# Patient Record
Sex: FEMALE | Race: WHITE | NOT HISPANIC OR LATINO | Employment: OTHER | URBAN - METROPOLITAN AREA
[De-identification: names, ages, dates, MRNs, and addresses within clinical notes are randomized per-mention and may not be internally consistent; named-entity substitution may affect disease eponyms.]

---

## 2017-01-17 ENCOUNTER — TELEPHONE (OUTPATIENT)
Dept: ORTHOPEDICS | Facility: CLINIC | Age: 52
End: 2017-01-17

## 2017-01-17 NOTE — TELEPHONE ENCOUNTER
Pt has a appointment on 01/31/17, pt needs labs couple days before, left voicemail for pt,    Chani Singleton CMA (AAMA)

## 2017-02-17 DIAGNOSIS — C50.412 MALIGNANT NEOPLASM OF UPPER-OUTER QUADRANT OF LEFT FEMALE BREAST (H): ICD-10-CM

## 2017-02-17 LAB
ALBUMIN SERPL-MCNC: 4.2 G/DL (ref 3.4–5)
ALP SERPL-CCNC: 87 U/L (ref 40–150)
ALT SERPL W P-5'-P-CCNC: 33 U/L (ref 0–50)
ANION GAP SERPL CALCULATED.3IONS-SCNC: 6 MMOL/L (ref 3–14)
AST SERPL W P-5'-P-CCNC: 20 U/L (ref 0–45)
BASOPHILS # BLD AUTO: 0 10E9/L (ref 0–0.2)
BASOPHILS NFR BLD AUTO: 0.6 %
BILIRUB SERPL-MCNC: 0.6 MG/DL (ref 0.2–1.3)
BUN SERPL-MCNC: 16 MG/DL (ref 7–30)
CALCIUM SERPL-MCNC: 9.4 MG/DL (ref 8.5–10.1)
CANCER AG27-29 SERPL-ACNC: 24 U/ML (ref 0–39)
CHLORIDE SERPL-SCNC: 104 MMOL/L (ref 94–109)
CO2 SERPL-SCNC: 31 MMOL/L (ref 20–32)
CREAT SERPL-MCNC: 0.85 MG/DL (ref 0.52–1.04)
DIFFERENTIAL METHOD BLD: NORMAL
EOSINOPHIL # BLD AUTO: 0.1 10E9/L (ref 0–0.7)
EOSINOPHIL NFR BLD AUTO: 1.4 %
ERYTHROCYTE [DISTWIDTH] IN BLOOD BY AUTOMATED COUNT: 12.6 % (ref 10–15)
GFR SERPL CREATININE-BSD FRML MDRD: 71 ML/MIN/1.7M2
GLUCOSE SERPL-MCNC: 86 MG/DL (ref 70–99)
HCT VFR BLD AUTO: 43.2 % (ref 35–47)
HGB BLD-MCNC: 14.8 G/DL (ref 11.7–15.7)
IMM GRANULOCYTES # BLD: 0 10E9/L (ref 0–0.4)
IMM GRANULOCYTES NFR BLD: 0.1 %
LYMPHOCYTES # BLD AUTO: 2 10E9/L (ref 0.8–5.3)
LYMPHOCYTES NFR BLD AUTO: 28.3 %
MCH RBC QN AUTO: 32 PG (ref 26.5–33)
MCHC RBC AUTO-ENTMCNC: 34.3 G/DL (ref 31.5–36.5)
MCV RBC AUTO: 94 FL (ref 78–100)
MONOCYTES # BLD AUTO: 0.5 10E9/L (ref 0–1.3)
MONOCYTES NFR BLD AUTO: 6.6 %
NEUTROPHILS # BLD AUTO: 4.6 10E9/L (ref 1.6–8.3)
NEUTROPHILS NFR BLD AUTO: 63 %
PLATELET # BLD AUTO: 173 10E9/L (ref 150–450)
POTASSIUM SERPL-SCNC: 4 MMOL/L (ref 3.4–5.3)
PROT SERPL-MCNC: 7.7 G/DL (ref 6.8–8.8)
RBC # BLD AUTO: 4.62 10E12/L (ref 3.8–5.2)
SODIUM SERPL-SCNC: 141 MMOL/L (ref 133–144)
WBC # BLD AUTO: 7.2 10E9/L (ref 4–11)

## 2017-02-17 PROCEDURE — 36415 COLL VENOUS BLD VENIPUNCTURE: CPT | Performed by: PHYSICIAN ASSISTANT

## 2017-02-17 PROCEDURE — 80053 COMPREHEN METABOLIC PANEL: CPT | Performed by: PHYSICIAN ASSISTANT

## 2017-02-17 PROCEDURE — 86300 IMMUNOASSAY TUMOR CA 15-3: CPT | Performed by: PHYSICIAN ASSISTANT

## 2017-02-17 PROCEDURE — 85025 COMPLETE CBC W/AUTO DIFF WBC: CPT | Performed by: PHYSICIAN ASSISTANT

## 2017-02-21 ENCOUNTER — ONCOLOGY VISIT (OUTPATIENT)
Dept: ONCOLOGY | Facility: CLINIC | Age: 52
End: 2017-02-21
Payer: COMMERCIAL

## 2017-02-21 VITALS
DIASTOLIC BLOOD PRESSURE: 48 MMHG | RESPIRATION RATE: 18 BRPM | HEART RATE: 64 BPM | OXYGEN SATURATION: 100 % | WEIGHT: 131 LBS | BODY MASS INDEX: 19.85 KG/M2 | HEIGHT: 68 IN | SYSTOLIC BLOOD PRESSURE: 96 MMHG | TEMPERATURE: 96.5 F

## 2017-02-21 DIAGNOSIS — C50.412 MALIGNANT NEOPLASM OF UPPER-OUTER QUADRANT OF LEFT FEMALE BREAST (H): Primary | ICD-10-CM

## 2017-02-21 PROCEDURE — 99214 OFFICE O/P EST MOD 30 MIN: CPT | Performed by: INTERNAL MEDICINE

## 2017-02-21 RX ORDER — TAMOXIFEN CITRATE 20 MG/1
20 TABLET ORAL DAILY
Qty: 90 TABLET | Refills: 1 | Status: SHIPPED | OUTPATIENT
Start: 2017-02-21 | End: 2017-08-22

## 2017-02-21 NOTE — NURSING NOTE
"Emma Nunn is a 51 year old female who presents for:  Chief Complaint   Patient presents with     Oncology Clinic Visit     6 month follow up for breast cancer     Results     Labs done on 02/17/17        Initial Vitals:  BP 96/48 (BP Location: Right arm, Patient Position: Chair, Cuff Size: Adult Regular)  Pulse 64  Temp 96.5  F (35.8  C) (Temporal)  Resp 18  Ht 1.722 m (5' 7.79\")  Wt 59.4 kg (131 lb)  SpO2 100%  BMI 20.04 kg/m2 Estimated body mass index is 20.04 kg/(m^2) as calculated from the following:    Height as of this encounter: 1.722 m (5' 7.79\").    Weight as of this encounter: 59.4 kg (131 lb).. Body surface area is 1.69 meters squared. BP completed using cuff size: regular  Data Unavailable No LMP recorded. Patient is not currently having periods (Reason: Irregular Periods). Allergies and medications reviewed.     Medications: Medication refills not needed today.  Pharmacy name entered into EPIC:    [facility] - [place], [place] - [address] [place] [address] PHARMACY [place], [place] - [address] [place] [address]     Comments:     5 minutes for nursing intake (face to face time)   Victor Manuel Alejo MA        "

## 2017-02-21 NOTE — PROGRESS NOTES
Hematology/ Oncology Follow-up Visit:  Feb 21, 2017    Reason for Visit:   Chief Complaint   Patient presents with     Oncology Clinic Visit     6 month follow up for breast cancer     Results     Labs done on 02/17/17       Oncologic History:  Malignant neoplasm of female breast (ALYCE Nunn was diagnosed at age 47, premenopausally through self-breast check, felt a lump in her left outer upper quadrant. Diagnostic mammogram early 07/2013 identified asymmetrical density associated with palpable findings around 1-2 o'clock position, 8-10 cm from the nipple. Limited sonogram revealed hypoechoic mass with irregular borders, measured about 0.8 cm trocar position. Sonogram-guided biopsy indicating invasive ductal cancer, grade 2, with associated DCIS, ER/CT 90% positive, HER-2/sandy positive, FISH ratio greater than 11.7. she had lumpectomy 7/2013 - 1.3 cm LAURA, GII, + ALI, margin is close <=1 mm, +DCIS, 8LN negative.  She has [id number] stage I disease. TCH was recommended from 8//15/2013 to 12/2013 and herceptin after. . She finished RT 2/28/2014 and tamoxifen started in 3/2014.    Interval History:  The patient is here today for follow-up. She has been feeling well without any recent complaints of bony aches or pains. She denies any nausea vomiting or diarrhea. She denies any shortness of breath cough or wheezing. She is currently on tamoxifen. She has been tolerating tamoxifen without significant side effects.      Review Of Systems:  Constitutional: Negative for fever, chills, and night sweats.  Skin: negative.  Eyes: negative.  Ears/Nose/Throat: negative.  Respiratory: No shortness of breath, dyspnea on exertion, cough, or hemoptysis.  Cardiovascular: negative.  Gastrointestinal: negative.  Genitourinary: negative.  Musculoskeletal: negative.  Neurologic: negative.  Psychiatric: negative.  Hematologic/Lymphatic/Immunologic: negative.  Endocrine: negative.    All other ROS negative unless mentioned in interval  "history.    Past medical, social, surgical, and family histories reviewed.    Allergies:  Allergies as of 02/21/2017 - Ray as Reviewed 02/21/2017   Allergen Reaction Noted     No known drug allergies  04/02/2001       Current Medications:  Current Outpatient Prescriptions   Medication Sig Dispense Refill     tamoxifen (NOLVADEX) 20 MG tablet Take 1 tablet (20 mg) by mouth daily 90 tablet 1     levothyroxine (SYNTHROID, LEVOTHROID) 88 MCG tablet Take 1 tablet (88 mcg) by mouth daily 90 tablet 3        Physical Exam:  BP 96/48 (BP Location: Right arm, Patient Position: Chair, Cuff Size: Adult Regular)  Pulse 64  Temp 96.5  F (35.8  C) (Temporal)  Resp 18  Ht 1.722 m (5' 7.79\")  Wt 59.4 kg (131 lb)  SpO2 100%  BMI 20.04 kg/m2  Wt Readings from Last 12 Encounters:   02/21/17 59.4 kg (131 lb)   11/25/16 58.5 kg (129 lb)   07/20/16 63.4 kg (139 lb 11.2 oz)   01/19/16 71.9 kg (158 lb 8 oz)   07/16/15 67.1 kg (148 lb)   05/27/15 65.3 kg (144 lb)   04/24/15 63 kg (139 lb)   04/23/15 63 kg (139 lb)   01/15/15 65.8 kg (145 lb)   10/02/14 68 kg (150 lb)   09/15/14 67.9 kg (149 lb 12.8 oz)   07/23/14 65.7 kg (144 lb 14.4 oz)     ECOG performance status: 0  GENERAL APPEARANCE: Healthy, alert and in no acute distress.  HEENT: Sclerae anicteric. PERRLA. Oropharynx without ulcers, lesions, or thrush.  NECK: Supple. No asymmetry or masses.  LYMPHATICS: No palpable cervical, supraclavicular, axillary, or inguinal lymphadenopathy.  RESP: Lungs clear to auscultation bilaterally without rales, rhonchi or wheezes.  BREAST: There is no dominant masses or axillary adenopathy bilaterally. CARDIOVASCULAR: Regular rate and rhythm. Normal S1, S2; no S3 or S4. No murmur, gallop, or rub.  ABDOMEN: Soft, nontender. Bowel sounds normal. No palpable organomegaly or masses.  MUSCULOSKELETAL: Extremities without gross deformities noted. No edema of bilateral lower extremities.  SKIN: No suspicious lesions or rashes.  NEURO: Alert and oriented x 3. " Cranial nerves II-XII grossly intact.  PSYCHIATRIC: Mentation and affect appear normal.    Laboratory/Imaging Studies:  Orders Only on 02/17/2017   Component Date Value Ref Range Status     WBC 02/17/2017 7.2  4.0 - 11.0 10e9/L Final     RBC Count 02/17/2017 4.62  3.8 - 5.2 10e12/L Final     Hemoglobin 02/17/2017 14.8  11.7 - 15.7 g/dL Final     Hematocrit 02/17/2017 43.2  35.0 - 47.0 % Final     MCV 02/17/2017 94  78 - 100 fl Final     MCH 02/17/2017 32.0  26.5 - 33.0 pg Final     MCHC 02/17/2017 34.3  31.5 - 36.5 g/dL Final     RDW 02/17/2017 12.6  10.0 - 15.0 % Final     Platelet Count 02/17/2017 173  150 - 450 10e9/L Final     Diff Method 02/17/2017 Automated Method   Final     % Neutrophils 02/17/2017 63.0  % Final     % Lymphocytes 02/17/2017 28.3  % Final     % Monocytes 02/17/2017 6.6  % Final     % Eosinophils 02/17/2017 1.4  % Final     % Basophils 02/17/2017 0.6  % Final     % Immature Granulocytes 02/17/2017 0.1  % Final     Absolute Neutrophil 02/17/2017 4.6  1.6 - 8.3 10e9/L Final     Absolute Lymphocytes 02/17/2017 2.0  0.8 - 5.3 10e9/L Final     Absolute Monocytes 02/17/2017 0.5  0.0 - 1.3 10e9/L Final     Absolute Eosinophils 02/17/2017 0.1  0.0 - 0.7 10e9/L Final     Absolute Basophils 02/17/2017 0.0  0.0 - 0.2 10e9/L Final     Abs Immature Granulocytes 02/17/2017 0.0  0 - 0.4 10e9/L Final     Sodium 02/17/2017 141  133 - 144 mmol/L Final     Potassium 02/17/2017 4.0  3.4 - 5.3 mmol/L Final     Chloride 02/17/2017 104  94 - 109 mmol/L Final     Carbon Dioxide 02/17/2017 31  20 - 32 mmol/L Final     Anion Gap 02/17/2017 6  3 - 14 mmol/L Final     Glucose 02/17/2017 86  70 - 99 mg/dL Final     Urea Nitrogen 02/17/2017 16  7 - 30 mg/dL Final     Creatinine 02/17/2017 0.85  0.52 - 1.04 mg/dL Final     GFR Estimate 02/17/2017 71  >60 mL/min/1.7m2 Final    Non  GFR Calc     GFR Estimate If Black 02/17/2017 86  >60 mL/min/1.7m2 Final    African American GFR Calc     Calcium 02/17/2017 9.4   8.5 - 10.1 mg/dL Final     Bilirubin Total 02/17/2017 0.6  0.2 - 1.3 mg/dL Final     Albumin 02/17/2017 4.2  3.4 - 5.0 g/dL Final     Protein Total 02/17/2017 7.7  6.8 - 8.8 g/dL Final     Alkaline Phosphatase 02/17/2017 87  40 - 150 U/L Final     ALT 02/17/2017 33  0 - 50 U/L Final     AST 02/17/2017 20  0 - 45 U/L Final     CA 27-29 02/17/2017 24  0 - 39 U/mL Final    Assay Method:  Chemiluminescence using Siemens Centaur XP          Assessment and plan:    (C50.412) Malignant neoplasm of upper-outer quadrant of left female breast (H)  I reviewed with the patient today most recent laboratory results. We also reviewed mammographic images. There is no clinical evidence of disease recurrence. The patient will continue on adjuvant endocrine therapy with tamoxifen 20 mg orally daily. She will continue on calcium and vitamin D supplements. I will see her again in 6 months or sooner if there is development of concerns.  The patient is ready to learn, no apparent learning barriers were identified.  Diagnosis and treatment plans were explained to the patient. The patient expressed understanding of the content. The patient asked appropriate questions. The patient questions were answered to her satisfaction.    Chart documentation with Dragon Voice recognition Software. Although reviewed after completion, some words and grammatical errors may remain.

## 2017-02-21 NOTE — MR AVS SNAPSHOT
After Visit Summary   2/21/2017    Emma Nunn    MRN: [id number]           Patient Information     Date Of Birth          1965        Visit Information        Provider Department      2/21/2017 3:00 PM Richard Ocampo MD [facility]        Today's Diagnoses     Malignant neoplasm of upper-outer quadrant of left female breast (H)    -  1      Care Instructions      Please follow up with Dr. Poe or available Oncologist in 6 months.  Please schedule labs 2-5 days prior to follow up appointment.    Abiodunhanane missed you today.  Please call [phone] to schedule labs and follow up.    Lab Date/Time:    Follow Up Date/Time:     If you have any questions or concerns please feel free to call.    Fortino Garcia, RN, BSN   Oncology Care Coordinator RN  [facility]  [phone]            Follow-ups after your visit        Follow-up notes from your care team     Return in about 6 months (around 8/21/2017) for Blood work before next appointment.      Future tests that were ordered for you today     Open Future Orders        Priority Expected Expires Ordered    Ca27.29  breast tumor marker Routine 7/21/2017 2/21/2018 2/21/2017    CBC with platelets differential Routine 7/21/2017 2/21/2018 2/21/2017    Comprehensive metabolic panel Routine 7/21/2017 2/21/2018 2/21/2017            Who to contact     If you have questions or need follow up information about today's clinic visit or your schedule please contact [facility] directly at [phone].  Normal or non-critical lab and imaging results will be communicated to you by MyChart, letter or phone within 4 business days after the clinic has received the results. If you do not hear from us within 7 days, please contact the clinic through MyChart or phone. If you have a critical or abnormal lab result, we will notify you by phone as soon as possible.  Submit refill requests through [employer] or call your pharmacy and they  "will forward the refill request to us. Please allow 3 business days for your refill to be completed.          Additional Information About Your Visit        [email or web address] Information     [employer] lets you send messages to your doctor, view your test results, renew your prescriptions, schedule appointments and more. To sign up, go to [email or web address] . Click on \"Log in\" on the left side of the screen, which will take you to the Welcome page. Then click on \"Sign up Now\" on the right side of the page.     You will be asked to enter the access code listed below, as well as some personal information. Please follow the directions to create your username and password.     Your access code is: [email or web address]  Expires: 2017  3:19 PM     Your access code will  in 90 days. If you need help or a new code, please call your [facility] or [phone].        Care EveryWhere ID     This is your [facility] EveryWhere ID. This could be used by other organizations to access your [facility] medical records  [id number]        Your Vitals Were     Pulse Temperature Respirations Height Pulse Oximetry BMI (Body Mass Index)    64 96.5  F (35.8  C) (Temporal) 18 1.722 m (5' 7.79\") 100% 20.04 kg/m2       Blood Pressure from Last 3 Encounters:   17 96/48   16 110/68   16 106/60    Weight from Last 3 Encounters:   17 59.4 kg (131 lb)   16 58.5 kg (129 lb)   16 63.4 kg (139 lb 11.2 oz)                 Where to get your medicines      These medications were sent to [facility] [place] - [address] [place] [address]     Phone:  [phone]     tamoxifen 20 MG tablet          Primary Care Provider Office Phone # Fax #    Zoltan Tolentino [phone]       [facility] [address] [place] [address]        Thank you!     Thank you for choosing [facility]  for your care. Our goal is always to " provide you with excellent care. Hearing back from our patients is one way we can continue to improve our services. Please take a few minutes to complete the written survey that you may receive in the mail after your visit with us. Thank you!             Your Updated Medication List - Protect others around you: Learn how to safely use, store and throw away your medicines at www.disposemymeds.org.          This list is accurate as of: 2/21/17  3:43 PM.  Always use your most recent med list.                   Brand Name Dispense Instructions for use    levothyroxine 88 MCG tablet    SYNTHROID/LEVOTHROID    90 tablet    Take 1 tablet (88 mcg) by mouth daily       tamoxifen 20 MG tablet    NOLVADEX    90 tablet    Take 1 tablet (20 mg) by mouth daily

## 2017-02-21 NOTE — NURSING NOTE
DISCHARGE PLAN:  Next appointments: See patient instruction section  Departure Mode: Ambulatory  Accompanied by: unknown  0 minutes for nursing discharge (face to face time)     Emma Nunn is here today for 6 month Oncology follow up for breast cancer.  Patient was not seen by writing nurse at time of appointment. Patient to follow up in 6 months with labs prior.  AVS mailed to patient. Patient to schedule appointments. See patient instructions and Oncologist's Progress note for further details. Questions and concerns addressed to patient's satisfaction. Patient verbalized and demonstrated understanding of plan.  Contact information provided and patient is encouraged to call with any that arise in the interim of care.    Fortino Garcia, RN, BSN, OCN   Oncology Care Coordinator RN  [facility]  [phone]  2/21/2017, 3:50 PM

## 2017-02-21 NOTE — ASSESSMENT & PLAN NOTE
Emma Nunn was diagnosed at age 47, premenopausally through self-breast check, felt a lump in her left outer upper quadrant. Diagnostic mammogram early 07/2013 identified asymmetrical density associated with palpable findings around 1-2 o'clock position, 8-10 cm from the nipple. Limited sonogram revealed hypoechoic mass with irregular borders, measured about 0.8 cm trocar position. Sonogram-guided biopsy indicating invasive ductal cancer, grade 2, with associated DCIS, ER/GA 90% positive, HER-2/sandy positive, FISH ratio greater than 11.7. she had lumpectomy 7/2013 - 1.3 cm LAURA, GII, + ALI, margin is close <=1 mm, +DCIS, 8LN negative.  She has [id number] stage I disease. TCH was recommended from 8//15/2013 to 12/2013 and herceptin after. . She finished RT 2/28/2014 and tamoxifen started in 3/2014.

## 2017-02-21 NOTE — PATIENT INSTRUCTIONS
Please follow up with Dr. Poe or available Oncologist in 6 months.  Please schedule labs 2-5 days prior to follow up appointment.    Sorry missed you today.  Please call [phone] to schedule labs and follow up.    Lab Date/Time:    Follow Up Date/Time:     If you have any questions or concerns please feel free to call.    Fortino Garcia RN, BSN   Oncology Care Coordinator RN  [facility]  [phone]

## 2017-05-02 ENCOUNTER — TRANSFERRED RECORDS (OUTPATIENT)
Dept: HEALTH INFORMATION MANAGEMENT | Facility: CLINIC | Age: 52
End: 2017-05-02

## 2017-05-10 ENCOUNTER — OFFICE VISIT (OUTPATIENT)
Dept: FAMILY MEDICINE | Facility: CLINIC | Age: 52
End: 2017-05-10
Payer: COMMERCIAL

## 2017-05-10 VITALS
HEART RATE: 56 BPM | DIASTOLIC BLOOD PRESSURE: 62 MMHG | RESPIRATION RATE: 16 BRPM | SYSTOLIC BLOOD PRESSURE: 102 MMHG | TEMPERATURE: 97.4 F | WEIGHT: 130 LBS | BODY MASS INDEX: 19.89 KG/M2
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
## 2017-05-10 DIAGNOSIS — K82.8 ENLARGED GALLBLADDER: ICD-10-CM

## 2017-05-10 DIAGNOSIS — S06.0X9D CONCUSSION WITH LOSS OF CONSCIOUSNESS, UNSPECIFIED DURATION, SUBSEQUENT ENCOUNTER: ICD-10-CM

## 2017-05-10 DIAGNOSIS — S01.81XD LACERATION OF FOREHEAD, SUBSEQUENT ENCOUNTER: ICD-10-CM

## 2017-05-10 PROCEDURE — 99214 OFFICE O/P EST MOD 30 MIN: CPT | Performed by: FAMILY MEDICINE

## 2017-05-10 RX ORDER — HYDROCODONE BITARTRATE AND ACETAMINOPHEN 5; 325 MG/1; MG/1
1 TABLET ORAL PRN
COMMUNITY
Start: 2017-05-02 | End: 2017-05-12

## 2017-05-10 RX ORDER — IBUPROFEN 600 MG/1
1 TABLET, FILM COATED ORAL EVERY 6 HOURS
COMMUNITY
Start: 2017-05-02 | End: 2017-05-12

## 2017-05-10 RX ORDER — ONDANSETRON 4 MG/1
1 TABLET, ORALLY DISINTEGRATING ORAL PRN
COMMUNITY
Start: 2017-05-02 | End: 2017-05-12

## 2017-05-10 RX ORDER — METHOCARBAMOL 750 MG/1
2 TABLET, FILM COATED ORAL 3 TIMES DAILY
COMMUNITY
Start: 2017-05-02 | End: 2017-05-12

## 2017-05-10 ASSESSMENT — PAIN SCALES - GENERAL: PAINLEVEL: MILD PAIN (2)
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
## 2017-05-10 NOTE — MR AVS SNAPSHOT
After Visit Summary   5/10/2017    Emma Nunn    MRN: [id number]           Patient Information     Date Of Birth          1965        Visit Information        Provider Department      5/10/2017 2:40 PM Zoltan Tolentino MD [facility]        Today's Diagnoses     Enlarged gallbladder    -  1       Follow-ups after your visit        Your next 10 appointments already scheduled     May 15, 2017  8:45 AM CDT   US ABDOMEN COMPLETE with PHUS1   [facility] ([facility])    [address] [place] [address] [phone]           Please bring a list of your medicines (including vitamins, minerals and over-the-counter drugs). Also, tell your doctor about any allergies you may have. Wear comfortable clothes and leave your valuables at home.  Adults: No eating or drinking for 8 hours before the exam. You may take medicine with a small sip of water.  Children: - Children 6+ years: No food or drink for 6 hours before exam. - Children 1-5 years: No food or drink for 4 hours before exam. - Infants, breast-fed: may have breast milk up to 2 hours before exam. - Infants, formula: may have bottle until 4 hours before exam.  Please call the Imaging Department at your exam site with any questions.              Future tests that were ordered for you today     Open Future Orders        Priority Expected Expires Ordered    US Abdomen Complete Routine 8/8/2017 5/10/2018 5/10/2017            Who to contact     If you have questions or need follow up information about today's clinic visit or your schedule please contact [facility] directly at [phone].  Normal or non-critical lab and imaging results will be communicated to you by MyChart, letter or phone within 4 business days after the clinic has received the results. If you do not hear from us within 7 days, please contact the clinic through MyChart or phone. If you have a critical or  "abnormal lab result, we will notify you by phone as soon as possible.  Submit refill requests through [employer] or call your pharmacy and they will forward the refill request to us. Please allow 3 business days for your refill to be completed.          Additional Information About Your Visit        [employer] Information     Bontera lets you send messages to your doctor, view your test results, renew your prescriptions, schedule appointments and more. To sign up, go to [email or web address] . Click on \"Log in\" on the left side of the screen, which will take you to the Welcome page. Then click on \"Sign up Now\" on the right side of the page.     You will be asked to enter the access code listed below, as well as some personal information. Please follow the directions to create your username and password.     Your access code is: [email or web address]  Expires: 2017  3:24 PM     Your access code will  in 90 days. If you need help or a new code, please call your [facility] or [phone].        Care EveryWhere ID     This is your Care EveryWhere ID. This could be used by other organizations to access your [facility] medical records  [id number]        Your Vitals Were     Pulse Temperature Respirations BMI (Body Mass Index)          56 97.4  F (36.3  C) (Temporal) 16 19.89 kg/m2         Blood Pressure from Last 3 Encounters:   05/10/17 102/62   17 96/48   16 110/68    Weight from Last 3 Encounters:   05/10/17 130 lb (59 kg)   17 131 lb (59.4 kg)   16 129 lb (58.5 kg)               Primary Care Provider Office Phone # Fax #    Zoltan Tolentino [phone]       [facility] [address] [place] [address]        Thank you!     Thank you for choosing [facility]  for your care. Our goal is always to provide you with excellent care. Hearing back from our patients is one way we can continue to improve our services. Please take a few minutes " to complete the written survey that you may receive in the mail after your visit with us. Thank you!             Your Updated Medication List - Protect others around you: Learn how to safely use, store and throw away your medicines at www.disposemymeds.org.          This list is accurate as of: 5/10/17  3:24 PM.  Always use your most recent med list.                   Brand Name Dispense Instructions for use    HYDROcodone-acetaminophen 5-325 MG per tablet    NORCO     Take 1 tablet by mouth as needed       ibuprofen 600 MG tablet    ADVIL/MOTRIN     Take 1 tablet by mouth every 6 hours       levothyroxine 88 MCG tablet    SYNTHROID/LEVOTHROID    90 tablet    Take 1 tablet (88 mcg) by mouth daily       methocarbamol 750 MG tablet    ROBAXIN     Take 2 tablets by mouth 3 times daily       ondansetron 4 MG ODT tab    ZOFRAN-ODT     Take 1 tablet by mouth as needed       tamoxifen 20 MG tablet    NOLVADEX    90 tablet    Take 1 tablet (20 mg) by mouth daily

## 2017-05-15 ENCOUNTER — HOSPITAL ENCOUNTER (OUTPATIENT)
Dept: ULTRASOUND IMAGING | Facility: CLINIC | Age: 52
Discharge: HOME | End: 2017-05-15
Attending: FAMILY MEDICINE | Admitting: FAMILY MEDICINE
Payer: COMMERCIAL

## 2017-05-15 DIAGNOSIS — K82.8 ENLARGED GALLBLADDER: ICD-10-CM

## 2017-05-15 PROCEDURE — 76705 ECHO EXAM OF ABDOMEN: CPT

## 2017-05-17 ENCOUNTER — TELEPHONE (OUTPATIENT)
Dept: FAMILY MEDICINE | Facility: CLINIC | Age: 52
End: 2017-05-17

## 2017-05-17 DIAGNOSIS — R93.5 ABNORMAL ABDOMINAL ULTRASOUND: Primary | ICD-10-CM

## 2017-05-17 NOTE — TELEPHONE ENCOUNTER
Reason for Call:  Request for results:    Name of test or procedure: ultrasound    Date of test of procedure: 5/15/17    Location of the test or procedure: [facility]    OK to leave the result message on voice mail or with a family member? YES    Phone number Patient can be reached at:  Home number on file [phone] (home)    Additional comments: Emma would appreciate a call asap to discuss her results.     Call taken on 5/17/2017 at 2:42 PM by Neel Trivedi

## 2017-05-17 NOTE — TELEPHONE ENCOUNTER
Zoltan Tolentino MD Blomgren, Kristi, MA                   Gallbladder ultrasound showed some gallstones and sludge in there. May need surgical consult we can discuss this next week.

## 2017-05-17 NOTE — TELEPHONE ENCOUNTER
Patient notified and she would like to discuss this with surgery. Anna Tolentino can you please get her set up with general surgery to discuss gall stones. KB/CMA

## 2017-05-17 NOTE — PROGRESS NOTES
Gallbladder ultrasound showed some gallstones and sludge in there. May need surgical consult we can discuss this next week.

## 2017-05-19 ENCOUNTER — OFFICE VISIT (OUTPATIENT)
Dept: SURGERY | Facility: OTHER | Age: 52
End: 2017-05-19
Payer: COMMERCIAL

## 2017-05-19 ENCOUNTER — TELEPHONE (OUTPATIENT)
Dept: SURGERY | Facility: OTHER | Age: 52
End: 2017-05-19

## 2017-05-19 VITALS — WEIGHT: 129 LBS | BODY MASS INDEX: 19.74 KG/M2 | TEMPERATURE: 97.9 F | HEART RATE: 76 BPM

## 2017-05-19 DIAGNOSIS — R11.0 NAUSEA: ICD-10-CM

## 2017-05-19 DIAGNOSIS — F07.81 POST CONCUSSIVE SYNDROME: ICD-10-CM

## 2017-05-19 DIAGNOSIS — K80.50 BILIARY COLIC: Primary | ICD-10-CM

## 2017-05-19 LAB
ALBUMIN SERPL-MCNC: 3.9 G/DL (ref 3.4–5)
ALP SERPL-CCNC: 79 U/L (ref 40–150)
ALT SERPL W P-5'-P-CCNC: 33 U/L (ref 0–50)
ANION GAP SERPL CALCULATED.3IONS-SCNC: 5 MMOL/L (ref 3–14)
AST SERPL W P-5'-P-CCNC: 20 U/L (ref 0–45)
BILIRUB SERPL-MCNC: 0.3 MG/DL (ref 0.2–1.3)
BUN SERPL-MCNC: 12 MG/DL (ref 7–30)
CALCIUM SERPL-MCNC: 8.8 MG/DL (ref 8.5–10.1)
CHLORIDE SERPL-SCNC: 108 MMOL/L (ref 94–109)
CO2 SERPL-SCNC: 30 MMOL/L (ref 20–32)
CREAT SERPL-MCNC: 0.79 MG/DL (ref 0.52–1.04)
ERYTHROCYTE [DISTWIDTH] IN BLOOD BY AUTOMATED COUNT: 12.3 % (ref 10–15)
GFR SERPL CREATININE-BSD FRML MDRD: 77 ML/MIN/1.7M2
GLUCOSE SERPL-MCNC: 88 MG/DL (ref 70–99)
HCT VFR BLD AUTO: 41.1 % (ref 35–47)
HGB BLD-MCNC: 13.9 G/DL (ref 11.7–15.7)
MCH RBC QN AUTO: 32.7 PG (ref 26.5–33)
MCHC RBC AUTO-ENTMCNC: 33.8 G/DL (ref 31.5–36.5)
MCV RBC AUTO: 97 FL (ref 78–100)
PLATELET # BLD AUTO: 142 10E9/L (ref 150–450)
POTASSIUM SERPL-SCNC: 4.6 MMOL/L (ref 3.4–5.3)
PROT SERPL-MCNC: 7 G/DL (ref 6.8–8.8)
RBC # BLD AUTO: 4.25 10E12/L (ref 3.8–5.2)
SODIUM SERPL-SCNC: 143 MMOL/L (ref 133–144)
WBC # BLD AUTO: 5.8 10E9/L (ref 4–11)

## 2017-05-19 PROCEDURE — 99241 ZZC OFFICE CONSULTATION,LEVEL I: CPT | Performed by: SPECIALIST

## 2017-05-19 RX ORDER — ONDANSETRON 4 MG/1
4 TABLET, FILM COATED ORAL EVERY 8 HOURS PRN
Qty: 30 TABLET | Refills: 3 | Status: SHIPPED | OUTPATIENT
Start: 2017-05-19 | End: 2017-10-24

## 2017-05-19 NOTE — MR AVS SNAPSHOT
After Visit Summary   5/19/2017    Emma Nunn    MRN: [id number]           Patient Information     Date Of Birth          1965        Visit Information        Provider Department      5/19/2017 8:00 AM Jason Munoz MD [facility]        Today's Diagnoses     Biliary colic    -  1    Nausea        Post concussive syndrome           Follow-ups after your visit        Additional Services     CONCUSSION  REFERRAL       [facility] is referring you to the Concussion  service at [facility].      The  Representative will assist you in the coordination of your concussion care as prescribed by your physician.    The  Representative will contact you within one business day, or you may contact the  Representative at [phone].    Referral Options:  Non-Sports related concussion management - [facility] 5-5-2017    Coverage of these services are subject to the terms and limitations of your health insurance plan.  Please call member services at your health plan with any benefit or coverage questions.     If X-rays, CT or MRI's have been performed, please contact the facility where they were done, to arrange for  prior to your scheduled appointment.  Please bring this referral request to your appointment and present it to your specialist.                  Your next 10 appointments already scheduled     May 22, 2017  7:00 AM CDT   Pre-Op physical with BUZZ Raines CNP   [facility]    [address] [facility] [address] [phone]            May 24, 2017  8:40 AM CDT   Office Visit with Zoltan Tolentino MD   [facility]    [address] [place] [address] [phone]           Bring a current list of meds and any records pertaining to this visit.  For Physicals, please bring immunization  "records and any forms needing to be filled out.  Please arrive 10 minutes early to complete paperwork.            Jun 01, 2017  7:30 AM CDT   Return Visit with Jason Munoz MD   [facility] ([facility]    [address] [facility]  [place] [address] [phone]              Future tests that were ordered for you today     Open Future Orders        Priority Expected Expires Ordered    **CBC with platelets FUTURE 1yr Routine 4/19/2018 5/19/2018 5/19/2017    **Comprehensive metabolic panel FUTURE 1yr Routine 4/19/2018 5/19/2018 5/19/2017            Who to contact     If you have questions or need follow up information about today's clinic visit or your schedule please contact [facility] directly at [phone].  Normal or non-critical lab and imaging results will be communicated to you by [employer], letter or phone within 4 business days after the clinic has received the results. If you do not hear from us within 7 days, please contact the clinic through [employer] or phone. If you have a critical or abnormal lab result, we will notify you by phone as soon as possible.  Submit refill requests through [employer] or call your pharmacy and they will forward the refill request to us. Please allow 3 business days for your refill to be completed.          Additional Information About Your Visit        [employer] Information     [employer] lets you send messages to your doctor, view your test results, renew your prescriptions, schedule appointments and more. To sign up, go to [email or web address] . Click on \"Log in\" on the left side of the screen, which will take you to the Welcome page. Then click on \"Sign up Now\" on the right side of the page.     You will be asked to enter the access code listed below, as well as some personal information. Please follow the directions to create your username and password.     Your access code is: [email or web address]  Expires: 8/8/2017  3:24 PM     Your " access code will  in 90 days. If you need help or a new code, please call your [facility] clinic or [phone].        Care EveryWhere ID     This is your Care EveryWhere ID. This could be used by other organizations to access your [facility] medical records  [id number]        Your Vitals Were     Pulse Temperature BMI (Body Mass Index)             76 97.9  F (36.6  C) (Temporal) 19.74 kg/m2          Blood Pressure from Last 3 Encounters:   05/10/17 102/62   17 96/48   16 110/68    Weight from Last 3 Encounters:   17 58.5 kg (129 lb)   05/10/17 59 kg (130 lb)   17 59.4 kg (131 lb)              We Performed the Following     CONCUSSION  REFERRAL          Today's Medication Changes          These changes are accurate as of: 17  8:57 AM.  If you have any questions, ask your nurse or doctor.               Start taking these medicines.        Dose/Directions    ondansetron 4 MG tablet   Commonly known as:  ZOFRAN   Used for:  Nausea   Started by:  Jason Munoz MD        Dose:  4 mg   Take 1 tablet (4 mg) by mouth every 8 hours as needed for nausea   Quantity:  30 tablet   Refills:  3            Where to get your medicines      These medications [facility] sent to [facility] [place] [address] [facility]   [address] [place] [address] [facility] [address]     Phone:  [phone]     ondansetron 4 MG tablet                Primary Care Provider Office Phone # Fax #    Zoltan Tolentino [phone]       [address]   [facility] [address]        Thank you!     Thank you for choosing [facility]  for your care. Our goal is always to provide you with excellent care. Hearing back from our patients is one way we can continue to improve our services. Please take a few minutes to complete the written survey that you may receive in the mail after your visit with us. Thank you!             Your Updated Medication List  - Protect others around you: Learn how to safely use, store and throw away your medicines at www.disposemymeds.org.          This list is accurate as of: 5/19/17  8:57 AM.  Always use your most recent med list.                   Brand Name Dispense Instructions for use    levothyroxine 88 MCG tablet    SYNTHROID/LEVOTHROID    90 tablet    Take 1 tablet (88 mcg) by mouth daily       ondansetron 4 MG tablet    ZOFRAN    30 tablet    Take 1 tablet (4 mg) by mouth every 8 hours as needed for nausea       tamoxifen 20 MG tablet    NOLVADEX    90 tablet    Take 1 tablet (20 mg) by mouth daily

## 2017-05-19 NOTE — NURSING NOTE
"Chief Complaint   Patient presents with     Abdominal Pain     possible gallbladder     Consult     referring Rajan       Initial Pulse 76  Temp 97.9  F (36.6  C) (Temporal)  Wt 58.5 kg (129 lb)  BMI 19.74 kg/m2 Estimated body mass index is 19.74 kg/(m^2) as calculated from the following:    Height as of 2/21/17: 1.722 m (5' 7.79\").    Weight as of this encounter: 58.5 kg (129 lb).  Medication Reconciliation: complete    "

## 2017-05-19 NOTE — PATIENT INSTRUCTIONS
Don't take anymore Ibuprofen, Aspirin, Naproxen or Aleve prior to surgery.   Tylenol is okay to use if needed.   Labs will be done today.  We will let you know only if there is something that would prevent your surgery.  If your labs are normal, you will get a letter within the next 1-2 weeks.     Before Your Surgery      Call your surgeon if there is any change in your health. This includes signs of a cold or flu (such as a sore throat, runny nose, cough, rash or fever).    Do not smoke, drink alcohol or take over the counter medicine (unless your surgeon or primary care doctor tells you to) for the 24 hours before and after surgery.    If you take prescribed drugs: Follow your doctor s orders about which medicines to take and which to stop until after surgery.    Eating and drinking prior to surgery: follow the instructions from your surgeon    Take a shower or bath the night before surgery. Use the soap your surgeon gave you to gently clean your skin. If you do not have soap from your surgeon, use your regular soap. Do not shave or scrub the surgery site.  Wear clean pajamas and have clean sheets on your bed.   Before Your Surgery    Call your surgeon if there is any change in your health. This includes signs of a cold or flu (such as a sore throat, runny nose, cough, rash or fever).  Do not smoke, drink alcohol or take over the counter medicine (unless your surgeon or primary care doctor tells you to) for the 24 hours before and after surgery.  If you take prescribed drugs: Follow your doctor s orders about which medicines to take and which to stop until after surgery.  Eating and drinking prior to surgery: follow the instructions from your surgeon  Take a shower or bath the night before surgery. Use the soap your surgeon gave you to gently clean your skin. If you do not have soap from your surgeon, use your regular soap. Do not shave or scrub the surgery site.  Wear clean pajamas and have clean sheets on your  bed.
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
## 2017-05-19 NOTE — TELEPHONE ENCOUNTER
Surgery Scheduled    Date of Surgery 05/24/17 Time of Surgery 10:00am  Procedure: Laparoscopic Cholecystectomy  Hospital/Surgical Facility: [facility]  Surgeon: Dr Munoz  Type of Anesthesia Anticipated: General  Pre-Op: 05/22/17 with Shelli Brito   Post-Op: 06/01/17 with Dr Munoz  Pre-Certification -to be completed  Consent Signed -to be completed  Hospital Stay -same day procedure    Surgery Packet (and/or) Colonscopy Prep (was given/or mailed) to patient. Patient was also instructed to arrive 1 hour(s) prior to surgery.  Patient understood and agrees to the plan.      Emma Jim  Specialty

## 2017-05-19 NOTE — NURSING NOTE
[facility] Surgical Services    Emma Nunn has been given the following teaching information:  Before Your Surgery booklet  [place]: Understanding Laproscopic Gallbladder Surgery  Instructions for Showering or Bathing before Surgery  Request for surgery sheet given to Emma at [facility]

## 2017-05-22 ENCOUNTER — OFFICE VISIT (OUTPATIENT)
Dept: ORTHOPEDICS | Facility: CLINIC | Age: 52
End: 2017-05-22
Payer: COMMERCIAL

## 2017-05-22 ENCOUNTER — OFFICE VISIT (OUTPATIENT)
Dept: FAMILY MEDICINE | Facility: OTHER | Age: 52
End: 2017-05-22
Payer: COMMERCIAL

## 2017-05-22 VITALS
HEART RATE: 83 BPM | BODY MASS INDEX: 19.23 KG/M2 | SYSTOLIC BLOOD PRESSURE: 84 MMHG | OXYGEN SATURATION: 100 % | RESPIRATION RATE: 20 BRPM | HEIGHT: 68 IN | DIASTOLIC BLOOD PRESSURE: 56 MMHG | TEMPERATURE: 97.1 F | WEIGHT: 126.9 LBS

## 2017-05-22 VITALS — HEIGHT: 68 IN | BODY MASS INDEX: 19.23 KG/M2 | WEIGHT: 126.9 LBS | HEART RATE: 78 BPM

## 2017-05-22 DIAGNOSIS — S06.0X1A CONCUSSION WITH LOSS OF CONSCIOUSNESS <= 30 MIN, INITIAL ENCOUNTER: Primary | ICD-10-CM

## 2017-05-22 DIAGNOSIS — E03.9 HYPOTHYROIDISM, UNSPECIFIED TYPE: ICD-10-CM

## 2017-05-22 DIAGNOSIS — Z01.818 PREOP GENERAL PHYSICAL EXAM: Primary | ICD-10-CM
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
## 2017-05-22 DIAGNOSIS — K80.50 BILIARY COLIC: ICD-10-CM

## 2017-05-22 LAB
T4 FREE SERPL-MCNC: 1.3 NG/DL (ref 0.76–1.46)
TSH SERPL DL<=0.005 MIU/L-ACNC: 6.81 MU/L (ref 0.4–4)

## 2017-05-22 PROCEDURE — 99244 OFF/OP CNSLTJ NEW/EST MOD 40: CPT | Performed by: PHYSICAL MEDICINE & REHABILITATION

## 2017-05-22 PROCEDURE — 84439 ASSAY OF FREE THYROXINE: CPT | Performed by: NURSE PRACTITIONER

## 2017-05-22 PROCEDURE — 93000 ELECTROCARDIOGRAM COMPLETE: CPT | Performed by: NURSE PRACTITIONER

## 2017-05-22 PROCEDURE — 36415 COLL VENOUS BLD VENIPUNCTURE: CPT | Performed by: NURSE PRACTITIONER

## 2017-05-22 PROCEDURE — 99214 OFFICE O/P EST MOD 30 MIN: CPT | Performed by: NURSE PRACTITIONER

## 2017-05-22 PROCEDURE — 84443 ASSAY THYROID STIM HORMONE: CPT | Performed by: NURSE PRACTITIONER

## 2017-05-22 ASSESSMENT — PAIN SCALES - GENERAL: PAINLEVEL: EXTREME PAIN (8)
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
## 2017-05-22 NOTE — LETTER
[facility]  [address] [facility] [address]  Phone: [phone]          May 22, 2017    Emma Nunn  [address] [facility] [address]          Dear Emma,      LAB RESULTS:     The results of your recent labs were stable, okay for surgery.  If you have any further questions or problems, please contact our office.          Sincerely,      Shelli Brito, CNP      Component      Latest Ref Rng & Units 5/22/2017   TSH      0.40 - 4.00 mU/L 6.81 (H)   T4 Free      0.76 - 1.46 ng/dL 1.30

## 2017-05-22 NOTE — PATIENT INSTRUCTIONS
Today's Plan of Care:  -Rehab: Physical Therapy: [facility] - [phone] and Occupational Therapy. Please do 5-6 days of exercises per week between formal sessions and the home exercises they provide.  -Continue Zofran as directed  -Recommend melatonin for sleep. Take 30 minutes before attempting to go to sleep  -Rest physically and cognitively as much as possible. Avoid things that worsen your symptoms.  -Limit screen time: computers, iPads, cell phones, video games, TV    Follow Up: 6/5 or sooner if symptoms fail to improve or worsen. Call with any questions or concerns.       Healing After a Concussion     Rest  Rest is the best treatment for a concussion. You should avoid activities that cause your symptoms to get worse or make you feel tired. This would include physical activities as well as watching TV, texting or playing video games.    You may sleep or nap during the day as long as it does not prevent you from sleeping at night. If you find it is hard to fall asleep, talk to your doctor. You may need medicine to help you sleep.    If symptoms have not worsened, you do not need to be wakened and checked on during the night.      Work  You may need to change your work routine as you recover. A doctor can help you create a plan for the conditions at your job.      Treat pain    Take Tylenol (acetaminophen) for headaches and pain every 4 to 6 hours, as needed.    Do not take over-the-counter medicines such as ibuprofen, Advil, Motrin, Benadryl, Aleve, sleep aides or Tylenol PM. These drugs may cause new problems.    If you cannot manage your pain with Tylenol, call your doctor or go to the emergency department.      Watch symptoms closely  Each day keep track of your symptoms. This will help your doctor see how well you are healing. Write down the symptom, how often it occurs, how long it lasts, and what makes it better or worse.    Possible symptoms: headache, stomach upset, feeling confused or  "dizzy, motion sickness, and personality changes.      Returning to activity  Take your time returning to activity. A doctor can help determine what levels of activity are best for you. If you re returning to a sport, you should see a healthcare provider before doing so.      If you have questions, call  Concussion hotline: [phone] or Athletic medicine hotline: 732.473.3805.          For informational purposes only.  Not to replace the advice of your health care provider.  Copyright   2014 [facility].  All rights reserved.            Sleep Hygiene     What is it?    \"Sleep hygiene\" means having good sleep habits. Follow the tips below to sleep better at night.      Get on a schedule. Go to bed and get up at about the same time every day.    Listen to your body. Only try to sleep when you actually feel tired or sleepy.    Be patient. If you haven't been able to get to sleep after about 20 minutes or more, get up and do something calming or boring until you feel sleepy. Then, return to bed and try again.      Avoid caffeine (coffee, tea, cola drinks, chocolate and some medicines) for at least 4 to 6 hours before going to bed. We also suggest you don't use alcohol or nicotine (cigarettes) during this time. Both can make it harder for you to fall asleep and stay asleep.    Use your bed for sleeping only. That means no TV, computer or homework in bed!    Don't nap during the day. If you do nap, make sure it is for less than an hour and before 3 p.m.    Create sleep rituals that remind your body that it is time to sleep. Examples include breathing exercises, stretching, or reading a book.     Try a bath or shower before bed. Having a hot bath 1 to 2 hours before bedtime can help you feel sleepy.    Don't watch the clock. Checking the clock during the night can wake you up. It can also lead to negative thoughts such as \"I will never fall asleep.\"    Use a sleep diary. Track your sleep schedule to know " your sleep patterns and to see where you can improve.    Get regular exercise. But try not to do heavy exercise in the 4 hours before bedtime.      Eat a healthy, balanced diet. Try eating a light, healthy snack before bed, but avoid eating a heavy meal.    Create the right sleeping area. A cool, dark, quiet room is best. If needed, try earplugs, fans and blackout curtains.      Keep your daytime routine the same even if you have a bad night sleep. Avoiding activities the next day can make it harder to sleep.          For informational purposes only. Not to replace the advice of your health care provider. Copyright   2013 [employer]. All rights reserved.

## 2017-05-22 NOTE — NURSING NOTE
"Chief Complaint   Patient presents with     Pre-Op Exam     lap stefan       Initial BP (!) 84/56  Pulse 83  Temp 97.1  F (36.2  C) (Tympanic)  Resp 20  Ht 5' 7.5\" (1.715 m)  Wt 126 lb 14.4 oz (57.6 kg)  SpO2 100%  Breastfeeding? No  BMI 19.58 kg/m2 Estimated body mass index is 19.58 kg/(m^2) as calculated from the following:    Height as of this encounter: 5' 7.5\" (1.715 m).    Weight as of this encounter: 126 lb 14.4 oz (57.6 kg).  Medication Reconciliation: complete   ................Paul Batista LPN,   May 22, 2017,      7:08 AM,   [facility]    "
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
## 2017-05-24 ENCOUNTER — ANESTHESIA EVENT (OUTPATIENT)
Dept: SURGERY | Facility: CLINIC | Age: 52
End: 2017-05-24
Payer: COMMERCIAL

## 2017-05-24 ENCOUNTER — HOSPITAL ENCOUNTER (OUTPATIENT)
Facility: CLINIC | Age: 52
Discharge: HOME | End: 2017-05-24
Attending: SPECIALIST | Admitting: SPECIALIST
Payer: COMMERCIAL

## 2017-05-24 ENCOUNTER — OFFICE VISIT (OUTPATIENT)
Dept: FAMILY MEDICINE | Facility: CLINIC | Age: 52
End: 2017-05-24
Payer: COMMERCIAL

## 2017-05-24 ENCOUNTER — ANESTHESIA (OUTPATIENT)
Dept: SURGERY | Facility: CLINIC | Age: 52
End: 2017-05-24
Payer: COMMERCIAL

## 2017-05-24 VITALS
OXYGEN SATURATION: 99 % | TEMPERATURE: 95.3 F | RESPIRATION RATE: 16 BRPM | SYSTOLIC BLOOD PRESSURE: 100 MMHG | HEART RATE: 84 BPM | DIASTOLIC BLOOD PRESSURE: 64 MMHG

## 2017-05-24 VITALS
TEMPERATURE: 97.6 F | SYSTOLIC BLOOD PRESSURE: 99 MMHG | OXYGEN SATURATION: 99 % | DIASTOLIC BLOOD PRESSURE: 63 MMHG | RESPIRATION RATE: 14 BRPM

## 2017-05-24 DIAGNOSIS — S06.0X9D CONCUSSION, WITH LOSS OF CONSCIOUSNESS OF UNSPECIFIED DURATION, SUBSEQUENT ENCOUNTER: Primary | ICD-10-CM

## 2017-05-24 DIAGNOSIS — G89.18 POST-OP PAIN: Primary | ICD-10-CM

## 2017-05-24 PROCEDURE — 36000056 ZZH SURGERY LEVEL 3 1ST 30 MIN: Performed by: SPECIALIST

## 2017-05-24 PROCEDURE — 25000125 ZZHC RX 250: Performed by: SPECIALIST

## 2017-05-24 PROCEDURE — 27210794 ZZH OR GENERAL SUPPLY STERILE: Performed by: SPECIALIST

## 2017-05-24 PROCEDURE — 88304 TISSUE EXAM BY PATHOLOGIST: CPT | Mod: 26 | Performed by: SPECIALIST

## 2017-05-24 PROCEDURE — 47562 LAPAROSCOPIC CHOLECYSTECTOMY: CPT | Performed by: SPECIALIST

## 2017-05-24 PROCEDURE — 25000125 ZZHC RX 250: Performed by: NURSE ANESTHETIST, CERTIFIED REGISTERED

## 2017-05-24 PROCEDURE — 71000015 ZZH RECOVERY PHASE 1 LEVEL 2 EA ADDTL HR: Performed by: SPECIALIST

## 2017-05-24 PROCEDURE — 25000128 H RX IP 250 OP 636: Performed by: SPECIALIST

## 2017-05-24 PROCEDURE — 40000306 ZZH STATISTIC PRE PROC ASSESS II: Performed by: SPECIALIST

## 2017-05-24 PROCEDURE — 25000128 H RX IP 250 OP 636: Performed by: NURSE ANESTHETIST, CERTIFIED REGISTERED

## 2017-05-24 PROCEDURE — 37000008 ZZH ANESTHESIA TECHNICAL FEE, 1ST 30 MIN: Performed by: SPECIALIST

## 2017-05-24 PROCEDURE — 71000014 ZZH RECOVERY PHASE 1 LEVEL 2 FIRST HR: Performed by: SPECIALIST

## 2017-05-24 PROCEDURE — 25000132 ZZH RX MED GY IP 250 OP 250 PS 637: Performed by: SPECIALIST

## 2017-05-24 PROCEDURE — 37000009 ZZH ANESTHESIA TECHNICAL FEE, EACH ADDTL 15 MIN: Performed by: SPECIALIST

## 2017-05-24 PROCEDURE — 27110028 ZZH OR GENERAL SUPPLY NON-STERILE: Performed by: SPECIALIST

## 2017-05-24 PROCEDURE — 25000566 ZZH SEVOFLURANE, EA 15 MIN: Performed by: SPECIALIST

## 2017-05-24 PROCEDURE — 88304 TISSUE EXAM BY PATHOLOGIST: CPT | Performed by: SPECIALIST

## 2017-05-24 PROCEDURE — 71000027 ZZH RECOVERY PHASE 2 EACH 15 MINS: Performed by: SPECIALIST

## 2017-05-24 PROCEDURE — 99213 OFFICE O/P EST LOW 20 MIN: CPT | Performed by: FAMILY MEDICINE

## 2017-05-24 PROCEDURE — 36000058 ZZH SURGERY LEVEL 3 EA 15 ADDTL MIN: Performed by: SPECIALIST

## 2017-05-24 RX ORDER — DIMENHYDRINATE 50 MG/ML
12.5 INJECTION, SOLUTION INTRAMUSCULAR; INTRAVENOUS EVERY 10 MIN PRN
Status: DISCONTINUED | OUTPATIENT
Start: 2017-05-24 | End: 2017-05-24 | Stop reason: HOSPADM

## 2017-05-24 RX ORDER — SODIUM CHLORIDE, SODIUM LACTATE, POTASSIUM CHLORIDE, CALCIUM CHLORIDE 600; 310; 30; 20 MG/100ML; MG/100ML; MG/100ML; MG/100ML
INJECTION, SOLUTION INTRAVENOUS CONTINUOUS
Status: DISCONTINUED | OUTPATIENT
Start: 2017-05-24 | End: 2017-05-24 | Stop reason: HOSPADM

## 2017-05-24 RX ORDER — BUPIVACAINE HYDROCHLORIDE AND EPINEPHRINE 2.5; 5 MG/ML; UG/ML
INJECTION, SOLUTION INFILTRATION; PERINEURAL PRN
Status: DISCONTINUED | OUTPATIENT
Start: 2017-05-24 | End: 2017-05-24 | Stop reason: HOSPADM

## 2017-05-24 RX ORDER — PROPOFOL 10 MG/ML
INJECTION, EMULSION INTRAVENOUS PRN
Status: DISCONTINUED | OUTPATIENT
Start: 2017-05-24 | End: 2017-05-24

## 2017-05-24 RX ORDER — OXYCODONE HYDROCHLORIDE 5 MG/1
10 TABLET ORAL EVERY 4 HOURS PRN
Status: DISCONTINUED | OUTPATIENT
Start: 2017-05-24 | End: 2017-05-24 | Stop reason: HOSPADM

## 2017-05-24 RX ORDER — HYDROCODONE BITARTRATE AND ACETAMINOPHEN 5; 325 MG/1; MG/1
1-2 TABLET ORAL
Status: COMPLETED | OUTPATIENT
Start: 2017-05-24 | End: 2017-05-24

## 2017-05-24 RX ORDER — CEFAZOLIN SODIUM 1 G/3ML
1 INJECTION, POWDER, FOR SOLUTION INTRAMUSCULAR; INTRAVENOUS SEE ADMIN INSTRUCTIONS
Status: DISCONTINUED | OUTPATIENT
Start: 2017-05-24 | End: 2017-05-24 | Stop reason: HOSPADM

## 2017-05-24 RX ORDER — LIDOCAINE HYDROCHLORIDE 20 MG/ML
INJECTION, SOLUTION INFILTRATION; PERINEURAL PRN
Status: DISCONTINUED | OUTPATIENT
Start: 2017-05-24 | End: 2017-05-24

## 2017-05-24 RX ORDER — LIDOCAINE 40 MG/G
CREAM TOPICAL
Status: DISCONTINUED | OUTPATIENT
Start: 2017-05-24 | End: 2017-05-24 | Stop reason: HOSPADM

## 2017-05-24 RX ORDER — GLYCOPYRROLATE 0.2 MG/ML
INJECTION, SOLUTION INTRAMUSCULAR; INTRAVENOUS PRN
Status: DISCONTINUED | OUTPATIENT
Start: 2017-05-24 | End: 2017-05-24

## 2017-05-24 RX ORDER — ACETAMINOPHEN 10 MG/ML
INJECTION, SOLUTION INTRAVENOUS PRN
Status: DISCONTINUED | OUTPATIENT
Start: 2017-05-24 | End: 2017-05-24

## 2017-05-24 RX ORDER — FENTANYL CITRATE 50 UG/ML
25-50 INJECTION, SOLUTION INTRAMUSCULAR; INTRAVENOUS
Status: DISCONTINUED | OUTPATIENT
Start: 2017-05-24 | End: 2017-05-24 | Stop reason: HOSPADM

## 2017-05-24 RX ORDER — CEFAZOLIN SODIUM 2 G/100ML
2 INJECTION, SOLUTION INTRAVENOUS
Status: COMPLETED | OUTPATIENT
Start: 2017-05-24 | End: 2017-05-24

## 2017-05-24 RX ORDER — NEOSTIGMINE METHYLSULFATE 1 MG/ML
VIAL (ML) INJECTION PRN
Status: DISCONTINUED | OUTPATIENT
Start: 2017-05-24 | End: 2017-05-24

## 2017-05-24 RX ORDER — ONDANSETRON 2 MG/ML
INJECTION INTRAMUSCULAR; INTRAVENOUS PRN
Status: DISCONTINUED | OUTPATIENT
Start: 2017-05-24 | End: 2017-05-24

## 2017-05-24 RX ORDER — NALOXONE HYDROCHLORIDE 0.4 MG/ML
.1-.4 INJECTION, SOLUTION INTRAMUSCULAR; INTRAVENOUS; SUBCUTANEOUS
Status: DISCONTINUED | OUTPATIENT
Start: 2017-05-24 | End: 2017-05-24 | Stop reason: HOSPADM

## 2017-05-24 RX ORDER — FENTANYL CITRATE 50 UG/ML
INJECTION, SOLUTION INTRAMUSCULAR; INTRAVENOUS PRN
Status: DISCONTINUED | OUTPATIENT
Start: 2017-05-24 | End: 2017-05-24

## 2017-05-24 RX ORDER — SCOLOPAMINE TRANSDERMAL SYSTEM 1 MG/1
PATCH, EXTENDED RELEASE TRANSDERMAL PRN
Status: DISCONTINUED | OUTPATIENT
Start: 2017-05-24 | End: 2017-05-24

## 2017-05-24 RX ORDER — ONDANSETRON 2 MG/ML
4 INJECTION INTRAMUSCULAR; INTRAVENOUS EVERY 30 MIN PRN
Status: DISCONTINUED | OUTPATIENT
Start: 2017-05-24 | End: 2017-05-24 | Stop reason: HOSPADM

## 2017-05-24 RX ORDER — ONDANSETRON 4 MG/1
4 TABLET, ORALLY DISINTEGRATING ORAL EVERY 30 MIN PRN
Status: DISCONTINUED | OUTPATIENT
Start: 2017-05-24 | End: 2017-05-24 | Stop reason: HOSPADM

## 2017-05-24 RX ORDER — HYDROCODONE BITARTRATE AND ACETAMINOPHEN 5; 325 MG/1; MG/1
1-2 TABLET ORAL EVERY 4 HOURS PRN
Qty: 30 TABLET | Refills: 0 | Status: SHIPPED | OUTPATIENT
Start: 2017-05-24 | End: 2017-06-01

## 2017-05-24 RX ORDER — HYDRALAZINE HYDROCHLORIDE 20 MG/ML
2.5-5 INJECTION INTRAMUSCULAR; INTRAVENOUS EVERY 10 MIN PRN
Status: DISCONTINUED | OUTPATIENT
Start: 2017-05-24 | End: 2017-05-24 | Stop reason: HOSPADM

## 2017-05-24 RX ORDER — ALBUTEROL SULFATE 0.83 MG/ML
2.5 SOLUTION RESPIRATORY (INHALATION) EVERY 4 HOURS PRN
Status: DISCONTINUED | OUTPATIENT
Start: 2017-05-24 | End: 2017-05-24 | Stop reason: HOSPADM

## 2017-05-24 RX ORDER — MEPERIDINE HYDROCHLORIDE 50 MG/ML
12.5 INJECTION INTRAMUSCULAR; INTRAVENOUS; SUBCUTANEOUS
Status: DISCONTINUED | OUTPATIENT
Start: 2017-05-24 | End: 2017-05-24 | Stop reason: HOSPADM

## 2017-05-24 RX ADMIN — MIDAZOLAM HYDROCHLORIDE 1 MG: 1 INJECTION, SOLUTION INTRAMUSCULAR; INTRAVENOUS at 10:08

## 2017-05-24 RX ADMIN — HYDROMORPHONE HYDROCHLORIDE 0.5 MG: 1 INJECTION, SOLUTION INTRAMUSCULAR; INTRAVENOUS; SUBCUTANEOUS at 12:29

## 2017-05-24 RX ADMIN — PROPOFOL 100 MG: 10 INJECTION, EMULSION INTRAVENOUS at 10:19

## 2017-05-24 RX ADMIN — FENTANYL CITRATE 50 MCG: 50 INJECTION, SOLUTION INTRAMUSCULAR; INTRAVENOUS at 10:18

## 2017-05-24 RX ADMIN — SODIUM CHLORIDE, POTASSIUM CHLORIDE, SODIUM LACTATE AND CALCIUM CHLORIDE: 600; 310; 30; 20 INJECTION, SOLUTION INTRAVENOUS at 11:56

## 2017-05-24 RX ADMIN — ONDANSETRON HYDROCHLORIDE 4 MG: 2 INJECTION, SOLUTION INTRAMUSCULAR; INTRAVENOUS at 12:29

## 2017-05-24 RX ADMIN — SODIUM CHLORIDE, POTASSIUM CHLORIDE, SODIUM LACTATE AND CALCIUM CHLORIDE: 600; 310; 30; 20 INJECTION, SOLUTION INTRAVENOUS at 10:08

## 2017-05-24 RX ADMIN — HYDROMORPHONE HYDROCHLORIDE 0.5 MG: 1 INJECTION, SOLUTION INTRAMUSCULAR; INTRAVENOUS; SUBCUTANEOUS at 12:12

## 2017-05-24 RX ADMIN — FENTANYL CITRATE 50 MCG: 50 INJECTION, SOLUTION INTRAMUSCULAR; INTRAVENOUS at 11:40

## 2017-05-24 RX ADMIN — MIDAZOLAM HYDROCHLORIDE 1 MG: 1 INJECTION, SOLUTION INTRAMUSCULAR; INTRAVENOUS at 10:11

## 2017-05-24 RX ADMIN — FENTANYL CITRATE 50 MCG: 50 INJECTION INTRAMUSCULAR; INTRAVENOUS at 12:19

## 2017-05-24 RX ADMIN — FENTANYL CITRATE 50 MCG: 50 INJECTION INTRAMUSCULAR; INTRAVENOUS at 12:38

## 2017-05-24 RX ADMIN — SCOPOLAMINE 1 PATCH: 1 PATCH, EXTENDED RELEASE TRANSDERMAL at 10:24

## 2017-05-24 RX ADMIN — NEOSTIGMINE METHYLSULFATE 2 MG: 1 INJECTION INTRAMUSCULAR; INTRAVENOUS; SUBCUTANEOUS at 11:18

## 2017-05-24 RX ADMIN — ACETAMINOPHEN 1000 MG: 10 INJECTION, SOLUTION INTRAVENOUS at 10:36

## 2017-05-24 RX ADMIN — LIDOCAINE HYDROCHLORIDE 20 MG: 20 INJECTION, SOLUTION INFILTRATION; PERINEURAL at 10:19

## 2017-05-24 RX ADMIN — HYDROMORPHONE HYDROCHLORIDE 0.5 MG: 1 INJECTION, SOLUTION INTRAMUSCULAR; INTRAVENOUS; SUBCUTANEOUS at 11:48

## 2017-05-24 RX ADMIN — CEFAZOLIN SODIUM 2 G: 2 INJECTION, SOLUTION INTRAVENOUS at 10:28

## 2017-05-24 RX ADMIN — FENTANYL CITRATE 50 MCG: 50 INJECTION INTRAMUSCULAR; INTRAVENOUS at 12:03

## 2017-05-24 RX ADMIN — HYDROMORPHONE HYDROCHLORIDE 0.5 MG: 1 INJECTION, SOLUTION INTRAMUSCULAR; INTRAVENOUS; SUBCUTANEOUS at 11:42

## 2017-05-24 RX ADMIN — GLYCOPYRROLATE 0.4 MG: 0.2 INJECTION, SOLUTION INTRAMUSCULAR; INTRAVENOUS at 11:18

## 2017-05-24 RX ADMIN — ONDANSETRON 4 MG: 2 INJECTION INTRAMUSCULAR; INTRAVENOUS at 11:18

## 2017-05-24 RX ADMIN — ROCURONIUM BROMIDE 30 MG: 10 INJECTION INTRAVENOUS at 10:19

## 2017-05-24 RX ADMIN — HYDROCODONE BITARTRATE AND ACETAMINOPHEN 2 TABLET: 5; 325 TABLET ORAL at 14:04
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
## 2017-05-24 NOTE — IP AVS SNAPSHOT
[facility]    [address] [place] [address]    Phone:  [phone]                                       After Visit Summary   5/24/2017    Emma Nunn    MRN: [id number]           After Visit Summary Signature Page     I have received my discharge instructions, and my questions have been answered. I have discussed any challenges I see with this plan with the nurse or doctor.    ..........................................................................................................................................  Patient/Patient Representative Signature      ..........................................................................................................................................  Patient Representative Print Name and Relationship to Patient    ..................................................               ................................................  Date                                            Time    ..........................................................................................................................................  Reviewed by Signature/Title    ...................................................              ..............................................  Date                                                            Time
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
## 2017-05-24 NOTE — DISCHARGE INSTRUCTIONS
DISCHARGE INSTRUCTIONS FOR PATIENT   SCOPOLAMINE TRANSDERMAL PATCH  You may leave the patch on behind your ear for three days, but NO LONGER. You may have withdrawal symptoms (nausea, vomiting, headache, dizziness) if used longer.  When you remove the patch, you may wash and dry your hands thoroughly and before touching your eyes, as pupil may dilate.  Discard patch (away from children and pets).  You may develop some urinary hesitancy or urine retention.  [facility]   Adult Discharge Orders & Instructions     For 24 hours after surgery    1. Get plenty of rest.  A responsible adult must stay with you for at least 24 hours after you leave the hospital.   2. Do not drive or use heavy equipment.  If you have weakness or tingling, don't drive or use heavy equipment until this feeling goes away.  3. Do not drink alcohol.  4. Avoid strenuous or risky activities.  Ask for help when climbing stairs.   5. You may feel lightheaded.  IF so, sit for a few minutes before standing.  Have someone help you get up.   6. If you have nausea (feel sick to your stomach): Drink only clear liquids such as apple juice, ginger ale, broth or 7-Up.  Rest may also help.  Be sure to drink enough fluids.  Move to a regular diet as you feel able.  7. You may have a slight fever. Call the doctor if your fever is over 100 F (37.7 C) (taken under the tongue) or lasts longer than 24 hours.  8. You may have a dry mouth, a sore throat, muscle aches or trouble sleeping.  These should go away after 24 hours.  9. Do not make important or legal decisions.   Call your doctor for any of the followin.  Signs of infection (fever, growing tenderness at the surgery site, a large amount of drainage or bleeding, severe pain, foul-smelling drainage, redness, swelling).    2. It has been over 8 to 10 hours since surgery and you are still not able to urinate (pass water).    To contact a doctor, call [phone] or nurse advice line after  hours [phone]
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
## 2017-05-24 NOTE — BRIEF OP NOTE
[facility] Brief Operative Note    Pre-operative diagnosis: biliary colic   Post-operative diagnosis Acute and chronic cholecystitis with hydrops   Procedure: Procedure(s):  Laparoscopic Cholecystectomy - Wound Class: IV-Dirty or Infected   Surgeon: Jason Munoz MD, FACS   Assistants(s): Kirk Quinones surg tech   Estimated blood loss: Less than 10 ml    Specimens: Gallbladder and stones   Findings: Distended, thickened gallbladder with hydrops and stones     Jason Munoz MD, FACS    #[id number]
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
## 2017-05-24 NOTE — ANESTHESIA CARE TRANSFER NOTE
Patient: Emma Nunn    Procedure(s):  Laparoscopic Cholecystectomy - Wound Class: IV-Dirty or Infected    Diagnosis: biliary colic  Diagnosis Additional Information: No value filed.    Anesthesia Type:   General, ETT     Note:  Airway :Nasal Cannula  Patient transferred to:PACU        Vitals: (Last set prior to Anesthesia Care Transfer)    CRNA VITALS  5/24/2017 1112 - 5/24/2017 1149      5/24/2017             Pulse: 75    SpO2: 100 %                Electronically Signed By: BUZZ Zepeda CRNA  May 24, 2017  11:49 AM

## 2017-05-24 NOTE — ANESTHESIA POSTPROCEDURE EVALUATION
Patient: Emma Nunn    Procedure(s):  Laparoscopic Cholecystectomy - Wound Class: IV-Dirty or Infected    Diagnosis:biliary colic  Diagnosis Additional Information: No value filed.    Anesthesia Type:  General, ETT    Note:  Anesthesia Post Evaluation    Patient location during evaluation: Phase 2 and Bedside  Patient participation: Able to fully participate in evaluation  Level of consciousness: awake and alert  Pain management: satisfactory to patient  Airway patency: patent  Cardiovascular status: stable  Respiratory status: nasal cannula  Hydration status: stable  PONV: none     Anesthetic complications: None    Comments: Appear to tolerate Gen well without anesthesia related problems / complications noted.  Pain level adequate per patient. No N  /  V noted.  No complaints per patient.  Will follow as needed.        Last vitals:  Vitals:    05/24/17 1235 05/24/17 1245 05/24/17 1300   BP:  101/57 (!) 84/52   Resp: 8 11 10   Temp:      SpO2: 99% 97% 97%         Electronically Signed By: BUZZ Zepeda CRNA  May 24, 2017  1:56 PM

## 2017-05-28 LAB — COPATH REPORT: NORMAL

## 2017-06-01 ENCOUNTER — OFFICE VISIT (OUTPATIENT)
Dept: SURGERY | Facility: OTHER | Age: 52
End: 2017-06-01
Payer: COMMERCIAL

## 2017-06-01 VITALS — WEIGHT: 123.6 LBS | BODY MASS INDEX: 19.07 KG/M2 | TEMPERATURE: 97.2 F | HEART RATE: 72 BPM

## 2017-06-01 DIAGNOSIS — Z98.890 POST-OPERATIVE STATE: Primary | ICD-10-CM

## 2017-06-01 PROCEDURE — 99024 POSTOP FOLLOW-UP VISIT: CPT | Performed by: SPECIALIST

## 2017-06-01 NOTE — PROGRESS NOTES
F/U for lap stefan.    Subjective:  Pt feels good.  Pre-op sx have resolved.    Objective:    Abd: Soft, non tender, non distended, staples out    Path -   SPECIMEN(S):   Gallbladder and contents     FINAL DIAGNOSIS:   Gallbladder and contents:   - Cholelithiasis.   - Chronic cholecystitis.   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     Electronically signed out by:     ADELFO Phillips M.D.       Assessment/Plan:  Pt s/p lap stefan. Doing well.  Pre-op sx have resolved.  She will increase her activity as fatty food intake as tolerated.  F/U with me PRN.    Jason Munoz MD

## 2017-06-01 NOTE — MR AVS SNAPSHOT
After Visit Summary   6/1/2017    Emma Nunn    MRN: [id number]           Patient Information     Date Of Birth          1965        Visit Information        Provider Department      6/1/2017 7:30 AM Jason Munoz MD [facility]        Today's Diagnoses     Post-operative state    -  1       Follow-ups after your visit        Your next 10 appointments already scheduled     Jun 05, 2017  8:20 AM CDT   Return Concussion with Mendy Macario MD   [facility]    [address] [place] [address]   [phone]            Jun 07, 2017  3:15 PM CDT   Concussion Eval with Cristina Jain, PT   [facility] ([facility])    [address] [place] [address] [address] [place] [address]   [phone]            Jun 08, 2017 10:15 AM CDT   Concussion Eval with Sue Cabral OT   [facility] ([facility])    [address] [place] [address] [address] [place] [address]   [phone]            Aug 21, 2017  3:30 PM CDT   LAB with [facility]   [facility]    [address] [place] [address]   [phone]           Patient must bring picture ID.  Patient should be prepared to give a urine specimen  Please do not eat 10-12 hours before your appointment if you are coming in fasting for labs on lipids, cholesterol, or glucose (sugar).  Pregnant women should follow their Care Team instructions. Water with medications is okay. Do not drink coffee or other fluids.   If you have concerns about taking  your medications, please ask at office or if scheduling via [employer], send a message by clicking on Secure Messaging, Message Your Care Team.            Aug 22, 2017  3:30 PM CDT   Return Visit with Richard Ocampo MD   [facility]    [address] [place] [address]  "  [phone]              Who to contact     If you have questions or need follow up information about today's clinic visit or your schedule please contact [facility] directly at [phone].  Normal or non-critical lab and imaging results will be communicated to you by MyChart, letter or phone within 4 business days after the clinic has received the results. If you do not hear from us within 7 days, please contact the clinic through MyChart or phone. If you have a critical or abnormal lab result, we will notify you by phone as soon as possible.  Submit refill requests through [employer] or call your pharmacy and they will forward the refill request to us. Please allow 3 business days for your refill to be completed.          Additional Information About Your Visit        [employer] Information     [employer] lets you send messages to your doctor, view your test results, renew your prescriptions, schedule appointments and more. To sign up, go to [email or web address] . Click on \"Log in\" on the left side of the screen, which will take you to the Welcome page. Then click on \"Sign up Now\" on the right side of the page.     You will be asked to enter the access code listed below, as well as some personal information. Please follow the directions to create your username and password.     Your access code is: [email or web address]  Expires: 2017  3:24 PM     Your access code will  in 90 days. If you need help or a new code, please call your [place] clinic or [phone].        Care EveryWhere ID     This is your Care EveryWhere ID. This could be used by other organizations to access your [place] medical records  [id number]        Your Vitals Were     Pulse Temperature BMI (Body Mass Index)             72 97.2  F (36.2  C) (Temporal) 19.07 kg/m2          Blood Pressure from Last 3 Encounters:   17 99/63   17 100/64   17 (!) 84/56    Weight from Last 3 Encounters:   17 123 lb 9.6 " oz (56.1 kg)   05/22/17 126 lb 14.4 oz (57.6 kg)   05/22/17 126 lb 14.4 oz (57.6 kg)              Today, you had the following     No orders found for display       Primary Care Provider Office Phone # Fax #    Zoltan Tolentino [phone]       [place] [address] [place] [address]        Thank you!     Thank you for choosing [facility]  for your care. Our goal is always to provide you with excellent care. Hearing back from our patients is one way we can continue to improve our services. Please take a few minutes to complete the written survey that you may receive in the mail after your visit with us. Thank you!             Your Updated Medication List - Protect others around you: Learn how to safely use, store and throw away your medicines at www.disposemymeds.org.          This list is accurate as of: 6/1/17  7:34 AM.  Always use your most recent med list.                   Brand Name Dispense Instructions for use    IBUPROFEN PO      Take 600 mg by mouth every 6 hours as needed for moderate pain       levothyroxine 88 MCG tablet    SYNTHROID/LEVOTHROID    90 tablet    Take 1 tablet (88 mcg) by mouth daily       ondansetron 4 MG tablet    ZOFRAN    30 tablet    Take 1 tablet (4 mg) by mouth every 8 hours as needed for nausea       tamoxifen 20 MG tablet    NOLVADEX    90 tablet    Take 1 tablet (20 mg) by mouth daily

## 2017-06-01 NOTE — NURSING NOTE
"Chief Complaint   Patient presents with     Surgical Followup     Laparoscopic Cholecystectomy       Initial Pulse 72  Temp 97.2  F (36.2  C) (Temporal)  Wt 123 lb 9.6 oz (56.1 kg)  BMI 19.07 kg/m2 Estimated body mass index is 19.07 kg/(m^2) as calculated from the following:    Height as of 5/22/17: 5' 7.5\" (1.715 m).    Weight as of this encounter: 123 lb 9.6 oz (56.1 kg).  Medication Reconciliation: complete    "

## 2017-06-05 ENCOUNTER — OFFICE VISIT (OUTPATIENT)
Dept: ORTHOPEDICS | Facility: CLINIC | Age: 52
End: 2017-06-05
Payer: COMMERCIAL

## 2017-06-05 VITALS — WEIGHT: 123 LBS | HEIGHT: 68 IN | BODY MASS INDEX: 18.64 KG/M2 | HEART RATE: 72 BPM

## 2017-06-05 DIAGNOSIS — S06.0X1D CONCUSSION WITH LOSS OF CONSCIOUSNESS <= 30 MIN, SUBSEQUENT ENCOUNTER: ICD-10-CM
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
## 2017-06-05 PROCEDURE — 99213 OFFICE O/P EST LOW 20 MIN: CPT | Performed by: PHYSICAL MEDICINE & REHABILITATION

## 2017-06-05 NOTE — PATIENT INSTRUCTIONS
Today's Plan of Care:  -Recommend no work at this time.  Letter provided.    -No multimedia activity (i.e.texting, video games, computer work, and TV)  -No strenuous physical activity.  -Continue Zofran as needed for nausea.  -Avoid things that aggravate the symptoms.  -No driving yet.  -Recommend physical and occupational therapy for the concussion.  Please do 5-6 days of exercises per week between formal sessions and the home exercises they provide.  -Recommend melatonin for sleep. Take 30 minutes before attempting to go to sleep.  -Follow up in 2 weeks or sooner if symptoms fail to improve or worsen.  Call with any questions or concerns.       Healing After a Concussion     Rest  Rest is the best treatment for a concussion. You should avoid activities that cause your symptoms to get worse or make you feel tired. This would include physical activities as well as watching TV, texting or playing video games.    You may sleep or nap during the day as long as it does not prevent you from sleeping at night. If you find it is hard to fall asleep, talk to your doctor. You may need medicine to help you sleep.    If symptoms have not worsened, you do not need to be wakened and checked on during the night.        Work  You may need to change your work routine as you recover. A doctor can help you create a plan for the conditions at your job.      Treat pain    Take Tylenol (acetaminophen) for headaches and pain every 4 to 6 hours, as needed.    Do not take over-the-counter medicines such as ibuprofen, Advil, Motrin, Benadryl, Aleve, sleep aides or Tylenol PM. These drugs may cause new problems.    If you cannot manage your pain with Tylenol, call your doctor or go to the emergency department.      Watch symptoms closely  Each day keep track of your symptoms. This will help your doctor see how well you are healing. Write down the symptom, how often it occurs, how long it lasts, and what makes it better or worse.    Possible  "symptoms: headache, stomach upset, feeling confused or dizzy, motion sickness, and personality changes.      Returning to activity  Take your time returning to activity. A doctor can help determine what levels of activity are best for you. If you re returning to a sport, you should see a healthcare provider before doing so.      If you have questions, call  Concussion hotline: [phone] or Athletic medicine hotline: 339.323.4412.          For informational purposes only.  Not to replace the advice of your health care provider.  Copyright   2014 [facility].  All rights reserved.            Sleep Hygiene     What is it?    \"Sleep hygiene\" means having good sleep habits. Follow the tips below to sleep better at night.      Get on a schedule. Go to bed and get up at about the same time every day.    Listen to your body. Only try to sleep when you actually feel tired or sleepy.    Be patient. If you haven't been able to get to sleep after about 20 minutes or more, get up and do something calming or boring until you feel sleepy. Then, return to bed and try again.      Avoid caffeine (coffee, tea, cola drinks, chocolate and some medicines) for at least 4 to 6 hours before going to bed. We also suggest you don't use alcohol or nicotine (cigarettes) during this time. Both can make it harder for you to fall asleep and stay asleep.    Use your bed for sleeping only. That means no TV, computer or homework in bed!    Don't nap during the day. If you do nap, make sure it is for less than an hour and before 3 p.m.    Create sleep rituals that remind your body that it is time to sleep. Examples include breathing exercises, stretching, or reading a book.     Try a bath or shower before bed. Having a hot bath 1 to 2 hours before bedtime can help you feel sleepy.    Don't watch the clock. Checking the clock during the night can wake you up. It can also lead to negative thoughts such as \"I will never fall " "asleep.\"    Use a sleep diary. Track your sleep schedule to know your sleep patterns and to see where you can improve.    Get regular exercise. But try not to do heavy exercise in the 4 hours before bedtime.      Eat a healthy, balanced diet. Try eating a light, healthy snack before bed, but avoid eating a heavy meal.    Create the right sleeping area. A cool, dark, quiet room is best. If needed, try earplugs, fans and blackout curtains.      Keep your daytime routine the same even if you have a bad night sleep. Avoiding activities the next day can make it harder to sleep.          For informational purposes only. Not to replace the advice of your health care provider. Copyright   2013 [facility]. All rights reserved.    "

## 2017-06-05 NOTE — LETTER
[address] [address] [phone]      June 5, 2017    Emma Nunn  [address] [facility] [address]              To whom it may concern,  Emma was seen in clinic today in follow up for a concussion. She is unable to work for 2 weeks. She will be seen in follow up in 2 weeks for reevaluation. Thank you for your help in advance.              Sincerely,      Mendy Macario MD
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
## 2017-06-07 ENCOUNTER — HOSPITAL ENCOUNTER (OUTPATIENT)
Dept: PHYSICAL THERAPY | Facility: CLINIC | Age: 52
Setting detail: THERAPIES SERIES
End: 2017-06-07
Attending: PHYSICAL MEDICINE & REHABILITATION
Payer: COMMERCIAL

## 2017-06-07 PROCEDURE — 97162 PT EVAL MOD COMPLEX 30 MIN: CPT | Mod: GP | Performed by: PHYSICAL THERAPIST

## 2017-06-07 PROCEDURE — 97112 NEUROMUSCULAR REEDUCATION: CPT | Mod: GP | Performed by: PHYSICAL THERAPIST

## 2017-06-07 PROCEDURE — 40000767 ZZHC STATISTIC PT CONCUSSION VISIT: Performed by: PHYSICAL THERAPIST

## 2017-06-08 ENCOUNTER — HOSPITAL ENCOUNTER (OUTPATIENT)
Dept: OCCUPATIONAL THERAPY | Facility: CLINIC | Age: 52
Setting detail: THERAPIES SERIES
End: 2017-06-08
Attending: PHYSICAL MEDICINE & REHABILITATION
Payer: COMMERCIAL

## 2017-06-08 PROCEDURE — 97167 OT EVAL HIGH COMPLEX 60 MIN: CPT | Mod: GO | Performed by: OCCUPATIONAL THERAPIST

## 2017-06-08 PROCEDURE — 40000768 ZZHC STATISTIC OT CONCUSSION VISIT: Performed by: OCCUPATIONAL THERAPIST

## 2017-06-08 PROCEDURE — 97535 SELF CARE MNGMENT TRAINING: CPT | Mod: GO | Performed by: OCCUPATIONAL THERAPIST
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
## 2017-06-13 ENCOUNTER — HOSPITAL ENCOUNTER (OUTPATIENT)
Dept: PHYSICAL THERAPY | Facility: CLINIC | Age: 52
Setting detail: THERAPIES SERIES
End: 2017-06-13
Attending: PHYSICAL MEDICINE & REHABILITATION
Payer: COMMERCIAL

## 2017-06-13 PROCEDURE — 97112 NEUROMUSCULAR REEDUCATION: CPT | Mod: GP | Performed by: PHYSICAL THERAPIST

## 2017-06-13 PROCEDURE — 40000719 ZZHC STATISTIC PT DEPARTMENT NEURO VISIT: Performed by: PHYSICAL THERAPIST

## 2017-06-13 PROCEDURE — 97140 MANUAL THERAPY 1/> REGIONS: CPT | Mod: GP | Performed by: PHYSICAL THERAPIST

## 2017-06-14 ENCOUNTER — HOSPITAL ENCOUNTER (OUTPATIENT)
Dept: OCCUPATIONAL THERAPY | Facility: CLINIC | Age: 52
Setting detail: THERAPIES SERIES
End: 2017-06-14
Attending: PHYSICAL MEDICINE & REHABILITATION
Payer: COMMERCIAL

## 2017-06-14 PROCEDURE — 40000768 ZZHC STATISTIC OT CONCUSSION VISIT: Performed by: OCCUPATIONAL THERAPIST

## 2017-06-14 PROCEDURE — 97112 NEUROMUSCULAR REEDUCATION: CPT | Mod: GO | Performed by: OCCUPATIONAL THERAPIST

## 2017-06-14 PROCEDURE — 97535 SELF CARE MNGMENT TRAINING: CPT | Mod: GO | Performed by: OCCUPATIONAL THERAPIST

## 2017-06-15 ENCOUNTER — HOSPITAL ENCOUNTER (OUTPATIENT)
Dept: PHYSICAL THERAPY | Facility: CLINIC | Age: 52
Setting detail: THERAPIES SERIES
End: 2017-06-15
Attending: PHYSICAL MEDICINE & REHABILITATION
Payer: COMMERCIAL

## 2017-06-15 PROCEDURE — 95992 CANALITH REPOSITIONING PROC: CPT | Mod: GP | Performed by: PHYSICAL THERAPIST

## 2017-06-15 PROCEDURE — 40000767 ZZHC STATISTIC PT CONCUSSION VISIT: Performed by: PHYSICAL THERAPIST

## 2017-06-15 PROCEDURE — 97112 NEUROMUSCULAR REEDUCATION: CPT | Mod: GP | Performed by: PHYSICAL THERAPIST

## 2017-06-19 ENCOUNTER — OFFICE VISIT (OUTPATIENT)
Dept: ORTHOPEDICS | Facility: CLINIC | Age: 52
End: 2017-06-19
Payer: COMMERCIAL

## 2017-06-19 ENCOUNTER — HOSPITAL ENCOUNTER (OUTPATIENT)
Dept: PHYSICAL THERAPY | Facility: CLINIC | Age: 52
Setting detail: THERAPIES SERIES
End: 2017-06-19
Attending: PHYSICAL MEDICINE & REHABILITATION
Payer: COMMERCIAL

## 2017-06-19 VITALS
BODY MASS INDEX: 19.25 KG/M2 | WEIGHT: 127 LBS | SYSTOLIC BLOOD PRESSURE: 101 MMHG | HEIGHT: 68 IN | DIASTOLIC BLOOD PRESSURE: 72 MMHG

## 2017-06-19 DIAGNOSIS — S06.0X1D CONCUSSION WITH LOSS OF CONSCIOUSNESS <= 30 MIN, SUBSEQUENT ENCOUNTER: Primary | ICD-10-CM

## 2017-06-19 DIAGNOSIS — F07.81 POST-CONCUSSION VERTIGO: ICD-10-CM

## 2017-06-19 DIAGNOSIS — R42 POST-CONCUSSION VERTIGO: ICD-10-CM

## 2017-06-19 PROCEDURE — 97112 NEUROMUSCULAR REEDUCATION: CPT | Mod: GP | Performed by: PHYSICAL THERAPIST

## 2017-06-19 PROCEDURE — 99214 OFFICE O/P EST MOD 30 MIN: CPT | Performed by: PHYSICAL MEDICINE & REHABILITATION

## 2017-06-19 PROCEDURE — 97140 MANUAL THERAPY 1/> REGIONS: CPT | Mod: GP | Performed by: PHYSICAL THERAPIST

## 2017-06-19 PROCEDURE — 40000767 ZZHC STATISTIC PT CONCUSSION VISIT: Performed by: PHYSICAL THERAPIST

## 2017-06-19 NOTE — PROGRESS NOTES
Sports Medicine Clinic Report:    CC: Head Injury    Emma Nunn is a 51 year old female who presents in follow up for a    Concussion with loss of consciousness <= 30 min, subsequent encounter  Post-concussion vertigo that occurred on 5/2/2017 or 7 weeks ago.  Since last visit on 6/5/2017 patient notes that she feels about the same. She notes that the nausea seems to be better, it is not constant. She notes an increase in nausea with PT exercises, thinking.    Since your last visit, level of activity is:  No activity initiated. Continues to rest a lot    Since your last visit, have you continued with your normal cognitive activity (text, computer, school):  None      Current Symptoms:  CONCUSSION SYMPTOMS ASSESSMENT 6/14/2017 6/15/2017 6/19/2017   Headache or Pressure In Head 4 - moderate to severe 3 - moderate 4 - moderate to severe   Upset Stomach or Throwing Up 0 - none 0 - none 0 - none   Problems with Balance 0 - none 0 - none 0 - none   Feeling Dizzy 0 - none 2 - mild to moderate 2 - mild to moderate   Sensitivity to Light 3 - moderate 3 - moderate 3 - moderate   Sensitivity to Noise 3 - moderate 3 - moderate 2 - mild to moderate   Mood Changes 2 - mild to moderate 2 - mild to moderate 3 - moderate   Feeling sluggish, hazy, or foggy 3 - moderate 2 - mild to moderate 3 - moderate   Trouble Concentrating, Lack of Focus 3 - moderate 3 - moderate 3 - moderate   Motion Sickness 3 - moderate 2 - mild to moderate 3 - moderate   Vision Changes 0 - none 0 - none 0 - none   Memory Problems 2 - mild to moderate 3 - moderate 2 - mild to moderate   Feeling Confused 2 - mild to moderate 2 - mild to moderate 2 - mild to moderate   Neck Pain 3 - moderate 2 - mild to moderate 2 - mild to moderate   Trouble Sleeping 2 - mild to moderate 3 - moderate 1 - mild   Total Number of Symptoms 11 12 12   Symptom Severity Score 30 30 30       Sleep: Sleeping more than usual. Sleep is better, not having as much trouble falling asleep.  "Using melatonin    Patient's past medical, surgical, social and family histories are reviewed today.    Past Medical History:   Diagnosis Date     Breast cancer (H)      Diffuse cystic mastopathy     Fibrocystic breast disease     External hemorrhoids without mention of complication      Unspecified hypothyroidism      Past Surgical History:   Procedure Laterality Date     BREAST BIOPSY, CORE RT/LT  7/8/2013     C NONSPECIFIC PROCEDURE  1993    Laparoscopy     C NONSPECIFIC PROCEDURE  1993    Outpt surgery for anal fissure     COLONOSCOPY N/A 12/1/2014    Procedure: COLONOSCOPY;  Surgeon: Gabriele Golden MD;  Location: [facility]     LAPAROSCOPIC CHOLECYSTECTOMY N/A 5/24/2017    Procedure: LAPAROSCOPIC CHOLECYSTECTOMY;  Laparoscopic Cholecystectomy;  Surgeon: Jason Munoz MD;  Location: [facility] OR     LUMPECTOMY BREAST WITH SENTINEL NODE, COMBINED  7/31/2013    Procedure: COMBINED LUMPECTOMY BREAST WITH SENTINEL NODE;  Left Breast Lumpectomy with Hamler Node Biopsy;  Surgeon: Jason Munoz MD;  Location: [facility] OR       OBJECTIVE:  /72  Ht 5' 7.5\" (1.715 m)  Wt 127 lb (57.6 kg)  BMI 19.6 kg/m2    General: Alert, wearing sunglasses, in no acute distress.  Skin: no suspicious lesions or rashes  Psych: Frustrated  HEENT: Neck is supple - some tenderness over the right suboccipital region.  Decreased neck flexion.   Neck movements cause nausea.  Neuromuscular/Strength: No motor weakness in C4-T1 distribution.    Neurologic/Visual:  Word-finding difficulties  Cranial Nerves:  II-XII intact grossly  PAULA: yes  EOMI: Has dizziness with tracking  Convergence testing: Difficulty with convergence    Coordination:       - Finger to Nose: Slow, but no dysmetria       - Heel to Shin: normal       - Rapid Alternating Movements: Slow    Balance Testing:       - Romberg: not tested       - Backward Tandem: not tested       - Single-leg stance: not tested      Vestibular/Ocular Motor Test:     Not Tested Headache Dizziness " Nausea Fogginess Comments   Baseline  7 0 6 5    Smooth Pursuits  7 0 8 6    Saccades-Horizontal  7 0 8 6 Nausea worse with eye movement and then subsides   Saccades-Vertical  7 0 8 6 Nausea worse with eye movement and then subsides - better than horizontal   Convergence ([place])  7 0 9 6 ([place] in CM)  Measure 1: 5  Measure 2: 8  Measure 3 8   VOR Vertical  8 2 9 6 2 passes and stopped test   VOR Horizontal  9 3 9 6 9 pass - shoulders began to turn instead of head   Visual Motion Sensitivity Test N/A - - - -            Impact Testing Scores: ImPACT Testing not performed    ASSESSMENT:     Concussion with loss of consciousness <= 30 min, subsequent encounter  Post-concussion vertigo    PLAN:  -Recommend no work at this time.  Letter provided.    -Advised against any multimedia activity (i.e.texting, video games, computer work, and TV)  -Advised against any strenuous physical activity.  However, encourage light walks outside as tolerated.    -Continue Zofran as needed for nausea.  She feels like the Zofran may be causing headaches.  Encouraged her to write down her symptoms, in particular related to taking the Zofran.  If it does cause side effects, we will try an alternate anti-emetic.    -Avoid things that aggravate the symptoms.  -She should not drive yet.  -Continue physical and occupational therapy for the concussion.  Please do 5-6 days of exercises per week between formal sessions and the home exercises they provide.  -Continue melatonin for sleep. Take 30 minutes to 1 hour before attempting to go to sleep  -Follow up in 3 weeks or sooner if symptoms fail to improve or worsen.  Call with any questions or concerns.     Time spent in one-on-one evaluation and discussion with patient regarding nature of problem, course, prior treatments, and therapeutic options, at least 50% of which was spent in counseling and coordination of care:  25 minutes.    Radha Macario MD, [facility]

## 2017-06-19 NOTE — MR AVS SNAPSHOT
After Visit Summary   6/19/2017    Emma Nunn    MRN: [id number]           Patient Information     Date Of Birth          1965        Visit Information        Provider Department      6/19/2017 8:20 AM Mendy Macario MD [facility]        Today's Diagnoses     Concussion with loss of consciousness <= 30 min, subsequent encounter    -  1      Care Instructions    Today's Plan of Care:  -Recommend no work at this time.  Letter provided.    -No multimedia activity (i.e.texting, video games, computer work, and TV)  -No strenuous physical activity. Light activity may be initiated  -Attempt Zofran as needed for nausea. Record symptoms  -Avoid things that aggravate the symptoms. Increased symptoms indicate too much stress on the brain  -No driving yet.  -Continue physical and occupational therapy for the concussion.    -Continue melatonin for sleep. Take 30 minutes before attempting to go to sleep.  -Follow up in 3 weeks or sooner if symptoms fail to improve or worsen.  Call with any questions or concerns.         Healing After a Concussion     Rest  Rest is the best treatment for a concussion. You should avoid activities that cause your symptoms to get worse or make you feel tired. This would include physical activities as well as watching TV, texting or playing video games.    You may sleep or nap during the day as long as it does not prevent you from sleeping at night. If you find it is hard to fall asleep, talk to your doctor. You may need medicine to help you sleep.    If symptoms have not worsened, you do not need to be wakened and checked on during the night.      Work  You may need to change your work routine as you recover. A doctor can help you create a plan for the conditions at your job.      Treat pain    Take Tylenol (acetaminophen) for headaches and pain every 4 to 6 hours, as needed.    Do not take over-the-counter medicines such as ibuprofen, Advil, Motrin,  "Benadryl, Aleve, sleep aides or Tylenol PM. These drugs may cause new problems.    If you cannot manage your pain with Tylenol, call your doctor or go to the emergency department.      Watch symptoms closely  Each day keep track of your symptoms. This will help your doctor see how well you are healing. Write down the symptom, how often it occurs, how long it lasts, and what makes it better or worse.    Possible symptoms: headache, stomach upset, feeling confused or dizzy, motion sickness, and personality changes.      Returning to activity  Take your time returning to activity. A doctor can help determine what levels of activity are best for you. If you re returning to a sport, you should see a healthcare provider before doing so.      If you have questions, call  Concussion hotline: [phone] or Athletic medicine hotline: 795.954.3223.          For informational purposes only.  Not to replace the advice of your health care provider.  Copyright   2014 [facility].  All rights reserved.            Sleep Hygiene     What is it?    \"Sleep hygiene\" means having good sleep habits. Follow the tips below to sleep better at night.      Get on a schedule. Go to bed and get up at about the same time every day.    Listen to your body. Only try to sleep when you actually feel tired or sleepy.    Be patient. If you haven't been able to get to sleep after about 20 minutes or more, get up and do something calming or boring until you feel sleepy. Then, return to bed and try again.      Avoid caffeine (coffee, tea, cola drinks, chocolate and some medicines) for at least 4 to 6 hours before going to bed. We also suggest you don't use alcohol or nicotine (cigarettes) during this time. Both can make it harder for you to fall asleep and stay asleep.    Use your bed for sleeping only. That means no TV, computer or homework in bed!    Don't nap during the day. If you do nap, make sure it is for less than an hour and before " "3 p.m.    Create sleep rituals that remind your body that it is time to sleep. Examples include breathing exercises, stretching, or reading a book.     Try a bath or shower before bed. Having a hot bath 1 to 2 hours before bedtime can help you feel sleepy.    Don't watch the clock. Checking the clock during the night can wake you up. It can also lead to negative thoughts such as \"I will never fall asleep.\"    Use a sleep diary. Track your sleep schedule to know your sleep patterns and to see where you can improve.    Get regular exercise. But try not to do heavy exercise in the 4 hours before bedtime.      Eat a healthy, balanced diet. Try eating a light, healthy snack before bed, but avoid eating a heavy meal.    Create the right sleeping area. A cool, dark, quiet room is best. If needed, try earplugs, fans and blackout curtains.      Keep your daytime routine the same even if you have a bad night sleep. Avoiding activities the next day can make it harder to sleep.          For informational purposes only. Not to replace the advice of your health care provider. Copyright   2013 [facility]. All rights reserved.            Follow-ups after your visit        Your next 10 appointments already scheduled     Jun 19, 2017 10:00 AM CDT   Concussion Treatment with Cristina Jain, PT   [facility] ([facility])    [address] [place] [address]   [phone]            Jun 21, 2017 11:30 AM CDT   Concussion Treatment with Sue Cabral OT   [facility] ([facility])    [address] [place] [address]   [phone]            Jun 22, 2017 10:45 AM CDT   Concussion Treatment with Cristina Jain, PT   [facility] ([facility])    [address] [place] [address]   [phone]            Jun 29, 2017  4:00 PM CDT   Concussion Treatment with Sue ENGEL" [place], ELOY   [facility] ([facility])    [address] [place] [address]   [phone]            Jul 06, 2017 10:45 AM CDT   Concussion Treatment with Sue Cabral OT   [facility] ([facility])    [address] [place] [address]   [phone]            Aug 21, 2017  3:30 PM CDT   LAB with [facility]   [facility] ([facility])    [address] [place] [address] [phone]           Patient must bring picture ID.  Patient should be prepared to give a urine specimen  Please do not eat 10-12 hours before your appointment if you are coming in fasting for labs on lipids, cholesterol, or glucose (sugar).  Pregnant women should follow their Care Team instructions. Water with medications is okay. Do not drink coffee or other fluids.   If you have concerns about taking  your medications, please ask at office or if scheduling via [employer], send a message by clicking on Secure Messaging, Message Your Care Team.            Aug 22, 2017  3:30 PM CDT   Return Visit with Richard Ocampo MD   [facility] ([address] [id number] [phone]              Who to contact     If you have questions or need follow up information about today's clinic visit or your schedule please contact [facility] directly at [phone].  Normal or non-critical lab and imaging results will be communicated to you by [employer], letter or phone within 4 business days after the clinic has received the results. If you do not hear from us within 7 days, please contact the clinic through [employer] or phone. If you have a critical or abnormal lab result, we will notify you by phone as soon as possible.  Submit refill requests through [employer] or call your pharmacy and they will forward the refill request to  "us. Please allow 3 business days for your refill to be completed.          Additional Information About Your Visit        MyChart Information     [email or web address] lets you send messages to your doctor, view your test results, renew your prescriptions, schedule appointments and more. To sign up, go to [email or web address] . Click on \"Log in\" on the left side of the screen, which will take you to the Welcome page. Then click on \"Sign up Now\" on the right side of the page.     You will be asked to enter the access code listed below, as well as some personal information. Please follow the directions to create your username and password.     Your access code is: [email or web address]  Expires: 2017  3:24 PM     Your access code will  in 90 days. If you need help or a new code, please call your [facility] clinic or [phone].        Care EveryWhere ID     This is your Care EveryWhere ID. This could be used by other organizations to access your [facility] medical records  [id number]        Your Vitals Were     Height BMI (Body Mass Index)                5' 7.5\" (1.715 m) 19.6 kg/m2           Blood Pressure from Last 3 Encounters:   17 101/72   17 99/63   17 100/64    Weight from Last 3 Encounters:   17 127 lb (57.6 kg)   17 123 lb (55.8 kg)   17 123 lb 9.6 oz (56.1 kg)              Today, you had the following     No orders found for display       Primary Care Provider Office Phone # Fax #    Zoltan Tolentino [phone]       [facility] [address] [place] [address]        Thank you!     Thank you for choosing [facility]  for your care. Our goal is always to provide you with excellent care. Hearing back from our patients is one way we can continue to improve our services. Please take a few minutes to complete the written survey that you may receive in the mail after your visit with us. Thank you!             Your Updated " Medication List - Protect others around you: Learn how to safely use, store and throw away your medicines at www.disposemymeds.org.          This list is accurate as of: 6/19/17  9:15 AM.  Always use your most recent med list.                   Brand Name Dispense Instructions for use    IBUPROFEN PO      Take 600 mg by mouth every 6 hours as needed for moderate pain       levothyroxine 88 MCG tablet    SYNTHROID/LEVOTHROID    90 tablet    Take 1 tablet (88 mcg) by mouth daily       ondansetron 4 MG tablet    ZOFRAN    30 tablet    Take 1 tablet (4 mg) by mouth every 8 hours as needed for nausea       tamoxifen 20 MG tablet    NOLVADEX    90 tablet    Take 1 tablet (20 mg) by mouth daily

## 2017-06-19 NOTE — PROGRESS NOTES
"Outpatient Physical Therapy Progress Note     Patient: Emma Nunn  : 1965    Beginning/End Dates of Reporting Period:  4 visits between 17 and 17    Referring Provider: Dr. Mendy Macario    Therapy Diagnosis: Concussion with vertigo, post traumatic headache, oculomotor issues      Client Self Report: She went to see Dr. Renaldo mondragon. She reports double vision with testing. States she is sleeping more, but only for an hour at a time. She is vague about improvements in nausea, but feels it is not always there but always there - always underlying.  She is not driving at this time. She is noticing a derease in concerns with approach. She is reporting 1 fall and running into walls several times over the last week or so. She is not sure she has a home program from OT or what they are working on. Her understanding of the PT exercises are looking at walls and chin tucks. She feels the exercise (looking at the walls/canalith repositioning is helpful).     Objective Measurements:  Objective Measure: DHI  Details:  - had difficulty interpreting questions  Objective Measure: Concussion Symptom Asessment  Details:   Objective Measure: Approach concerns  Details: Decreasing even from behind  Objective Measure: Seated Cervical AROM  Details: Flexion: 9 degrees with nausea; extension: 47 degrees with nausea, Rotation: 30% bilaterally with nausea     Goals:  Goal Identifier Vertigo   Goal Description Emma will be able to report 50% decrease of vertigo on DHI so she can walk and progress toward driving her car safely, avoid falls (Not driving yet, fell x 1 wk ago - lost balance, )   Target Date 17   Date Met      Progress:     Goal Identifier Sleep   Goal Description Emma will be able to use sleep hygiene principles to allow her to sleep x 4 hours at one time->7-8 hours per night (improving. intermittent sleep but \"lots\")   Target Date 17   Date Met      Progress:     Goal Identifier " Headache   Goal Description Emma will be able to report a 50% reduction in headaches so she can concentrate better and focus to prepare for return to driving and work (Increasing HA, now:5/6, range:3-5/6)   Target Date 07/14/17   Date Met      Progress:     Goal Identifier Approach   Goal Description Emma will be able to tolerate approaches within 2 feet of her from the back and R side so she can hold conversations, walk through crowds in the community without increased anxiety (improving)   Target Date 06/22/17   Date Met      Progress:   Progress Toward Goals:   Progress this reporting period: Plan of care discussed. reporting less anxiety with approaches. She is still jumping to sounds in the hallway during the session. She has an increase in her concussion symptoms which could be secondary to the earlier assessment checks by Dr. Macario. She is noting a decrease in her dizziness with the home program and we discussed the progression for this next session. Her cervical AROM is tight and limited due to nausea vs report of pain. We will continue to work on the vertigo and work with OT on the vision aspect. She was encouraged to continue with the cervical AROM exercises as she is able. there is significant tightness of the SCMs bilaterally that do relax 70% by palpation with the manual releases.     Plan:  Continue therapy per current plan of care. 1-2 times per week and focusing initially on vertigo and oculomotor, manual therapy and progress activity as she tolerates    Discharge:  No    Thank you for referring Emma  to [facility] - John Jain, PT  [phone]

## 2017-06-19 NOTE — LETTER
[address] [address]  [phone]      June 19, 2017    Emma Nunn  [address] [place] [facility] [address]              To whom it may concern,  Emma was seen in clinic today in follow up for a concussion. She is still unable to return to work. She will be seen in follow up in 3 weeks for reevaluation. Thank you for your help in advance.              Sincerely,      Mendy Macario MD

## 2017-06-19 NOTE — PATIENT INSTRUCTIONS
Today's Plan of Care:  -Recommend no work at this time.  Letter provided.    -No multimedia activity (i.e.texting, video games, computer work, and TV)  -No strenuous physical activity. Light activity may be initiated  -Attempt Zofran as needed for nausea. Record symptoms  -Avoid things that aggravate the symptoms. Increased symptoms indicate too much stress on the brain  -No driving yet.  -Continue physical and occupational therapy for the concussion.    -Continue melatonin for sleep. Take 30 minutes before attempting to go to sleep.  -Follow up in 3 weeks or sooner if symptoms fail to improve or worsen.  Call with any questions or concerns.         Healing After a Concussion     Rest  Rest is the best treatment for a concussion. You should avoid activities that cause your symptoms to get worse or make you feel tired. This would include physical activities as well as watching TV, texting or playing video games.    You may sleep or nap during the day as long as it does not prevent you from sleeping at night. If you find it is hard to fall asleep, talk to your doctor. You may need medicine to help you sleep.    If symptoms have not worsened, you do not need to be wakened and checked on during the night.      Work  You may need to change your work routine as you recover. A doctor can help you create a plan for the conditions at your job.      Treat pain    Take Tylenol (acetaminophen) for headaches and pain every 4 to 6 hours, as needed.    Do not take over-the-counter medicines such as ibuprofen, Advil, Motrin, Benadryl, Aleve, sleep aides or Tylenol PM. These drugs may cause new problems.    If you cannot manage your pain with Tylenol, call your doctor or go to the emergency department.      Watch symptoms closely  Each day keep track of your symptoms. This will help your doctor see how well you are healing. Write down the symptom, how often it occurs, how long it lasts, and what makes it better or worse.    Possible  "symptoms: headache, stomach upset, feeling confused or dizzy, motion sickness, and personality changes.      Returning to activity  Take your time returning to activity. A doctor can help determine what levels of activity are best for you. If you re returning to a sport, you should see a healthcare provider before doing so.      If you have questions, call  Concussion hotline: 200.242.4349 or Athletic medicine hotline: 299.720.4078.          For informational purposes only.  Not to replace the advice of your health care provider.  Copyright   2014 [facility].  All rights reserved.            Sleep Hygiene     What is it?    \"Sleep hygiene\" means having good sleep habits. Follow the tips below to sleep better at night.      Get on a schedule. Go to bed and get up at about the same time every day.    Listen to your body. Only try to sleep when you actually feel tired or sleepy.    Be patient. If you haven't been able to get to sleep after about 20 minutes or more, get up and do something calming or boring until you feel sleepy. Then, return to bed and try again.      Avoid caffeine (coffee, tea, cola drinks, chocolate and some medicines) for at least 4 to 6 hours before going to bed. We also suggest you don't use alcohol or nicotine (cigarettes) during this time. Both can make it harder for you to fall asleep and stay asleep.    Use your bed for sleeping only. That means no TV, computer or homework in bed!    Don't nap during the day. If you do nap, make sure it is for less than an hour and before 3 p.m.    Create sleep rituals that remind your body that it is time to sleep. Examples include breathing exercises, stretching, or reading a book.     Try a bath or shower before bed. Having a hot bath 1 to 2 hours before bedtime can help you feel sleepy.    Don't watch the clock. Checking the clock during the night can wake you up. It can also lead to negative thoughts such as \"I will never fall " "asleep.\"    Use a sleep diary. Track your sleep schedule to know your sleep patterns and to see where you can improve.    Get regular exercise. But try not to do heavy exercise in the 4 hours before bedtime.      Eat a healthy, balanced diet. Try eating a light, healthy snack before bed, but avoid eating a heavy meal.    Create the right sleeping area. A cool, dark, quiet room is best. If needed, try earplugs, fans and blackout curtains.      Keep your daytime routine the same even if you have a bad night sleep. Avoiding activities the next day can make it harder to sleep.          For informational purposes only. Not to replace the advice of your health care provider. Copyright   2013 [facility]. All rights reserved.    "

## 2017-06-21 ENCOUNTER — HOSPITAL ENCOUNTER (OUTPATIENT)
Dept: OCCUPATIONAL THERAPY | Facility: CLINIC | Age: 52
Setting detail: THERAPIES SERIES
End: 2017-06-21
Attending: PHYSICAL MEDICINE & REHABILITATION
Payer: COMMERCIAL

## 2017-06-21 PROCEDURE — 40000768 ZZHC STATISTIC OT CONCUSSION VISIT: Performed by: OCCUPATIONAL THERAPIST

## 2017-06-21 PROCEDURE — 97535 SELF CARE MNGMENT TRAINING: CPT | Mod: GO | Performed by: OCCUPATIONAL THERAPIST

## 2017-06-21 PROCEDURE — 97112 NEUROMUSCULAR REEDUCATION: CPT | Mod: GO | Performed by: OCCUPATIONAL THERAPIST

## 2017-06-22 ENCOUNTER — HOSPITAL ENCOUNTER (OUTPATIENT)
Dept: PHYSICAL THERAPY | Facility: CLINIC | Age: 52
Setting detail: THERAPIES SERIES
End: 2017-06-22
Attending: PHYSICAL MEDICINE & REHABILITATION
Payer: COMMERCIAL

## 2017-06-22 PROCEDURE — 40000767 ZZHC STATISTIC PT CONCUSSION VISIT: Performed by: PHYSICAL THERAPIST

## 2017-06-22 PROCEDURE — 97140 MANUAL THERAPY 1/> REGIONS: CPT | Mod: GP | Performed by: PHYSICAL THERAPIST

## 2017-06-26 ENCOUNTER — HOSPITAL ENCOUNTER (OUTPATIENT)
Dept: OCCUPATIONAL THERAPY | Facility: CLINIC | Age: 52
Setting detail: THERAPIES SERIES
End: 2017-06-26
Attending: PHYSICAL MEDICINE & REHABILITATION
Payer: COMMERCIAL

## 2017-06-26 PROCEDURE — 40000768 ZZHC STATISTIC OT CONCUSSION VISIT: Performed by: OCCUPATIONAL THERAPIST

## 2017-06-26 PROCEDURE — 97535 SELF CARE MNGMENT TRAINING: CPT | Mod: GO | Performed by: OCCUPATIONAL THERAPIST

## 2017-06-28 ENCOUNTER — HOSPITAL ENCOUNTER (OUTPATIENT)
Dept: PHYSICAL THERAPY | Facility: CLINIC | Age: 52
Setting detail: THERAPIES SERIES
End: 2017-06-28
Attending: PHYSICAL MEDICINE & REHABILITATION
Payer: COMMERCIAL

## 2017-06-28 PROCEDURE — 40000767 ZZHC STATISTIC PT CONCUSSION VISIT: Performed by: PHYSICAL THERAPIST

## 2017-06-28 PROCEDURE — 97530 THERAPEUTIC ACTIVITIES: CPT | Mod: GP | Performed by: PHYSICAL THERAPIST

## 2017-06-28 PROCEDURE — 97140 MANUAL THERAPY 1/> REGIONS: CPT | Mod: GP | Performed by: PHYSICAL THERAPIST

## 2017-07-05 ENCOUNTER — HOSPITAL ENCOUNTER (OUTPATIENT)
Dept: PHYSICAL THERAPY | Facility: CLINIC | Age: 52
Setting detail: THERAPIES SERIES
End: 2017-07-05
Attending: PHYSICAL MEDICINE & REHABILITATION
Payer: COMMERCIAL

## 2017-07-05 PROCEDURE — 97140 MANUAL THERAPY 1/> REGIONS: CPT | Mod: GP | Performed by: PHYSICAL THERAPIST

## 2017-07-05 PROCEDURE — 40000767 ZZHC STATISTIC PT CONCUSSION VISIT: Performed by: PHYSICAL THERAPIST

## 2017-07-06 ENCOUNTER — HOSPITAL ENCOUNTER (OUTPATIENT)
Dept: PHYSICAL THERAPY | Facility: CLINIC | Age: 52
Setting detail: THERAPIES SERIES
End: 2017-07-06
Attending: PHYSICAL MEDICINE & REHABILITATION
Payer: COMMERCIAL

## 2017-07-06 ENCOUNTER — HOSPITAL ENCOUNTER (OUTPATIENT)
Dept: OCCUPATIONAL THERAPY | Facility: CLINIC | Age: 52
Setting detail: THERAPIES SERIES
End: 2017-07-06
Attending: PHYSICAL MEDICINE & REHABILITATION
Payer: COMMERCIAL

## 2017-07-06 PROCEDURE — 40000767 ZZHC STATISTIC PT CONCUSSION VISIT: Performed by: PHYSICAL THERAPIST

## 2017-07-06 PROCEDURE — 97140 MANUAL THERAPY 1/> REGIONS: CPT | Mod: GP | Performed by: PHYSICAL THERAPIST

## 2017-07-06 PROCEDURE — 97535 SELF CARE MNGMENT TRAINING: CPT | Mod: GO | Performed by: OCCUPATIONAL THERAPIST

## 2017-07-06 PROCEDURE — 40000768 ZZHC STATISTIC OT CONCUSSION VISIT: Performed by: OCCUPATIONAL THERAPIST

## 2017-07-07 DIAGNOSIS — F06.4 ANXIETY DISORDER DUE TO BRAIN INJURY: ICD-10-CM

## 2017-07-07 DIAGNOSIS — S06.0X1A: Primary | ICD-10-CM

## 2017-07-10 ENCOUNTER — OFFICE VISIT (OUTPATIENT)
Dept: ORTHOPEDICS | Facility: CLINIC | Age: 52
End: 2017-07-10
Payer: COMMERCIAL

## 2017-07-10 VITALS — HEIGHT: 68 IN | WEIGHT: 125 LBS | BODY MASS INDEX: 18.94 KG/M2 | HEART RATE: 66 BPM

## 2017-07-10 DIAGNOSIS — S06.0X1D CONCUSSION WITH LOSS OF CONSCIOUSNESS <= 30 MIN, SUBSEQUENT ENCOUNTER: Primary | ICD-10-CM

## 2017-07-10 PROCEDURE — 99214 OFFICE O/P EST MOD 30 MIN: CPT | Performed by: PHYSICAL MEDICINE & REHABILITATION

## 2017-07-10 NOTE — PROGRESS NOTES
Sports Medicine Clinic Report:    CC: Head Injury    Emma Nunn is a 51 year old female who presents in follow up for a concussion that occurred on 5/2/2017 or 2 months ago.  Since last visit on 6/19/2017 patient notes that she has had improvement She believes the PT and rest has helped significantly. Her nausea and dizziness has been significantly reduced, her headaches are less frequent with less intensity. She feels that she is able to engage more and feels less anxious.     Since your last visit, level of activity is:  Stage 2 - light to moderate. Walking - she feels that she has more energy    Since your last visit, have you continued with your normal cognitive activity (text, computer, school):  She reports that her fogginess has decreased. She does continue to have trouble with language processing      Current Symptoms:  CONCUSSION SYMPTOMS ASSESSMENT 7/5/2017 7/6/2017 7/10/2017   Headache or Pressure In Head 3 - moderate 3 - moderate 2 - mild to moderate   Upset Stomach or Throwing Up 0 - none 0 - none 0 - none   Problems with Balance 2 - mild to moderate 2 - mild to moderate 0 - none   Feeling Dizzy 1 - mild 2 - mild to moderate 1 - mild   Sensitivity to Light 2 - mild to moderate 2 - mild to moderate 1 - mild   Sensitivity to Noise 1 - mild 1 - mild 2 - mild to moderate   Mood Changes 1 - mild 2 - mild to moderate 2 - mild to moderate   Feeling sluggish, hazy, or foggy 2 - mild to moderate 1 - mild 2 - mild to moderate   Trouble Concentrating, Lack of Focus 1 - mild 2 - mild to moderate 2 - mild to moderate   Motion Sickness 1 - mild 2 - mild to moderate 1 - mild   Vision Changes 2 - mild to moderate 0 - none 0 - none   Memory Problems 2 - mild to moderate 3 - moderate 2 - mild to moderate   Feeling Confused 1 - mild 3 - moderate 2 - mild to moderate   Neck Pain 1 - mild 2 - mild to moderate 2 - mild to moderate   Trouble Sleeping 2 - mild to moderate 2 - mild to moderate 1 - mild   Total Number of  "Symptoms 14 13 12   Symptom Severity Score 22 27 20       Sleep: Sleeping more than usual however sleeping less than she was    Patient's past medical, surgical, social and family histories are reviewed today.    Past Medical History:   Diagnosis Date     Breast cancer (H)      Diffuse cystic mastopathy     Fibrocystic breast disease     External hemorrhoids without mention of complication      Unspecified hypothyroidism      Past Surgical History:   Procedure Laterality Date     BREAST BIOPSY, CORE RT/LT  7/8/2013     C NONSPECIFIC PROCEDURE  1993    Laparoscopy     C NONSPECIFIC PROCEDURE  1993    Outpt surgery for anal fissure     COLONOSCOPY N/A 12/1/2014    Procedure: COLONOSCOPY;  Surgeon: Gabriele Golden MD;  Location: [facility]     LAPAROSCOPIC CHOLECYSTECTOMY N/A 5/24/2017    Procedure: LAPAROSCOPIC CHOLECYSTECTOMY;  Laparoscopic Cholecystectomy;  Surgeon: Jason Munoz MD;  Location: [facility]     LUMPECTOMY BREAST WITH SENTINEL NODE, COMBINED  7/31/2013    Procedure: COMBINED LUMPECTOMY BREAST WITH SENTINEL NODE;  Left Breast Lumpectomy with Partridge Node Biopsy;  Surgeon: Jason Munoz MD;  Location: [facility]       OBJECTIVE:  Pulse 66  Ht 5' 7.5\" (1.715 m)  Wt 125 lb (56.7 kg)  BMI 19.29 kg/m2    General: Healthy, well-appearing, and in no acute distress.  Skin: no suspicious lesions or rashes  Psych: mentation appears normal, and affect is appropriate/bright  HEENT: Neck is supple, flexion and left lateral rotation is decreased.  Left lateral rotation is painful.    Neuromuscular/Strength: No motor weakness in C5-T1 distribution.    Neurologic/Visual:  Cranial Nerves:  II-XII intact grossly  PAULA: yes  EOMI: yes  Nystagmus: no    Coordination:       - Finger to Nose: normal       - Heel to Shin: normal       - Rapid Alternating Movements: normal    Cognitive:  Previous cognitive assessment was normal and without deficit; repeat cognitive testing not performed today.      ASSESSMENT:  Concussion with " loss of consciousness <= 30 min, subsequent encounter    PLAN:  -She has been improving gradually but continues to have some symptoms.    -Recommend continuing PT and OT.  PT/OT thought she would benefit from SLP and a mental health provider.  We have ordered speech/language therapy.  She is going to call her insurance and determine where she would like to go for mental health.  We will place the referral accordingly.  -Continue to remain off of work.  Letter provided to be off for 2 more weeks.    -Work restrictions: off for 2 weeks. Letter provided.  -Refrain from multimedia activity (i.e.texting, video games, computer work, and TV)  -Refrain from any strenuous physical activity.  However, encourage light walks outside as tolerated.    -Avoid things that aggravate the symptoms.  -She should not return to driving yet.  -Continue melatonin for sleep. Take 30 minutes to 1 hour before attempting to go to sleep  -Follow Up: 2 weeks or sooner if symptoms fail to improve or worsen. Call with any questions or concerns.     Time spent in one-on-one evaluation and discussion with patient regarding nature of problem, course, prior treatments, and therapeutic options, at least 50% of which was spent in counseling and coordination of care:  25 minutes.    Radha Macario MD, CAQ  [facility]

## 2017-07-10 NOTE — MR AVS SNAPSHOT
After Visit Summary   7/10/2017    Emma Nunn    MRN: [id number]           Patient Information     Date Of Birth          1965        Visit Information        Provider Department      7/10/2017 10:40 AM Mendy Macario MD [facility]        Today's Diagnoses     Concussion with loss of consciousness <= 30 min, subsequent encounter    -  1      Care Instructions    Today's Plan of Care:  -Work restrictions: off for 2 weeks. Letter provided  -Continue PT and OT, beginning speech/language therapy  -Call with desired mental health provider  -Avoid things that aggravate the symptoms.  -She should not return to driving yet.  -Continue melatonin for sleep. Take 30 minutes to 1 hour before attempting to go to sleep    Follow Up: 2 weeks or sooner if symptoms fail to improve or worsen. Call with any questions or concerns.               Follow-ups after your visit        Your next 10 appointments already scheduled     Jul 11, 2017  3:15 PM CDT   Concussion Treatment with Cristina Jain, PT   [facility] ([facility])    [address] [place] [address] Dr John LALA [address]   [phone]            Jul 12, 2017 10:30 AM CDT   Concussion Treatment with Sue Cabral, OT   [facility] ([facility])    [address] [place] [address] Dr John LALA [address]   [phone]            Jul 13, 2017 10:45 AM CDT   Concussion Treatment with Cristina Jain, PT   [facility] ([facility])    [address] [place] [address] Dr John LALA [address]   [phone]            Jul 20, 2017  3:15 PM CDT   Concussion Treatment with Sue Cabral, ELOY   [facility] ([facility])    [address] [place] [address] Dr John LALA [address]   [phone]            Jul 25, 2017  3:15 PM CDT   Concussion Treatment with Cristina Jain, PT   [facility]  ([facility])    [address] John LALA [address]   [phone]            Jul 27, 2017 10:15 AM CDT   Concussion Treatment with Cristina Jain, PT   [facility] ([facility])    [address] [place] [address]   [phone]            Aug 01, 2017  3:15 PM CDT   Concussion Treatment with Cristina Jain, PT   [facility] ([facility])    [address] John [place] [address]   [phone]            Aug 02, 2017  8:15 AM CDT   Concussion Treatment with Sue Cabral, OT   [facility] ([facility])    [address] John [place] [address]   [phone]            Aug 03, 2017  8:00 AM CDT   Concussion Treatment with Cristina Jain, PT   [facility] ([facility])    [address] John [place] [address]   [phone]            Aug 08, 2017  3:15 PM CDT   Concussion Treatment with Cristina Jain, PT   [facility] ([facility])    [address] John [place] [address]   [phone]              Who to contact     If you have questions or need follow up information about today's clinic visit or your schedule please contact [facility] directly at [phone].  Normal or non-critical lab and imaging results will be communicated to you by MyChart, letter or phone within 4 business days after the clinic has received the results. If you do not hear from us within 7 days, please contact the clinic through [employer] or phone. If you have a critical or abnormal lab result, we will notify you by phone as soon as possible.  Submit refill requests through [employer] or call your pharmacy and they will forward the refill request to us. Please allow 3 business days for your refill to be completed.          Additional Information About Your Visit       "  MyChart Information     [employer] lets you send messages to your doctor, view your test results, renew your prescriptions, schedule appointments and more. To sign up, go to [email or web address] . Click on \"Log in\" on the left side of the screen, which will take you to the Welcome page. Then click on \"Sign up Now\" on the right side of the page.     You will be asked to enter the access code listed below, as well as some personal information. Please follow the directions to create your username and password.     Your access code is: [email or web address]  Expires: 2017  3:24 PM     Your access code will  in 90 days. If you need help or a new code, please call your [place] clinic or [phone].        Care EveryWhere ID     This is your Care EveryWhere ID. This could be used by other organizations to access your [place] medical records  [id number]        Your Vitals Were     Pulse Height BMI (Body Mass Index)             66 5' 7.5\" (1.715 m) 19.29 kg/m2          Blood Pressure from Last 3 Encounters:   17 101/72   17 99/63   17 100/64    Weight from Last 3 Encounters:   07/10/17 125 lb (56.7 kg)   17 127 lb (57.6 kg)   17 123 lb (55.8 kg)              Today, you had the following     No orders found for display       Primary Care Provider Office Phone # Fax #    Zoltan Tolentino [phone]       [facility] [address] [place] [address]        Equal Access to Services     [facility] Hospital[facility] AH: Hadii isatu Betancourt, waaxda luqadaha, qaybta kaaldanay anders. So [facility] [phone].    ATENCIÓN: Si habla español, tiene a jang disposición servicios gratuitos de asistencia lingüística. Weston candelario [phone].    We comply with applicable federal civil rights laws and [place] laws. We do not discriminate on the basis of race, color, national origin, age, disability sex, sexual orientation or gender " identity.            Thank you!     Thank you for choosing [facility]  for your care. Our goal is always to provide you with excellent care. Hearing back from our patients is one way we can continue to improve our services. Please take a few minutes to complete the written survey that you may receive in the mail after your visit with us. Thank you!             Your Updated Medication List - Protect others around you: Learn how to safely use, store and throw away your medicines at www.disposemymeds.org.          This list is accurate as of: 7/10/17 11:26 AM.  Always use your most recent med list.                   Brand Name Dispense Instructions for use Diagnosis    IBUPROFEN PO      Take 600 mg by mouth every 6 hours as needed for moderate pain        levothyroxine 88 MCG tablet    SYNTHROID/LEVOTHROID    90 tablet    Take 1 tablet (88 mcg) by mouth daily    Malignant neoplasm of upper-outer quadrant of left female breast (H)       ondansetron 4 MG tablet    ZOFRAN    30 tablet    Take 1 tablet (4 mg) by mouth every 8 hours as needed for nausea    Nausea       tamoxifen 20 MG tablet    NOLVADEX    90 tablet    Take 1 tablet (20 mg) by mouth daily    Malignant neoplasm of upper-outer quadrant of left female breast (H)

## 2017-07-10 NOTE — PATIENT INSTRUCTIONS
Today's Plan of Care:  -Work restrictions: off for 2 weeks. Letter provided  -Continue PT and OT, beginning speech/language therapy  -Call with desired mental health provider  -Avoid things that aggravate the symptoms.  -She should not return to driving yet.  -Continue melatonin for sleep. Take 30 minutes to 1 hour before attempting to go to sleep    Follow Up: 2 weeks or sooner if symptoms fail to improve or worsen. Call with any questions or concerns.

## 2017-07-10 NOTE — LETTER
[address] [address]  [phone]      July 10, 2017    Emma Nunn  [address] [facility] [address]              To whom it may concern,  Emma was seen in clinic today in follow up for a concussion. She is still unable to return to work. She will be seen in follow up in 2 weeks for reevaluation. Thank you for your help in advance.              Sincerely,      Mendy Macario MD

## 2017-07-11 ENCOUNTER — HOSPITAL ENCOUNTER (OUTPATIENT)
Dept: PHYSICAL THERAPY | Facility: CLINIC | Age: 52
Setting detail: THERAPIES SERIES
End: 2017-07-11
Attending: PHYSICAL MEDICINE & REHABILITATION
Payer: COMMERCIAL

## 2017-07-11 PROCEDURE — 97110 THERAPEUTIC EXERCISES: CPT | Mod: GP | Performed by: PHYSICAL THERAPIST

## 2017-07-11 PROCEDURE — 40000767 ZZHC STATISTIC PT CONCUSSION VISIT: Performed by: PHYSICAL THERAPIST

## 2017-07-11 PROCEDURE — 97140 MANUAL THERAPY 1/> REGIONS: CPT | Mod: GP | Performed by: PHYSICAL THERAPIST

## 2017-07-12 ENCOUNTER — HOSPITAL ENCOUNTER (OUTPATIENT)
Dept: OCCUPATIONAL THERAPY | Facility: CLINIC | Age: 52
Setting detail: THERAPIES SERIES
End: 2017-07-12
Attending: PHYSICAL MEDICINE & REHABILITATION
Payer: COMMERCIAL

## 2017-07-12 PROCEDURE — 97112 NEUROMUSCULAR REEDUCATION: CPT | Mod: GO | Performed by: OCCUPATIONAL THERAPIST

## 2017-07-12 PROCEDURE — 96125 COGNITIVE TEST BY HC PRO: CPT | Mod: GO | Performed by: OCCUPATIONAL THERAPIST

## 2017-07-12 PROCEDURE — 40000768 ZZHC STATISTIC OT CONCUSSION VISIT: Performed by: OCCUPATIONAL THERAPIST

## 2017-07-12 NOTE — PROGRESS NOTES
Cognistat    SUMMARY OF TEST:    The Cognistat is designed to assess functioning in five major areas:  Language, Constructions, Memory, Calculations and Reasoning.  Attention, Level of Consciousness and Orientation are assessed independently.  Scores are plotted on a profile which illustrates the overall pattern of abilities and disabilities.  Scores can be compared to norms and/or representative profiles for various populations.    DATE OF TESTIN17    RESULTS OF TESTING:    This patient scores in the average range for Level of Consciousness, Orientation, Language, Calculations and Reasoning    This patient scores in the mildly impaired for Attention, Constructions and Memory    This patient scores in the moderately impaired for na    This patient scores in the severely impaired for na    Test results comments:  Patient tolerated testing >75% , without increased symptoms    INTERPRETATION OF TEST RESULTS:    The patient is able to tolerate testing this date.  OT able to complete full COGNISTAT testing without patient requiring brain rest or symptom control with testing.  Patient unable to state date, but overall oriented.  Deficit areas of concern are attention/sequencing, memory/recall and visual constructs.   She has delayed processing speed to answer most questions presented, however improved from a week ago.  Overall mild cognitive impairment at this time.    Factors affecting performance:  Impaired vision    Recommendations: Continue OT services for visual and cognitive processing skill building.    TIME ADMINISTERING TEST: 35    TIME FOR INTERPRETATION AND PREPARATION OF REPORT: 10    TOTAL TIME: 45      Thank you for referring Emma  To OT services to assist with concussion rehab.    If you have any questions or concerns, please contact me at [phone].    Sue Cabral MA, OTR/L  [facility]

## 2017-07-13 ENCOUNTER — HOSPITAL ENCOUNTER (OUTPATIENT)
Dept: PHYSICAL THERAPY | Facility: CLINIC | Age: 52
Setting detail: THERAPIES SERIES
End: 2017-07-13
Attending: PHYSICAL MEDICINE & REHABILITATION
Payer: COMMERCIAL

## 2017-07-13 PROCEDURE — 97110 THERAPEUTIC EXERCISES: CPT | Mod: GP | Performed by: PHYSICAL THERAPIST

## 2017-07-13 PROCEDURE — 97140 MANUAL THERAPY 1/> REGIONS: CPT | Mod: GP | Performed by: PHYSICAL THERAPIST

## 2017-07-13 PROCEDURE — 40000767 ZZHC STATISTIC PT CONCUSSION VISIT: Performed by: PHYSICAL THERAPIST

## 2017-07-18 ENCOUNTER — HOSPITAL ENCOUNTER (OUTPATIENT)
Dept: PHYSICAL THERAPY | Facility: CLINIC | Age: 52
Setting detail: THERAPIES SERIES
End: 2017-07-18
Attending: PHYSICAL MEDICINE & REHABILITATION
Payer: COMMERCIAL

## 2017-07-18 PROCEDURE — 97110 THERAPEUTIC EXERCISES: CPT | Mod: GP | Performed by: PHYSICAL THERAPIST

## 2017-07-18 PROCEDURE — 40000767 ZZHC STATISTIC PT CONCUSSION VISIT: Performed by: PHYSICAL THERAPIST

## 2017-07-18 PROCEDURE — 97112 NEUROMUSCULAR REEDUCATION: CPT | Mod: GP | Performed by: PHYSICAL THERAPIST

## 2017-07-18 PROCEDURE — 97140 MANUAL THERAPY 1/> REGIONS: CPT | Mod: GP | Performed by: PHYSICAL THERAPIST

## 2017-07-20 ENCOUNTER — HOSPITAL ENCOUNTER (OUTPATIENT)
Dept: OCCUPATIONAL THERAPY | Facility: CLINIC | Age: 52
Setting detail: THERAPIES SERIES
End: 2017-07-20
Attending: PHYSICAL MEDICINE & REHABILITATION
Payer: COMMERCIAL

## 2017-07-20 PROCEDURE — 40000768 ZZHC STATISTIC OT CONCUSSION VISIT: Performed by: OCCUPATIONAL THERAPIST

## 2017-07-20 PROCEDURE — 97112 NEUROMUSCULAR REEDUCATION: CPT | Mod: GO | Performed by: OCCUPATIONAL THERAPIST

## 2017-07-25 ENCOUNTER — HOSPITAL ENCOUNTER (OUTPATIENT)
Dept: PHYSICAL THERAPY | Facility: CLINIC | Age: 52
Setting detail: THERAPIES SERIES
End: 2017-07-25
Attending: PHYSICAL MEDICINE & REHABILITATION
Payer: COMMERCIAL

## 2017-07-25 ENCOUNTER — OFFICE VISIT (OUTPATIENT)
Dept: ORTHOPEDICS | Facility: CLINIC | Age: 52
End: 2017-07-25
Payer: COMMERCIAL

## 2017-07-25 VITALS — HEART RATE: 72 BPM | WEIGHT: 125 LBS | HEIGHT: 68 IN | BODY MASS INDEX: 18.94 KG/M2

## 2017-07-25 DIAGNOSIS — S06.0X1D CONCUSSION WITH LOSS OF CONSCIOUSNESS <= 30 MIN, SUBSEQUENT ENCOUNTER: Primary | ICD-10-CM

## 2017-07-25 PROCEDURE — 40000767 ZZHC STATISTIC PT CONCUSSION VISIT: Performed by: PHYSICAL THERAPIST

## 2017-07-25 PROCEDURE — 97140 MANUAL THERAPY 1/> REGIONS: CPT | Mod: GP | Performed by: PHYSICAL THERAPIST

## 2017-07-25 PROCEDURE — 99214 OFFICE O/P EST MOD 30 MIN: CPT | Performed by: PHYSICAL MEDICINE & REHABILITATION

## 2017-07-25 NOTE — MR AVS SNAPSHOT
After Visit Summary   7/25/2017    Emma Nunn    MRN: [id number]           Patient Information     Date Of Birth          1965        Visit Information        Provider Department      7/25/2017 9:00 AM Mendy Macario MD [facility] Instructions    Today's Plan of Care:   -Recommend continuing PT and OT.  Begin speech/language therapy as scheduled.  Call with mental health provider  -Continue to remain off of work.  Letter provided to be off for 3 more weeks.    -Work restrictions: off for 3 weeks. Letter provided.  -Refrain from multimedia activity (i.e.texting, video games, computer work, and TV)  -Refrain from any strenuous physical activity.  However, encourage light walks outside as tolerated.    -Avoid things that aggravate the symptoms.  -She should not return to driving yet.  -Continue melatonin for sleep. Take 30 minutes to 1 hour before attempting to go to sleep  -Follow Up: 3 weeks or sooner if symptoms fail to improve or worsen. Call with any questions or concerns.             Follow-ups after your visit        Your next 10 appointments already scheduled     Jul 25, 2017  3:15 PM CDT   Concussion Treatment with Cristina Jain, PT   [facility] ([facility])    [address] [place] [address] Dr John LALA [address]   [phone]            Jul 27, 2017 10:15 AM CDT   Concussion Treatment with Cristina Jain, PT   [facility] ([facility])    [address] [place] [address] Dr John LALA [address]   [phone]            Aug 01, 2017  3:15 PM CDT   Concussion Treatment with Cristina Jain PT   [facility] ([facility])    [address] [place] [address] Dr John LALA [address]   [phone]            Aug 02, 2017  8:15 AM CDT   Concussion Treatment with Sue Cabral OT   [facility] ([facility])    [address] [place] [address]  Dr John LALA [address]   [phone]            Aug 03, 2017  8:00 AM CDT   Concussion Treatment with Cristina Jain, PT   [facility] ([facility])    [address] [place] [address]   [phone]            Aug 08, 2017  3:15 PM CDT   Concussion Treatment with Cristina Jain, PT   [facility] ([facility])    [address] [place] [address]   [phone]            Aug 09, 2017  3:15 PM CDT   Concussion Treatment with Cristina Jain, PT   [facility] ([facility])    [address] [place] [address]   [phone]            Aug 10, 2017  8:00 AM CDT   Evaluation with ANGELITO Buitrago   [facility] ([facility])    [address] [place] [address] Dr John LALA [address]   [phone]            Aug 10, 2017  3:15 PM CDT   Concussion Treatment with Sue Cabral OT   [facility] ([facility])    [address] [place] [address]   [phone]            Aug 15, 2017  8:00 AM CDT   Treatment 45 with ANGELITO Buitrago   [facility] ([facility])    [address] [place] [address] Dr John LALA [address]   [phone]              Who to contact     If you have questions or need follow up information about today's clinic visit or your schedule please contact [facility] directly at [phone].  Normal or non-critical lab and imaging results will be communicated to you by MyChart, letter or phone within 4 business days after the clinic has received the results. If you do not hear from us within 7 days, please contact the clinic through MyChart or phone. If you have a critical or abnormal lab result, we will notify you by phone as soon as possible.  Submit refill requests through [employer] or call your pharmacy and they will forward the refill  "request to us. Please allow 3 business days for your refill to be completed.          Additional Information About Your Visit        MyChart Information     [employer] lets you send messages to your doctor, view your test results, renew your prescriptions, schedule appointments and more. To sign up, go to [email or web address] . Click on \"Log in\" on the left side of the screen, which will take you to the Welcome page. Then click on \"Sign up Now\" on the right side of the page.     You will be asked to enter the access code listed below, as well as some personal information. Please follow the directions to create your username and password.     Your access code is: [email or web address]  Expires: 2017  3:24 PM     Your access code will  in 90 days. If you need help or a new code, please call your [place] clinic or [phone].        Care EveryWhere ID     This is your [facility] EveryWhere ID. This could be used by other organizations to access your [place] medical records  [id number]        Your Vitals Were     Pulse Height                72 5' 7.5\" (1.715 m)           Blood Pressure from Last 3 Encounters:   17 101/72   17 99/63   17 100/64    Weight from Last 3 Encounters:   07/10/17 125 lb (56.7 kg)   17 127 lb (57.6 kg)   17 123 lb (55.8 kg)              Today, you had the following     No orders found for display       Primary Care Provider Office Phone # Fax #    Zoltan Tolentino [phone]       [facility] [address] [place] [address]        Equal Access to Services     [facility] : Hadii isatu garrett hadconsuelo Soteo, waaxda luqadaha, qaybta kaalmada afsaneh, danay irwin. So [facility] [phone].    ATENCIÓN: Si habla español, tiene a jang disposición servicios gratuitos de asistencia lingüística. Llame al [phone].    We comply with applicable federal civil rights laws and [place] laws. We do not " discriminate on the basis of race, color, national origin, age, disability sex, sexual orientation or gender identity.            Thank you!     Thank you for choosing [facility]  for your care. Our goal is always to provide you with excellent care. Hearing back from our patients is one way we can continue to improve our services. Please take a few minutes to complete the written survey that you may receive in the mail after your visit with us. Thank you!             Your Updated Medication List - Protect others around you: Learn how to safely use, store and throw away your medicines at www.disposemymeds.org.          This list is accurate as of: 7/25/17  9:41 AM.  Always use your most recent med list.                   Brand Name Dispense Instructions for use Diagnosis    IBUPROFEN PO      Take 600 mg by mouth every 6 hours as needed for moderate pain        levothyroxine 88 MCG tablet    SYNTHROID/LEVOTHROID    90 tablet    Take 1 tablet (88 mcg) by mouth daily    Malignant neoplasm of upper-outer quadrant of left female breast (H)       ondansetron 4 MG tablet    ZOFRAN    30 tablet    Take 1 tablet (4 mg) by mouth every 8 hours as needed for nausea    Nausea       tamoxifen 20 MG tablet    NOLVADEX    90 tablet    Take 1 tablet (20 mg) by mouth daily    Malignant neoplasm of upper-outer quadrant of left female breast (H)

## 2017-07-25 NOTE — PATIENT INSTRUCTIONS
Today's Plan of Care:   -Recommend continuing PT and OT.  Begin speech/language therapy as scheduled.  Call with mental health provider  -Continue to remain off of work.  Letter provided to be off for 3 more weeks.    -Work restrictions: off for 3 weeks. Letter provided.  -Refrain from multimedia activity (i.e.texting, video games, computer work, and TV)  -Refrain from any strenuous physical activity.  However, encourage light walks outside as tolerated.    -Avoid things that aggravate the symptoms.  -She should not return to driving yet.  -Continue melatonin for sleep. Take 30 minutes to 1 hour before attempting to go to sleep  -Follow Up: 3 weeks or sooner if symptoms fail to improve or worsen. Call with any questions or concerns.

## 2017-07-25 NOTE — Clinical Note
Sawyer Rogers, Just wanted to let you know that Emma requested more visits per week with you since she feels it has been really helpful (thank you!) so she may ask you about that. Thanks! Radha

## 2017-07-25 NOTE — PROGRESS NOTES
"Sports Medicine Clinic Report:    CC: Head Injury    Emma Nunn is a 51 year old female who presents in follow up for a concussion that occurred on 5/2/2017 or 12 weeks ago.  Since last visit on 7/10/2017 patient notes some slight improvement in the noise and light sensitivity. She feels that the cognitive piece is getting better however she still has a lag in speech.     She still is struggling with \"sensory overload\". She gets irritated and challenged with multiple stimuli around her.    Since your last visit, level of activity is:  Able to do most daily living tasks. She has also been walking.      Since your last visit, have you continued with your normal cognitive activity (text, computer, school):  She has progressed through some physical therapy tasks with more success. She feels that she has been able to process more.      Current Symptoms:  CONCUSSION SYMPTOMS ASSESSMENT 7/13/2017 7/18/2017 7/25/2017   Headache or Pressure In Head 3 - moderate 3 - moderate 2 - mild to moderate   Upset Stomach or Throwing Up 0 - none 0 - none 0 - none   Problems with Balance 1 - mild 2 - mild to moderate 1 - mild   Feeling Dizzy 1 - mild 1 - mild 1 - mild   Sensitivity to Light 2 - mild to moderate 2 - mild to moderate 1 - mild   Sensitivity to Noise 2 - mild to moderate 2 - mild to moderate 1 - mild   Mood Changes 2 - mild to moderate 2 - mild to moderate 2 - mild to moderate   Feeling sluggish, hazy, or foggy 2 - mild to moderate 2 - mild to moderate 1 - mild   Trouble Concentrating, Lack of Focus 2 - mild to moderate 2 - mild to moderate 2 - mild to moderate   Motion Sickness 1 - mild 0 - none 0 - none   Vision Changes 2 - mild to moderate 2 - mild to moderate 2 - mild to moderate   Memory Problems 3 - moderate 3 - moderate 2 - mild to moderate   Feeling Confused 2 - mild to moderate 2 - mild to moderate 2 - mild to moderate   Neck Pain 1 - mild 2 - mild to moderate 2 - mild to moderate   Trouble Sleeping 2 - mild to " "moderate 2 - mild to moderate 1 - mild   Total Number of Symptoms 14 13 13   Symptom Severity Score 26 27 20       Sleep: Continue melatonin for sleep although trouble staying asleep    Patient's past medical, surgical, social and family histories are reviewed today.    Past Medical History:   Diagnosis Date     Breast cancer (H)      Diffuse cystic mastopathy     Fibrocystic breast disease     External hemorrhoids without mention of complication      Unspecified hypothyroidism      Past Surgical History:   Procedure Laterality Date     BREAST BIOPSY, CORE RT/LT  7/8/2013     C NONSPECIFIC PROCEDURE  1993    Laparoscopy     C NONSPECIFIC PROCEDURE  1993    Outpt surgery for anal fissure     COLONOSCOPY N/A 12/1/2014    Procedure: COLONOSCOPY;  Surgeon: Gabriele Golden MD;  Location: [facility]     LAPAROSCOPIC CHOLECYSTECTOMY N/A 5/24/2017    Procedure: LAPAROSCOPIC CHOLECYSTECTOMY;  Laparoscopic Cholecystectomy;  Surgeon: Jason Munoz MD;  Location: [facility]     LUMPECTOMY BREAST WITH SENTINEL NODE, COMBINED  7/31/2013    Procedure: COMBINED LUMPECTOMY BREAST WITH SENTINEL NODE;  Left Breast Lumpectomy with Crowley Node Biopsy;  Surgeon: Jason Munoz MD;  Location: [facility]       OBJECTIVE:  Pulse 72  Ht 5' 7.5\" (1.715 m)  Wt 125 lb (56.7 kg)  BMI 19.29 kg/m2    General: Healthy, well-appearing, and in no acute distress.  Skin: no suspicious lesions or rashes  Psych: mentation appears normal, and affect is appropriate/bright  HEENT: Neck is supple with full ROM; initial exam benign.  Neuromuscular/Strength: No motor weakness in C5-T1 distribution.    Neurologic/Visual:  Cranial Nerves:  CN II-XII intact grossly  PAULA: yes  EOMI: yes  Nystagmus: no  Painful eye movements: no, but causes some double vision    Coordination:       - Finger to Nose: normal       - Heel to Shin: normal       - Rapid Alternating Movements: normal    Cognitive:  Previous cognitive assessment was normal and without deficit; repeat " cognitive testing not performed today.      Impact Testing Scores: ImPACT Testing not performed    ASSESSMENT:  Concussion with loss of consciousness <= 30 min, subsequent encounter    PLAN:  -Recommend continuing PT and OT.  Begin speech/language therapy as scheduled.  She may also be getting prism glasses per OT.  -Call with preferred mental health provider and we will place a referral.  -Continue to remain off of work.  Letter provided to be off for 3 more weeks.    -Refrain from multimedia activity (i.e.texting, video games, computer work, and TV)  -Refrain from any strenuous physical activity.  However, encourage light walks outside as tolerated.    -Avoid things that aggravate the symptoms.  -She should not return to driving yet.  -Continue melatonin for sleep. Take 30 minutes to 1 hour before attempting to go to sleep  -Follow Up: 3 weeks or sooner if symptoms fail to improve or worsen. Call with any questions or concerns.     Time spent in one-on-one evaluation and discussion with patient regarding nature of problem, course, prior treatments, and therapeutic options, at least 50% of which was spent in counseling and coordination of care:  25 minutes.    Radha Macario MD, CAQ  [facility]

## 2017-07-26 ENCOUNTER — TELEPHONE (OUTPATIENT)
Dept: ORTHOPEDICS | Facility: OTHER | Age: 52
End: 2017-07-26

## 2017-07-26 NOTE — TELEPHONE ENCOUNTER
Letter completed, faxed to Lisa Lopez at [phone]  Claim # [id number]    Copy mailed to pt

## 2017-07-26 NOTE — TELEPHONE ENCOUNTER
Reason for Call:  Other appointment    Detailed comments: pt seen in clinic yesterday and  states pt didn't receive paperwork for being absent for work for 3 more weeks. Please advise and contact pt in regards.    Phone Number Patient can be reached at: Home number on file [phone] (home)    Best Time: ANY    Can we leave a detailed message on this number? YES    Call taken on 7/26/2017 at 9:46 AM by Sofía Quigley

## 2017-07-26 NOTE — LETTER
[address] [id number] [phone]      July 25, 2017    Emma Nunn  [address] [facility] [address]              To whom it may concern,  Emma was seen in clinic today in follow up for a concussion. She is to continue no work for 3 weeks. She will be seen in follow up in 3 weeks for reevaluation. Thank you for your help in advance.              Sincerely,      Mendy Macario MD

## 2017-07-27 ENCOUNTER — HOSPITAL ENCOUNTER (OUTPATIENT)
Dept: PHYSICAL THERAPY | Facility: CLINIC | Age: 52
Setting detail: THERAPIES SERIES
End: 2017-07-27
Attending: PHYSICAL MEDICINE & REHABILITATION
Payer: COMMERCIAL

## 2017-07-27 PROCEDURE — 40000767 ZZHC STATISTIC PT CONCUSSION VISIT: Performed by: PHYSICAL THERAPIST

## 2017-07-27 PROCEDURE — 97112 NEUROMUSCULAR REEDUCATION: CPT | Mod: GP | Performed by: PHYSICAL THERAPIST

## 2017-07-27 PROCEDURE — 97140 MANUAL THERAPY 1/> REGIONS: CPT | Mod: GP | Performed by: PHYSICAL THERAPIST

## 2017-07-31 NOTE — ADDENDUM NOTE
Encounter addended by: Cristina Jain PT on: 7/31/2017  9:13 AM<BR>     Actions taken: Flowsheet accepted

## 2017-08-01 ENCOUNTER — HOSPITAL ENCOUNTER (OUTPATIENT)
Dept: PHYSICAL THERAPY | Facility: CLINIC | Age: 52
Setting detail: THERAPIES SERIES
End: 2017-08-01
Attending: PHYSICAL MEDICINE & REHABILITATION
Payer: COMMERCIAL

## 2017-08-01 PROCEDURE — 97140 MANUAL THERAPY 1/> REGIONS: CPT | Mod: GP | Performed by: PHYSICAL THERAPIST

## 2017-08-01 PROCEDURE — 40000767 ZZHC STATISTIC PT CONCUSSION VISIT: Performed by: PHYSICAL THERAPIST

## 2017-08-02 ENCOUNTER — HOSPITAL ENCOUNTER (OUTPATIENT)
Dept: OCCUPATIONAL THERAPY | Facility: CLINIC | Age: 52
Setting detail: THERAPIES SERIES
End: 2017-08-02
Attending: PHYSICAL MEDICINE & REHABILITATION
Payer: COMMERCIAL

## 2017-08-02 PROCEDURE — 97112 NEUROMUSCULAR REEDUCATION: CPT | Mod: GO | Performed by: OCCUPATIONAL THERAPIST

## 2017-08-02 PROCEDURE — 40000125 ZZHC STATISTIC OT OUTPT VISIT: Performed by: OCCUPATIONAL THERAPIST

## 2017-08-03 ENCOUNTER — HOSPITAL ENCOUNTER (OUTPATIENT)
Dept: OCCUPATIONAL THERAPY | Facility: CLINIC | Age: 52
Setting detail: THERAPIES SERIES
End: 2017-08-03
Attending: PHYSICAL MEDICINE & REHABILITATION
Payer: COMMERCIAL

## 2017-08-03 ENCOUNTER — HOSPITAL ENCOUNTER (OUTPATIENT)
Dept: PHYSICAL THERAPY | Facility: CLINIC | Age: 52
Setting detail: THERAPIES SERIES
End: 2017-08-03
Attending: PHYSICAL MEDICINE & REHABILITATION
Payer: COMMERCIAL

## 2017-08-03 PROCEDURE — 40000768 ZZHC STATISTIC OT CONCUSSION VISIT: Performed by: OCCUPATIONAL THERAPIST

## 2017-08-03 PROCEDURE — 40000767 ZZHC STATISTIC PT CONCUSSION VISIT: Performed by: PHYSICAL THERAPIST

## 2017-08-03 PROCEDURE — 97530 THERAPEUTIC ACTIVITIES: CPT | Mod: GP | Performed by: PHYSICAL THERAPIST

## 2017-08-03 PROCEDURE — 97112 NEUROMUSCULAR REEDUCATION: CPT | Mod: GO | Performed by: OCCUPATIONAL THERAPIST

## 2017-08-03 PROCEDURE — 97140 MANUAL THERAPY 1/> REGIONS: CPT | Mod: GP | Performed by: PHYSICAL THERAPIST

## 2017-08-08 ENCOUNTER — HOSPITAL ENCOUNTER (OUTPATIENT)
Dept: PHYSICAL THERAPY | Facility: CLINIC | Age: 52
Setting detail: THERAPIES SERIES
End: 2017-08-08
Attending: PHYSICAL MEDICINE & REHABILITATION
Payer: COMMERCIAL

## 2017-08-08 ENCOUNTER — HOSPITAL ENCOUNTER (OUTPATIENT)
Dept: OCCUPATIONAL THERAPY | Facility: CLINIC | Age: 52
Setting detail: THERAPIES SERIES
End: 2017-08-08
Attending: PHYSICAL MEDICINE & REHABILITATION
Payer: COMMERCIAL

## 2017-08-08 PROCEDURE — 40000768 ZZHC STATISTIC OT CONCUSSION VISIT: Performed by: OCCUPATIONAL THERAPIST

## 2017-08-08 PROCEDURE — 40000767 ZZHC STATISTIC PT CONCUSSION VISIT: Performed by: PHYSICAL THERAPIST

## 2017-08-08 PROCEDURE — 97112 NEUROMUSCULAR REEDUCATION: CPT | Mod: GP | Performed by: PHYSICAL THERAPIST

## 2017-08-08 PROCEDURE — 97140 MANUAL THERAPY 1/> REGIONS: CPT | Mod: GP | Performed by: PHYSICAL THERAPIST

## 2017-08-08 PROCEDURE — 97112 NEUROMUSCULAR REEDUCATION: CPT | Mod: GO | Performed by: OCCUPATIONAL THERAPIST

## 2017-08-09 ENCOUNTER — HOSPITAL ENCOUNTER (OUTPATIENT)
Dept: PHYSICAL THERAPY | Facility: CLINIC | Age: 52
Setting detail: THERAPIES SERIES
End: 2017-08-09
Attending: PHYSICAL MEDICINE & REHABILITATION
Payer: COMMERCIAL

## 2017-08-09 PROCEDURE — 40000767 ZZHC STATISTIC PT CONCUSSION VISIT: Performed by: PHYSICAL THERAPIST

## 2017-08-09 PROCEDURE — 97140 MANUAL THERAPY 1/> REGIONS: CPT | Mod: GP | Performed by: PHYSICAL THERAPIST
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
## 2017-08-10 ENCOUNTER — HOSPITAL ENCOUNTER (OUTPATIENT)
Dept: OCCUPATIONAL THERAPY | Facility: CLINIC | Age: 52
Setting detail: THERAPIES SERIES
End: 2017-08-10
Attending: PHYSICAL MEDICINE & REHABILITATION
Payer: COMMERCIAL

## 2017-08-10 ENCOUNTER — HOSPITAL ENCOUNTER (OUTPATIENT)
Dept: SPEECH THERAPY | Facility: CLINIC | Age: 52
Setting detail: THERAPIES SERIES
End: 2017-08-10
Attending: PHYSICAL MEDICINE & REHABILITATION
Payer: COMMERCIAL

## 2017-08-10 PROCEDURE — 96125 COGNITIVE TEST BY HC PRO: CPT | Mod: GN | Performed by: SPEECH-LANGUAGE PATHOLOGIST

## 2017-08-10 PROCEDURE — 40000223 ZZHC STATISTIC SLP VISIT NEURO CLINIC: Performed by: SPEECH-LANGUAGE PATHOLOGIST

## 2017-08-10 PROCEDURE — 40000768 ZZHC STATISTIC OT CONCUSSION VISIT: Performed by: OCCUPATIONAL THERAPIST

## 2017-08-10 PROCEDURE — 97535 SELF CARE MNGMENT TRAINING: CPT | Mod: GO | Performed by: OCCUPATIONAL THERAPIST
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
## 2017-08-14 ENCOUNTER — HOSPITAL ENCOUNTER (OUTPATIENT)
Dept: PHYSICAL THERAPY | Facility: CLINIC | Age: 52
Setting detail: THERAPIES SERIES
End: 2017-08-14
Attending: PHYSICAL MEDICINE & REHABILITATION
Payer: COMMERCIAL

## 2017-08-14 ENCOUNTER — TELEPHONE (OUTPATIENT)
Dept: ONCOLOGY | Facility: CLINIC | Age: 52
End: 2017-08-14

## 2017-08-14 ENCOUNTER — HOSPITAL ENCOUNTER (OUTPATIENT)
Dept: OCCUPATIONAL THERAPY | Facility: CLINIC | Age: 52
Setting detail: THERAPIES SERIES
End: 2017-08-14
Attending: PHYSICAL MEDICINE & REHABILITATION
Payer: COMMERCIAL

## 2017-08-14 PROCEDURE — 40000768 ZZHC STATISTIC OT CONCUSSION VISIT: Performed by: OCCUPATIONAL THERAPIST

## 2017-08-14 PROCEDURE — 97535 SELF CARE MNGMENT TRAINING: CPT | Mod: GO | Performed by: OCCUPATIONAL THERAPIST

## 2017-08-14 PROCEDURE — 97112 NEUROMUSCULAR REEDUCATION: CPT | Mod: GP | Performed by: PHYSICAL THERAPIST

## 2017-08-14 PROCEDURE — 97140 MANUAL THERAPY 1/> REGIONS: CPT | Mod: GP | Performed by: PHYSICAL THERAPIST

## 2017-08-14 PROCEDURE — 40000767 ZZHC STATISTIC PT CONCUSSION VISIT: Performed by: PHYSICAL THERAPIST

## 2017-08-14 NOTE — TELEPHONE ENCOUNTER
Reason for Call:  Other call back    Detailed comments: Dr. Ocampo patient, requesting a thyroid lab test also, she is having labs on Monday. Please call patients  Gabriele when lab order is placed.     Phone Number Patient can be reached at: Other phone number:  [phone] (Y)    Best Time: ASAP today -     Can we leave a detailed message on this number? YES    Call taken on 8/14/2017 at 8:40 AM by Perlita Loera

## 2017-08-14 NOTE — TELEPHONE ENCOUNTER
Patient does have DX of hypothyroidism. That is being managed by Zoltan Tolentino.  Order for thyroid labs should come from his office as this is not related to her oncology dx. Lab can still draw the correct tube needed and contact Zoltan Tolentino for orders.    Spoke with patient's  Gabriele and updated with this information. He would like our office to contact Zoltan Tolentino for the orders.  Did let Gabriele know that typically this would be left to the patient to obtain, but I would send a message to Zoltan Tolentino.  I did advise Gabriele/patient verify with Zoltan Tolentino office or the lab prior to Monday, that orders were placed.  Gabriele is in agreement with this plan.  Advised to call back with further questions or concerns. Direct line provided.    Elli Tolentino RN, BSN, OCN  Oncology Hematology   Breast Cancer Navigator  [facility]  [email or web address]  [phone]  Phone   [phone]  Fax

## 2017-08-14 NOTE — TELEPHONE ENCOUNTER
Spoke directly with Dr. Tolentino's nurse.  Relayed the patient's request.  She will speak with Dr. Tolentino and call and update patient and spouse in regards to orders being placed or not (last thyroid level was 05/2017).

## 2017-08-15 ENCOUNTER — HOSPITAL ENCOUNTER (OUTPATIENT)
Dept: SPEECH THERAPY | Facility: CLINIC | Age: 52
Setting detail: THERAPIES SERIES
End: 2017-08-15
Attending: PHYSICAL MEDICINE & REHABILITATION
Payer: COMMERCIAL

## 2017-08-15 ENCOUNTER — OFFICE VISIT (OUTPATIENT)
Dept: ORTHOPEDICS | Facility: CLINIC | Age: 52
End: 2017-08-15
Payer: COMMERCIAL

## 2017-08-15 VITALS — HEIGHT: 68 IN | HEART RATE: 75 BPM | BODY MASS INDEX: 18.94 KG/M2 | WEIGHT: 125 LBS

## 2017-08-15 DIAGNOSIS — S06.0X1D CONCUSSION WITH LOSS OF CONSCIOUSNESS <= 30 MIN, SUBSEQUENT ENCOUNTER: Primary | ICD-10-CM

## 2017-08-15 PROCEDURE — 99213 OFFICE O/P EST LOW 20 MIN: CPT | Performed by: PHYSICAL MEDICINE & REHABILITATION

## 2017-08-15 PROCEDURE — 97532 ZZHC SP COGNITIVE SKILLS EA 15 MIN: CPT | Mod: GN | Performed by: SPEECH-LANGUAGE PATHOLOGIST

## 2017-08-15 PROCEDURE — 40000223 ZZHC STATISTIC SLP VISIT NEURO CLINIC: Performed by: SPEECH-LANGUAGE PATHOLOGIST

## 2017-08-15 NOTE — LETTER
[address] [address]  Phone: [phone]    August 15, 2017        To Whom It May Concern:    Emma was seen in clinic today in follow up for a concussion. She is to continue no work for 4 weeks. She will be seen in follow up in 4 weeks for reevaluation. Thank you for your help in advance.    Please feel free to contact me at the number above with any questions or concerns.    Sincerely,         Mendy Macario MD        [place] state law requires qualified medical clearance for return to  participation following concussion.

## 2017-08-15 NOTE — LETTER
[address] [address]  Phone: [phone]    August 15, 2017        To Whom It May Concern:    Emma was seen in clinic today in follow up for a concussion. She is to continue no work for 4 weeks. She will be seen in follow up in 3 weeks for reevaluation. Thank you for your help in advance.    Please feel free to contact me at the number above with any questions or concerns.    Sincerely,         Mendy Macario MD        [place] state law requires qualified medical clearance for return to  participation following concussion.

## 2017-08-15 NOTE — PROGRESS NOTES
Emma Nunn is a 51 year old female who presents in follow up for a concussion that occurred on 5/2/2017 or 15 weeks ago.  Since last visit on 7/25/2017 patient had been slowly improving.  Four days ago had speech therapy testing and OT session, felt increasing symptoms after that.  Has been getting manual work in PT, has been helping.  Was struggling with attention tasks in speech.  Has been doing visual field work and cognitive/memory tasks in OT.    Since your last visit, level of activity is:  Stage 3 - moderately aggressive, 5 miles walking everyday.    Since your last visit, have you continued with your normal cognitive activity (text, computer, school):  Yes, has been improving, wants to discuss returning to work until Friday's setback      Current Symptoms:  CONCUSSION SYMPTOMS ASSESSMENT 8/8/2017 8/14/2017 8/15/2017   Headache or Pressure In Head 3 - moderate 5 - severe 5 - severe   Upset Stomach or Throwing Up 0 - none 0 - none 2 - mild to moderate   Problems with Balance 0 - none 0 - none 0 - none   Feeling Dizzy 0 - none 0 - none 0 - none   Sensitivity to Light 2 - mild to moderate 3 - moderate 3 - moderate   Sensitivity to Noise 2 - mild to moderate 3 - moderate 3 - moderate   Mood Changes 2 - mild to moderate 3 - moderate 3 - moderate   Feeling sluggish, hazy, or foggy 1 - mild 2 - mild to moderate 2 - mild to moderate   Trouble Concentrating, Lack of Focus 2 - mild to moderate 2 - mild to moderate 2 - mild to moderate   Motion Sickness 0 - none 2 - mild to moderate 0 - none   Vision Changes 2 - mild to moderate 2 - mild to moderate 3 - moderate   Memory Problems 2 - mild to moderate 2 - mild to moderate 2 - mild to moderate   Feeling Confused 1 - mild 3 - moderate 2 - mild to moderate   Neck Pain 3 - moderate 3 - moderate 3 - moderate   Trouble Sleeping 2 - mild to moderate 3 - moderate 3 - moderate   Total Number of Symptoms 11 12 12   Symptom Severity Score 22 33 33       Sleep: Difficulty staying  "asleep, typically 1-2 hours at a time    Patient's past medical, surgical, social and family histories are reviewed today.    Past Medical History:   Diagnosis Date     Breast cancer (H)      Diffuse cystic mastopathy     Fibrocystic breast disease     External hemorrhoids without mention of complication      Unspecified hypothyroidism      Past Surgical History:   Procedure Laterality Date     BREAST BIOPSY, CORE RT/LT  7/8/2013     C NONSPECIFIC PROCEDURE  1993    Laparoscopy     C NONSPECIFIC PROCEDURE  1993    Outpt surgery for anal fissure     COLONOSCOPY N/A 12/1/2014    Procedure: COLONOSCOPY;  Surgeon: Gabriele Golden MD;  Location: [facility]     LAPAROSCOPIC CHOLECYSTECTOMY N/A 5/24/2017    Procedure: LAPAROSCOPIC CHOLECYSTECTOMY;  Laparoscopic Cholecystectomy;  Surgeon: Jason Munoz MD;  Location: [facility] OR     LUMPECTOMY BREAST WITH SENTINEL NODE, COMBINED  7/31/2013    Procedure: COMBINED LUMPECTOMY BREAST WITH SENTINEL NODE;  Left Breast Lumpectomy with [place] Node Biopsy;  Surgeon: Jason Munoz MD;  Location: [facility] OR       OBJECTIVE:  Pulse 75  Ht 5' 7.5\" (1.715 m)  Wt 125 lb (56.7 kg)  BMI 19.29 kg/m2    General: Healthy, well-appearing, and in no acute distress.  Skin: no suspicious lesions or rashes  Psych:  Appears more calm today and less anxious  HEENT: Head normocephalic, atraumatic  Neuromuscular/Strength: No motor weakness in C5-T1 distribution.    Neurologic/Visual:  Cranial Nerves:  II-XII intact grossly  PAULA: yes  EOMI: yes  Nystagmus: no  Cognitive:  Previous cognitive assessment was normal and without deficit; repeat cognitive testing not performed today.    ASSESSMENT:  Concussion with loss of consciousness <= 30 min, subsequent encounter    PLAN:  -Recommend continuing PT, OT and speech.    -Continue to remain off of work.  Letter provided to be off for 4 more weeks.    -Refrain from multimedia activity (i.e.texting, video games, computer work, and TV)  -Refrain from any " strenuous physical activity.  However, encourage light walks outside as tolerated.    -Avoid things that aggravate the symptoms.  -She should not return to driving yet.  -Continue melatonin for sleep. Take 30 minutes to 1 hour before attempting to go to sleep  -Follow Up: 4 weeks or sooner if symptoms fail to improve or worsen. Call with any questions or concerns.    Radha Macario MD, CAQ  [facility]

## 2017-08-15 NOTE — MR AVS SNAPSHOT
After Visit Summary   8/15/2017    Emma Nunn    MRN: [id number]           Patient Information     Date Of Birth          1965        Visit Information        Provider Department      8/15/2017 9:00 AM Mendy Macario MD [facility] Instructions    Today's Plan of Care:   -Recommend continuing PT, OT and speech.    -Continue to remain off of work.  Letter provided to be off for 4 more weeks.    -Work restrictions: off for 4 weeks. Letter provided.  -Refrain from multimedia activity (i.e.texting, video games, computer work, and TV)  -Refrain from any strenuous physical activity.  However, encourage light walks outside as tolerated.    -Avoid things that aggravate the symptoms.  -She should not return to driving yet.  -Continue melatonin for sleep. Take 30 minutes to 1 hour before attempting to go to sleep  -Follow Up: 4 weeks or sooner if symptoms fail to improve or worsen. Call with any questions or concerns.    Healing After a Concussion     Rest  Rest is the best treatment for a concussion. You should avoid activities that cause your symptoms to get worse or make you feel tired. This would include physical activities as well as watching TV, texting or playing video games.    You may sleep or nap during the day as long as it does not prevent you from sleeping at night. If you find it is hard to fall asleep, talk to your doctor. You may need medicine to help you sleep.    If symptoms have not worsened, you do not need to be wakened and checked on during the night.      School  You can rest your brain by staying at home for a time. The amount of time away from school will depend on the injury and the symptoms.    At school, you may have trouble taking tests or working on a computer. Symptoms may get worse in band, choir, busy classes or a noisy lunchroom. A doctor can work with the school if you need a plan to help you succeed.    Work  You may need to change  "your work routine as you recover. A doctor can help you create a plan for the conditions at your job.      Treat pain    Take Tylenol (acetaminophen) for headaches and pain every 4 to 6 hours, as needed.    Do not take over-the-counter medicines such as ibuprofen, Advil, Motrin, Benadryl, Aleve, sleep aides or Tylenol PM. These drugs may cause new problems.    If you cannot manage your pain with Tylenol, call your doctor or go to the emergency department.      Watch symptoms closely  Each day keep track of your symptoms. This will help your doctor see how well you are healing. Write down the symptom, how often it occurs, how long it lasts, and what makes it better or worse.    Possible symptoms: headache, stomach upset, feeling confused or dizzy, motion sickness, and personality changes.      Returning to activity  Take your time returning to activity. A doctor can help determine what levels of activity are best for you. If you re returning to a sport, you should see a healthcare provider before doing so.      If you have questions, call  Concussion hotline: [phone] or Athletic medicine hotline: [phone].          For informational purposes only.  Not to replace the advice of your health care provider.  Copyright   2014 [facility].  All rights reserved.            Sleep Hygiene     What is it?    \"Sleep hygiene\" means having good sleep habits. Follow the tips below to sleep better at night.      Get on a schedule. Go to bed and get up at about the same time every day.    Listen to your body. Only try to sleep when you actually feel tired or sleepy.    Be patient. If you haven't been able to get to sleep after about 20 minutes or more, get up and do something calming or boring until you feel sleepy. Then, return to bed and try again.      Avoid caffeine (coffee, tea, cola drinks, chocolate and some medicines) for at least 4 to 6 hours before going to bed. We also suggest you don't use alcohol " "or nicotine (cigarettes) during this time. Both can make it harder for you to fall asleep and stay asleep.    Use your bed for sleeping only. That means no TV, computer or homework in bed!    Don't nap during the day. If you do nap, make sure it is for less than an hour and before 3 p.m.    Create sleep rituals that remind your body that it is time to sleep. Examples include breathing exercises, stretching, or reading a book.     Try a bath or shower before bed. Having a hot bath 1 to 2 hours before bedtime can help you feel sleepy.    Don't watch the clock. Checking the clock during the night can wake you up. It can also lead to negative thoughts such as \"I will never fall asleep.\"    Use a sleep diary. Track your sleep schedule to know your sleep patterns and to see where you can improve.    Get regular exercise. But try not to do heavy exercise in the 4 hours before bedtime.      Eat a healthy, balanced diet. Try eating a light, healthy snack before bed, but avoid eating a heavy meal.    Create the right sleeping area. A cool, dark, quiet room is best. If needed, try earplugs, fans and blackout curtains.      Keep your daytime routine the same even if you have a bad night sleep. Avoiding activities the next day can make it harder to sleep.          For informational purposes only. Not to replace the advice of your health care provider. Copyright   2013 [facility]. All rights reserved.            Follow-ups after your visit        Your next 10 appointments already scheduled     Aug 18, 2017  9:00 AM CDT   Concussion Treatment with Sue Cabral OT   [facility] ([facility])    [address] [place] [id number] [phone]            Aug 18, 2017  9:30 AM CDT   Concussion Treatment with Cristina Jain PT   [facility] ([facility])    [address] [place] [id number] [phone]         "    Aug 21, 2017  8:15 AM CDT   Concussion Treatment with Sue Cabral, OT   [facility] ([facility])    [address] [place] [address] Dr [place] [id number]   [phone]            Aug 21, 2017  9:00 AM CDT   Concussion Treatment with Cristina Jain, PT   [facility] ([facility])    [address] [place] [address] Dr [place] [address]   [phone]            Aug 21, 2017  3:30 PM CDT   [facility]    [address] [place] [id number]   [phone]           Patient must bring picture ID. Patient should be prepared to give a urine specimen  Please do not eat 10-12 hours before your appointment if you are coming in fasting for labs on lipids, cholesterol, or glucose (sugar). Pregnant women should follow their Care Team instructions. Water with medications is okay. Do not drink coffee or other fluids. If you have concerns about taking  your medications, please ask at office or if scheduling via OpenWhere, send a message by clicking on Secure Messaging, Message Your Care Team.            Aug 22, 2017  7:30 AM CDT   Treatment 45 with ANGELITO Buitrago   [facility] ([facility])    [address] [place] [address] Dr [place] [phone]   [phone]            Aug 22, 2017  3:30 PM CDT   [facility]    [address] [place] [address]   [phone]            Aug 25, 2017  9:00 AM CDT   Concussion Treatment with Sue Cabral, OT   [facility] ([facility])    [address] [place] [address] Dr [place] [address]   [phone]            Aug 25, 2017  9:45 AM CDT   Concussion Treatment with Cristina Jain, PT   [facility] ([facility])    [address] [place] [address] Dr [place]  "[phone]            Aug 29, 2017  9:00 AM CDT   Concussion Treatment with Cristina Jain PT   [facility] ([facility])    [address] [place] [id number] [phone]              Who to contact     If you have questions or need follow up information about today's clinic visit or your schedule please [facility] at [phone].  Normal or non-critical lab and imaging results will be communicated to you by MyChart, letter or phone within 4 business days after the clinic has received the results. If you do not hear from us within 7 days, please contact the clinic through MyChart or phone. If you have a critical or abnormal lab result, we will notify you by phone as soon as possible.  Submit refill requests through Kiko or call your pharmacy and they will forward the refill request to us. Please allow 3 business days for your refill to be completed.          Additional Information About Your Visit        [facility] Direct Information     Kiko lets you send messages to your doctor, view your test results, renew your prescriptions, schedule appointments and more. To sign up, go to [email or web address] . Click on \"Log in\" on the left side of the screen, which will take you to the Welcome page. Then click on \"Sign up Now\" on the right side of the page.     You will be asked to enter the access code listed below, as well as some personal information. Please follow the directions to create your username and password.     Your access code is: [email or web address]  Expires: 2017  9:30 AM     Your access code will  in 90 days. If you need help or a new code, please call your [facility] clinic or [phone].        Care EveryWhere ID     This is your Care EveryWhere ID. This could be used by other organizations to access your [facility] medical records  [id number]        Your Vitals Were     Pulse Height BMI (Body Mass " "Index)             75 5' 7.5\" (1.715 m) 19.29 kg/m2          Blood Pressure from Last 3 Encounters:   06/19/17 101/72   05/24/17 99/63   05/24/17 100/64    Weight from Last 3 Encounters:   08/15/17 125 lb (56.7 kg)   07/25/17 125 lb (56.7 kg)   07/10/17 125 lb (56.7 kg)              Today, you had the following     No orders found for display       Primary Care Provider Office Phone # Fax #    Zoltan Tolentino [phone]       [facility] [address] [place] [address]        Equal Access to Services     REYNA SINGH : Hadii isatu samaniegoo Soteo, waaxda luqadaha, qaybta kaalmada adeegyada, danay irwin. So [facility] [phone].    ATENCIÓN: Si habla español, tiene a jang disposición servicios gratuitos de asistencia lingüística. Llame al [phone].    We comply with applicable federal civil rights laws and [place] laws. We do not discriminate on the basis of race, color, national origin, age, disability sex, sexual orientation or gender identity.            Thank you!     Thank you for choosing [facility]  for your care. Our goal is always to provide you with excellent care. Hearing back from our patients is one way we can continue to improve our services. Please take a few minutes to complete the written survey that you may receive in the mail after your visit with us. Thank you!             Your Updated Medication List - Protect others around you: Learn how to safely use, store and throw away your medicines at www.disposemymeds.org.          This list is accurate as of: 8/15/17  9:30 AM.  Always use your most recent med list.                   Brand Name Dispense Instructions for use Diagnosis    IBUPROFEN PO      Take 600 mg by mouth every 6 hours as needed for moderate pain        levothyroxine 88 MCG tablet    SYNTHROID/LEVOTHROID    90 tablet    Take 1 tablet (88 mcg) by mouth daily    Malignant neoplasm of upper-outer " quadrant of left female breast (H)       ondansetron 4 MG tablet    ZOFRAN    30 tablet    Take 1 tablet (4 mg) by mouth every 8 hours as needed for nausea    Nausea       tamoxifen 20 MG tablet    NOLVADEX    90 tablet    Take 1 tablet (20 mg) by mouth daily    Malignant neoplasm of upper-outer quadrant of left female breast (H)

## 2017-08-15 NOTE — NURSING NOTE
"Chief Complaint   Patient presents with     Musculoskeletal Problem     concussion f/u       Initial Pulse 75  Ht 5' 7.5\" (1.715 m)  Wt 125 lb (56.7 kg)  BMI 19.29 kg/m2 Estimated body mass index is 19.29 kg/(m^2) as calculated from the following:    Height as of this encounter: 5' 7.5\" (1.715 m).    Weight as of this encounter: 125 lb (56.7 kg).  Medication Reconciliation: complete     Andreas Brady MS, ATC      "

## 2017-08-15 NOTE — PATIENT INSTRUCTIONS
Today's Plan of Care:   -Recommend continuing PT, OT and speech.    -Continue to remain off of work.  Letter provided to be off for 4 more weeks.    -Work restrictions: off for 4 weeks. Letter provided.  -Refrain from multimedia activity (i.e.texting, video games, computer work, and TV)  -Refrain from any strenuous physical activity.  However, encourage light walks outside as tolerated.    -Avoid things that aggravate the symptoms.  -She should not return to driving yet.  -Continue melatonin for sleep. Take 30 minutes to 1 hour before attempting to go to sleep  -Follow Up: 4 weeks or sooner if symptoms fail to improve or worsen. Call with any questions or concerns.    Healing After a Concussion     Rest  Rest is the best treatment for a concussion. You should avoid activities that cause your symptoms to get worse or make you feel tired. This would include physical activities as well as watching TV, texting or playing video games.    You may sleep or nap during the day as long as it does not prevent you from sleeping at night. If you find it is hard to fall asleep, talk to your doctor. You may need medicine to help you sleep.    If symptoms have not worsened, you do not need to be wakened and checked on during the night.      School  You can rest your brain by staying at home for a time. The amount of time away from school will depend on the injury and the symptoms.    At school, you may have trouble taking tests or working on a computer. Symptoms may get worse in band, choir, busy classes or a noisy lunchroom. A doctor can work with the school if you need a plan to help you succeed.    Work  You may need to change your work routine as you recover. A doctor can help you create a plan for the conditions at your job.      Treat pain    Take Tylenol (acetaminophen) for headaches and pain every 4 to 6 hours, as needed.    Do not take over-the-counter medicines such as ibuprofen, Advil, Motrin, Benadryl, Aleve, sleep  "aides or Tylenol PM. These drugs may cause new problems.    If you cannot manage your pain with Tylenol, call your doctor or go to the emergency department.      Watch symptoms closely  Each day keep track of your symptoms. This will help your doctor see how well you are healing. Write down the symptom, how often it occurs, how long it lasts, and what makes it better or worse.    Possible symptoms: headache, stomach upset, feeling confused or dizzy, motion sickness, and personality changes.      Returning to activity  Take your time returning to activity. A doctor can help determine what levels of activity are best for you. If you re returning to a sport, you should see a healthcare provider before doing so.      If you have questions, call  Concussion hotline: [phone] or Athletic medicine hotline: 766.829.4920.          For informational purposes only.  Not to replace the advice of your health care provider.  Copyright   2014 [facility].  All rights reserved.            Sleep Hygiene     What is it?    \"Sleep hygiene\" means having good sleep habits. Follow the tips below to sleep better at night.      Get on a schedule. Go to bed and get up at about the same time every day.    Listen to your body. Only try to sleep when you actually feel tired or sleepy.    Be patient. If you haven't been able to get to sleep after about 20 minutes or more, get up and do something calming or boring until you feel sleepy. Then, return to bed and try again.      Avoid caffeine (coffee, tea, cola drinks, chocolate and some medicines) for at least 4 to 6 hours before going to bed. We also suggest you don't use alcohol or nicotine (cigarettes) during this time. Both can make it harder for you to fall asleep and stay asleep.    Use your bed for sleeping only. That means no TV, computer or homework in bed!    Don't nap during the day. If you do nap, make sure it is for less than an hour and before 3 p.m.    Create sleep " "rituals that remind your body that it is time to sleep. Examples include breathing exercises, stretching, or reading a book.     Try a bath or shower before bed. Having a hot bath 1 to 2 hours before bedtime can help you feel sleepy.    Don't watch the clock. Checking the clock during the night can wake you up. It can also lead to negative thoughts such as \"I will never fall asleep.\"    Use a sleep diary. Track your sleep schedule to know your sleep patterns and to see where you can improve.    Get regular exercise. But try not to do heavy exercise in the 4 hours before bedtime.      Eat a healthy, balanced diet. Try eating a light, healthy snack before bed, but avoid eating a heavy meal.    Create the right sleeping area. A cool, dark, quiet room is best. If needed, try earplugs, fans and blackout curtains.      Keep your daytime routine the same even if you have a bad night sleep. Avoiding activities the next day can make it harder to sleep.          For informational purposes only. Not to replace the advice of your health care provider. Copyright   2013 [facility]. All rights reserved.    "

## 2017-08-18 ENCOUNTER — HOSPITAL ENCOUNTER (OUTPATIENT)
Dept: PHYSICAL THERAPY | Facility: CLINIC | Age: 52
Setting detail: THERAPIES SERIES
End: 2017-08-18
Attending: PHYSICAL MEDICINE & REHABILITATION
Payer: COMMERCIAL

## 2017-08-18 ENCOUNTER — HOSPITAL ENCOUNTER (OUTPATIENT)
Dept: OCCUPATIONAL THERAPY | Facility: CLINIC | Age: 52
Setting detail: THERAPIES SERIES
End: 2017-08-18
Attending: PHYSICAL MEDICINE & REHABILITATION
Payer: COMMERCIAL

## 2017-08-18 PROCEDURE — 97112 NEUROMUSCULAR REEDUCATION: CPT | Mod: GO | Performed by: OCCUPATIONAL THERAPIST

## 2017-08-18 PROCEDURE — 40000768 ZZHC STATISTIC OT CONCUSSION VISIT: Performed by: OCCUPATIONAL THERAPIST

## 2017-08-18 PROCEDURE — 40000767 ZZHC STATISTIC PT CONCUSSION VISIT: Performed by: PHYSICAL THERAPIST

## 2017-08-18 PROCEDURE — 97530 THERAPEUTIC ACTIVITIES: CPT | Mod: GP | Performed by: PHYSICAL THERAPIST

## 2017-08-18 NOTE — PROGRESS NOTES
"Outpatient Physical Therapy Progress Note     Patient: Emma Nunn  : 1965    Beginning/End Dates of Reporting Period:  9 visits between 17 and 17 for a total of 22 visits    Referring Provider: Dr. Mendy Macario    Therapy Diagnosis: Concussion with vertigo, post traumatic headache, oculomotor issues , neck pain and decreased AROM     Client Self Report: She reports her sleeping is not changed. She sleeps x 3 hours (falls asleep without dificulty) and then up x 1-1/2 hours and then sleeps in 1 hour  blocks. She wakes to brain chatter and \"body pain: neck, shoulders and C6-7 dermatome bilateral fingers. She is noting an increase in her head pressure when she has to concentrate and focus. She contiunes to push through symptoms which increases her head pressure. She has been completing the neck exercises but continues to have difficulty with cervical flexion, She feels her rotation and SB for her neck are improved.     Objective Measurements:  Objective Measure: Concussion assessment  Details:   Objective Measure: NDI  Details: 68%  Objective Measure: Cervical AROM in sitting  Details: Flexion: 25% with pain, extension 80%, rotation R: 90%, L: 85%       Outcome Measures (most recent score):  Dizziness Handicap Inventory (score out of 100) A decrease in score by 17.18 or greater indicates a clinically significant change in symptoms.: 12 (8physical and 4 functional)           Goals:  Goal Identifier Vertigo   Goal Description Emma will be able to report 50% decrease of vertigo on DHI so she can walk and progress toward driving her car safely, avoid falls   Target Date 17   Date Met  17 (17: Vertigo very rare now; only 1x/day now; 90% decreas)   Progress:     Goal Identifier Sleep   Goal Description Emma will be able to use sleep hygiene principles to allow her to sleep x 4 hours at one time->7-8 hours per night (6-7 hours of total sleep, but interrupted)   Target Date 17 " "  Date Met      Progress:     Goal Identifier Headache   Goal Description Emma will be able to report a 50% reduction in headaches so she can concentrate better and focus to prepare for return to driving and work (3/10, pressure. decreased from last session)   Target Date 08/30/17   Date Met   (7/18/17: 80% reduction in HAs since start but not driving)   Progress:     Goal Identifier Approach   Goal Description Emma will be able to tolerate approaches within 2 feet of her from the back and R side so she can hold conversations, walk through crowds in the community without increased anxiety (met - occasional hesitancy normal for her)   Target Date 08/01/17   Date Met      Progress:     Goal Identifier Vertigo   Goal Description Emma will complete the cnanlith repositioning exercises at home so she can report 100% improvement in dizziness symptoms/room spinning and stay safe with walking   Target Date 10/18/17   Date Met      Progress:         Progress Toward Goals:   Progress this reporting period: Emma is reporting improvement in her dizziness and her DHI is indicating this. She reports she has some dizziness at times but she continues to complete the exercises 3 times per day and feels she does not need to work on this in clinic. She has an increase in head pressure wtih focusing. She is noting improvement with the manual therapy. We revisited the importance of \"nudging\" the concusson symptoms without pushing through and the importance of communication. She has a tendency not to let the therapist know when symptoms increase. She would benefit from continued PT working on cervical range of motion and activities with monitoring. OT is working on oculomotor activities.         Plan:  Continue therapy per current plan of care focusing more on cervical AROM, home education and exercise    Discharge:  No    Thank you for referring Emma  to [facility] John Jain, " PT  [phone]

## 2017-08-21 ENCOUNTER — HOSPITAL ENCOUNTER (OUTPATIENT)
Dept: PHYSICAL THERAPY | Facility: CLINIC | Age: 52
Setting detail: THERAPIES SERIES
End: 2017-08-21
Attending: PHYSICAL MEDICINE & REHABILITATION
Payer: COMMERCIAL

## 2017-08-21 ENCOUNTER — HOSPITAL ENCOUNTER (OUTPATIENT)
Dept: OCCUPATIONAL THERAPY | Facility: CLINIC | Age: 52
Setting detail: THERAPIES SERIES
End: 2017-08-21
Attending: PHYSICAL MEDICINE & REHABILITATION
Payer: COMMERCIAL

## 2017-08-21 DIAGNOSIS — C50.412 MALIGNANT NEOPLASM OF UPPER-OUTER QUADRANT OF LEFT FEMALE BREAST (H): ICD-10-CM

## 2017-08-21 DIAGNOSIS — Z13.6 CARDIOVASCULAR SCREENING; LDL GOAL LESS THAN 160: Primary | ICD-10-CM

## 2017-08-21 LAB
ALBUMIN SERPL-MCNC: 3.5 G/DL (ref 3.4–5)
ALP SERPL-CCNC: 75 U/L (ref 40–150)
ALT SERPL W P-5'-P-CCNC: 35 U/L (ref 0–50)
ANION GAP SERPL CALCULATED.3IONS-SCNC: 6 MMOL/L (ref 3–14)
AST SERPL W P-5'-P-CCNC: 23 U/L (ref 0–45)
BASOPHILS # BLD AUTO: 0.1 10E9/L (ref 0–0.2)
BASOPHILS NFR BLD AUTO: 0.9 %
BILIRUB SERPL-MCNC: 0.6 MG/DL (ref 0.2–1.3)
BUN SERPL-MCNC: 13 MG/DL (ref 7–30)
CALCIUM SERPL-MCNC: 9.2 MG/DL (ref 8.5–10.1)
CANCER AG27-29 SERPL-ACNC: 18 U/ML (ref 0–39)
CHLORIDE SERPL-SCNC: 107 MMOL/L (ref 94–109)
CHOLEST SERPL-MCNC: 149 MG/DL
CO2 SERPL-SCNC: 32 MMOL/L (ref 20–32)
CREAT SERPL-MCNC: 0.82 MG/DL (ref 0.52–1.04)
DIFFERENTIAL METHOD BLD: ABNORMAL
EOSINOPHIL # BLD AUTO: 0.1 10E9/L (ref 0–0.7)
EOSINOPHIL NFR BLD AUTO: 1.8 %
ERYTHROCYTE [DISTWIDTH] IN BLOOD BY AUTOMATED COUNT: 12.3 % (ref 10–15)
GFR SERPL CREATININE-BSD FRML MDRD: 74 ML/MIN/1.7M2
GLUCOSE SERPL-MCNC: 86 MG/DL (ref 70–99)
HCT VFR BLD AUTO: 41.6 % (ref 35–47)
HDLC SERPL-MCNC: 81 MG/DL
HGB BLD-MCNC: 13.6 G/DL (ref 11.7–15.7)
IMM GRANULOCYTES # BLD: 0 10E9/L (ref 0–0.4)
IMM GRANULOCYTES NFR BLD: 0.2 %
LDLC SERPL CALC-MCNC: 57 MG/DL
LYMPHOCYTES # BLD AUTO: 1.6 10E9/L (ref 0.8–5.3)
LYMPHOCYTES NFR BLD AUTO: 29.7 %
MCH RBC QN AUTO: 32 PG (ref 26.5–33)
MCHC RBC AUTO-ENTMCNC: 32.7 G/DL (ref 31.5–36.5)
MCV RBC AUTO: 98 FL (ref 78–100)
MONOCYTES # BLD AUTO: 0.4 10E9/L (ref 0–1.3)
MONOCYTES NFR BLD AUTO: 7.7 %
NEUTROPHILS # BLD AUTO: 3.3 10E9/L (ref 1.6–8.3)
NEUTROPHILS NFR BLD AUTO: 59.7 %
NONHDLC SERPL-MCNC: 68 MG/DL
PLATELET # BLD AUTO: 135 10E9/L (ref 150–450)
POTASSIUM SERPL-SCNC: 4.4 MMOL/L (ref 3.4–5.3)
PROT SERPL-MCNC: 6.5 G/DL (ref 6.8–8.8)
RBC # BLD AUTO: 4.25 10E12/L (ref 3.8–5.2)
SODIUM SERPL-SCNC: 145 MMOL/L (ref 133–144)
TRIGL SERPL-MCNC: 57 MG/DL
WBC # BLD AUTO: 5.5 10E9/L (ref 4–11)

## 2017-08-21 PROCEDURE — 97112 NEUROMUSCULAR REEDUCATION: CPT | Mod: GO | Performed by: OCCUPATIONAL THERAPIST

## 2017-08-21 PROCEDURE — 80061 LIPID PANEL: CPT | Performed by: FAMILY MEDICINE

## 2017-08-21 PROCEDURE — 85025 COMPLETE CBC W/AUTO DIFF WBC: CPT | Performed by: INTERNAL MEDICINE

## 2017-08-21 PROCEDURE — 97140 MANUAL THERAPY 1/> REGIONS: CPT | Mod: GP | Performed by: PHYSICAL THERAPIST

## 2017-08-21 PROCEDURE — 80053 COMPREHEN METABOLIC PANEL: CPT | Performed by: INTERNAL MEDICINE

## 2017-08-21 PROCEDURE — 97110 THERAPEUTIC EXERCISES: CPT | Mod: GP | Performed by: PHYSICAL THERAPIST

## 2017-08-21 PROCEDURE — 40000767 ZZHC STATISTIC PT CONCUSSION VISIT: Performed by: PHYSICAL THERAPIST

## 2017-08-21 PROCEDURE — 40000768 ZZHC STATISTIC OT CONCUSSION VISIT: Performed by: OCCUPATIONAL THERAPIST

## 2017-08-21 PROCEDURE — 36415 COLL VENOUS BLD VENIPUNCTURE: CPT | Performed by: INTERNAL MEDICINE

## 2017-08-21 PROCEDURE — 86300 IMMUNOASSAY TUMOR CA 15-3: CPT | Performed by: INTERNAL MEDICINE

## 2017-08-21 NOTE — LETTER
[address] [id number] [phone]         August 31, 2017    Emma Nunn  [address] [facility] [address]        Dear Emma,    The results of your recent lab tests were normal.     Results for orders placed or performed in visit on 08/21/17   Ca27.29  breast tumor marker   Result Value Ref Range    CA 27-29 18 0 - 39 U/mL   CBC with platelets differential   Result Value Ref Range    WBC 5.5 4.0 - 11.0 10e9/L    RBC Count 4.25 3.8 - 5.2 10e12/L    Hemoglobin 13.6 11.7 - 15.7 g/dL    Hematocrit 41.6 35.0 - 47.0 %    MCV 98 78 - 100 fl    MCH 32.0 26.5 - 33.0 pg    MCHC 32.7 31.5 - 36.5 g/dL    RDW 12.3 10.0 - 15.0 %    Platelet Count 135 (L) 150 - 450 10e9/L    Diff Method Automated Method     % Neutrophils 59.7 %    % Lymphocytes 29.7 %    % Monocytes 7.7 %    % Eosinophils 1.8 %    % Basophils 0.9 %    % Immature Granulocytes 0.2 %    Absolute Neutrophil 3.3 1.6 - 8.3 10e9/L    Absolute Lymphocytes 1.6 0.8 - 5.3 10e9/L    Absolute Monocytes 0.4 0.0 - 1.3 10e9/L    Absolute Eosinophils 0.1 0.0 - 0.7 10e9/L    Absolute Basophils 0.1 0.0 - 0.2 10e9/L    Abs Immature Granulocytes 0.0 0 - 0.4 10e9/L   Comprehensive metabolic panel   Result Value Ref Range    Sodium 145 (H) 133 - 144 mmol/L    Potassium 4.4 3.4 - 5.3 mmol/L    Chloride 107 94 - 109 mmol/L    Carbon Dioxide 32 20 - 32 mmol/L    Anion Gap 6 3 - 14 mmol/L    Glucose 86 70 - 99 mg/dL    Urea Nitrogen 13 7 - 30 mg/dL    Creatinine 0.82 0.52 - 1.04 mg/dL    GFR Estimate 74 >60 mL/min/1.7m2    GFR Estimate If Black 89 >60 mL/min/1.7m2    Calcium 9.2 8.5 - 10.1 mg/dL    Bilirubin Total 0.6 0.2 - 1.3 mg/dL    Albumin 3.5 3.4 - 5.0 g/dL    Protein Total 6.5 (L) 6.8 - 8.8 g/dL    Alkaline Phosphatase 75 40 - 150 U/L    ALT 35 0 - 50 U/L    AST 23 0 - 45 U/L   Lipid panel reflex to direct LDL   Result Value Ref Range    Cholesterol 149 <200 mg/dL    Triglycerides 57 <150 mg/dL    HDL Cholesterol 81 >49  mg/dL    LDL Cholesterol Calculated 57 <100 mg/dL    Non HDL Cholesterol 68 <130 mg/dL           If you have any questions or concerns, please call [phone].    Sincerely,      Zoltan Tolentino MD/tf

## 2017-08-22 ENCOUNTER — HOSPITAL ENCOUNTER (OUTPATIENT)
Dept: SPEECH THERAPY | Facility: CLINIC | Age: 52
Setting detail: THERAPIES SERIES
End: 2017-08-22
Attending: PHYSICAL MEDICINE & REHABILITATION
Payer: COMMERCIAL

## 2017-08-22 ENCOUNTER — ONCOLOGY VISIT (OUTPATIENT)
Dept: ONCOLOGY | Facility: CLINIC | Age: 52
End: 2017-08-22
Payer: COMMERCIAL

## 2017-08-22 VITALS
RESPIRATION RATE: 18 BRPM | SYSTOLIC BLOOD PRESSURE: 110 MMHG | TEMPERATURE: 97.5 F | OXYGEN SATURATION: 100 % | DIASTOLIC BLOOD PRESSURE: 67 MMHG | WEIGHT: 131.6 LBS | BODY MASS INDEX: 19.94 KG/M2 | HEIGHT: 68 IN | HEART RATE: 67 BPM

## 2017-08-22 DIAGNOSIS — E03.9 HYPOTHYROIDISM, UNSPECIFIED TYPE: Primary | ICD-10-CM

## 2017-08-22 DIAGNOSIS — C50.412 MALIGNANT NEOPLASM OF UPPER-OUTER QUADRANT OF LEFT BREAST IN FEMALE, ESTROGEN RECEPTOR POSITIVE (H): ICD-10-CM

## 2017-08-22 DIAGNOSIS — Z17.0 MALIGNANT NEOPLASM OF UPPER-OUTER QUADRANT OF LEFT BREAST IN FEMALE, ESTROGEN RECEPTOR POSITIVE (H): ICD-10-CM

## 2017-08-22 PROCEDURE — 99214 OFFICE O/P EST MOD 30 MIN: CPT | Performed by: INTERNAL MEDICINE

## 2017-08-22 PROCEDURE — 40000223 ZZHC STATISTIC SLP VISIT NEURO CLINIC: Performed by: SPEECH-LANGUAGE PATHOLOGIST

## 2017-08-22 PROCEDURE — 97532 ZZHC SP COGNITIVE SKILLS EA 15 MIN: CPT | Mod: GN | Performed by: SPEECH-LANGUAGE PATHOLOGIST

## 2017-08-22 RX ORDER — TAMOXIFEN CITRATE 20 MG/1
20 TABLET ORAL DAILY
Qty: 90 TABLET | Refills: 1 | Status: SHIPPED | OUTPATIENT
Start: 2017-08-22 | End: 2018-03-05

## 2017-08-22 RX ORDER — LEVOTHYROXINE SODIUM 88 UG/1
88 TABLET ORAL DAILY
Qty: 90 TABLET | Refills: 3 | Status: SHIPPED | OUTPATIENT
Start: 2017-08-22 | End: 2018-07-02

## 2017-08-22 ASSESSMENT — PAIN SCALES - GENERAL: PAINLEVEL: MILD PAIN (2)

## 2017-08-22 NOTE — MR AVS SNAPSHOT
After Visit Summary   8/22/2017    Emma Nunn    MRN: [id number]           Patient Information     Date Of Birth          1965        Visit Information        Provider Department      8/22/2017 3:30 PM Richard Ocampo MD [facility]        Today's Diagnoses     Hypothyroidism, unspecified type    -  1    Malignant neoplasm of upper-outer quadrant of left breast in female, estrogen receptor positive (H)           Follow-ups after your visit        Follow-up notes from your care team     Return in about 6 months (around 2/22/2018).      Your next 10 appointments already scheduled     Aug 25, 2017  9:00 AM CDT   Concussion Treatment with Sue Cabral, OT   [facility] ([facility])    [address] [place] [address] Dr John LALA [address]   [phone]            Aug 25, 2017  9:45 AM CDT   Concussion Treatment with Cristina Jain, PT   [facility] ([facility])    [address] [place] [address] Dr John LALA [address]   [phone]            Aug 29, 2017  8:15 AM CDT   Treatment 45 with Pacheco Rodas, SLP   [facility] ([facility])    [address] [place] [address] Dr John LALA [address]   [phone]            Aug 29, 2017  9:00 AM CDT   Concussion Treatment with Cristina Jain, PT   [facility] ([facility])    [address] [place] [address] Dr John LALA [address]   [phone]            Aug 29, 2017  9:45 AM CDT   Concussion Treatment with Sue Cabral, OT   [facility] ([facility])    [address] [place] [address] Dr John LALA [address]   [phone]            Aug 31, 2017  8:45 AM CDT   Concussion Treatment with Sue Cabral, OT   [facility] ([facility])    [facility] [address] John LALA [address]   [phone]            Aug 31, 2017  9:30 AM CDT    Concussion Treatment with Cristina Jain, PT   [facility] ([facility])    [address] [facility] [address] John LALA [address]   [phone]            Sep 05, 2017  9:00 AM CDT   Treatment 45 with Pacheco Rodas, SLP   [facility] ([facility])    [address] John LALA [address]   [phone]            Sep 05, 2017  9:30 AM CDT   Concussion Treatment with Cristina Jain, PT   [facility] ([facility])    [address] John LALA [address]   [phone]            Sep 06, 2017  9:45 AM CDT   Concussion Treatment with Cristina Jain, PT   [facility] ([facility])    [address] John LALA [address]   [phone]              Future tests that were ordered for you today     Open Future Orders        Priority Expected Expires Ordered    CBC with platelets differential Routine 8/22/2017 8/22/2018 8/22/2017    MA Screening Digital Bilateral Routine 10/23/2017 8/22/2018 8/22/2017            Who to contact     If you have questions or need follow up information about today's clinic visit or your schedule please contact [facility] directly at [phone].  Normal or non-critical lab and imaging results will be communicated to you by [employer], letter or phone within 4 business days after the clinic has received the results. If you do not hear from us within 7 days, please contact the clinic through [employer] or phone. If you have a critical or abnormal lab result, we will notify you by phone as soon as possible.  Submit refill requests through [employer] or call your pharmacy and they will forward the refill request to us. Please allow 3 business days for your refill to be completed.          Additional Information About Your Visit        [employer] Information     [employer] lets you send messages to your doctor, view your test results, renew  "your prescriptions, schedule appointments and more. To sign up, go to [email or web address] . Click on \"Log in\" on the left side of the screen, which will take you to the Welcome page. Then click on \"Sign up Now\" on the right side of the page.     You will be asked to enter the access code listed below, as well as some personal information. Please follow the directions to create your username and password.     Your access code is: [email or web address]  Expires: 2017  9:30 AM     Your access code will  in 90 days. If you need help or a new code, please call your [facility] clinic or [phone].        Care EveryWhere ID     This is your Care EveryWhere ID. This could be used by other organizations to access your [facility] medical records  [id number]        Your Vitals Were     Pulse Temperature Respirations Height Pulse Oximetry BMI (Body Mass Index)    67 97.5  F (36.4  C) (Temporal) 18 1.715 m (5' 7.5\") 100% 20.31 kg/m2       Blood Pressure from Last 3 Encounters:   17 110/67   17 101/72   17 99/63    Weight from Last 3 Encounters:   17 59.7 kg (131 lb 9.6 oz)   08/15/17 56.7 kg (125 lb)   17 56.7 kg (125 lb)              We Performed the Following     Ca27.29  breast tumor marker     Comprehensive metabolic panel          Where to get your medicines      These medications were sent to [facility]  [address] [facility] [address]     Phone:  [phone]     levothyroxine 88 MCG tablet    tamoxifen 20 MG tablet          Primary Care Provider Office Phone # Fax #    Zoltan Tolentino [phone]       [place] [address] [address] [place] [address] [address]        Equal Access to Services     REYNA SINGH : Mohsen Betancourt, jeana ward, danay pablo. [facility] [phone].    ATENCIÓN: Si habla esprenee, tiene a jang disposición " servicios gratuitos de asistencia lingüística. Weston candelario [phone].    We comply with applicable federal civil rights laws and [place] laws. We do not discriminate on the basis of race, color, national origin, age, disability sex, sexual orientation or gender identity.            Thank you!     Thank you for choosing [facility]  for your care. Our goal is always to provide you with excellent care. Hearing back from our patients is one way we can continue to improve our services. Please take a few minutes to complete the written survey that you may receive in the mail after your visit with us. Thank you!             Your Updated Medication List - Protect others around you: Learn how to safely use, store and throw away your medicines at www.disposemymeds.org.          This list is accurate as of: 8/22/17  4:06 PM.  Always use your most recent med list.                   Brand Name Dispense Instructions for use Diagnosis    IBUPROFEN PO      Take 600 mg by mouth every 6 hours as needed for moderate pain        levothyroxine 88 MCG tablet    SYNTHROID/LEVOTHROID    90 tablet    Take 1 tablet (88 mcg) by mouth daily    Malignant neoplasm of upper-outer quadrant of left breast in female, estrogen receptor positive (H)       ondansetron 4 MG tablet    ZOFRAN    30 tablet    Take 1 tablet (4 mg) by mouth every 8 hours as needed for nausea    Nausea       tamoxifen 20 MG tablet    NOLVADEX    90 tablet    Take 1 tablet (20 mg) by mouth daily    Malignant neoplasm of upper-outer quadrant of left breast in female, estrogen receptor positive (H)

## 2017-08-22 NOTE — ASSESSMENT & PLAN NOTE
Emma Nunn was diagnosed at age 47, premenopausally through self-breast check, felt a lump in her left outer upper quadrant. Diagnostic mammogram early 07/2013 identified asymmetrical density associated with palpable findings around 1-2 o'clock position, 8-10 cm from the nipple. Limited sonogram revealed hypoechoic mass with irregular borders, measured about 0.8 cm trocar position. Sonogram-guided biopsy indicating invasive ductal cancer, grade 2, with associated DCIS, ER/IL 90% positive, HER-2/sandy positive, FISH ratio greater than 11.7. she had lumpectomy 7/2013 - 1.3 cm LAURA, GII, + ALI, margin is close <=1 mm, +DCIS, 8LN negative.  She has [id number] stage I disease. TCH was recommended from 8//15/2013 to 12/2013 and herceptin after. . She finished RT 2/28/2014 and tamoxifen started in 3/2014.

## 2017-08-22 NOTE — NURSING NOTE
"Oncology Rooming Note    August 22, 2017 3:43 PM   Emma Nunn is a 51 year old female who presents for:    Chief Complaint   Patient presents with     Oncology Clinic Visit     6 month follow up for Malignant neoplasm of upper-outer quadrant of left female breast     Results     Labs today     Initial Vitals: /67 (BP Location: Right arm, Patient Position: Chair, Cuff Size: Adult Regular)  Pulse 67  Temp 97.5  F (36.4  C) (Temporal)  Resp 18  Ht 1.715 m (5' 7.5\")  Wt 59.7 kg (131 lb 9.6 oz)  SpO2 100%  BMI 20.31 kg/m2 Estimated body mass index is 20.31 kg/(m^2) as calculated from the following:    Height as of this encounter: 1.715 m (5' 7.5\").    Weight as of this encounter: 59.7 kg (131 lb 9.6 oz). Body surface area is 1.69 meters squared.  Mild Pain (2) Comment: left axillary - most times, more tender to touch   No LMP recorded. Patient is not currently having periods (Reason: Chemotherapy).  Allergies reviewed: Yes  Medications reviewed: Yes    Medications: Medication refills not needed today.  Pharmacy name entered into EPIC:    [facility] - [facility], [place] - [address] [place] [address] PHARM[address]     Clinical concerns: None Dr. Ocampo was notified.    8 minutes for nursing intake (face to face time)     Fortino Garcia RN              "

## 2017-08-22 NOTE — PATIENT INSTRUCTIONS
Sorry Nursing missed you at the end of your appointment to assist with scheduling.  Please call Oncology at [phone] or [phone] to assist you with scheduling.    Please follow up with Dr. Ocampo in 6 months.  Please schedule labs 2-5 days prior to follow up appointment.  Please schedule Mammogram at the end of October.      Mammogram Date/Time:    Lab Date/Time:    Follow Up Date/Time:     If you have any questions or concerns please feel free to call.    Fortino Garcia, RN, BSN   Oncology Care Coordinator RN  [facility]  [phone]
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
## 2017-08-23 NOTE — NURSING NOTE
DISCHARGE PLAN:  Next appointments: See patient instruction section  Departure Mode: Ambulatory  Accompanied by:   0 minutes for nursing discharge (face to face time)     Emma Nunn is here today for Oncology follow up.  Patient was not seen by writing nurse at time of appointment. Patient to continue with follow up in 6 months with labs.  AVS mailed to patient.  See patient instructions and Oncologist's Progress note for further details. Questions and concerns addressed to patient's satisfaction. Patient verbalized and demonstrated understanding of plan.  Contact information provided and patient is encouraged to call with any that arise in the interim of care.    Fortino Garcia, RN, BSN, OCN   Oncology Care Coordinator RN  [facility]  [phone]  8/23/2017, 1:52 PM

## 2017-08-25 ENCOUNTER — HOSPITAL ENCOUNTER (OUTPATIENT)
Dept: PHYSICAL THERAPY | Facility: CLINIC | Age: 52
Setting detail: THERAPIES SERIES
End: 2017-08-25
Attending: PHYSICAL MEDICINE & REHABILITATION
Payer: COMMERCIAL

## 2017-08-25 ENCOUNTER — HOSPITAL ENCOUNTER (OUTPATIENT)
Dept: OCCUPATIONAL THERAPY | Facility: CLINIC | Age: 52
Setting detail: THERAPIES SERIES
End: 2017-08-25
Attending: PHYSICAL MEDICINE & REHABILITATION
Payer: COMMERCIAL

## 2017-08-25 PROCEDURE — 97110 THERAPEUTIC EXERCISES: CPT | Mod: GP | Performed by: PHYSICAL THERAPIST

## 2017-08-25 PROCEDURE — 40000718 ZZHC STATISTIC PT DEPARTMENT ORTHO VISIT: Performed by: PHYSICAL THERAPIST

## 2017-08-25 PROCEDURE — 97112 NEUROMUSCULAR REEDUCATION: CPT | Mod: GO | Performed by: OCCUPATIONAL THERAPIST

## 2017-08-25 PROCEDURE — 97140 MANUAL THERAPY 1/> REGIONS: CPT | Mod: GP | Performed by: PHYSICAL THERAPIST

## 2017-08-25 PROCEDURE — 97535 SELF CARE MNGMENT TRAINING: CPT | Mod: GO | Performed by: OCCUPATIONAL THERAPIST

## 2017-08-25 PROCEDURE — 40000768 ZZHC STATISTIC OT CONCUSSION VISIT: Performed by: OCCUPATIONAL THERAPIST

## 2017-08-29 ENCOUNTER — HOSPITAL ENCOUNTER (OUTPATIENT)
Dept: OCCUPATIONAL THERAPY | Facility: CLINIC | Age: 52
Setting detail: THERAPIES SERIES
End: 2017-08-29
Attending: PHYSICAL MEDICINE & REHABILITATION
Payer: COMMERCIAL

## 2017-08-29 ENCOUNTER — HOSPITAL ENCOUNTER (OUTPATIENT)
Dept: PHYSICAL THERAPY | Facility: CLINIC | Age: 52
Setting detail: THERAPIES SERIES
End: 2017-08-29
Attending: PHYSICAL MEDICINE & REHABILITATION
Payer: COMMERCIAL

## 2017-08-29 ENCOUNTER — HOSPITAL ENCOUNTER (OUTPATIENT)
Dept: SPEECH THERAPY | Facility: CLINIC | Age: 52
Setting detail: THERAPIES SERIES
End: 2017-08-29
Attending: PHYSICAL MEDICINE & REHABILITATION
Payer: COMMERCIAL

## 2017-08-29 PROCEDURE — 97110 THERAPEUTIC EXERCISES: CPT | Mod: GP | Performed by: PHYSICAL THERAPIST

## 2017-08-29 PROCEDURE — 97535 SELF CARE MNGMENT TRAINING: CPT | Mod: GO

## 2017-08-29 PROCEDURE — 97532 ZZHC SP COGNITIVE SKILLS EA 15 MIN: CPT | Mod: GN | Performed by: SPEECH-LANGUAGE PATHOLOGIST

## 2017-08-29 PROCEDURE — 40000223 ZZHC STATISTIC SLP VISIT NEURO CLINIC: Performed by: SPEECH-LANGUAGE PATHOLOGIST

## 2017-08-29 PROCEDURE — 40000768 ZZHC STATISTIC OT CONCUSSION VISIT

## 2017-08-29 PROCEDURE — 97140 MANUAL THERAPY 1/> REGIONS: CPT | Mod: GP | Performed by: PHYSICAL THERAPIST

## 2017-08-29 PROCEDURE — 97530 THERAPEUTIC ACTIVITIES: CPT | Mod: GO

## 2017-08-29 PROCEDURE — 97112 NEUROMUSCULAR REEDUCATION: CPT | Mod: GO

## 2017-08-29 PROCEDURE — 40000767 ZZHC STATISTIC PT CONCUSSION VISIT: Performed by: PHYSICAL THERAPIST

## 2017-08-31 ENCOUNTER — HOSPITAL ENCOUNTER (OUTPATIENT)
Dept: PHYSICAL THERAPY | Facility: CLINIC | Age: 52
Setting detail: THERAPIES SERIES
End: 2017-08-31
Attending: PHYSICAL MEDICINE & REHABILITATION
Payer: COMMERCIAL

## 2017-08-31 ENCOUNTER — HOSPITAL ENCOUNTER (OUTPATIENT)
Dept: OCCUPATIONAL THERAPY | Facility: CLINIC | Age: 52
Setting detail: THERAPIES SERIES
End: 2017-08-31
Attending: PHYSICAL MEDICINE & REHABILITATION
Payer: COMMERCIAL

## 2017-08-31 PROCEDURE — 97110 THERAPEUTIC EXERCISES: CPT | Mod: GP | Performed by: PHYSICAL THERAPIST

## 2017-08-31 PROCEDURE — 97140 MANUAL THERAPY 1/> REGIONS: CPT | Mod: GP | Performed by: PHYSICAL THERAPIST

## 2017-08-31 PROCEDURE — 97112 NEUROMUSCULAR REEDUCATION: CPT | Mod: GO | Performed by: OCCUPATIONAL THERAPIST

## 2017-08-31 PROCEDURE — 40000768 ZZHC STATISTIC OT CONCUSSION VISIT: Performed by: OCCUPATIONAL THERAPIST

## 2017-08-31 PROCEDURE — 40000767 ZZHC STATISTIC PT CONCUSSION VISIT: Performed by: PHYSICAL THERAPIST

## 2017-09-05 ENCOUNTER — HOSPITAL ENCOUNTER (OUTPATIENT)
Dept: PHYSICAL THERAPY | Facility: CLINIC | Age: 52
Setting detail: THERAPIES SERIES
End: 2017-09-05
Attending: PHYSICAL MEDICINE & REHABILITATION
Payer: COMMERCIAL

## 2017-09-05 ENCOUNTER — HOSPITAL ENCOUNTER (OUTPATIENT)
Dept: SPEECH THERAPY | Facility: CLINIC | Age: 52
Setting detail: THERAPIES SERIES
End: 2017-09-05
Attending: PHYSICAL MEDICINE & REHABILITATION
Payer: COMMERCIAL

## 2017-09-05 PROCEDURE — 40000211 ZZHC STATISTIC SLP  DEPARTMENT VISIT: Performed by: SPEECH-LANGUAGE PATHOLOGIST

## 2017-09-05 PROCEDURE — 40000767 ZZHC STATISTIC PT CONCUSSION VISIT: Performed by: PHYSICAL THERAPIST

## 2017-09-05 PROCEDURE — 97532 ZZHC SP COGNITIVE SKILLS EA 15 MIN: CPT | Mod: GN | Performed by: SPEECH-LANGUAGE PATHOLOGIST

## 2017-09-05 PROCEDURE — 97140 MANUAL THERAPY 1/> REGIONS: CPT | Mod: GP | Performed by: PHYSICAL THERAPIST

## 2017-09-06 ENCOUNTER — HOSPITAL ENCOUNTER (OUTPATIENT)
Dept: OCCUPATIONAL THERAPY | Facility: CLINIC | Age: 52
Setting detail: THERAPIES SERIES
End: 2017-09-06
Attending: PHYSICAL MEDICINE & REHABILITATION
Payer: COMMERCIAL

## 2017-09-06 ENCOUNTER — HOSPITAL ENCOUNTER (OUTPATIENT)
Dept: PHYSICAL THERAPY | Facility: CLINIC | Age: 52
Setting detail: THERAPIES SERIES
End: 2017-09-06
Attending: PHYSICAL MEDICINE & REHABILITATION
Payer: COMMERCIAL

## 2017-09-06 PROCEDURE — 97140 MANUAL THERAPY 1/> REGIONS: CPT | Mod: GP | Performed by: PHYSICAL THERAPIST

## 2017-09-06 PROCEDURE — 40000768 ZZHC STATISTIC OT CONCUSSION VISIT: Performed by: OCCUPATIONAL THERAPIST

## 2017-09-06 PROCEDURE — 40000767 ZZHC STATISTIC PT CONCUSSION VISIT: Performed by: PHYSICAL THERAPIST

## 2017-09-06 PROCEDURE — 97112 NEUROMUSCULAR REEDUCATION: CPT | Mod: GO | Performed by: OCCUPATIONAL THERAPIST

## 2017-09-12 ENCOUNTER — OFFICE VISIT (OUTPATIENT)
Dept: ORTHOPEDICS | Facility: CLINIC | Age: 52
End: 2017-09-12
Payer: COMMERCIAL

## 2017-09-12 ENCOUNTER — HOSPITAL ENCOUNTER (OUTPATIENT)
Dept: OCCUPATIONAL THERAPY | Facility: CLINIC | Age: 52
Setting detail: THERAPIES SERIES
End: 2017-09-12
Attending: PHYSICAL MEDICINE & REHABILITATION
Payer: COMMERCIAL

## 2017-09-12 ENCOUNTER — HOSPITAL ENCOUNTER (OUTPATIENT)
Dept: PHYSICAL THERAPY | Facility: CLINIC | Age: 52
Setting detail: THERAPIES SERIES
End: 2017-09-12
Attending: PHYSICAL MEDICINE & REHABILITATION
Payer: COMMERCIAL

## 2017-09-12 ENCOUNTER — HOSPITAL ENCOUNTER (OUTPATIENT)
Dept: SPEECH THERAPY | Facility: CLINIC | Age: 52
Setting detail: THERAPIES SERIES
End: 2017-09-12
Attending: PHYSICAL MEDICINE & REHABILITATION
Payer: COMMERCIAL

## 2017-09-12 VITALS — WEIGHT: 125 LBS | BODY MASS INDEX: 18.94 KG/M2 | HEART RATE: 72 BPM | HEIGHT: 68 IN

## 2017-09-12 DIAGNOSIS — S06.0X1D CONCUSSION WITH LOSS OF CONSCIOUSNESS <= 30 MIN, SUBSEQUENT ENCOUNTER: Primary | ICD-10-CM

## 2017-09-12 PROCEDURE — 97535 SELF CARE MNGMENT TRAINING: CPT | Mod: GO | Performed by: OCCUPATIONAL THERAPIST

## 2017-09-12 PROCEDURE — 40000768 ZZHC STATISTIC OT CONCUSSION VISIT: Performed by: OCCUPATIONAL THERAPIST

## 2017-09-12 PROCEDURE — 97532 ZZHC SP COGNITIVE SKILLS EA 15 MIN: CPT | Mod: GN | Performed by: SPEECH-LANGUAGE PATHOLOGIST

## 2017-09-12 PROCEDURE — 99214 OFFICE O/P EST MOD 30 MIN: CPT | Performed by: PHYSICAL MEDICINE & REHABILITATION

## 2017-09-12 PROCEDURE — 97112 NEUROMUSCULAR REEDUCATION: CPT | Mod: GO | Performed by: OCCUPATIONAL THERAPIST

## 2017-09-12 PROCEDURE — 97110 THERAPEUTIC EXERCISES: CPT | Mod: GP | Performed by: PHYSICAL THERAPIST

## 2017-09-12 PROCEDURE — 40000769 ZZHC STATISTIC SLP CONCUSSION VISIT: Performed by: SPEECH-LANGUAGE PATHOLOGIST

## 2017-09-12 PROCEDURE — 97140 MANUAL THERAPY 1/> REGIONS: CPT | Mod: GP | Performed by: PHYSICAL THERAPIST

## 2017-09-12 PROCEDURE — 40000767 ZZHC STATISTIC PT CONCUSSION VISIT: Performed by: PHYSICAL THERAPIST

## 2017-09-12 NOTE — LETTER
September 12, 2017      Emma Nunn  [address] [facility] [address]        To Whom It May Concern:      Emma Nunn  was seen on 9/12/2017 in follow up for her concussion.  She is able to return to work beginning 9/18/2017 with the following restrictions:  -9/18-9/22: Monday, Wednesday, Friday 3 hours per day, work from home  -9/25-9/29: Monday, Wednesday, Friday 5 hours per day, work from home  -10/2-10/6: Monday, Wednesday, Friday 6 hours per day, work from home      She will be seen in follow up in 3 weeks for reevaluation. Thank you for your help in advance.         Sincerely,        Mendy Macario MD, CAQ

## 2017-09-12 NOTE — PATIENT INSTRUCTIONS
Today's Plan of Care:  -Work restrictions, letter provided  -Continue therapies as indicated by therapists    Follow Up: 3 weeks or sooner if symptoms fail to improve or worsen. Call with any questions or concerns.

## 2017-09-12 NOTE — MR AVS SNAPSHOT
After Visit Summary   9/12/2017    Emma Nunn    MRN: [id number]           Patient Information     Date Of Birth          1965        Visit Information        Provider Department      9/12/2017 8:00 AM Mendy Macario MD [facility] Instructions    Today's Plan of Care:  -Work restrictions, letter provided  -Continue therapies as indicated by therapists    Follow Up: 3 weeks or sooner if symptoms fail to improve or worsen. Call with any questions or concerns.               Follow-ups after your visit        Your next 10 appointments already scheduled     Sep 12, 2017  9:45 AM CDT   Concussion Treatment with Cristina Jain, PT   [facility] ([facility])    [address] [place] [address] [address] [place] [address]   [phone]            Sep 12, 2017 10:30 AM CDT   Concussion Treatment with Sue Cabral, OT   [facility] ([facility])    [address] [place] [address] [place] [address]   [phone]            Sep 14, 2017  9:00 AM CDT   Concussion Treatment with Cristina Jain, PT   [facility] ([facility])    [address] [place] [address] [place] [address]   [phone]            Sep 14, 2017  9:45 AM CDT   Concussion Treatment with Sue Cabral, OT   [facility] ([facility])    [address] [place] [address] [place] [address]   [phone]            Sep 19, 2017  8:30 AM CDT   Treatment 45 with Destiny Tolentino, SLP   [facility] ([facility])    [address] [place] [address] [address] [place] [address]   [phone]            Sep 19, 2017  9:15 AM CDT   Concussion Treatment with Cristina Jain, PT   [facility] ([facility])    [facility] [address] [place] [address]   [phone]            Sep 19, 2017 10:00 AM CDT   Concussion  "Treatment with Sue Cabral, OT   [facility] ([facility])    [address]   [place] [address]   [phone]            Sep 21, 2017  9:00 AM CDT   Concussion Treatment with Cristina Jain PT   [facility] ([facility])    [address] [place] [address]   [phone]            Sep 21, 2017  9:45 AM CDT   Concussion Treatment with Sue Cabral, OT   [facility]ational [facility] ([facility])    [address] [place] [address]   [phone]              Who to contact     If you have questions or need follow up information about today's clinic visit or your schedule please contact [facility] directly at [phone].  Normal or non-critical lab and imaging results will be communicated to you by MyChart, letter or phone within 4 business days after the clinic has received the results. If you do not hear from us within 7 days, please contact the clinic through [employer] or phone. If you have a critical or abnormal lab result, we will notify you by phone as soon as possible.  Submit refill requests through [facility] or call your pharmacy and they will forward the refill request to us. Please allow 3 business days for your refill to be completed.          Additional Information About Your Visit        MyChart Information     [facility] lets you send messages to your doctor, view your test results, renew your prescriptions, schedule appointments and more. To sign up, go to [email or web address] . Click on \"Log in\" on the left side of the screen, which will take you to the Welcome page. Then click on \"Sign up Now\" on the right side of the page.     You will be asked to enter the access code listed below, as well as some personal information. Please follow the directions to create your username and password.     Your access code is: " [email or web address]  Expires: 2017  9:30 AM     Your access code will  in 90 days. If you need help or a new code, please call your [facility] clinic or [phone].        Care EveryWhere ID     This is your Care EveryWhere ID. This could be used by other organizations to access your [facility] medical records  [id number]        Your Vitals Were     Pulse Height BMI (Body Mass Index)             72 5' 7.5\" (1.715 m) 19.29 kg/m2          Blood Pressure from Last 3 Encounters:   17 110/67   17 101/72   17 99/63    Weight from Last 3 Encounters:   17 125 lb (56.7 kg)   17 131 lb 9.6 oz (59.7 kg)   08/15/17 125 lb (56.7 kg)              Today, you had the following     No orders found for display       Primary Care Provider Office Phone # Fax #    Zoltan Tolentino [phone]       [facility] [address] [place] [address]        Equal Access to Services     [facility] Center[facility]EL[facility] AH: Hadii aad ku hadasho Soomaali, waaxda luqadaha, qaybta kaalmada adeegyada, danay kauffman . So [facility] [phone].    ATENCIÓN: Si habla español, tiene a jang disposición servicios gratuitos de asistencia lingüística. Sivaame al [phone].    We comply with applicable federal civil rights laws and [place] laws. We do not discriminate on the basis of race, color, national origin, age, disability sex, sexual orientation or gender identity.            Thank you!     Thank you for choosing [facility]  for your care. Our goal is always to provide you with excellent care. Hearing back from our patients is one way we can continue to improve our services. Please take a few minutes to complete the written survey that you may receive in the mail after your visit with us. Thank you!             Your Updated Medication List - Protect others around you: Learn how to safely use, store and throw away your medicines at www.disposemymeds.org.        "   This list is accurate as of: 9/12/17  9:03 AM.  Always use your most recent med list.                   Brand Name Dispense Instructions for use Diagnosis    IBUPROFEN PO      Take 600 mg by mouth every 6 hours as needed for moderate pain        levothyroxine 88 MCG tablet    SYNTHROID/LEVOTHROID    90 tablet    Take 1 tablet (88 mcg) by mouth daily    Malignant neoplasm of upper-outer quadrant of left breast in female, estrogen receptor positive (H)       ondansetron 4 MG tablet    ZOFRAN    30 tablet    Take 1 tablet (4 mg) by mouth every 8 hours as needed for nausea    Nausea       tamoxifen 20 MG tablet    NOLVADEX    90 tablet    Take 1 tablet (20 mg) by mouth daily    Malignant neoplasm of upper-outer quadrant of left breast in female, estrogen receptor positive (H)

## 2017-09-12 NOTE — PROGRESS NOTES
Sports Medicine Clinic Report:    CC: Head Injury    Emma Nunn is a 51 year old female who presents in follow up for a concussion that occurred on 5/2/2017 or 4 months ago.  Since last visit on 8/15/2017 patient notes she continues to have some dull headaches however she has not had any nausea or dizziness. She feels that she is back to 85-90%. She continues to have some neck pain. She is continuing to work on processing information and the attention piece with reading with speech therapy. She continues to have issues with overstimulation and gets irritated very easily in these environment.     Since your last visit, level of activity is:  Continues to do some walking, she has began to do light weight and biking with PT - has not done any running.     Since your last visit, have you continued with your normal cognitive activity (text, computer, school):  Less than normal. Working on [employer] and challenges.       Current Symptoms:  CONCUSSION SYMPTOMS ASSESSMENT 9/5/2017 9/6/2017 9/12/2017   Headache or Pressure In Head 2 - mild to moderate 2 - mild to moderate 2 - mild to moderate   Upset Stomach or Throwing Up 0 - none 0 - none 0 - none   Problems with Balance 0 - none 0 - none 0 - none   Feeling Dizzy 0 - none 0 - none 0 - none   Sensitivity to Light 2 - mild to moderate 1 - mild 1 - mild   Sensitivity to Noise 3 - moderate 2 - mild to moderate 2 - mild to moderate   Mood Changes 1 - mild 1 - mild 1 - mild   Feeling sluggish, hazy, or foggy 0 - none 0 - none 0 - none   Trouble Concentrating, Lack of Focus 2 - mild to moderate 2 - mild to moderate 3 - moderate   Motion Sickness 0 - none 0 - none 0 - none   Vision Changes 2 - mild to moderate 2 - mild to moderate 1 - mild   Memory Problems 2 - mild to moderate 2 - mild to moderate 2 - mild to moderate   Feeling Confused 2 - mild to moderate 2 - mild to moderate 2 - mild to moderate   Neck Pain 2 - mild to moderate 2 - mild to moderate 2 - mild to moderate   Trouble  "Sleeping 2 - mild to moderate 1 - mild 2 - mild to moderate   Total Number of Symptoms 10 10 10   Symptom Severity Score 20 17 18       Sleep: Still having some trouble sleeping. She is continuing to sleep more (9-10 sleep). Continues to have trouble falling asleep     Patient's past medical, surgical, social and family histories are reviewed today.    Past Medical History:   Diagnosis Date     Breast cancer (H)      Diffuse cystic mastopathy     Fibrocystic breast disease     External hemorrhoids without mention of complication      Unspecified hypothyroidism      Past Surgical History:   Procedure Laterality Date     BREAST BIOPSY, CORE RT/LT  7/8/2013     C NONSPECIFIC PROCEDURE  1993    Laparoscopy     C NONSPECIFIC PROCEDURE  1993    Outpt surgery for anal fissure     COLONOSCOPY N/A 12/1/2014    Procedure: COLONOSCOPY;  Surgeon: Gabriele Golden MD;  Location: [facility]     LAPAROSCOPIC CHOLECYSTECTOMY N/A 5/24/2017    Procedure: LAPAROSCOPIC CHOLECYSTECTOMY;  Laparoscopic Cholecystectomy;  Surgeon: Jason Munoz MD;  Location: [facility]     LUMPECTOMY BREAST WITH SENTINEL NODE, COMBINED  7/31/2013    Procedure: COMBINED LUMPECTOMY BREAST WITH SENTINEL NODE;  Left Breast Lumpectomy with [place] Node Biopsy;  Surgeon: Jason Munoz MD;  Location: [facility] OR       OBJECTIVE:  Pulse 72  Ht 5' 7.5\" (1.715 m)  Wt 125 lb (56.7 kg)  BMI 19.29 kg/m2    General: Healthy, well-appearing, and in no acute distress.  Skin: no suspicious lesions or rashes  Psych: mentation appears normal, and affect is appropriate/bright  HEENT: Neck is supple with full ROM  Neuromuscular/Strength: No motor weakness in C5-T1 distribution.    Neurologic/Visual:  PAULA: yes  EOMI: yes  Nystagmus: no  Painful eye movements: no    Coordination:       - Finger to Nose: normal       - Heel to Shin: normal       - Rapid Alternating Movements: normal    Cognitive:  Previous cognitive assessment was normal and without deficit; repeat cognitive " testing not performed today.      Impact Testing Scores: ImPACT Testing not performed    ASSESSMENT:  Concussion with loss of consciousness <= 30 min, subsequent encounter    PLAN:  -Emma is doing much better.  We will clear her to begin a gradual return to work.  On 9/18/17, we will clear her for 3 hours of work on Monday, Wednesday, and Friday.  On 9/25/17, she can do 5 hours of work Monday, Wednesday, and Friday.  On 10/2/17, she can do 6 hours of work.  This should all be done from home.  -On Tuesdays and Thursdays, she will continue to have occupational and speech language pathology.  She has almost been discharged from physical therapy.  Recommend she continue the home exercises.    -Follow Up: 3 weeks or sooner if symptoms fail to improve or worsen. Call with any questions or concerns.     Time spent in one-on-one evaluation and discussion with patient regarding nature of problem, course, prior treatments, and therapeutic options, at least 50% of which was spent in counseling and coordination of care:  25 minutes.    Radha Macario MD, CAQ  [facility]

## 2017-09-14 ENCOUNTER — HOSPITAL ENCOUNTER (OUTPATIENT)
Dept: OCCUPATIONAL THERAPY | Facility: CLINIC | Age: 52
Setting detail: THERAPIES SERIES
End: 2017-09-14
Attending: PHYSICAL MEDICINE & REHABILITATION
Payer: COMMERCIAL

## 2017-09-14 ENCOUNTER — HOSPITAL ENCOUNTER (OUTPATIENT)
Dept: PHYSICAL THERAPY | Facility: CLINIC | Age: 52
Setting detail: THERAPIES SERIES
End: 2017-09-14
Attending: PHYSICAL MEDICINE & REHABILITATION
Payer: COMMERCIAL

## 2017-09-14 PROCEDURE — 40000768 ZZHC STATISTIC OT CONCUSSION VISIT: Performed by: OCCUPATIONAL THERAPIST

## 2017-09-14 PROCEDURE — 40000767 ZZHC STATISTIC PT CONCUSSION VISIT: Performed by: PHYSICAL THERAPIST

## 2017-09-14 PROCEDURE — 97112 NEUROMUSCULAR REEDUCATION: CPT | Mod: GO | Performed by: OCCUPATIONAL THERAPIST

## 2017-09-14 PROCEDURE — 97140 MANUAL THERAPY 1/> REGIONS: CPT | Mod: GP | Performed by: PHYSICAL THERAPIST

## 2017-09-14 PROCEDURE — 97535 SELF CARE MNGMENT TRAINING: CPT | Mod: GO | Performed by: OCCUPATIONAL THERAPIST

## 2017-09-14 NOTE — PROGRESS NOTES
Outpatient Occupational Therapy Progress Note     Patient: Barbara Nunn  : 1965  Insurance:   Payor/Plan Subscriber Name Rel Member # Group #   [employer] BARBARA NUNN  [id number]       [address], [address]       Beginning/End Dates of Reporting Period:  17 to 2017  Patient seen for 10 OT appointments since reporting period and total of 20 OT sessions since SOC.    Referring Provider: Dr Mendy Macario    Therapy Diagnosis: s/p concussion effecting her ability to preform functional tasks/actvites at work, home and leisure.  Decreased ability to drive independently due to symptoms and anxiety.    Client Self Report: Per pt., she may return to work on Monday, physician order : 3 hours  M/W/F, 2nd week: M/W/F 5 hours and 3rd week: 6 hours M/W/F.       Objective Measurements:     Objective Measure: memory   Details: LTM recall:  1st set 10/10, 2nd set 10/10 and 3rd 10/10 (improved)     Objective Measure: MVPT   Details: raw score 44 standard score 128,, percentile rank: 97 nd >18.1 y/o equivalent (norm)     Objective Measure: Comprehensive Trail Making Test (CTMT)   Details: Trail 1 ; raw score 1:02, T score 23, %ile <1.  Trail 2: raw score 1:00, 23 T score, <1.  Trail 3: raw score 1:11, 24 T-score and <1.  Trail 4: 44, T score 42 and 21 (below average)       Objective Measure: 9 hole peg test   Details: right 20 seconds and left 21 seconds (improved, norm)     Objective Measure: auditory sequence and memory recall/attention   Details: subtest foreward; 6 letter sequence recall x 2 trials and reverse 4 letters , 2 trials   Objective Measure: functional billing and schedule worksheets    Dynavision reactions speed; delayed and decreased divided attention skills (as prepare to return to [facility])         Goals:     Goal Identifier visual and sound accomadations   Goal Description The patient will state and demonstrate up to 5 accomadations implemented at home and work (when  returns), in order to manage s/p concussion symptoms and imrpvoe overall function.   Target Date 09/08/17   Date Met  09/06/17   Progress:     Goal Identifier attention   Goal Description The patient will be able to attend to a therapeutic activity for 15 to 30 mintues; without distress or increased symtpoms to improve ability for return to work and daily tasks.   Target Date 10/06/17 (advancing toward goals, continue goal)   Date Met      Progress:     Goal Identifier memory   Goal Description The patient will recall 2 , 10 word list in order to improve memory skills needed for work and home.   Target Date 09/08/17   Date Met  08/08/17   Progress:     Goal Identifier reaction speed and divided attention   Goal Description The patient will average 60> lights and state up to 3# seq without increased symptoms, in order to improve skiills needed for driving independently and work tasks.   Target Date 09/14/17   Date Met      Progress:     Goal Identifier auditory sequencing and attention, recall   Goal Description  The patient will maintain attention to oral instructions and commands and tested by ability to recall 3; 5-8 word sentences provided by therapist without cues; in order to improve her skills needed for conversation with others and for work tasks.   Target Date  11/14/17   Date Met      Progress:     Goal Identifier multi step actiivity   Goal Description  The patient will be able to complete a 5 - 8 step visual direction tasks, without cues, in order to improve problem solving and processing skills needed for home and work.   Target Date  11/14/17   Date Met      Progress:     Goal Identifier     Goal Description     Target Date     Date Met      Progress:     Goal Identifier     Goal Description     Target Date     Date Met      Progress:     Progress Toward Goals:   Progress this reporting period: refer to objectives and goals      Plan:  Changes to therapy plan of care: extended POC 2x week x 8  weeks  Changes to goals: 2 new goals    Discharge:  No

## 2017-09-19 ENCOUNTER — HOSPITAL ENCOUNTER (OUTPATIENT)
Dept: OCCUPATIONAL THERAPY | Facility: CLINIC | Age: 52
Setting detail: THERAPIES SERIES
End: 2017-09-19
Attending: PHYSICAL MEDICINE & REHABILITATION
Payer: COMMERCIAL

## 2017-09-19 ENCOUNTER — HOSPITAL ENCOUNTER (OUTPATIENT)
Dept: PHYSICAL THERAPY | Facility: CLINIC | Age: 52
Setting detail: THERAPIES SERIES
End: 2017-09-19
Attending: PHYSICAL MEDICINE & REHABILITATION
Payer: COMMERCIAL

## 2017-09-19 ENCOUNTER — HOSPITAL ENCOUNTER (OUTPATIENT)
Dept: SPEECH THERAPY | Facility: CLINIC | Age: 52
Setting detail: THERAPIES SERIES
End: 2017-09-19
Attending: PHYSICAL MEDICINE & REHABILITATION
Payer: COMMERCIAL

## 2017-09-19 PROCEDURE — 97535 SELF CARE MNGMENT TRAINING: CPT | Mod: GO | Performed by: OCCUPATIONAL THERAPIST

## 2017-09-19 PROCEDURE — 40000767 ZZHC STATISTIC PT CONCUSSION VISIT: Performed by: PHYSICAL THERAPIST

## 2017-09-19 PROCEDURE — 97140 MANUAL THERAPY 1/> REGIONS: CPT | Mod: GP | Performed by: PHYSICAL THERAPIST

## 2017-09-19 PROCEDURE — 40000211 ZZHC STATISTIC SLP  DEPARTMENT VISIT: Performed by: SPEECH-LANGUAGE PATHOLOGIST

## 2017-09-19 PROCEDURE — 97532 ZZHC SP COGNITIVE SKILLS EA 15 MIN: CPT | Mod: GN | Performed by: SPEECH-LANGUAGE PATHOLOGIST

## 2017-09-19 PROCEDURE — 40000768 ZZHC STATISTIC OT CONCUSSION VISIT: Performed by: OCCUPATIONAL THERAPIST

## 2017-09-19 NOTE — PROGRESS NOTES
Outpatient Physical Therapy Progress Note     Patient: Emma Nunn  : 1965    Beginning/End Dates of Reporting Period:  9 visits between 17 and 17 for a total of 31 visits    Referring Provider: Dr. Mendy Macario    Therapy Diagnosis: Post traumatic headache, neck pain with decreased cervical AROM and issues with light and noise sensitivity     Client Self Report: She reports she is getting her exercises in daily. She has seen SLP and worked on the computer. She is expecting to return soon. She had a 10 minute teleconference with luna and this went well.     Objective Measurements:  Objective Measure: DHI  Details: No dizziness  Objective Measure: NDI  Details: not completed today  Objective Measure: Concussion assessment scale  Details: 10/17  Objective Measure: Seated cervical AROM  Details: Flexion: 41 degrees; extension: full at 60 degrees; Sidebending bilaterally 30 degrees; rotation: R: 75 degrees, L: 73 degrees  Objective Measure: Symptoms  Details: Cervical and Headache       GOALS:    Goal Identifier Sleep   Goal Description Emma will be able to use sleep hygiene principles to allow her to sleep x 4 hours at one time->7-8 hours per night (on and off, she is now working to get on her work schedule)   Target Date 17   Date Met      Progress:     Goal Identifier Headache   Goal Description Emma will be able to report a 50% reduction in headaches so she can concentrate better and focus to prepare for return to driving and work (3/10, pressure. decreased from last session)   Target Date 17 (pressure is decreased - 25%)   Date Met   (17: 80% reduction in HAs since start but not driving)   Progress:     Goal Identifier Approach   Goal Description Emma will be able to tolerate approaches within 2 feet of her from the back and R side so she can hold conversations, walk through crowds in the community without increased anxiety (met - occasional hesitancy normal for her)    Target Date 08/01/17   Date Met      Progress:     Goal Identifier Vertigo   Goal Description Emma will complete the cnanlith repositioning exercises at home so she can report 100% improvement in dizziness symptoms/room spinning and stay safe with walking (met - no dizziness x 1 week)   Target Date 10/18/17   Date Met      Progress:         Progress Toward Goals:   Progress this reporting period: Overall, Emma has been noting significant improvement. She is continuing to have issues with noise and light sensitivity. She no longer has nausea or dizziness. She continues to feel stretching with the manual therapy along the neck and upper back. She has improved her AROM and her headaches and neck symptoms are averaging 2/6. She is working hard on her home program including endurance and cervical AROM. She will benefit from continued PT to assist with the deeper releases for her neck and head to restore her neck AROM.     Plan:  Changes to therapy plan of care: Continue 2->1 time per week with intention of holding PT after 9-30-17    Discharge:  No    Thank you for referring Emma  to [facility] [place] Jain, PT  [phone]

## 2017-09-21 ENCOUNTER — HOSPITAL ENCOUNTER (OUTPATIENT)
Dept: PHYSICAL THERAPY | Facility: CLINIC | Age: 52
Setting detail: THERAPIES SERIES
End: 2017-09-21
Attending: PHYSICAL MEDICINE & REHABILITATION
Payer: COMMERCIAL

## 2017-09-21 ENCOUNTER — HOSPITAL ENCOUNTER (OUTPATIENT)
Dept: OCCUPATIONAL THERAPY | Facility: CLINIC | Age: 52
Setting detail: THERAPIES SERIES
End: 2017-09-21
Attending: PHYSICAL MEDICINE & REHABILITATION
Payer: COMMERCIAL

## 2017-09-21 PROCEDURE — 97140 MANUAL THERAPY 1/> REGIONS: CPT | Mod: GP | Performed by: PHYSICAL THERAPIST

## 2017-09-21 PROCEDURE — 40000767 ZZHC STATISTIC PT CONCUSSION VISIT: Performed by: PHYSICAL THERAPIST

## 2017-09-21 PROCEDURE — 97535 SELF CARE MNGMENT TRAINING: CPT | Mod: GO | Performed by: OCCUPATIONAL THERAPIST

## 2017-09-21 PROCEDURE — 40000768 ZZHC STATISTIC OT CONCUSSION VISIT: Performed by: OCCUPATIONAL THERAPIST

## 2017-09-26 ENCOUNTER — HOSPITAL ENCOUNTER (OUTPATIENT)
Dept: PHYSICAL THERAPY | Facility: CLINIC | Age: 52
Setting detail: THERAPIES SERIES
End: 2017-09-26
Attending: PHYSICAL MEDICINE & REHABILITATION
Payer: COMMERCIAL

## 2017-09-26 ENCOUNTER — HOSPITAL ENCOUNTER (OUTPATIENT)
Dept: OCCUPATIONAL THERAPY | Facility: CLINIC | Age: 52
Setting detail: THERAPIES SERIES
End: 2017-09-26
Attending: PHYSICAL MEDICINE & REHABILITATION
Payer: COMMERCIAL

## 2017-09-26 PROCEDURE — 40000718 ZZHC STATISTIC PT DEPARTMENT ORTHO VISIT: Performed by: PHYSICAL THERAPIST

## 2017-09-26 PROCEDURE — 97140 MANUAL THERAPY 1/> REGIONS: CPT | Mod: GP | Performed by: PHYSICAL THERAPIST

## 2017-09-26 PROCEDURE — 97535 SELF CARE MNGMENT TRAINING: CPT | Mod: GO | Performed by: OCCUPATIONAL THERAPIST

## 2017-09-26 PROCEDURE — 40000768 ZZHC STATISTIC OT CONCUSSION VISIT: Performed by: OCCUPATIONAL THERAPIST

## 2017-09-26 PROCEDURE — 97530 THERAPEUTIC ACTIVITIES: CPT | Mod: GP | Performed by: PHYSICAL THERAPIST

## 2017-09-27 ENCOUNTER — HOSPITAL ENCOUNTER (OUTPATIENT)
Dept: SPEECH THERAPY | Facility: CLINIC | Age: 52
Setting detail: THERAPIES SERIES
End: 2017-09-27
Attending: PHYSICAL MEDICINE & REHABILITATION
Payer: COMMERCIAL

## 2017-09-27 PROCEDURE — 40000211 ZZHC STATISTIC SLP  DEPARTMENT VISIT: Performed by: SPEECH-LANGUAGE PATHOLOGIST

## 2017-09-27 PROCEDURE — 92507 TX SP LANG VOICE COMM INDIV: CPT | Mod: GN | Performed by: SPEECH-LANGUAGE PATHOLOGIST

## 2017-10-03 ENCOUNTER — OFFICE VISIT (OUTPATIENT)
Dept: ORTHOPEDICS | Facility: CLINIC | Age: 52
End: 2017-10-03
Payer: COMMERCIAL

## 2017-10-03 VITALS — BODY MASS INDEX: 18.94 KG/M2 | WEIGHT: 125 LBS | HEIGHT: 68 IN

## 2017-10-03 DIAGNOSIS — S06.0X1S CONCUSSION WITH LOSS OF CONSCIOUSNESS OF 30 MINUTES OR LESS, SEQUELA (H): Primary | ICD-10-CM

## 2017-10-03 PROCEDURE — 99213 OFFICE O/P EST LOW 20 MIN: CPT | Performed by: PHYSICAL MEDICINE & REHABILITATION

## 2017-10-03 NOTE — PATIENT INSTRUCTIONS
Today's Plan of Care:  -Work restrictions provided in letter.   -Begin gradual return to driving as discussed by OT  -Email disability info to [email or web address]      Follow Up:  4 weeks or sooner if symptoms fail to improve or worsen. Call with any questions or concerns.

## 2017-10-03 NOTE — PROGRESS NOTES
"Sports Medicine Clinic Report:    CC: Head Injury    Emma Nunn is a 51 year old female who presents in follow up for a concussion that occurred on 5/2/17 or 5 months ago.  Since last visit on 9/12/2017 patient notes she is continuing to improve. She feels that she is at ~85-90% of her baseline. She continues to have pressure in her head.  She continues to have some difficult with sensitivity to noise and light, train of the thought and attention issues. She is still not driving.    She has been discharged from PT and she is doing bi weekly OT sessions.    She feels that speech therapy is going \"okay\". She will be doing some testing for memory and speech in the near future.    Since your last visit, level of activity is:  Continuing to do walking. She did try running the other day on sand and it went \"okay\"    Since your last visit, have you continued with your normal cognitive activity (text, computer, school):  Increased, especially with work. Getting fatigued at times due to work.       Current Symptoms:  CONCUSSION SYMPTOMS ASSESSMENT 9/21/2017 9/26/2017 10/3/2017   Headache or Pressure In Head 2 - mild to moderate 2 - mild to moderate 2 - mild to moderate   Upset Stomach or Throwing Up 0 - none 0 - none 0 - none   Problems with Balance 0 - none 0 - none 0 - none   Feeling Dizzy 0 - none 0 - none 0 - none   Sensitivity to Light 1 - mild 2 - mild to moderate 1 - mild   Sensitivity to Noise 2 - mild to moderate 2 - mild to moderate 2 - mild to moderate   Mood Changes 2 - mild to moderate 1 - mild 1 - mild   Feeling sluggish, hazy, or foggy 0 - none 0 - none 0 - none   Trouble Concentrating, Lack of Focus 2 - mild to moderate 1 - mild 2 - mild to moderate   Motion Sickness 0 - none 0 - none 0 - none   Vision Changes 1 - mild 2 - mild to moderate 1 - mild   Memory Problems 2 - mild to moderate 1 - mild 2 - mild to moderate   Feeling Confused 1 - mild 2 - mild to moderate 2 - mild to moderate   Neck Pain 2 - mild to " "moderate 2 - mild to moderate 2 - mild to moderate   Trouble Sleeping 1 - mild 1 - mild 1 - mild   Total Number of Symptoms 10 10 10   Symptom Severity Score 16 16 16       Sleep: Sleep has normalized for her. She still has some troubles due to neck pain or headaches.    Patient's past medical, surgical, social and family histories are reviewed today.    Past Medical History:   Diagnosis Date     Breast cancer (H)      Diffuse cystic mastopathy     Fibrocystic breast disease     External hemorrhoids without mention of complication      Unspecified hypothyroidism      Past Surgical History:   Procedure Laterality Date     BREAST BIOPSY, CORE RT/LT  7/8/2013     C NONSPECIFIC PROCEDURE  1993    Laparoscopy     C NONSPECIFIC PROCEDURE  1993    Outpt surgery for anal fissure     COLONOSCOPY N/A 12/1/2014    Procedure: COLONOSCOPY;  Surgeon: Gabriele Golden MD;  [facility]     LAPAROSCOPIC CHOLECYSTECTOMY N/A 5/24/2017    Procedure: LAPAROSCOPIC CHOLECYSTECTOMY;  Laparoscopic Cholecystectomy;  Surgeon: Jason Munoz MD;  [facility]     LUMPECTOMY BREAST WITH SENTINEL NODE, COMBINED  7/31/2013    Procedure: COMBINED LUMPECTOMY BREAST WITH SENTINEL NODE;  Left Breast Lumpectomy with [place] Node Biopsy;  Surgeon: Jason Munoz MD;  [facility]       OBJECTIVE:  Ht 5' 7.5\" (1.715 m)  Wt 125 lb (56.7 kg)  BMI 19.29 kg/m2    General: Healthy, well-appearing, and in no acute distress.  Skin: no suspicious lesions or rashes  Psych: mentation appears normal, and affect is appropriate/bright  HEENT: Neck is supple with full ROM except for neck flexion which is mildly decreased.  Left sided rotation and left lateral flexion are painful.    Neuromuscular/Strength: No motor weakness in C5-T1 distribution.    Neurologic/Visual:  Cranial Nerves:  CN II-XII intact grossly  PAULA: yes  EOMI: yes  Nystagmus: no  Painful eye movements: no    Coordination:       - Finger to Nose: normal       - Heel to Shin: " normal       - Rapid Alternating Movements: normal    Cognitive:  Previous cognitive assessment was normal and without deficit; repeat cognitive testing not performed today.      Impact Testing Scores: ImPACT Testing not performed    ASSESSMENT:  Concussion with loss of consciousness of 30 minutes or less, sequela (H)    PLAN:  -Emma is continuing to do better.  She has been tolerating her return to work well.  We will gradually increase to full work hours/days over the next 4 weeks.  She should still be allowed to work from home.  Full details provided in work letter.  -Begin gradual return to driving as discussed with occupational therapy.      -Follow Up:  4 weeks or sooner if symptoms fail to improve or worsen. Call with any questions or concerns.     Radha Macario MD, CAQ  [facility]

## 2017-10-03 NOTE — MR AVS SNAPSHOT
After Visit Summary   10/3/2017    Emma Nunn    MRN: [id number]           Patient Information     Date Of Birth          1965        Visit Information        Provider Department      10/3/2017 9:00 AM Mendy Macario MD [facility]        Care Instructions    Today's Plan of Care:  -Work restrictions provided in letter.   -Begin gradual return to driving as discussed by OT  -Email disability info to [email or web address]      Follow Up:  4 weeks or sooner if symptoms fail to improve or worsen. Call with any questions or concerns.               Follow-ups after your visit        Your next 10 appointments already scheduled     Oct 04, 2017  4:00 PM CDT   Treatment 45 with ANGELITO Rangel   [facility] ([facility])    [address] [place] [address] Dr John LALA [address]   [phone]            Oct 10, 2017  4:00 PM CDT   Concussion Treatment with Sue Cabral OT   [facility] ([facility])    [address] [place] [address] Dr John LALA [address]   [phone]            Oct 11, 2017  2:30 PM CDT   Treatment 45 with ANGELITO Rangel   [facility] ([facility])    [address] [place] [address] Dr John LALA [address]   [phone]            Oct 26, 2017  7:30 AM CDT   Concussion Treatment with Sue Cabral, ELOY   [facility] ([facility])    [address] [place] [address] Dr John LALA [address]   [phone]            Nov 07, 2017  3:00 PM CST   Concussion Treatment with Sue Cabral OT   [facility] ([facility])    [address] [place] [address] Dr John LALA [address]   [phone]              Who to contact     If you have questions or need follow up information about today's clinic visit or your schedule please contact [facility] directly at [phone].  Normal or  "non-critical lab and imaging results will be communicated to you by MyChart, letter or phone within 4 business days after the clinic has received the results. If you do not hear from us within 7 days, please contact the clinic through [employer] or phone. If you have a critical or abnormal lab result, we will notify you by phone as soon as possible.  Submit refill requests through [employer] or call your pharmacy and they will forward the refill request to us. Please allow 3 business days for your refill to be completed.          Additional Information About Your Visit        [employer] Information     [employer] lets you send messages to your doctor, view your test results, renew your prescriptions, schedule appointments and more. To sign up, go to [email or web address] . Click on \"Log in\" on the left side of the screen, which will take you to the Welcome page. Then click on \"Sign up Now\" on the right side of the page.     You will be asked to enter the access code listed below, as well as some personal information. Please follow the directions to create your username and password.     Your access code is: [email or web address]  Expires: 2017  9:30 AM     Your access code will  in 90 days. If you need help or a new code, please call your [place] clinic or [phone].        Care EveryWhere ID     This is your Care EveryWhere ID. This could be used by other organizations to access your [place] medical records  [id number]        Your Vitals Were     Height BMI (Body Mass Index)                5' 7.5\" (1.715 m) 19.29 kg/m2           Blood Pressure from Last 3 Encounters:   17 110/67   17 101/72   17 99/63    Weight from Last 3 Encounters:   10/03/17 125 lb (56.7 kg)   17 125 lb (56.7 kg)   17 131 lb 9.6 oz (59.7 kg)              Today, you had the following     No orders found for display       Primary Care Provider Office Phone # Fax #    Zoltan Tolentino [phone]    "    [facility] [address] [place] [address]        Equal Access to Services     REYNA SINGH : Hadii isatu menjivar Soteo, waaxda luqadaha, qaybta kaalmada petersondwainelakeisha, danay lundylisethjose alfredo irwin. So [facility] [phone].    ATENCIÓN: Si habla español, tiene a jang disposición servicios gratuitos de asistencia lingüística. Llame al [phone].    We comply with applicable federal civil rights laws and [place] laws. We do not discriminate on the basis of race, color, national origin, age, disability, sex, sexual orientation, or gender identity.            Thank you!     Thank you for choosing [facility]  for your care. Our goal is always to provide you with excellent care. Hearing back from our patients is one way we can continue to improve our services. Please take a few minutes to complete the written survey that you may receive in the mail after your visit with us. Thank you!             Your Updated Medication List - Protect others around you: Learn how to safely use, store and throw away your medicines at www.disposemymeds.org.          This list is accurate as of: 10/3/17  9:41 AM.  Always use your most recent med list.                   Brand Name Dispense Instructions for use Diagnosis    IBUPROFEN PO      Take 600 mg by mouth every 6 hours as needed for moderate pain        levothyroxine 88 MCG tablet    SYNTHROID/LEVOTHROID    90 tablet    Take 1 tablet (88 mcg) by mouth daily    Malignant neoplasm of upper-outer quadrant of left breast in female, estrogen receptor positive (H)       ondansetron 4 MG tablet    ZOFRAN    30 tablet    Take 1 tablet (4 mg) by mouth every 8 hours as needed for nausea    Nausea       tamoxifen 20 MG tablet    NOLVADEX    90 tablet    Take 1 tablet (20 mg) by mouth daily    Malignant neoplasm of upper-outer quadrant of left breast in female, estrogen receptor positive (H)

## 2017-10-03 NOTE — LETTER
October 3, 2017      Emma Nunn  [address] [facility] [address]        To Whom It May Concern:      Emma Nunn  was seen on 10/3/2017 in follow up for her concussion.  She is able to continue her gradual return to work with the following restrictions:    -10/2-10/6: Monday, Wednesday, Friday 6 hours per day. Work from home  -10/9-10/20: Monday, Wednesday, Friday 6 hours per day. Tuesday and Thursday 4 hours per day. Work from home  -10/23 -10/27: 6 hours per day Monday through Friday. Work from home  -10/30 - 11/3: 7.5 hours per day Monday through Friday. Work from home    She will be seen in follow up in 4 weeks for reevaluation. Thank you for your help in advance.         Sincerely,        Mendy Macario MD, CAQ

## 2017-10-24 ENCOUNTER — OFFICE VISIT (OUTPATIENT)
Dept: ORTHOPEDICS | Facility: CLINIC | Age: 52
End: 2017-10-24
Payer: COMMERCIAL

## 2017-10-24 VITALS
BODY MASS INDEX: 18.94 KG/M2 | DIASTOLIC BLOOD PRESSURE: 67 MMHG | WEIGHT: 125 LBS | SYSTOLIC BLOOD PRESSURE: 101 MMHG | HEART RATE: 72 BPM | HEIGHT: 68 IN

## 2017-10-24 DIAGNOSIS — S06.0X1S CONCUSSION WITH LOSS OF CONSCIOUSNESS OF 30 MINUTES OR LESS, SEQUELA (H): Primary | ICD-10-CM

## 2017-10-24 PROCEDURE — 99213 OFFICE O/P EST LOW 20 MIN: CPT | Performed by: PHYSICAL MEDICINE & REHABILITATION

## 2017-10-24 NOTE — PATIENT INSTRUCTIONS
Today's Plan of Care:  -Full work hours from home.   -Continue gradual return to driving.     Follow Up: 4-6 weeks or sooner if symptoms fail to improve or worsen. Call with any questions or concerns.

## 2017-10-24 NOTE — MR AVS SNAPSHOT
"              After Visit Summary   10/24/2017    Emma Nunn    MRN: [id number]           Patient Information     Date Of Birth          1965        Visit Information        Provider Department      10/24/2017 2:20 PM Mendy Macario MD [facility]        Today's Diagnoses     Concussion with loss of consciousness of 30 minutes or less, sequela (H)    -  1      Care Instructions    Today's Plan of Care:  -Full work hours from home.   -Continue gradual return to driving.     Follow Up: 4-6 weeks or sooner if symptoms fail to improve or worsen. Call with any questions or concerns.               Follow-ups after your visit        Who to contact     If you have questions or need follow up information about today's clinic visit or your schedule please contact [facility] directly at [phone].  Normal or non-critical lab and imaging results will be communicated to you by MyChart, letter or phone within 4 business days after the clinic has received the results. If you do not hear from us within 7 days, please contact the clinic through [employer] or phone. If you have a critical or abnormal lab result, we will notify you by phone as soon as possible.  Submit refill requests through [employer] or call your pharmacy and they will forward the refill request to us. Please allow 3 business days for your refill to be completed.          Additional Information About Your Visit        [employer] Information     [employer] lets you send messages to your doctor, view your test results, renew your prescriptions, schedule appointments and more. To sign up, go to [email or web address] . Click on \"Log in\" on the left side of the screen, which will take you to the Welcome page. Then click on \"Sign up Now\" on the right side of the page.     You will be asked to enter the access code listed below, as well as some personal information. Please follow the directions to create your username and " "password.     Your access code is: [email or web address]  Expires: 2017  9:30 AM     Your access code will  in 90 days. If you need help or a new code, please call your [place] clinic or [phone].        Care EveryWhere ID     This is your Care EveryWhere ID. This could be used by other organizations to access your [place] medical records  [id number]        Your Vitals Were     Pulse Height BMI (Body Mass Index)             72 5' 7.5\" (1.715 m) 19.29 kg/m2          Blood Pressure from Last 3 Encounters:   10/24/17 101/67   17 110/67   17 101/72    Weight from Last 3 Encounters:   10/24/17 125 lb (56.7 kg)   10/03/17 125 lb (56.7 kg)   17 125 lb (56.7 kg)              Today, you had the following     No orders found for display         Today's Medication Changes          These changes are accurate as of: 10/24/17  2:50 PM.  If you have any questions, ask your nurse or doctor.               Stop taking these medicines if you haven't already. Please contact your care team if you have questions.     ondansetron 4 MG tablet   Commonly known as:  ZOFRAN                    Primary Care Provider Office Phone # Fax #    Zoltan Tolentino [phone]       [facility] [address] [place] [address]        Equal Access to Services     REYNA SINGH AH: Hadii isatu ku hadasho Soomaali, waaxda luqadaha, qaybta kaalmada adeegyada, danay irwin. So [facility] [phone].    ATENCIÓN: Si habla español, tiene a jang disposición servicios gratuitos de asistencia lingüística. Llame al [phone].    We comply with applicable federal civil rights laws and [place] laws. We do not discriminate on the basis of race, color, national origin, age, disability, sex, sexual orientation, or gender identity.            Thank you!     Thank you for choosing [facility]  for your care. Our goal is always to provide you with excellent care. " Hearing back from our patients is one way we can continue to improve our services. Please take a few minutes to complete the written survey that you may receive in the mail after your visit with us. Thank you!             Your Updated Medication List - Protect others around you: Learn how to safely use, store and throw away your medicines at www.disposemymeds.org.          This list is accurate as of: 10/24/17  2:50 PM.  Always use your most recent med list.                   Brand Name Dispense Instructions for use Diagnosis    IBUPROFEN PO      Take 600 mg by mouth every 6 hours as needed for moderate pain        levothyroxine 88 MCG tablet    SYNTHROID/LEVOTHROID    90 tablet    Take 1 tablet (88 mcg) by mouth daily    Malignant neoplasm of upper-outer quadrant of left breast in female, estrogen receptor positive (H)       tamoxifen 20 MG tablet    NOLVADEX    90 tablet    Take 1 tablet (20 mg) by mouth daily    Malignant neoplasm of upper-outer quadrant of left breast in female, estrogen receptor positive (H)

## 2017-10-24 NOTE — LETTER
October 24, 2017      Emma Nunn  [address] [facility] [address]        To Whom It May Concern:      Emma Nunn  was seen on 10/24/2017 in follow up for her concussion.  She is able to continue her gradual return to work with the following restrictions:    -Begin full work hours beginning 10/30 from home.   -Work from home indefinitely    She will be seen in follow up in 4-6 weeks for reevaluation. Thank you for your help in advance.         Sincerely,        Mendy Macario MD, CAQ

## 2017-10-24 NOTE — PROGRESS NOTES
Sports Medicine Clinic Report:    CC: Head Injury    Emma Nnun is a 51 year old female who presents in follow up for a concussion that occurred on 5/2/17.  Since last visit on 10/3/2017 patient notes she is doing very well.  She has done some short term driving and is getting used to that. She has noticed that the night time driving with the headlights is very bothersome for her. She also notices some trouble with her eyes in well lit areas.    She is done with therapy.     Since your last visit, level of activity is:  Continues her walks    Since your last visit, have you continued with your normal cognitive activity (text, computer, school):  She has been doing well with the increase in work hours. She has been fatigued with the increase in work hours.      Current Symptoms:  CONCUSSION SYMPTOMS ASSESSMENT 9/26/2017 10/3/2017 10/24/2017   Headache or Pressure In Head 2 - mild to moderate 2 - mild to moderate 2 - mild to moderate   Upset Stomach or Throwing Up 0 - none 0 - none 0 - none   Problems with Balance 0 - none 0 - none 0 - none   Feeling Dizzy 0 - none 0 - none 0 - none   Sensitivity to Light 2 - mild to moderate 1 - mild 2 - mild to moderate   Sensitivity to Noise 2 - mild to moderate 2 - mild to moderate 1 - mild   Mood Changes 1 - mild 1 - mild 1 - mild   Feeling sluggish, hazy, or foggy 0 - none 0 - none 0 - none   Trouble Concentrating, Lack of Focus 1 - mild 2 - mild to moderate 1 - mild   Motion Sickness 0 - none 0 - none 0 - none   Vision Changes 2 - mild to moderate 1 - mild 2 - mild to moderate   Memory Problems 1 - mild 2 - mild to moderate 2 - mild to moderate   Feeling Confused 2 - mild to moderate 2 - mild to moderate 2 - mild to moderate   Neck Pain 2 - mild to moderate 2 - mild to moderate 2 - mild to moderate   Trouble Sleeping 1 - mild 1 - mild 1 - mild   Total Number of Symptoms 10 10 10   Symptom Severity Score 16 16 16       Sleep: Sleeping is going better. She feels that she is  "getting enough sleep, some night are more restful than others    Patient's past medical, surgical, social and family histories are reviewed today.    Past Medical History:   Diagnosis Date     Breast cancer (H)      Diffuse cystic mastopathy     Fibrocystic breast disease     External hemorrhoids without mention of complication      Unspecified hypothyroidism      Past Surgical History:   Procedure Laterality Date     BREAST BIOPSY, CORE RT/LT  7/8/2013     C NONSPECIFIC PROCEDURE  1993    Laparoscopy     C NONSPECIFIC PROCEDURE  1993    Outpt surgery for anal fissure     COLONOSCOPY N/A 12/1/2014    Procedure: COLONOSCOPY;  Surgeon: Gabriele Golden MD;  [facility]     LAPAROSCOPIC CHOLECYSTECTOMY N/A 5/24/2017    Procedure: LAPAROSCOPIC CHOLECYSTECTOMY;  Laparoscopic Cholecystectomy;  Surgeon: Jason Munoz MD;  [facility]     LUMPECTOMY BREAST WITH SENTINEL NODE, COMBINED  7/31/2013    Procedure: COMBINED LUMPECTOMY BREAST WITH SENTINEL NODE;  Left Breast Lumpectomy with [place] Node Biopsy;  Surgeon: Jason Munoz MD;  [facility]       OBJECTIVE:  Ht 5' 7.5\" (1.715 m)  Wt 125 lb (56.7 kg)  BMI 19.29 kg/m2    General: Healthy, well-appearing, and in no acute distress.  Skin: no suspicious lesions or rashes  Psych: mentation appears normal, and affect is appropriate/bright  HEENT: Neck is supple with full ROM  Neuromuscular/Strength: No motor weakness in C5-T1 distribution.    Neurologic/Visual:  Cranial Nerves:  II-XII intact grossly  EOMI: yes  Nystagmus: no  Painful eye movements: no    Coordination:       - Finger to Nose: normal       - Heel to Shin: normal       - Rapid Alternating Movements: normal      Cognitive:  Previous cognitive assessment was normal and without deficit; repeat cognitive testing not performed today.      Impact Testing Scores: ImPACT Testing not performed    ASSESSMENT:  Concussion with loss of consciousness of 30 minutes or less, sequela (H)    PLAN:  -Full " work hours from home starting 10/30/17.  New letter provided.  .   -Continue gradual return to driving.     Follow Up: 4-6 weeks or sooner if symptoms fail to improve or worsen. Call with any questions or concerns.     Radha Macario MD, CAQ  [facility]

## 2017-11-06 NOTE — DISCHARGE SUMMARY
Outpatient Physical Therapy Discharge Note     Patient: Emma Nunn  : 1965    Beginning/End Dates of Reporting Period:  2 visits between 17 and 17 for a total of 33 visits    Referring Provider: Dr. Mendy Macario    Therapy Diagnosis: Post traumatic headache, neck pain with decreased cervical AROM and issues with light and noise sensitivity     Client Self Report: Emma reports she still has some headaches and neck pain. She notes she is back to work still at home full duties and did note an increase in her symptoms. She takes more breaks and this helps. She purchased a larger computer screen and is not driving at this time. She feels ready to be discharged from PT.     Objective Measurements:   At the time of her last session   Objective Measure: Concussion assessment scale  Details: 10/16        Goals:  Goal Identifier     Goal Description     Target Date     Date Met      Progress:     Goal Identifier Sleep   Goal Description Emma will be able to use sleep hygiene principles to allow her to sleep x 4 hours at one time->7-8 hours per night (on and off, she is now working to get on her work schedule)   Target Date 17   Date Met      Progress:     Goal Identifier Headache   Goal Description Emma will be able to report a 50% reduction in headaches so she can concentrate better and focus to prepare for return to driving and work (3/10, pressure. decreased from last session)   Target Date 17 (pressure is decreased - 25%)   Date Met   (17: 80% reduction in HAs since start but not driving)   Progress:     Goal Identifier Approach   Goal Description Emma will be able to tolerate approaches within 2 feet of her from the back and R side so she can hold conversations, walk through crowds in the community without increased anxiety (met - occasional hesitancy normal for her)   Target Date 17   Date Met      Progress:     Goal Identifier Vertigo   Goal Description Emma will complete the  cnanlith repositioning exercises at home so she can report 100% improvement in dizziness symptoms/room spinning and stay safe with walking (met - no dizziness x 1 week)   Target Date 10/18/17   Date Met      Progress:         Progress Toward Goals:   Progress this reporting period: Not assessed. Patient reports she is improved.     Plan:  Discharge from therapy.Progressing and back to work. Breaks help.     Discharge:    Reason for Discharge: As noted above    Equipment Issued: bands    Discharge Plan: Patient to continue home program.    Thank you for referring Emma  to [facility] [place]    Cristina Jain, PT  [phone]

## 2017-11-06 NOTE — ADDENDUM NOTE
Encounter addended by: Cristina Jain PT on: 11/6/2017  5:27 PM<BR>     Actions taken: Sign clinical note, Episode resolved

## 2017-12-05 ENCOUNTER — OFFICE VISIT (OUTPATIENT)
Dept: ORTHOPEDICS | Facility: CLINIC | Age: 52
End: 2017-12-05
Payer: COMMERCIAL

## 2017-12-05 VITALS — HEART RATE: 71 BPM | OXYGEN SATURATION: 99 % | WEIGHT: 125 LBS | HEIGHT: 68 IN | BODY MASS INDEX: 18.94 KG/M2

## 2017-12-05 DIAGNOSIS — S06.0X1S CONCUSSION WITH LOSS OF CONSCIOUSNESS OF 30 MINUTES OR LESS, SEQUELA (H): Primary | ICD-10-CM

## 2017-12-05 PROCEDURE — 99214 OFFICE O/P EST MOD 30 MIN: CPT | Performed by: PHYSICAL MEDICINE & REHABILITATION

## 2017-12-05 NOTE — MR AVS SNAPSHOT
"              After Visit Summary   12/5/2017    Emma Nunn    MRN: [id number]           Patient Information     Date Of Birth          1965        Visit Information        Provider Department      12/5/2017 2:20 PM Mendy Macario MD [facility]        Today's Diagnoses     Concussion with loss of consciousness of 30 minutes or less, sequela (H)    -  1      Care Instructions    Today's Plan of Care:  -Work letter: Work from home.  -Recommend pursuing a mental health provider. Call if you need a referral to be placed once you have identified a provider of choice.    Follow Up: 6 weeks or sooner if symptoms fail to improve or worsen. Call with any questions or concerns.               Follow-ups after your visit        Who to contact     If you have questions or need follow up information about today's clinic visit or your schedule please contact [facility] directly at [phone].  Normal or non-critical lab and imaging results will be communicated to you by MyChart, letter or phone within 4 business days after the clinic has received the results. If you do not hear from us within 7 days, please contact the clinic through [employer] or phone. If you have a critical or abnormal lab result, we will notify you by phone as soon as possible.  Submit refill requests through [employer] or call your pharmacy and they will forward the refill request to us. Please allow 3 business days for your refill to be completed.          Additional Information About Your Visit        MyChart Information     [employer] lets you send messages to your doctor, view your test results, renew your prescriptions, schedule appointments and more. To sign up, go to [email or web address] . Click on \"Log in\" on the left side of the screen, which will take you to the Welcome page. Then click on \"Sign up Now\" on the right side of the page.     You will be asked to enter the access code listed below, as well " "as some personal information. Please follow the directions to create your username and password.     Your access code is: [email or web address]  Expires: 3/5/2018  3:16 PM     Your access code will  in 90 days. If you need help or a new code, please call your [place] clinic or [phone].        Care EveryWhere ID     This is your Care EveryWhere ID. This could be used by other organizations to access your [place] medical records  [id number]        Your Vitals Were     Pulse Height Pulse Oximetry BMI (Body Mass Index)          71 5' 7.5\" (1.715 m) 99% 19.29 kg/m2         Blood Pressure from Last 3 Encounters:   10/24/17 101/67   17 110/67   17 101/72    Weight from Last 3 Encounters:   17 125 lb (56.7 kg)   10/24/17 125 lb (56.7 kg)   10/03/17 125 lb (56.7 kg)              Today, you had the following     No orders found for display       Primary Care Provider Office Phone # Fax #    Zoltan Tolentino [phone]       [facility] [address] [place] [address]        Equal Access to Services     REYNA SINGH AH: Hadii aad ku hadasho Soomaali, waaxda luqadaha, qaybta kaalmada adeegyada, waxay idiin haymariumn peterson irwin. So [facility] [phone].    ATENCIÓN: Si habla español, tiene a jang disposición servicios gratuitos de asistencia lingüística. Llame al [phone].    We comply with applicable federal civil rights laws and [place] laws. We do not discriminate on the basis of race, color, national origin, age, disability, sex, sexual orientation, or gender identity.            Thank you!     Thank you for choosing [facility]  for your care. Our goal is always to provide you with excellent care. Hearing back from our patients is one way we can continue to improve our services. Please take a few minutes to complete the written survey that you may receive in the mail after your visit with us. Thank you!             Your Updated " Medication List - Protect others around you: Learn how to safely use, store and throw away your medicines at www.disposemymeds.org.          This list is accurate as of: 12/5/17  3:16 PM.  Always use your most recent med list.                   Brand Name Dispense Instructions for use Diagnosis    IBUPROFEN PO      Take 600 mg by mouth every 6 hours as needed for moderate pain        levothyroxine 88 MCG tablet    SYNTHROID/LEVOTHROID    90 tablet    Take 1 tablet (88 mcg) by mouth daily    Malignant neoplasm of upper-outer quadrant of left breast in female, estrogen receptor positive (H)       tamoxifen 20 MG tablet    NOLVADEX    90 tablet    Take 1 tablet (20 mg) by mouth daily    Malignant neoplasm of upper-outer quadrant of left breast in female, estrogen receptor positive (H)

## 2017-12-05 NOTE — LETTER
December 5, 2017      Emma Nunn  [address] [facility] [address]        To Whom It May Concern:    Emma Nunn  was seen on 12/5/17 in follow up for her concussion.  Please allow her to work from home. She is unable to drive to work at this time. She will be seen in follow up in 6 weeks. If there are any questions, please contact my office at the number listed above. Thank you for your help in advance.       Sincerely,        Mendy Macario MD, CAQ
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
## 2017-12-05 NOTE — PATIENT INSTRUCTIONS
Today's Plan of Care:  -Work letter: Work from home.  -Recommend pursuing a mental health provider. Call if you need a referral to be placed once you have identified a provider of choice.    Follow Up: 6 weeks or sooner if symptoms fail to improve or worsen. Call with any questions or concerns.
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
## 2018-01-16 ENCOUNTER — OFFICE VISIT (OUTPATIENT)
Dept: ORTHOPEDICS | Facility: CLINIC | Age: 53
End: 2018-01-16
Payer: COMMERCIAL

## 2018-01-16 VITALS
HEART RATE: 69 BPM | HEIGHT: 68 IN | DIASTOLIC BLOOD PRESSURE: 67 MMHG | SYSTOLIC BLOOD PRESSURE: 102 MMHG | BODY MASS INDEX: 19.4 KG/M2 | WEIGHT: 128 LBS

## 2018-01-16 DIAGNOSIS — F43.10 PTSD (POST-TRAUMATIC STRESS DISORDER): ICD-10-CM

## 2018-01-16 DIAGNOSIS — F41.9 ANXIETY: ICD-10-CM

## 2018-01-16 DIAGNOSIS — S06.0X1S CONCUSSION WITH LOSS OF CONSCIOUSNESS OF 30 MINUTES OR LESS, SEQUELA (H): Primary | ICD-10-CM

## 2018-01-16 PROCEDURE — 99214 OFFICE O/P EST MOD 30 MIN: CPT | Performed by: PHYSICAL MEDICINE & REHABILITATION

## 2018-01-16 NOTE — LETTER
1/16/2018         RE: Emma Nunn  [address] [facility] [address]        Dear Colleague,    Thank you for referring your patient, Emma Nunn, to the [facility]. Please see a copy of my visit note below.    Sports Medicine Clinic Report:    CC: Head Injury      Emma Nunn is a 52 year old female who presents in follow up for a concussion that occurred on 5/2/2017.  Since last visit on 12/5/2017, Emma notes that she is doing well. She has had a couple sessions with a mental health counselor who diagnosed her with PTSD and anxiety. The therapist is hopeful that she will recover from her fear of driving.    Since your last visit, level of activity is:  Daily walks - 5 miles    Since your last visit, have you continued with your normal cognitive activity (text, computer, school):  Still working from home. They are not pushing for her to drive into work at this time      Current Symptoms:  CONCUSSION SYMPTOMS ASSESSMENT 10/24/2017 12/5/2017 1/16/2018   Headache or Pressure In Head 2 - mild to moderate 2 - mild to moderate 2 - mild to moderate   Upset Stomach or Throwing Up 0 - none 0 - none 0 - none   Problems with Balance 0 - none 0 - none 0 - none   Feeling Dizzy 0 - none 0 - none 0 - none   Sensitivity to Light 2 - mild to moderate 2 - mild to moderate 2 - mild to moderate   Sensitivity to Noise 1 - mild 2 - mild to moderate 2 - mild to moderate   Mood Changes 1 - mild 1 - mild 1 - mild   Feeling sluggish, hazy, or foggy 0 - none 0 - none 0 - none   Trouble Concentrating, Lack of Focus 1 - mild 0 - none 1 - mild   Motion Sickness 0 - none 0 - none 0 - none   Vision Changes 2 - mild to moderate 1 - mild 0 - none   Memory Problems 2 - mild to moderate 0 - none 0 - none   Feeling Confused 2 - mild to moderate 0 - none 1 - mild   Neck Pain 2 - mild to moderate 2 - mild to moderate 1 - mild   Trouble Sleeping 1 - mild 1 - mild 2 - mild to moderate   Total Number of Symptoms 10 7 8   Symptom  "Severity Score 16 11 12       Sleep: Difficulty falling asleep    Patient's past medical, surgical, social and family histories are reviewed today.    Past Medical History:   Diagnosis Date     Breast cancer (H)      Diffuse cystic mastopathy     Fibrocystic breast disease     External hemorrhoids without mention of complication      Unspecified hypothyroidism      Past Surgical History:   Procedure Laterality Date     BREAST BIOPSY, CORE RT/LT  7/8/2013     C NONSPECIFIC PROCEDURE  1993    Laparoscopy     C NONSPECIFIC PROCEDURE  1993    Outpt surgery for anal fissure     COLONOSCOPY N/A 12/1/2014    Procedure: COLONOSCOPY;  Surgeon: Gabriele Golden MD;  [facility]     LAPAROSCOPIC CHOLECYSTECTOMY N/A 5/24/2017    Procedure: LAPAROSCOPIC CHOLECYSTECTOMY;  Laparoscopic Cholecystectomy;  Surgeon: Jason Munoz MD;  Location[facility]     LUMPECTOMY BREAST WITH SENTINEL NODE, COMBINED  7/31/2013    Procedure: COMBINED LUMPECTOMY BREAST WITH SENTINEL NODE;  Left Breast Lumpectomy with [place] Node Biopsy;  Surgeon: Jason Munoz MD;  Location: [facility]       OBJECTIVE:  /67  Pulse 69  Ht 5' 7.5\" (1.715 m)  Wt 128 lb (58.1 kg)  BMI 19.75 kg/m2    General: Healthy, well-appearing, and in no acute distress.  Skin: no suspicious lesions or rashes  Psych: mentation appears normal, and affect is appropriate/bright  HEENT: Neck is supple with full ROM; no tenderness to palpation.  Neuromuscular/Strength: No motor weakness in C5-T1 distribution.    Neurologic/Visual:  PAULA: yes  EOMI: yes  Nystagmus: no  Painful eye movements: no    Coordination:       - Finger to Nose: normal       - Heel to Shin: normal       - Rapid Alternating Movements: normal    Cognitive:  Previous cognitive assessment was normal and without deficit; repeat cognitive testing not performed today.      Impact Testing Scores: ImPACT Testing not performed    ASSESSMENT:     Concussion with loss of consciousness of 30 minutes or less, " sequela (H)  PTSD (post-traumatic stress disorder)  Anxiety    PLAN:  -Continue counseling for PTSD, anxiety, and return to driving.    -Letter provided to continue full duties at work but from home.      Follow Up: 6 weeks or sooner if symptoms fail to improve or worsen. Call with any questions or concerns.     Time spent in one-on-one evaluation and discussion with patient regarding nature of problem, course, prior treatments, and therapeutic options, at least 50% of which was spent in counseling and coordination of care:  25 minutes.    Radha Macario MD, [facility]      Again, thank you for allowing me to participate in the care of your patient.        Sincerely,        Mendy Macario MD
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
## 2018-01-16 NOTE — PATIENT INSTRUCTIONS
Today's Plan of Care:  -Continue counseling  -Letter provided for work  -Send paperwork to [email or web address]    Follow Up: 6 weeks or sooner if symptoms fail to improve or worsen. Call with any questions or concerns.
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
## 2018-01-22 NOTE — ADDENDUM NOTE
Encounter addended by: Aura Ariza, SLP on: 1/22/2018  4:36 PM<BR>     Actions taken: Sign clinical note, Episode resolved

## 2018-01-22 NOTE — PROGRESS NOTES
Outpatient Speech Language Pathology Discharge Note     Patient: Emma Nunn  : 1965    Beginning/End Dates of Reporting Period:  08/10/2017 to 2017    Referring Provider: Mendy Macario MD    Therapy Diagnosis: Cognitive Deficits, Language Deficits, Deficits due to post concussion    Client Self Report: Emma reports that she has 2 sessions left  but has concerns regarding short term memory loss.  She reports that she is returning to work on a limited basis.       Objective Measurements: Goals:  Goal Identifier concussion symptom reduction   Goal Description Emma will complete moderately complex problem solving tasks for fifteen minutes independently and report no increase in concussion symptoms on a one to seven Likert scale in a structured clinical environment in order to improve her ability to complete tasks of daily living and occupational tasks with reduced symptoms.   Target Date 17   Date Met      Progress: Did not meet due to not returning to therapy.     Goal Identifier attention tasks   Goal Description Emma will complete moderately complex attention tasks independently and with 90% accuracy in a structured clinical setting for three consecutive therapy sessions in order to increase her ability to sustain attention and complete work tasks and tasks of daily living.   Target Date 17   Date Met      Progress: Did not meet due to not returning to therapy.     Goal Identifier compensatory strategies   Goal Description Emma will learn and use compensatory strategies to manage her negative concussion symptoms independently at home per verbal report to clinician for three consecutive therapy sessions in order to improve her ability to complete tasks of daily living at home.   Target Date 17   Date Met      Progress: Did not meet due to not returning to therapy.       Progress Toward Goals:   Progress this reporting period:      Plan:  Discharge from  therapy.    Discharge:    Reason for Discharge: Patient has failed to schedule further appointments.    Equipment Issued: no    Discharge Plan: Discharge from  at this time.

## 2018-01-25 ENCOUNTER — TELEPHONE (OUTPATIENT)
Dept: ORTHOPEDICS | Facility: CLINIC | Age: 53
End: 2018-01-25

## 2018-01-25 NOTE — TELEPHONE ENCOUNTER
Reason for Call:  Form, our goal is to have forms completed with 72 hours, however, some forms may require a visit or additional information.    Type of letter, form or note:  disability    Who is the form from?: Insurance comp    Where did the form come from: was brought in by family     What clinic location was the form placed at?: [place]    Where the form was placed: 's Box    What number is listed as a contact on the form?: fax [phone]- patient would like to  form as well.       Additional comments: none    Call taken on 1/25/2018 at 12:29 PM by Juliet Browning

## 2018-01-29 NOTE — TELEPHONE ENCOUNTER
Forms completed. Faxed a copy to [employer]. Also call and LVM stating that a copy was left for the patient at specialty care check in desk for .     Cynthia Montilla M.Ed., ATR, ATC

## 2018-02-08 NOTE — ADDENDUM NOTE
Encounter addended by: Sue Cabral OT on: 2/8/2018  7:33 AM<BR>     Actions taken: Episode resolved, Sign clinical note
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
## 2018-03-02 ENCOUNTER — TELEPHONE (OUTPATIENT)
Dept: FAMILY MEDICINE | Facility: CLINIC | Age: 53
End: 2018-03-02

## 2018-03-02 DIAGNOSIS — C50.412 MALIGNANT NEOPLASM OF UPPER-OUTER QUADRANT OF LEFT BREAST IN FEMALE, ESTROGEN RECEPTOR POSITIVE (H): ICD-10-CM

## 2018-03-02 DIAGNOSIS — E03.9 HYPOTHYROIDISM: Primary | ICD-10-CM

## 2018-03-02 DIAGNOSIS — Z17.0 MALIGNANT NEOPLASM OF UPPER-OUTER QUADRANT OF LEFT BREAST IN FEMALE, ESTROGEN RECEPTOR POSITIVE (H): ICD-10-CM

## 2018-03-02 LAB
ALBUMIN SERPL-MCNC: 3.8 G/DL (ref 3.4–5)
ALP SERPL-CCNC: 83 U/L (ref 40–150)
ALT SERPL W P-5'-P-CCNC: 37 U/L (ref 0–50)
ANION GAP SERPL CALCULATED.3IONS-SCNC: 7 MMOL/L (ref 3–14)
AST SERPL W P-5'-P-CCNC: 19 U/L (ref 0–45)
BASOPHILS # BLD AUTO: 0 10E9/L (ref 0–0.2)
BASOPHILS NFR BLD AUTO: 0.6 %
BILIRUB SERPL-MCNC: 0.4 MG/DL (ref 0.2–1.3)
BUN SERPL-MCNC: 21 MG/DL (ref 7–30)
CALCIUM SERPL-MCNC: 8.8 MG/DL (ref 8.5–10.1)
CHLORIDE SERPL-SCNC: 104 MMOL/L (ref 94–109)
CO2 SERPL-SCNC: 33 MMOL/L (ref 20–32)
CREAT SERPL-MCNC: 0.81 MG/DL (ref 0.52–1.04)
DIFFERENTIAL METHOD BLD: NORMAL
EOSINOPHIL # BLD AUTO: 0.1 10E9/L (ref 0–0.7)
EOSINOPHIL NFR BLD AUTO: 1.9 %
ERYTHROCYTE [DISTWIDTH] IN BLOOD BY AUTOMATED COUNT: 12.8 % (ref 10–15)
GFR SERPL CREATININE-BSD FRML MDRD: 74 ML/MIN/1.7M2
GLUCOSE SERPL-MCNC: 81 MG/DL (ref 70–99)
HCT VFR BLD AUTO: 43.2 % (ref 35–47)
HGB BLD-MCNC: 13.9 G/DL (ref 11.7–15.7)
IMM GRANULOCYTES # BLD: 0 10E9/L (ref 0–0.4)
IMM GRANULOCYTES NFR BLD: 0 %
LYMPHOCYTES # BLD AUTO: 2 10E9/L (ref 0.8–5.3)
LYMPHOCYTES NFR BLD AUTO: 28 %
MCH RBC QN AUTO: 31.7 PG (ref 26.5–33)
MCHC RBC AUTO-ENTMCNC: 32.2 G/DL (ref 31.5–36.5)
MCV RBC AUTO: 98 FL (ref 78–100)
MONOCYTES # BLD AUTO: 0.4 10E9/L (ref 0–1.3)
MONOCYTES NFR BLD AUTO: 6 %
NEUTROPHILS # BLD AUTO: 4.4 10E9/L (ref 1.6–8.3)
NEUTROPHILS NFR BLD AUTO: 63.5 %
PLATELET # BLD AUTO: 207 10E9/L (ref 150–450)
POTASSIUM SERPL-SCNC: 4.1 MMOL/L (ref 3.4–5.3)
PROT SERPL-MCNC: 7.2 G/DL (ref 6.8–8.8)
RBC # BLD AUTO: 4.39 10E12/L (ref 3.8–5.2)
SODIUM SERPL-SCNC: 144 MMOL/L (ref 133–144)
WBC # BLD AUTO: 7 10E9/L (ref 4–11)

## 2018-03-02 PROCEDURE — 84439 ASSAY OF FREE THYROXINE: CPT | Performed by: FAMILY MEDICINE

## 2018-03-02 PROCEDURE — 85025 COMPLETE CBC W/AUTO DIFF WBC: CPT | Performed by: INTERNAL MEDICINE

## 2018-03-02 PROCEDURE — 80053 COMPREHEN METABOLIC PANEL: CPT | Performed by: INTERNAL MEDICINE

## 2018-03-02 PROCEDURE — 36415 COLL VENOUS BLD VENIPUNCTURE: CPT | Performed by: INTERNAL MEDICINE

## 2018-03-02 PROCEDURE — 84443 ASSAY THYROID STIM HORMONE: CPT | Performed by: FAMILY MEDICINE

## 2018-03-02 PROCEDURE — 86300 IMMUNOASSAY TUMOR CA 15-3: CPT | Performed by: INTERNAL MEDICINE

## 2018-03-03 LAB — CANCER AG27-29 SERPL-ACNC: 21 U/ML (ref 0–39)

## 2018-03-05 DIAGNOSIS — C50.412 MALIGNANT NEOPLASM OF UPPER-OUTER QUADRANT OF LEFT BREAST IN FEMALE, ESTROGEN RECEPTOR POSITIVE (H): ICD-10-CM

## 2018-03-05 DIAGNOSIS — Z17.0 MALIGNANT NEOPLASM OF UPPER-OUTER QUADRANT OF LEFT BREAST IN FEMALE, ESTROGEN RECEPTOR POSITIVE (H): ICD-10-CM

## 2018-03-06 ENCOUNTER — OFFICE VISIT (OUTPATIENT)
Dept: ORTHOPEDICS | Facility: CLINIC | Age: 53
End: 2018-03-06
Payer: COMMERCIAL

## 2018-03-06 ENCOUNTER — ONCOLOGY VISIT (OUTPATIENT)
Dept: ONCOLOGY | Facility: CLINIC | Age: 53
End: 2018-03-06
Payer: COMMERCIAL

## 2018-03-06 VITALS
HEART RATE: 70 BPM | DIASTOLIC BLOOD PRESSURE: 67 MMHG | SYSTOLIC BLOOD PRESSURE: 103 MMHG | WEIGHT: 128 LBS | BODY MASS INDEX: 19.4 KG/M2 | HEIGHT: 68 IN

## 2018-03-06 VITALS
OXYGEN SATURATION: 99 % | WEIGHT: 130.8 LBS | TEMPERATURE: 97.6 F | SYSTOLIC BLOOD PRESSURE: 95 MMHG | HEART RATE: 71 BPM | BODY MASS INDEX: 19.82 KG/M2 | RESPIRATION RATE: 16 BRPM | DIASTOLIC BLOOD PRESSURE: 63 MMHG | HEIGHT: 68 IN

## 2018-03-06 DIAGNOSIS — E03.9 HYPOTHYROIDISM, UNSPECIFIED TYPE: ICD-10-CM

## 2018-03-06 DIAGNOSIS — S06.0X1S CONCUSSION WITH LOSS OF CONSCIOUSNESS OF 30 MINUTES OR LESS, SEQUELA (H): Primary | ICD-10-CM

## 2018-03-06 DIAGNOSIS — C50.412 MALIGNANT NEOPLASM OF UPPER-OUTER QUADRANT OF LEFT BREAST IN FEMALE, ESTROGEN RECEPTOR POSITIVE (H): Primary | ICD-10-CM

## 2018-03-06 DIAGNOSIS — G89.3 CANCER ASSOCIATED PAIN: ICD-10-CM

## 2018-03-06 DIAGNOSIS — Z17.0 MALIGNANT NEOPLASM OF UPPER-OUTER QUADRANT OF LEFT BREAST IN FEMALE, ESTROGEN RECEPTOR POSITIVE (H): Primary | ICD-10-CM

## 2018-03-06 DIAGNOSIS — F41.9 ANXIETY: ICD-10-CM

## 2018-03-06 DIAGNOSIS — F43.10 PTSD (POST-TRAUMATIC STRESS DISORDER): ICD-10-CM

## 2018-03-06 DIAGNOSIS — E03.9 HYPOTHYROIDISM: ICD-10-CM

## 2018-03-06 LAB
T4 FREE SERPL-MCNC: 1.23 NG/DL (ref 0.76–1.46)
TSH SERPL DL<=0.005 MIU/L-ACNC: 3.06 MU/L (ref 0.4–4)

## 2018-03-06 PROCEDURE — 99214 OFFICE O/P EST MOD 30 MIN: CPT | Performed by: INTERNAL MEDICINE

## 2018-03-06 PROCEDURE — 99214 OFFICE O/P EST MOD 30 MIN: CPT | Performed by: PHYSICAL MEDICINE & REHABILITATION

## 2018-03-06 RX ORDER — TAMOXIFEN CITRATE 20 MG/1
20 TABLET ORAL DAILY
Qty: 90 TABLET | Refills: 1 | Status: SHIPPED | OUTPATIENT
Start: 2018-03-06 | End: 2018-03-06

## 2018-03-06 RX ORDER — TAMOXIFEN CITRATE 20 MG/1
20 TABLET ORAL DAILY
Qty: 90 TABLET | Refills: 1 | Status: SHIPPED | OUTPATIENT
Start: 2018-03-06 | End: 2019-04-16

## 2018-03-06 ASSESSMENT — PAIN SCALES - GENERAL: PAINLEVEL: NO PAIN (0)

## 2018-03-06 NOTE — PROGRESS NOTES
Hematology/ Oncology Follow-up Visit:  Mar 6, 2018    Reason for Visit:   Chief Complaint   Patient presents with     Oncology Clinic Visit     6 month follow up for Malignant neoplasm of upper-outer quadrant of left breast in female, estrogen receptor positive     Results     Lab 3/2/2018       Oncologic History:  Malignant neoplasm of female breast (ALYCE Nunn was diagnosed at age 47, premenopausally through self-breast check, felt a lump in her left outer upper quadrant. Diagnostic mammogram early 07/2013 identified asymmetrical density associated with palpable findings around 1-2 o'clock position, 8-10 cm from the nipple. Limited sonogram revealed hypoechoic mass with irregular borders, measured about 0.8 cm trocar position. Sonogram-guided biopsy indicating invasive ductal cancer, grade 2, with associated DCIS, ER/[place] 90% positive, HER-2/sandy positive, FISH ratio greater than 11.7. she had lumpectomy 7/2013 - 1.3 cm LAURA, GII, + ALI, margin is close <=1 mm, +DCIS, 8LN negative.  She has [id number] stage I disease. TCH was recommended from 8//15/2013 to 12/2013 and herceptin after. . She finished RT 2/28/2014 and tamoxifen started in 3/2014.      Interval History:  Patient is here today for follow-up. She has been feeling well without any recent complaints.  She denies any nausea vomiting or diarrhea.  She denies an shortness of breath or cough or wheezing.  Most recent mammogram was October 2016.  Patient continues on adjuvant endocrine therapy with tamoxifen.  She has been tolerating tamoxifen without significant side effects.        Review Of Systems:  Constitutional: Negative for fever, chills, and night sweats.  Skin: negative.  Eyes: negative.  Ears/Nose/Throat: negative.  Respiratory: No shortness of breath, dyspnea on exertion, cough, or hemoptysis.  Cardiovascular: negative.  Gastrointestinal: negative.  Genitourinary: negative.  Musculoskeletal: negative.  Neurologic: negative.  Psychiatric:  "negative.  Hematologic/Lymphatic/Immunologic: negative.  Endocrine: negative.    All other ROS negative unless mentioned in interval history.    Past medical, social, surgical, and family histories reviewed.    Allergies:  Allergies as of 03/06/2018 - Ray as Reviewed 03/06/2018   Allergen Reaction Noted     No known drug allergies  04/02/2001       Current Medications:  Current Outpatient Prescriptions   Medication Sig Dispense Refill     tamoxifen (NOLVADEX) 20 MG tablet Take 1 tablet (20 mg) by mouth daily 90 tablet 1     levothyroxine (SYNTHROID/LEVOTHROID) 88 MCG tablet Take 1 tablet (88 mcg) by mouth daily 90 tablet 3     IBUPROFEN PO Take 600 mg by mouth every 6 hours as needed for moderate pain       [DISCONTINUED] tamoxifen (NOLVADEX) 20 MG tablet Take 1 tablet (20 mg) by mouth daily 90 tablet 1        Physical Exam:  BP 95/63 (BP Location: Right arm, Patient Position: Chair, Cuff Size: Adult Regular)  Pulse 71  Temp 97.6  F (36.4  C) (Temporal)  Resp 16  Ht 1.715 m (5' 7.5\")  Wt 59.3 kg (130 lb 12.8 oz)  SpO2 99%  BMI 20.18 kg/m2  Wt Readings from Last 12 Encounters:   03/06/18 59.3 kg (130 lb 12.8 oz)   03/06/18 58.1 kg (128 lb)   01/16/18 58.1 kg (128 lb)   12/05/17 56.7 kg (125 lb)   10/24/17 56.7 kg (125 lb)   10/03/17 56.7 kg (125 lb)   09/12/17 56.7 kg (125 lb)   08/22/17 59.7 kg (131 lb 9.6 oz)   08/15/17 56.7 kg (125 lb)   07/25/17 56.7 kg (125 lb)   07/10/17 56.7 kg (125 lb)   06/19/17 57.6 kg (127 lb)     ECOG performance status: 0  GENERAL APPEARANCE: Healthy, alert and in no acute distress.  HEENT: Sclerae anicteric. PERRLA. Oropharynx without ulcers, lesions, or thrush.  NECK: Supple. No asymmetry or masses.  LYMPHATICS: No palpable cervical, supraclavicular, axillary, or inguinal lymphadenopathy.  RESP: Lungs clear to auscultation bilaterally without rales, rhonchi or wheezes.  CARDIOVASCULAR: Regular rate and rhythm. Normal S1, S2; no S3 or S4. No murmur, gallop, or rub.  BRSEAT: " There is no dominant masses or axillary adenopathy on the right breast.  Left breast there is a scar of lumpectomy is associated tender area.  ABDOMEN: Soft, nontender. Bowel sounds normal. No palpable organomegaly or masses.  MUSCULOSKELETAL: Extremities without gross deformities noted. No edema of bilateral lower extremities.  SKIN: No suspicious lesions or rashes.  NEURO: Alert and oriented x 3. Cranial nerves II-XII grossly intact.  PSYCHIATRIC: Mentation and affect appear normal.    Laboratory/Imaging Studies:  Orders Only on 03/02/2018   Component Date Value Ref Range Status     WBC 03/02/2018 7.0  4.0 - 11.0 10e9/L Final     RBC Count 03/02/2018 4.39  3.8 - 5.2 10e12/L Final     Hemoglobin 03/02/2018 13.9  11.7 - 15.7 g/dL Final     Hematocrit 03/02/2018 43.2  35.0 - 47.0 % Final     MCV 03/02/2018 98  78 - 100 fl Final     MCH 03/02/2018 31.7  26.5 - 33.0 pg Final     MCHC 03/02/2018 32.2  31.5 - 36.5 g/dL Final     RDW 03/02/2018 12.8  10.0 - 15.0 % Final     Platelet Count 03/02/2018 207  150 - 450 10e9/L Final     Diff Method 03/02/2018 Automated Method   Final     % Neutrophils 03/02/2018 63.5  % Final     % Lymphocytes 03/02/2018 28.0  % Final     % Monocytes 03/02/2018 6.0  % Final     % Eosinophils 03/02/2018 1.9  % Final     % Basophils 03/02/2018 0.6  % Final     % Immature Granulocytes 03/02/2018 0.0  % Final     Absolute Neutrophil 03/02/2018 4.4  1.6 - 8.3 10e9/L Final     Absolute Lymphocytes 03/02/2018 2.0  0.8 - 5.3 10e9/L Final     Absolute Monocytes 03/02/2018 0.4  0.0 - 1.3 10e9/L Final     Absolute Eosinophils 03/02/2018 0.1  0.0 - 0.7 10e9/L Final     Absolute Basophils 03/02/2018 0.0  0.0 - 0.2 10e9/L Final     Abs Immature Granulocytes 03/02/2018 0.0  0 - 0.4 10e9/L Final     Sodium 03/02/2018 144  133 - 144 mmol/L Final     Potassium 03/02/2018 4.1  3.4 - 5.3 mmol/L Final     Chloride 03/02/2018 104  94 - 109 mmol/L Final     Carbon Dioxide 03/02/2018 33* 20 - 32 mmol/L Final     Anion  Gap 03/02/2018 7  3 - 14 mmol/L Final     Glucose 03/02/2018 81  70 - 99 mg/dL Final     Urea Nitrogen 03/02/2018 21  7 - 30 mg/dL Final     Creatinine 03/02/2018 0.81  0.52 - 1.04 mg/dL Final     GFR Estimate 03/02/2018 74  >60 mL/min/1.7m2 Final    Non  GFR Calc     GFR Estimate If Black 03/02/2018 90  >60 mL/min/1.7m2 Final    African American GFR Calc     Calcium 03/02/2018 8.8  8.5 - 10.1 mg/dL Final     Bilirubin Total 03/02/2018 0.4  0.2 - 1.3 mg/dL Final     Albumin 03/02/2018 3.8  3.4 - 5.0 g/dL Final     Protein Total 03/02/2018 7.2  6.8 - 8.8 g/dL Final     Alkaline Phosphatase 03/02/2018 83  40 - 150 U/L Final     ALT 03/02/2018 37  0 - 50 U/L Final     AST 03/02/2018 19  0 - 45 U/L Final     CA 27-29 03/02/2018 21  0 - 39 U/mL Final    Assay Method:  Chemiluminescence using Siemens Centaur XP          Assessment and plan:    (C50.412,  Z17.0) Malignant neoplasm of upper-outer quadrant of left breast in female, estrogen receptor positive (H)  (primary encounter diagnosis)   I reviewed with patient most recent laboratory tests. There is no clinical evidence of breast cancer recurrence.  I would recommend to arrange for his annual mammography.  We will arrange for an ultrasound at the area of the lumpectomy scar.  We will continue follow-up visits every 6 months.  The patient will continue on adjuvant endocrine therapy with tamoxifen.    (E03.9) Hypothyroidism  Patient currently on Synthroid 88 mcg daily.    The patient is ready to learn, no apparent learning barriers were identified.  Diagnosis and treatment plans were explained to the patient. The patient expressed understanding of the content. The patient asked appropriate questions. The patient questions were answered to her satisfaction.    Chart documentation with Dragon Voice recognition Software. Although reviewed after completion, some words and grammatical errors may remain.

## 2018-03-06 NOTE — LETTER
3/6/2018         RE: Emma Nunn  [address] [facility] [id number]        Dear Colleague,    Thank you for referring your patient, Emma Nunn, to the [facility]. Please see a copy of my visit note below.    Hematology/ Oncology Follow-up Visit:  Mar 6, 2018    Reason for Visit:   Chief Complaint   Patient presents with     Oncology Clinic Visit     6 month follow up for Malignant neoplasm of upper-outer quadrant of left breast in female, estrogen receptor positive     Results     Lab 3/2/2018       Oncologic History:  Malignant neoplasm of female breast (H)  Emma Nunn was diagnosed at age 47, premenopausally through self-breast check, felt a lump in her left outer upper quadrant. Diagnostic mammogram early 07/2013 identified asymmetrical density associated with palpable findings around 1-2 o'clock position, 8-10 cm from the nipple. Limited sonogram revealed hypoechoic mass with irregular borders, measured about 0.8 cm trocar position. Sonogram-guided biopsy indicating invasive ductal cancer, grade 2, with associated DCIS, ER/[place] 90% positive, HER-2/sandy positive, FISH ratio greater than 11.7. she had lumpectomy 7/2013 - 1.3 cm LAURA, GII, + ALI, margin is close <=1 mm, +DCIS, 8LN negative.  She has [id number] stage I disease. TCH was recommended from 8//15/2013 to 12/2013 and herceptin after. . She finished RT 2/28/2014 and tamoxifen started in 3/2014.      Interval History:  Patient is here today for follow-up. She has been feeling well without any recent complaints.  She denies any nausea vomiting or diarrhea.  She denies an shortness of breath or cough or wheezing.  Most recent mammogram was October 2016.  Patient continues on adjuvant endocrine therapy with tamoxifen.  She has been tolerating tamoxifen without significant side effects.        Review Of Systems:  Constitutional: Negative for fever, chills, and night sweats.  Skin: negative.  Eyes: negative.  Ears/Nose/Throat:  "negative.  Respiratory: No shortness of breath, dyspnea on exertion, cough, or hemoptysis.  Cardiovascular: negative.  Gastrointestinal: negative.  Genitourinary: negative.  Musculoskeletal: negative.  Neurologic: negative.  Psychiatric: negative.  Hematologic/Lymphatic/Immunologic: negative.  Endocrine: negative.    All other ROS negative unless mentioned in interval history.    Past medical, social, surgical, and family histories reviewed.    Allergies:  Allergies as of 03/06/2018 - Ray as Reviewed 03/06/2018   Allergen Reaction Noted     No known drug allergies  04/02/2001       Current Medications:  Current Outpatient Prescriptions   Medication Sig Dispense Refill     tamoxifen (NOLVADEX) 20 MG tablet Take 1 tablet (20 mg) by mouth daily 90 tablet 1     levothyroxine (SYNTHROID/LEVOTHROID) 88 MCG tablet Take 1 tablet (88 mcg) by mouth daily 90 tablet 3     IBUPROFEN PO Take 600 mg by mouth every 6 hours as needed for moderate pain       [DISCONTINUED] tamoxifen (NOLVADEX) 20 MG tablet Take 1 tablet (20 mg) by mouth daily 90 tablet 1        Physical Exam:  BP 95/63 (BP Location: Right arm, Patient Position: Chair, Cuff Size: Adult Regular)  Pulse 71  Temp 97.6  F (36.4  C) (Temporal)  Resp 16  Ht 1.715 m (5' 7.5\")  Wt 59.3 kg (130 lb 12.8 oz)  SpO2 99%  BMI 20.18 kg/m2  Wt Readings from Last 12 Encounters:   03/06/18 59.3 kg (130 lb 12.8 oz)   03/06/18 58.1 kg (128 lb)   01/16/18 58.1 kg (128 lb)   12/05/17 56.7 kg (125 lb)   10/24/17 56.7 kg (125 lb)   10/03/17 56.7 kg (125 lb)   09/12/17 56.7 kg (125 lb)   08/22/17 59.7 kg (131 lb 9.6 oz)   08/15/17 56.7 kg (125 lb)   07/25/17 56.7 kg (125 lb)   07/10/17 56.7 kg (125 lb)   06/19/17 57.6 kg (127 lb)     ECOG performance status: 0  GENERAL APPEARANCE: Healthy, alert and in no acute distress.  HEENT: Sclerae anicteric. PERRLA. Oropharynx without ulcers, lesions, or thrush.  NECK: Supple. No asymmetry or masses.  LYMPHATICS: No palpable cervical, " supraclavicular, axillary, or inguinal lymphadenopathy.  RESP: Lungs clear to auscultation bilaterally without rales, rhonchi or wheezes.  CARDIOVASCULAR: Regular rate and rhythm. Normal S1, S2; no S3 or S4. No murmur, gallop, or rub.  BRSEAT: There is no dominant masses or axillary adenopathy on the right breast.  Left breast there is a scar of lumpectomy is associated tender area.  ABDOMEN: Soft, nontender. Bowel sounds normal. No palpable organomegaly or masses.  MUSCULOSKELETAL: Extremities without gross deformities noted. No edema of bilateral lower extremities.  SKIN: No suspicious lesions or rashes.  NEURO: Alert and oriented x 3. Cranial nerves II-XII grossly intact.  PSYCHIATRIC: Mentation and affect appear normal.    Laboratory/Imaging Studies:  Orders Only on 03/02/2018   Component Date Value Ref Range Status     WBC 03/02/2018 7.0  4.0 - 11.0 10e9/L Final     RBC Count 03/02/2018 4.39  3.8 - 5.2 10e12/L Final     Hemoglobin 03/02/2018 13.9  11.7 - 15.7 g/dL Final     Hematocrit 03/02/2018 43.2  35.0 - 47.0 % Final     MCV 03/02/2018 98  78 - 100 fl Final     MCH 03/02/2018 31.7  26.5 - 33.0 pg Final     MCHC 03/02/2018 32.2  31.5 - 36.5 g/dL Final     RDW 03/02/2018 12.8  10.0 - 15.0 % Final     Platelet Count 03/02/2018 207  150 - 450 10e9/L Final     Diff Method 03/02/2018 Automated Method   Final     % Neutrophils 03/02/2018 63.5  % Final     % Lymphocytes 03/02/2018 28.0  % Final     % Monocytes 03/02/2018 6.0  % Final     % Eosinophils 03/02/2018 1.9  % Final     % Basophils 03/02/2018 0.6  % Final     % Immature Granulocytes 03/02/2018 0.0  % Final     Absolute Neutrophil 03/02/2018 4.4  1.6 - 8.3 10e9/L Final     Absolute Lymphocytes 03/02/2018 2.0  0.8 - 5.3 10e9/L Final     Absolute Monocytes 03/02/2018 0.4  0.0 - 1.3 10e9/L Final     Absolute Eosinophils 03/02/2018 0.1  0.0 - 0.7 10e9/L Final     Absolute Basophils 03/02/2018 0.0  0.0 - 0.2 10e9/L Final     Abs Immature Granulocytes 03/02/2018  0.0  0 - 0.4 10e9/L Final     Sodium 03/02/2018 144  133 - 144 mmol/L Final     Potassium 03/02/2018 4.1  3.4 - 5.3 mmol/L Final     Chloride 03/02/2018 104  94 - 109 mmol/L Final     Carbon Dioxide 03/02/2018 33* 20 - 32 mmol/L Final     Anion Gap 03/02/2018 7  3 - 14 mmol/L Final     Glucose 03/02/2018 81  70 - 99 mg/dL Final     Urea Nitrogen 03/02/2018 21  7 - 30 mg/dL Final     Creatinine 03/02/2018 0.81  0.52 - 1.04 mg/dL Final     GFR Estimate 03/02/2018 74  >60 mL/min/1.7m2 Final    Non  GFR Calc     GFR Estimate If Black 03/02/2018 90  >60 mL/min/1.7m2 Final    African American GFR Calc     Calcium 03/02/2018 8.8  8.5 - 10.1 mg/dL Final     Bilirubin Total 03/02/2018 0.4  0.2 - 1.3 mg/dL Final     Albumin 03/02/2018 3.8  3.4 - 5.0 g/dL Final     Protein Total 03/02/2018 7.2  6.8 - 8.8 g/dL Final     Alkaline Phosphatase 03/02/2018 83  40 - 150 U/L Final     ALT 03/02/2018 37  0 - 50 U/L Final     AST 03/02/2018 19  0 - 45 U/L Final     CA 27-29 03/02/2018 21  0 - 39 U/mL Final    Assay Method:  Chemiluminescence using Siemens Centaur XP          Assessment and plan:    (C50.412,  Z17.0) Malignant neoplasm of upper-outer quadrant of left breast in female, estrogen receptor positive (H)  (primary encounter diagnosis)   I reviewed with patient most recent laboratory tests. There is no clinical evidence of breast cancer recurrence.  I would recommend to arrange for his annual mammography.  We will arrange for an ultrasound at the area of the lumpectomy scar.  We will continue follow-up visits every 6 months.  The patient will continue on adjuvant endocrine therapy with tamoxifen.    (E03.9) Hypothyroidism  Patient currently on Synthroid 88 mcg daily.    The patient is ready to learn, no apparent learning barriers were identified.  Diagnosis and treatment plans were explained to the patient. The patient expressed understanding of the content. The patient asked appropriate questions. The patient  questions were answered to her satisfaction.    Chart documentation with Dragon Voice recognition Software. Although reviewed after completion, some words and grammatical errors may remain.    Again, thank you for allowing me to participate in the care of your patient.        Sincerely,        Richard Ocampo MD

## 2018-03-06 NOTE — NURSING NOTE
"Oncology Rooming Note    March 6, 2018 2:57 PM   Emma Nunn is a 52 year old female who presents for:    Chief Complaint   Patient presents with     Oncology Clinic Visit     6 month follow up for Malignant neoplasm of upper-outer quadrant of left breast in female, estrogen receptor positive     Results     Lab 3/2/2018     Initial Vitals: BP 95/63 (BP Location: Right arm, Patient Position: Chair, Cuff Size: Adult Regular)  Pulse 71  Temp 97.6  F (36.4  C) (Temporal)  Resp 16  Ht 1.715 m (5' 7.5\")  Wt 59.3 kg (130 lb 12.8 oz)  SpO2 99%  BMI 20.18 kg/m2 Estimated body mass index is 20.18 kg/(m^2) as calculated from the following:    Height as of this encounter: 1.715 m (5' 7.5\").    Weight as of this encounter: 59.3 kg (130 lb 12.8 oz). Body surface area is 1.68 meters squared.  No Pain (0) Comment: Data Unavailable   No LMP recorded. Patient is not currently having periods (Reason: Chemotherapy).  Allergies reviewed: Yes  Medications reviewed: Yes    Medications: MEDICATION REFILLS NEEDED TODAY. Provider was notified.  Pharmacy name entered into EPIC:    [facility] - [facility], [place] - [address] [facility] [address]     Clinical concerns: None. Dr. Ocampo was notified.    Yen Buckley MA              "

## 2018-03-06 NOTE — MR AVS SNAPSHOT
After Visit Summary   3/6/2018    Emma Nunn    MRN: [id number]           Patient Information     Date Of Birth          1965        Visit Information        Provider Department      3/6/2018 3:00 PM Richard Ocampo MD [facility]        Today's Diagnoses     Malignant neoplasm of upper-outer quadrant of left breast in female, estrogen receptor positive (H)    -  1    Hypothyroidism, unspecified type        Cancer associated pain          Care Instructions      Please follow up with Dr. Ocampo in 6 months.  Please schedule labs 2-5 prior to follow up appointment.  Mammogram with Ultrasound now.  Oncology will call with results.    Mammogram with Ultrasound Date/Time:  3/15/18 at 11:00 - check in 10:45  No deodorant, creams, lotions, or powders.     Lab Date/Time:    Follow Up Date/Time:     If you have any questions or concerns please feel free to call.    If you need to reschedule please call:  Clinic or Lab Appointment - [phone]  Infusion - [phone]  Imaging - [phone]    Fortino Garcia RN, BSN, OCN   Oncology Care Coordinator RN  [facility]  [phone]              Follow-ups after your visit        Follow-up notes from your care team     Return in about 6 months (around 9/6/2018) for Imaging ordered before next appointment.      Your next 10 appointments already scheduled     Mar 15, 2018 11:00 AM CDT   (Arrive by 10:45 AM)   MA DIAGNOSTIC DIGITAL BILATERAL with PHMA1, PH RAD   [facility] ([facility])    [address] [place] [address] [id number] [phone]           Do not use any powder, lotion or deodorant under your arms or on your breast. If you do, we will ask you to remove it before your exam.  Wear comfortable, two-piece clothing.  If you have any allergies, tell your care team.  Bring any previous mammograms from other facilities or have them mailed to the breast center.  Three-dimensional (3D)  "mammograms are available at [facility] locations in [place], [place], [place], [place], [facility], [place], [place], and [place]. -Health locations include [place] and [facility] in [place]. Benefits of 3D mammograms include: - Improved rate of cancer detection - Decreases your chance of having to go back for more tests, which means fewer: - \"False-positive\" results (This means that there is an abnormal area but it isn't cancer.) - Invasive testing procedures, such as a biopsy or surgery - Can provide clearer images of the breast if you have dense breast tissue. 3D mammography is an optional exam that anyone can have with a 2D mammogram. It doesn't replace or take the place of a 2D mammogram. 2D mammograms remain an effective screening test for all women.  Not all insurance companies cover the cost of a 3D mammogram. Check with your insurance.            May 08, 2018  2:20 PM CDT   [facility] Conc[facility] with [facility]   [facility]    [address] [place] [address] [phone]              Future tests that were ordered for you today     Open Future Orders        Priority Expected Expires Ordered    MA Diagnostic Digital Bilateral Routine  9/6/2018 3/6/2018    Ca27.29  breast tumor marker Routine 9/6/2018 3/6/2019 3/6/2018    CBC with platelets differential Routine 9/6/2018 3/6/2019 3/6/2018    Comprehensive metabolic panel Routine 9/6/2018 3/6/2019 3/6/2018    US Breast Left Limited 1-3 Quadrants Routine  4/20/2018 3/6/2018            Who to contact     If you have questions or need follow up information about today's clinic visit or your schedule please contact [facility] directly at [phone].  Normal or non-critical lab and imaging results will be communicated to you by MyChart, letter or phone within 4 business days after the clinic has received the results. If you do not hear from us " "within 7 days, please contact the clinic through [employer] or phone. If you have a critical or abnormal lab result, we will notify you by phone as soon as possible.  Submit refill requests through [employer] or call your pharmacy and they will forward the refill request to us. Please allow 3 business days for your refill to be completed.          Additional Information About Your Visit        [employer] Information     [employer] lets you send messages to your doctor, view your test results, renew your prescriptions, schedule appointments and more. To sign up, go to [email or web address] . Click on \"Log in\" on the left side of the screen, which will take you to the Welcome page. Then click on \"Sign up Now\" on the right side of the page.     You will be asked to enter the access code listed below, as well as some personal information. Please follow the directions to create your username and password.     Your access code is: [email or web address]  Expires: 2018  2:49 PM     Your access code will  in 90 days. If you need help or a new code, please call your [facility] clinic or [phone].        Care EveryWhere ID     This is your Care EveryWhere ID. This could be used by other organizations to access your [facility] medical records  [id number]        Your Vitals Were     Pulse Temperature Respirations Height Pulse Oximetry BMI (Body Mass Index)    71 97.6  F (36.4  C) (Temporal) 16 1.715 m (5' 7.5\") 99% 20.18 kg/m2       Blood Pressure from Last 3 Encounters:   18 95/63   18 103/67   18 102/67    Weight from Last 3 Encounters:   18 59.3 kg (130 lb 12.8 oz)   18 58.1 kg (128 lb)   18 58.1 kg (128 lb)                 Where to get your medicines      These medications were sent to [facility] - [address]  [address] [address]     Phone:  [phone]     tamoxifen 20 MG tablet          Primary Care Provider Office Phone # Fax #    Zoltan " ADELFO Tolentino -[phone]       [facility] [address] [place] [address]        Equal Access to Services     REYNA SINGH : Hadii aad ku hadtoluorlin Ramonateo, wanathanaelda fionaqkristalha, qamaurata kaalmada afsaneh, danay jolantain hayaaadriana yeeseng alcantar daly irwin. So [facility] [phone].    ATENCIÓN: Si habla español, tiene a jang disposición servicios gratuitos de asistencia lingüística. Llame al [phone].    We comply with applicable federal civil rights laws and [place] laws. We do not discriminate on the basis of race, color, national origin, age, disability, sex, sexual orientation, or gender identity.            Thank you!     Thank you for choosing [facility]  for your care. Our goal is always to provide you with excellent care. Hearing back from our patients is one way we can continue to improve our services. Please take a few minutes to complete the written survey that you may receive in the mail after your visit with us. Thank you!             Your Updated Medication List - Protect others around you: Learn how to safely use, store and throw away your medicines at www.disposemymeds.org.          This list is accurate as of 3/6/18  3:28 PM.  Always use your most recent med list.                   Brand Name Dispense Instructions for use Diagnosis    IBUPROFEN PO      Take 600 mg by mouth every 6 hours as needed for moderate pain        levothyroxine 88 MCG tablet    SYNTHROID/LEVOTHROID    90 tablet    Take 1 tablet (88 mcg) by mouth daily    Malignant neoplasm of upper-outer quadrant of left breast in female, estrogen receptor positive (H)       tamoxifen 20 MG tablet    NOLVADEX    90 tablet    Take 1 tablet (20 mg) by mouth daily    Malignant neoplasm of upper-outer quadrant of left breast in female, estrogen receptor positive (H)

## 2018-03-06 NOTE — PATIENT INSTRUCTIONS
Please follow up with Dr. Ocampo in 6 months.  Please schedule labs 2-5 prior to follow up appointment.  Mammogram with Ultrasound now.  Oncology will call with results.    Mammogram with Ultrasound Date/Time:  3/15/18 at 11:00 - check in 10:45  No deodorant, creams, lotions, or powders.     Lab Date/Time:    Follow Up Date/Time:     If you have any questions or concerns please feel free to call.    If you need to reschedule please call:  Clinic or Lab Appointment - [phone]  Infusion - [phone]  Imaging - [phone]    Fortino Garcia, RN, BSN, OCN   Oncology Care Coordinator RN  [facility]  [phone]

## 2018-03-06 NOTE — ASSESSMENT & PLAN NOTE
Emma Nunn was diagnosed at age 47, premenopausally through self-breast check, felt a lump in her left outer upper quadrant. Diagnostic mammogram early 07/2013 identified asymmetrical density associated with palpable findings around 1-2 o'clock position, 8-10 cm from the nipple. Limited sonogram revealed hypoechoic mass with irregular borders, measured about 0.8 cm trocar position. Sonogram-guided biopsy indicating invasive ductal cancer, grade 2, with associated DCIS, ER/VA 90% positive, HER-2/sandy positive, FISH ratio greater than 11.7. she had lumpectomy 7/2013 - 1.3 cm LAURA, GII, + ALI, margin is close <=1 mm, +DCIS, 8LN negative.  She has L0fG3J3 stage I disease. TCH was recommended from 8//15/2013 to 12/2013 and herceptin after. . She finished RT 2/28/2014 and tamoxifen started in 3/2014.

## 2018-03-06 NOTE — PROGRESS NOTES
Sports Medicine Clinic Report:    CC: Head Injury    Emma Nunn is a 52 year old female who presents in follow up for a concussion that occurred on 5/2/2017.  Since last visit on 1/16/2018 patient notes that she is doing well.  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     Since your last visit, level of activity is:  Walking 5 miles per day     Since your last visit, have you continued with your normal cognitive activity (text, computer, school):  Doing well with work. No cognitive issues.       Current Symptoms:  CONCUSSION SYMPTOMS ASSESSMENT 12/5/2017 1/16/2018 3/6/2018   Headache or Pressure In Head 2 - mild to moderate 2 - mild to moderate 2 - mild to moderate   Upset Stomach or Throwing Up 0 - none 0 - none 0 - none   Problems with Balance 0 - none 0 - none 0 - none   Feeling Dizzy 0 - none 0 - none 0 - none   Sensitivity to Light 2 - mild to moderate 2 - mild to moderate 2 - mild to moderate   Sensitivity to Noise 2 - mild to moderate 2 - mild to moderate 2 - mild to moderate   Mood Changes 1 - mild 1 - mild 2 - mild to moderate   Feeling sluggish, hazy, or foggy 0 - none 0 - none 0 - none   Trouble Concentrating, Lack of Focus 0 - none 1 - mild 1 - mild   Motion Sickness 0 - none 0 - none 0 - none   Vision Changes 1 - mild 0 - none 0 - none   Memory Problems 0 - none 0 - none 0 - none   Feeling Confused 0 - none 1 - mild 0 - none   Neck Pain 2 - mild to moderate 1 - mild 1 - mild   Trouble Sleeping 1 - mild 2 - mild to moderate 1 - mild   Total Number of Symptoms 7 8 7   Symptom Severity Score 11 12 11       Sleep:  Difficulty falling asleep    Patient's past medical, surgical, social and family histories are reviewed today.    Past Medical History:   Diagnosis Date     Breast cancer (H)      Diffuse cystic mastopathy     Fibrocystic breast disease     External hemorrhoids without mention of complication      Unspecified hypothyroidism      Past Surgical History:   Procedure  "Laterality Date     BREAST BIOPSY, CORE RT/LT  7/8/2013     C NONSPECIFIC PROCEDURE  1993    Laparoscopy     C NONSPECIFIC PROCEDURE  1993    Outpt surgery for anal fissure     COLONOSCOPY N/A 12/1/2014    Procedure: COLONOSCOPY;  Surgeon: Gabriele Golden MD;  [facility]     LAPAROSCOPIC CHOLECYSTECTOMY N/A 5/24/2017    Procedure: LAPAROSCOPIC CHOLECYSTECTOMY;  Laparoscopic Cholecystectomy;  Surgeon: Jason Munoz MD;  [facility]     LUMPECTOMY BREAST WITH SENTINEL NODE, COMBINED  7/31/2013    Procedure: COMBINED LUMPECTOMY BREAST WITH SENTINEL NODE;  Left Breast Lumpectomy with [place] Node Biopsy;  Surgeon: aJson Munoz MD;  Location[facility]       OBJECTIVE:  /67  Pulse 70  Ht 5' 7.5\" (1.715 m)  Wt 128 lb (58.1 kg)  BMI 19.75 kg/m2    General: Healthy, well-appearing, and in no acute distress.  Skin: no suspicious lesions or rashes  Psych: mentation appears normal, and affect is appropriate/bright  HEENT: Neck is supple with full ROM.  Neuromuscular/Strength: Nno motor weakness in C5-T1 distribution.    Neurologic/Visual:  Cranial Nerves:  CN II-XII intact grossly  PAULA: yes  EOMI: yes  Nystagmus: no  Painful eye movements: no    Coordination:       - Finger to Nose: normal       - Heel to Shin: normal       - Rapid Alternating Movements: normal    Cognitive:  Previous cognitive assessment was normal and without deficit; repeat cognitive testing not performed today.    Impact Testing Scores: ImPACT Testing not performed    ASSESSMENT:     Concussion with loss of consciousness of 30 minutes or less, sequela (H)  PTSD (post-traumatic stress disorder)  Anxiety    PLAN:  -Continue counseling for PTSD, anxiety, and return to driving.    -Recommend continuing to work from home at this time until she is able to complete a return to driving at the discretion of her counselor.     Follow Up: 2 months or sooner if symptoms fail to improve or worsen.  Please call with any questions or " concerns.     Radha Macario MD, CAQ  [facility]

## 2018-03-06 NOTE — LETTER
3/6/2018         RE: Emma Nunn  [address] [facility] [address]        Dear Colleague,    Thank you for referring your patient, Emma Nunn, to the [facility]. Please see a copy of my visit note below.    Sports Medicine Clinic Report:    CC: Head Injury    Emma Nunn is a 52 year old female who presents in follow up for a concussion that occurred on 5/2/2017.  Since last visit on 1/16/2018 patient notes that she is doing well.  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     Since your last visit, level of activity is:  Walking 5 miles per day     Since your last visit, have you continued with your normal cognitive activity (text, computer, school):  Doing well with work. No cognitive issues.       Current Symptoms:  CONCUSSION SYMPTOMS ASSESSMENT 12/5/2017 1/16/2018 3/6/2018   Headache or Pressure In Head 2 - mild to moderate 2 - mild to moderate 2 - mild to moderate   Upset Stomach or Throwing Up 0 - none 0 - none 0 - none   Problems with Balance 0 - none 0 - none 0 - none   Feeling Dizzy 0 - none 0 - none 0 - none   Sensitivity to Light 2 - mild to moderate 2 - mild to moderate 2 - mild to moderate   Sensitivity to Noise 2 - mild to moderate 2 - mild to moderate 2 - mild to moderate   Mood Changes 1 - mild 1 - mild 2 - mild to moderate   Feeling sluggish, hazy, or foggy 0 - none 0 - none 0 - none   Trouble Concentrating, Lack of Focus 0 - none 1 - mild 1 - mild   Motion Sickness 0 - none 0 - none 0 - none   Vision Changes 1 - mild 0 - none 0 - none   Memory Problems 0 - none 0 - none 0 - none   Feeling Confused 0 - none 1 - mild 0 - none   Neck Pain 2 - mild to moderate 1 - mild 1 - mild   Trouble Sleeping 1 - mild 2 - mild to moderate 1 - mild   Total Number of Symptoms 7 8 7   Symptom Severity Score 11 12 11       Sleep:  Difficulty falling asleep    Patient's past medical, surgical, social and family histories are reviewed today.    Past Medical  "History:   Diagnosis Date     Breast cancer (H)      Diffuse cystic mastopathy     Fibrocystic breast disease     External hemorrhoids without mention of complication      Unspecified hypothyroidism      Past Surgical History:   Procedure Laterality Date     BREAST BIOPSY, CORE RT/LT  7/8/2013     C NONSPECIFIC PROCEDURE  1993    Laparoscopy     C NONSPECIFIC PROCEDURE  1993    Outpt surgery for anal fissure     COLONOSCOPY N/A 12/1/2014    Procedure: COLONOSCOPY;  Surgeon: Gabriele Golden MD;  [facility]     LAPAROSCOPIC CHOLECYSTECTOMY N/A 5/24/2017    Procedure: LAPAROSCOPIC CHOLECYSTECTOMY;  Laparoscopic Cholecystectomy;  Surgeon: Jason Munoz MD;  [facility]     LUMPECTOMY BREAST WITH SENTINEL NODE, COMBINED  7/31/2013    Procedure: COMBINED LUMPECTOMY BREAST WITH SENTINEL NODE;  Left Breast Lumpectomy with [place] Node Biopsy;  Surgeon: Jason Munoz MD;  [facility]       OBJECTIVE:  /67  Pulse 70  Ht 5' 7.5\" (1.715 m)  Wt 128 lb (58.1 kg)  BMI 19.75 kg/m2    General: Healthy, well-appearing, and in no acute distress.  Skin: no suspicious lesions or rashes  Psych: mentation appears normal, and affect is appropriate/bright  HEENT: Neck is supple with full ROM.  Neuromuscular/Strength: Nno motor weakness in C5-T1 distribution.    Neurologic/Visual:  Cranial Nerves:  CN II-XII intact grossly  PAULA: yes  EOMI: yes  Nystagmus: no  Painful eye movements: no    Coordination:       - Finger to Nose: normal       - Heel to Shin: normal       - Rapid Alternating Movements: normal    Cognitive:  Previous cognitive assessment was normal and without deficit; repeat cognitive testing not performed today.    Impact Testing Scores: ImPACT Testing not performed    ASSESSMENT:     Concussion with loss of consciousness of 30 minutes or less, sequela (H)  PTSD (post-traumatic stress disorder)  Anxiety    PLAN:  -Continue counseling for PTSD, anxiety, and return to driving.    -Recommend " continuing to work from home at this time until she is able to complete a return to driving at the discretion of her counselor.     Follow Up: 2 months or sooner if symptoms fail to improve or worsen.  Please call with any questions or concerns.     Radha Macario MD, [facility]        Again, thank you for allowing me to participate in the care of your patient.        Sincerely,        Mendy Macario MD

## 2018-03-06 NOTE — LETTER
[address]   [address]  Tel. [phone] / Fax [phone]    March 7, 2018        Emma Nunn  [address] [place] [id number]          Dear Emma,    Thyroid test normal.      If you have any further questions please contact the clinic.     Sincerely,        Zoltan Tolentino M.D.

## 2018-03-06 NOTE — NURSING NOTE
DISCHARGE PLAN:  Next appointments: See patient instruction section  Departure Mode: Ambulatory  Accompanied by:   8 minutes for nursing discharge (face to face time)     Emma Nunn is here today for Oncology follow up for Breast Cancer.  Writing nurse seen patient after Medical Oncology appointment to address questions/concerns/coordinate care. Patient will have mammogram and US.  Oncology will call with results.  Follow up with labs in 6 mnths. Patient ambulated by nurse to  to schedule follow up and/or lab appointments. See patient instructions and Oncologist's Progress note for further details. Questions and concerns addressed to patient's satisfaction. Patient verbalized and demonstrated understanding of plan.  Contact information provided and patient is encouraged to call with any that arise in the interim of care.    Fortino Garcia, RN, BSN, OCN   Oncology Care Coordinator RN  [facility]  [phone]  3/6/2018, 3:45 PM

## 2018-03-06 NOTE — MR AVS SNAPSHOT
After Visit Summary   3/6/2018    Emma Nunn    MRN: [id number]           Patient Information     Date Of Birth          1965        Visit Information        Provider Department      3/6/2018 2:20 PM Mendy Macario MD [facility]        Today's Diagnoses     Concussion with loss of consciousness of 30 minutes or less, sequela (H)    -  1    PTSD (post-traumatic stress disorder)        Anxiety          Care Instructions    Today's Plan of Care:  -Continue counseling for return to driving  -Continue working from home    Follow Up: 2 months or sooner if symptoms fail to improve or worsen. Call with any questions or concerns.               Follow-ups after your visit        Your next 10 appointments already scheduled     Mar 06, 2018  3:00 PM CST   Return Visit with Richard Ocampo MD   [facility]    [address] [place] [address] [phone]              Who to contact     If you have questions or need follow up information about today's clinic visit or your schedule please contact [facility] directly at [phone].  Normal or non-critical lab and imaging results will be communicated to you by IPXIhart, letter or phone within 4 business days after the clinic has received the results. If you do not hear from us within 7 days, please contact the clinic through "[employer] or phone. If you have a critical or abnormal lab result, we will notify you by phone as soon as possible.  Submit refill requests through [employer] or call your pharmacy and they will forward the refill request to us. Please allow 3 business days for your refill to be completed.          Additional Information About Your Visit        MyChart Information     [employer] lets you send messages to your doctor, view your test results, renew your prescriptions, schedule appointments and more. To sign up, go to [email or web address] .  "Click on \"Log in\" on the left side of the screen, which will take you to the Welcome page. Then click on \"Sign up Now\" on the right side of the page.     You will be asked to enter the access code listed below, as well as some personal information. Please follow the directions to create your username and password.     Your access code is: [email or web address]  Expires: 2018  2:49 PM     Your access code will  in 90 days. If you need help or a new code, please call your [facility] clinic or [phone].        Care EveryWhere ID     This is your Care EveryWhere ID. This could be used by other organizations to access your [facility] medical records  [id number]        Your Vitals Were     Pulse Height BMI (Body Mass Index)             70 5' 7.5\" (1.715 m) 19.75 kg/m2          Blood Pressure from Last 3 Encounters:   18 103/67   18 102/67   10/24/17 101/67    Weight from Last 3 Encounters:   18 128 lb (58.1 kg)   18 128 lb (58.1 kg)   17 125 lb (56.7 kg)              Today, you had the following     No orders found for display       Primary Care Provider Office Phone # Fax #    Zoltan Tolentino -[phone]       [facility] [address] [place] [address]        Equal Access to Services     REYNA SINGH AH: Hadii isatu ku hadasho Soomaali, waaxda luqadaha, qaybta kaalmada adeegyada, danay irwin. So [facility] [phone].    ATENCIÓN: Si habla español, tiene a jang disposición servicios gratuitos de asistencia lingüística. [place] al [phone].    We comply with applicable federal civil rights laws and [place] laws. We do not discriminate on the basis of race, color, national origin, age, disability, sex, sexual orientation, or gender identity.            Thank you!     Thank you for choosing [facility]  for your care. Our goal is always to provide you with excellent care. Hearing back from our patients is one " way we can continue to improve our services. Please take a few minutes to complete the written survey that you may receive in the mail after your visit with us. Thank you!             Your Updated Medication List - Protect others around you: Learn how to safely use, store and throw away your medicines at www.disposemymeds.org.          This list is accurate as of 3/6/18  2:49 PM.  Always use your most recent med list.                   Brand Name Dispense Instructions for use Diagnosis    IBUPROFEN PO      Take 600 mg by mouth every 6 hours as needed for moderate pain        levothyroxine 88 MCG tablet    SYNTHROID/LEVOTHROID    90 tablet    Take 1 tablet (88 mcg) by mouth daily    Malignant neoplasm of upper-outer quadrant of left breast in female, estrogen receptor positive (H)       tamoxifen 20 MG tablet    NOLVADEX    90 tablet    Take 1 tablet (20 mg) by mouth daily    Malignant neoplasm of upper-outer quadrant of left breast in female, estrogen receptor positive (H)

## 2018-03-06 NOTE — PATIENT INSTRUCTIONS
Today's Plan of Care:  -Continue counseling for return to driving  -Continue working from home    Follow Up: 2 months or sooner if symptoms fail to improve or worsen. Call with any questions or concerns.

## 2018-03-06 NOTE — TELEPHONE ENCOUNTER
Tamoxifen       Last Written Prescription Date:  8/22/17  Last Fill Quantity: 90,   # refills: 1  Last Office Visit: 5/24/17  Future Office visit:    Next 5 appointments (look out 90 days)     Mar 06, 2018  2:20 PM CST   Return Concussion with Mendy Macario MD   [address] [address]   [phone]            Mar 06, 2018  3:00 PM CST   Return Visit with Richard Ocampo MD   [facility] ([address] [address]   [phone]                   Routing refill request to provider for review/approval because:  Drug not on the [facility], [facility] or [facility] refill protocol or controlled substance

## 2018-03-15 ENCOUNTER — HOSPITAL ENCOUNTER (OUTPATIENT)
Dept: MAMMOGRAPHY | Facility: CLINIC | Age: 53
Discharge: HOME | End: 2018-03-15
Attending: INTERNAL MEDICINE | Admitting: INTERNAL MEDICINE
Payer: COMMERCIAL

## 2018-03-15 ENCOUNTER — HOSPITAL ENCOUNTER (OUTPATIENT)
Dept: ULTRASOUND IMAGING | Facility: CLINIC | Age: 53
End: 2018-03-15
Attending: INTERNAL MEDICINE
Payer: COMMERCIAL

## 2018-03-15 DIAGNOSIS — Z17.0 MALIGNANT NEOPLASM OF UPPER-OUTER QUADRANT OF LEFT BREAST IN FEMALE, ESTROGEN RECEPTOR POSITIVE (H): ICD-10-CM

## 2018-03-15 DIAGNOSIS — C50.412 MALIGNANT NEOPLASM OF UPPER-OUTER QUADRANT OF LEFT BREAST IN FEMALE, ESTROGEN RECEPTOR POSITIVE (H): ICD-10-CM

## 2018-03-15 PROCEDURE — 76642 ULTRASOUND BREAST LIMITED: CPT | Mod: LT

## 2018-03-15 PROCEDURE — G0279 TOMOSYNTHESIS, MAMMO: HCPCS

## 2018-03-30 ENCOUNTER — TELEPHONE (OUTPATIENT)
Dept: ORTHOPEDICS | Facility: OTHER | Age: 53
End: 2018-03-30

## 2018-03-30 NOTE — TELEPHONE ENCOUNTER
Form brought in by patient's . Form faxed to [employer] per patient request. Confirmed sent by Roojoom 12:42.    Originals placed in outgoing mail for patient.    Cynthia Montilla M.Ed., ATR, ATC

## 2018-05-08 ENCOUNTER — OFFICE VISIT (OUTPATIENT)
Dept: ORTHOPEDICS | Facility: CLINIC | Age: 53
End: 2018-05-08
Payer: COMMERCIAL

## 2018-05-08 VITALS — BODY MASS INDEX: 20.16 KG/M2 | HEIGHT: 68 IN | WEIGHT: 133 LBS

## 2018-05-08 DIAGNOSIS — S06.0X1S CONCUSSION WITH LOSS OF CONSCIOUSNESS OF 30 MINUTES OR LESS, SEQUELA (H): Primary | ICD-10-CM

## 2018-05-08 DIAGNOSIS — F43.10 PTSD (POST-TRAUMATIC STRESS DISORDER): ICD-10-CM

## 2018-05-08 DIAGNOSIS — F41.9 ANXIETY: ICD-10-CM

## 2018-05-08 PROCEDURE — 99213 OFFICE O/P EST LOW 20 MIN: CPT | Performed by: PHYSICAL MEDICINE & REHABILITATION

## 2018-05-08 NOTE — PROGRESS NOTES
Sports Medicine Clinic Report:    CC: Head Injury    Emma Nunn is a 52 year old female who presents in follow up.  Since last visit on 3/6/2018 patient notes that she is doing well. She is continuing to work with the counseling on the return to drive. She has been making progress has driven down the driveway. She feels good about the progress being made.    She is doing well at work. They are switching from accommodations to alternate working conditions. Her counselor filled out the last accomodation form for her.    Since your last visit, level of activity is:  Continuing walking 5 miles per day    Since your last visit, have you continued with your normal cognitive activity (text, computer, school):  Full work, normal ADLs      Current Symptoms:  CONCUSSION SYMPTOMS ASSESSMENT 12/5/2017 1/16/2018 3/6/2018   Headache or Pressure In Head 2 - mild to moderate 2 - mild to moderate 2 - mild to moderate   Upset Stomach or Throwing Up 0 - none 0 - none 0 - none   Problems with Balance 0 - none 0 - none 0 - none   Feeling Dizzy 0 - none 0 - none 0 - none   Sensitivity to Light 2 - mild to moderate 2 - mild to moderate 2 - mild to moderate   Sensitivity to Noise 2 - mild to moderate 2 - mild to moderate 2 - mild to moderate   Mood Changes 1 - mild 1 - mild 2 - mild to moderate   Feeling sluggish, hazy, or foggy 0 - none 0 - none 0 - none   Trouble Concentrating, Lack of Focus 0 - none 1 - mild 1 - mild   Motion Sickness 0 - none 0 - none 0 - none   Vision Changes 1 - mild 0 - none 0 - none   Memory Problems 0 - none 0 - none 0 - none   Feeling Confused 0 - none 1 - mild 0 - none   Neck Pain 2 - mild to moderate 1 - mild 1 - mild   Trouble Sleeping 1 - mild 2 - mild to moderate 1 - mild   Total Number of Symptoms 7 8 7   Symptom Severity Score 11 12 11       Sleep: No Issues    Patient's past medical, surgical, social and family histories are reviewed today.    Past Medical History:   Diagnosis Date     Breast cancer (H)   "    Diffuse cystic mastopathy     Fibrocystic breast disease     External hemorrhoids without mention of complication      Unspecified hypothyroidism      Past Surgical History:   Procedure Laterality Date     BREAST BIOPSY, CORE RT/LT  7/8/2013     C NONSPECIFIC PROCEDURE  1993    Laparoscopy     C NONSPECIFIC PROCEDURE  1993    Outpt surgery for anal fissure     COLONOSCOPY N/A 12/1/2014    Procedure: COLONOSCOPY;  Surgeon: Gabriele Golden MD;  [facility]     LAPAROSCOPIC CHOLECYSTECTOMY N/A 5/24/2017    Procedure: LAPAROSCOPIC CHOLECYSTECTOMY;  Laparoscopic Cholecystectomy;  Surgeon: Jason Munoz MD;  [facility]     LUMPECTOMY BREAST WITH SENTINEL NODE, COMBINED  7/31/2013    Procedure: COMBINED LUMPECTOMY BREAST WITH SENTINEL NODE;  Left Breast Lumpectomy with [place] Node Biopsy;  Surgeon: Jason Munoz MD;  Location[facility]       OBJECTIVE:  Ht 5' 7.5\" (1.715 m)  Wt 133 lb (60.3 kg)  BMI 20.52 kg/m2    General: Healthy, well-appearing, and in no acute distress.  Skin: no suspicious lesions or rashes  Psych: mentation appears normal, and affect is appropriate/bright  HEENT: Neck is supple with full ROM.  Neuromuscular/Strength: No motor weakness in C5-T1 distribution.    Neurologic/Visual:  Cranial Nerves:  CN II-XII intact grossly  PAULA: yes  EOMI: yes  Nystagmus: no  Painful eye movements: no    Coordination:       - Finger to Nose: normal       - Heel to Shin: normal       - Rapid Alternating Movements: normal      Cognitive:  Previous cognitive assessment was normal and without deficit; repeat cognitive testing not performed today.      Impact Testing Scores: ImPACT Testing not performed    ASSESSMENT:     Concussion with loss of consciousness of 30 minutes or less, sequela (H)  PTSD (post-traumatic stress disorder)  Anxiety    PLAN:  -Emma is doing well.  She is doing full work at home.  She is working on returning to driving with the counselor.  I do not think she needs to follow up " with me on a scheduled basis anymore.  Recommend she continue to work with the counselor.  All questions invited and answered. Please call with questions or concerns.    Radha Macario MD, CAQ  [facility]

## 2018-05-08 NOTE — MR AVS SNAPSHOT
After Visit Summary   5/8/2018    Emma Nunn    MRN: [id number]           Patient Information     Date Of Birth          1965        Visit Information        Provider Department      5/8/2018 2:20 PM Mendy Macario MD [facility]        Today's Diagnoses     Concussion with loss of consciousness of 30 minutes or less, sequela (H)    -  1    PTSD (post-traumatic stress disorder)        Anxiety          Care Instructions    Today's Plan of Care:  Continue counseling for return to drive    Follow Up: As needed. Please call with any questions or concerns.               Follow-ups after your visit        Your next 10 appointments already scheduled     Sep 10, 2018  3:30 PM CDT   LAB with [facility]   [facility]    [address] [place] [id number] [phone]           Please do not eat 10-12 hours before your appointment if you are coming in fasting for labs on lipids, cholesterol, or glucose (sugar). This does not apply to pregnant women. Water, hot tea and black coffee (with nothing added) are okay. Do not drink other fluids, diet soda or chew gum.            Sep 11, 2018  3:30 PM CDT   Return Visit with Richard Ocampo MD   [facility]    [address] [place] [id number] [phone]              Who to contact     If you have questions or need follow up information about today's clinic visit or your schedule please contact [facility] directly at [phone].  Normal or non-critical lab and imaging results will be communicated to you by MyChart, letter or phone within 4 business days after the clinic has received the results. If you do not hear from us within 7 days, please contact the clinic through MyChart or phone. If you have a critical or abnormal lab result, we will notify you by phone as soon as possible.  Submit refill requests  "through Soc[employer] or call your pharmacy and they will forward the refill request to us. Please allow 3 business days for your refill to be completed.          Additional Information About Your Visit        Myntrahart Information     Socitive lets you send messages to your doctor, view your test results, renew your prescriptions, schedule appointments and more. To sign up, go to [email or web address] . Click on \"Log in\" on the left side of the screen, which will take you to the Welcome page. Then click on \"Sign up Now\" on the right side of the page.     You will be asked to enter the access code listed below, as well as some personal information. Please follow the directions to create your username and password.     Your access code is: [email or web address]  Expires: 2018  3:49 PM     Your access code will  in 90 days. If you need help or a new code, please call your [place] clinic or [phone].        Care EveryWhere ID     This is your Care EveryWhere ID. This could be used by other organizations to access your [place] medical records  [id number]        Your Vitals Were     Height BMI (Body Mass Index)                5' 7.5\" (1.715 m) 20.52 kg/m2           Blood Pressure from Last 3 Encounters:   18 95/63   18 103/67   18 102/67    Weight from Last 3 Encounters:   18 133 lb (60.3 kg)   18 130 lb 12.8 oz (59.3 kg)   18 128 lb (58.1 kg)              Today, you had the following     No orders found for display       Primary Care Provider Office Phone # Fax #    Zoltan Tolentino [phone]       [address] [facility] [address]        Equal Access to Services     GLORY SINGH : Mohsen Betancourt, jeana ward, qamaurata kaaldanay anders. So [facility] [phone].    ATENCIÓN: Si habla español, tiene a jang disposición servicios gratuitos de asistencia lingüística. Llame al [phone].    We " comply with applicable federal civil rights laws and [place] laws. We do not discriminate on the basis of race, color, national origin, age, disability, sex, sexual orientation, or gender identity.            Thank you!     Thank you for choosing [facility]  for your care. Our goal is always to provide you with excellent care. Hearing back from our patients is one way we can continue to improve our services. Please take a few minutes to complete the written survey that you may receive in the mail after your visit with us. Thank you!             Your Updated Medication List - Protect others around you: Learn how to safely use, store and throw away your medicines at www.disposemymeds.org.          This list is accurate as of 5/8/18  2:48 PM.  Always use your most recent med list.                   Brand Name Dispense Instructions for use Diagnosis    IBUPROFEN PO      Take 600 mg by mouth every 6 hours as needed for moderate pain        levothyroxine 88 MCG tablet    SYNTHROID/LEVOTHROID    90 tablet    Take 1 tablet (88 mcg) by mouth daily    Malignant neoplasm of upper-outer quadrant of left breast in female, estrogen receptor positive (H)       tamoxifen 20 MG tablet    NOLVADEX    90 tablet    Take 1 tablet (20 mg) by mouth daily    Malignant neoplasm of upper-outer quadrant of left breast in female, estrogen receptor positive (H)
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
## 2018-05-08 NOTE — PATIENT INSTRUCTIONS
Today's Plan of Care:  Continue counseling for return to drive    Follow Up: As needed. Please call with any questions or concerns.

## 2018-07-02 DIAGNOSIS — Z17.0 MALIGNANT NEOPLASM OF UPPER-OUTER QUADRANT OF LEFT BREAST IN FEMALE, ESTROGEN RECEPTOR POSITIVE (H): ICD-10-CM

## 2018-07-02 DIAGNOSIS — C50.412 MALIGNANT NEOPLASM OF UPPER-OUTER QUADRANT OF LEFT BREAST IN FEMALE, ESTROGEN RECEPTOR POSITIVE (H): ICD-10-CM

## 2018-07-02 RX ORDER — LEVOTHYROXINE SODIUM 88 UG/1
88 TABLET ORAL DAILY
Qty: 90 TABLET | Refills: 3 | Status: SHIPPED | OUTPATIENT
Start: 2018-07-02 | End: 2019-04-16

## 2018-07-02 NOTE — TELEPHONE ENCOUNTER
Needs today please for vacation.     Thanks  Elizabeth Clancy [facility]   [phone]

## 2018-09-10 DIAGNOSIS — C50.412 MALIGNANT NEOPLASM OF UPPER-OUTER QUADRANT OF LEFT BREAST IN FEMALE, ESTROGEN RECEPTOR POSITIVE (H): ICD-10-CM

## 2018-09-10 DIAGNOSIS — Z17.0 MALIGNANT NEOPLASM OF UPPER-OUTER QUADRANT OF LEFT BREAST IN FEMALE, ESTROGEN RECEPTOR POSITIVE (H): ICD-10-CM

## 2018-09-10 LAB
ALBUMIN SERPL-MCNC: 3.7 G/DL (ref 3.4–5)
ALP SERPL-CCNC: 95 U/L (ref 40–150)
ALT SERPL W P-5'-P-CCNC: 27 U/L (ref 0–50)
ANION GAP SERPL CALCULATED.3IONS-SCNC: 8 MMOL/L (ref 3–14)
AST SERPL W P-5'-P-CCNC: 17 U/L (ref 0–45)
BASOPHILS # BLD AUTO: 0.1 10E9/L (ref 0–0.2)
BASOPHILS NFR BLD AUTO: 0.9 %
BILIRUB SERPL-MCNC: 0.2 MG/DL (ref 0.2–1.3)
BUN SERPL-MCNC: 21 MG/DL (ref 7–30)
CALCIUM SERPL-MCNC: 8.6 MG/DL (ref 8.5–10.1)
CANCER AG27-29 SERPL-ACNC: 24 U/ML (ref 0–39)
CHLORIDE SERPL-SCNC: 106 MMOL/L (ref 94–109)
CO2 SERPL-SCNC: 32 MMOL/L (ref 20–32)
CREAT SERPL-MCNC: 0.85 MG/DL (ref 0.52–1.04)
DIFFERENTIAL METHOD BLD: NORMAL
EOSINOPHIL NFR BLD AUTO: 2.2 %
ERYTHROCYTE [DISTWIDTH] IN BLOOD BY AUTOMATED COUNT: 12.4 % (ref 10–15)
GFR SERPL CREATININE-BSD FRML MDRD: 70 ML/MIN/1.7M2
GLUCOSE SERPL-MCNC: 65 MG/DL (ref 70–99)
HCT VFR BLD AUTO: 41.1 % (ref 35–47)
HGB BLD-MCNC: 13.9 G/DL (ref 11.7–15.7)
IMM GRANULOCYTES # BLD: 0 10E9/L (ref 0–0.4)
IMM GRANULOCYTES NFR BLD: 0.3 %
LYMPHOCYTES # BLD AUTO: 2 10E9/L (ref 0.8–5.3)
LYMPHOCYTES NFR BLD AUTO: 28.9 %
MCH RBC QN AUTO: 32.5 PG (ref 26.5–33)
MCHC RBC AUTO-ENTMCNC: 33.8 G/DL (ref 31.5–36.5)
MCV RBC AUTO: 96 FL (ref 78–100)
MONOCYTES # BLD AUTO: 0.5 10E9/L (ref 0–1.3)
MONOCYTES NFR BLD AUTO: 7 %
NEUTROPHILS # BLD AUTO: 4.2 10E9/L (ref 1.6–8.3)
NEUTROPHILS NFR BLD AUTO: 60.7 %
NRBC # BLD AUTO: 0 10*3/UL
NRBC BLD AUTO-RTO: 0 /100
PLATELET # BLD AUTO: 153 10E9/L (ref 150–450)
POTASSIUM SERPL-SCNC: 4 MMOL/L (ref 3.4–5.3)
PROT SERPL-MCNC: 7 G/DL (ref 6.8–8.8)
RBC # BLD AUTO: 4.28 10E12/L (ref 3.8–5.2)
SODIUM SERPL-SCNC: 146 MMOL/L (ref 133–144)
WBC # BLD AUTO: 6.9 10E9/L (ref 4–11)

## 2018-09-10 PROCEDURE — 86300 IMMUNOASSAY TUMOR CA 15-3: CPT | Performed by: INTERNAL MEDICINE

## 2018-09-10 PROCEDURE — 85025 COMPLETE CBC W/AUTO DIFF WBC: CPT | Performed by: INTERNAL MEDICINE

## 2018-09-10 PROCEDURE — 80053 COMPREHEN METABOLIC PANEL: CPT | Performed by: INTERNAL MEDICINE

## 2018-09-10 PROCEDURE — 36415 COLL VENOUS BLD VENIPUNCTURE: CPT | Performed by: INTERNAL MEDICINE

## 2018-09-11 ENCOUNTER — ONCOLOGY VISIT (OUTPATIENT)
Dept: ONCOLOGY | Facility: CLINIC | Age: 53
End: 2018-09-11
Payer: COMMERCIAL

## 2018-09-11 VITALS
RESPIRATION RATE: 20 BRPM | TEMPERATURE: 97.3 F | BODY MASS INDEX: 20.05 KG/M2 | OXYGEN SATURATION: 100 % | DIASTOLIC BLOOD PRESSURE: 54 MMHG | HEART RATE: 74 BPM | SYSTOLIC BLOOD PRESSURE: 100 MMHG | HEIGHT: 68 IN | WEIGHT: 132.3 LBS

## 2018-09-11 DIAGNOSIS — E03.9 HYPOTHYROIDISM, UNSPECIFIED TYPE: ICD-10-CM

## 2018-09-11 DIAGNOSIS — Z17.0 MALIGNANT NEOPLASM OF UPPER-OUTER QUADRANT OF LEFT BREAST IN FEMALE, ESTROGEN RECEPTOR POSITIVE (H): Primary | ICD-10-CM

## 2018-09-11 DIAGNOSIS — C50.412 MALIGNANT NEOPLASM OF UPPER-OUTER QUADRANT OF LEFT BREAST IN FEMALE, ESTROGEN RECEPTOR POSITIVE (H): Primary | ICD-10-CM

## 2018-09-11 PROCEDURE — 99214 OFFICE O/P EST MOD 30 MIN: CPT | Performed by: INTERNAL MEDICINE

## 2018-09-11 ASSESSMENT — PAIN SCALES - GENERAL: PAINLEVEL: NO PAIN (0)

## 2018-09-11 NOTE — MR AVS SNAPSHOT
After Visit Summary   9/11/2018    Emma Nunn    MRN: [id number]           Patient Information     Date Of Birth          1965        Visit Information        Provider Department      9/11/2018 3:30 PM Richard Ocampo MD [facility]        Today's Diagnoses     Malignant neoplasm of upper-outer quadrant of left breast in female, estrogen receptor positive (H)    -  1    Hypothyroidism, unspecified type          Care Instructions    Please follow up with Dr. Ocampo in 6 months.  Please schedule & complete lab work and mammogram scan 3-7 days prior to follow up appointment.    Lab Date/Time:    Mammogram Date/Time:    No deodorant, creams, lotions, or powders.  [facility] is now doing 3D Mammograms.  If you are interested, please check with your insurance and then let the Mammogram technician know.    Follow Up Date/Time:     If you have any questions or concerns please feel free to call.    If you need to reschedule please call:  Clinic or Lab Appointment - [phone]  Infusion - [phone]  Imaging - [phone]              Follow-ups after your visit        Follow-up notes from your care team     Return in about 6 months (around 3/11/2019) for Imaging ordered before next appointment, Blood work before next appointment.      Your next 10 appointments already scheduled     Oct 05, 2018  8:00 AM CDT   Office Visit with Arelis Schaefer MD   [facility] ([facility]    [address] [place] [id number] [phone]           Bring a current list of meds and any records pertaining to this visit. For Physicals, please bring immunization records and any forms needing to be filled out. Please arrive 10 minutes early to complete paperwork.              Future tests that were ordered for you today     Open Future Orders        Priority Expected Expires Ordered    CBC with platelets differential Routine 3/11/2019  "2019    Comprehensive metabolic panel Routine 3/11/2019 2019 2018    Ca27.29  breast tumor marker Routine 3/11/2019 2019 2018    MA Screening Digital Bilateral Routine 3/11/2019 2019 2018            Who to contact     If you have questions or need follow up information about today's clinic visit or your schedule please contact [facility] directly at [phone].  Normal or non-critical lab and imaging results will be communicated to you by MyChart, letter or phone within 4 business days after the clinic has received the results. If you do not hear from us within 7 days, please contact the clinic through SportsManiashart or phone. If you have a critical or abnormal lab result, we will notify you by phone as soon as possible.  Submit refill requests through [employer] or call your pharmacy and they will forward the refill request to us. Please allow 3 business days for your refill to be completed.          Additional Information About Your Visit        SportsManiasharTroppin Information     Digital [employer] lets you send messages to your doctor, view your test results, renew your prescriptions, schedule appointments and more. To sign up, go to [email or web address] . Click on \"Log in\" on the left side of the screen, which will take you to the Welcome page. Then click on \"Sign up Now\" on the right side of the page.     You will be asked to enter the access code listed below, as well as some personal information. Please follow the directions to create your username and password.     Your access code is: [email or web address]  Expires: 2018  3:47 PM     Your access code will  in 90 days. If you need help or a new code, please call your [facility] or [phone].        Care EveryWhere ID     This is your Care EveryWhere ID. This could be used by other organizations to access your [place] medical records  [id number]        Your Vitals Were     Pulse Temperature Respirations Height " "Pulse Oximetry BMI (Body Mass Index)    74 97.3  F (36.3  C) (Temporal) 20 1.715 m (5' 7.5\") 100% 20.42 kg/m2       Blood Pressure from Last 3 Encounters:   09/11/18 100/54   03/06/18 95/63   03/06/18 103/67    Weight from Last 3 Encounters:   09/11/18 60 kg (132 lb 4.8 oz)   05/08/18 60.3 kg (133 lb)   03/06/18 59.3 kg (130 lb 12.8 oz)               Primary Care Provider Office Phone # Fax #    Zoltan Tolentino [phone] [address] [facility] [phone]        Equal Access to Services     REYNA SINGH : Mohsen samaniegoo Soteo, waaxda luqadaha, qaybta kaalmada adeegyalakeisha, danay irwin. So [facility] [phone].    ATENCIÓN: Si habla español, tiene a jang disposición servicios gratuitos de asistencia lingüística. [facility] [phone].    We comply with applicable federal civil rights laws and [place] laws. We do not discriminate on the basis of race, color, national origin, age, disability, sex, sexual orientation, or gender identity.            Thank you!     Thank you for choosing [facility]  for your care. Our goal is always to provide you with excellent care. Hearing back from our patients is one way we can continue to improve our services. Please take a few minutes to complete the written survey that you may receive in the mail after your visit with us. Thank you!             Your Updated Medication List - Protect others around you: Learn how to safely use, store and throw away your medicines at www.disposemymeds.org.          This list is accurate as of 9/11/18  4:13 PM.  Always use your most recent med list.                   Brand Name Dispense Instructions for use Diagnosis    IBUPROFEN PO      Take 600 mg by mouth every 6 hours as needed for moderate pain        levothyroxine 88 MCG tablet    SYNTHROID/LEVOTHROID    90 tablet    Take 1 tablet (88 mcg) by mouth daily    Malignant neoplasm of upper-outer quadrant of left " breast in female, estrogen receptor positive (H)       tamoxifen 20 MG tablet    NOLVADEX    90 tablet    Take 1 tablet (20 mg) by mouth daily    Malignant neoplasm of upper-outer quadrant of left breast in female, estrogen receptor positive (H)
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
## 2018-09-11 NOTE — NURSING NOTE
DISCHARGE PLAN:  Next appointments: See patient instruction section  Departure Mode: Ambulatory  Accompanied by: self  2 minutes for nursing discharge (face to face time)     Emma Nunn is here today for 6 month follow up breast cancer.  Writing nurse seen patient after Medical Oncology appointment to address questions/concerns/coordinate care. Patient to schedule appointments.  Staff reminder sent. Did mail off AVS with instructions. See patient instructions and Oncologist's Progress note for further details. Questions and concerns addressed to patient's satisfaction. Patient verbalized and demonstrated understanding of plan.  Contact information provided and patient is encouraged to call with any that arise in the interim of care.    Chani BORGES [facility]    Oncology/Hematology at [facility]  [phone]  9/11/2018, 4:25 PM

## 2018-09-11 NOTE — PROGRESS NOTES
Hematology/ Oncology Follow-up Visit:  Sep 11, 2018    Reason for Visit:   Chief Complaint   Patient presents with     Oncology Clinic Visit     6 month follow up for Malignant neoplasm of upper-outer quadrant of left breast in female, estrogen receptor positive     Medication Therapy Management     tamoxifen (NOLVADEX) 20 MG tablet       Oncologic History:  Malignant neoplasm of female breast (ALYCE Nunn was diagnosed at age 47, premenopausally through self-breast check, felt a lump in her left outer upper quadrant. Diagnostic mammogram early 07/2013 identified asymmetrical density associated with palpable findings around 1-2 o'clock position, 8-10 cm from the nipple. Limited sonogram revealed hypoechoic mass with irregular borders, measured about 0.8 cm trocar position. Sonogram-guided biopsy indicating invasive ductal cancer, grade 2, with associated DCIS, ER/MS 90% positive, HER-2/sandy positive, FISH ratio greater than 11.7. she had lumpectomy 7/2013 - 1.3 cm LAURA, GII, + ALI, margin is close <=1 mm, +DCIS, 8LN negative.  She has [id number] stage I disease. TCH was recommended from 8//15/2013 to 12/2013 and herceptin after. . She finished RT 2/28/2014 and tamoxifen started in 3/2014.      Interval History:  Returning today for follow-up visit.  She has been feeling well without any recent complaints weight loss night sweats fever or chills.  She denies any nausea vomiting or diarrhea.  Has been on tamoxifen.  Has mild hot flashes otherwise there is no other side effects.    Review Of Systems:  Constitutional: Negative for fever, chills, and night sweats.  Skin: negative.  Eyes: negative.  Ears/Nose/Throat: negative.  Respiratory: No shortness of breath, dyspnea on exertion, cough, or hemoptysis.  Cardiovascular: negative.  Gastrointestinal: negative.  Genitourinary: negative.  Musculoskeletal: negative.  Neurologic: negative.  Psychiatric: negative.  Hematologic/Lymphatic/Immunologic: negative.  Endocrine:  "negative.    All other ROS negative unless mentioned in interval history.    Past medical, social, surgical, and family histories reviewed.    Allergies:  Allergies as of 09/11/2018 - Ray as Reviewed 09/11/2018   Allergen Reaction Noted     No known drug allergies  04/02/2001       Current Medications:  Current Outpatient Prescriptions   Medication Sig Dispense Refill     IBUPROFEN PO Take 600 mg by mouth every 6 hours as needed for moderate pain       levothyroxine (SYNTHROID/LEVOTHROID) 88 MCG tablet Take 1 tablet (88 mcg) by mouth daily 90 tablet 3     tamoxifen (NOLVADEX) 20 MG tablet Take 1 tablet (20 mg) by mouth daily 90 tablet 1        Physical Exam:  /54  Pulse 74  Temp 97.3  F (36.3  C) (Temporal)  Resp 20  Ht 1.715 m (5' 7.5\")  Wt 60 kg (132 lb 4.8 oz)  SpO2 100%  BMI 20.42 kg/m2  Wt Readings from Last 12 Encounters:   09/11/18 60 kg (132 lb 4.8 oz)   05/08/18 60.3 kg (133 lb)   03/06/18 59.3 kg (130 lb 12.8 oz)   03/06/18 58.1 kg (128 lb)   01/16/18 58.1 kg (128 lb)   12/05/17 56.7 kg (125 lb)   10/24/17 56.7 kg (125 lb)   10/03/17 56.7 kg (125 lb)   09/12/17 56.7 kg (125 lb)   08/22/17 59.7 kg (131 lb 9.6 oz)   08/15/17 56.7 kg (125 lb)   07/25/17 56.7 kg (125 lb)     ECOG performance status: 0  GENERAL APPEARANCE: Healthy, alert and in no acute distress.  HEENT: Sclerae anicteric. PERRLA. Oropharynx without ulcers, lesions, or thrush.  NECK: Supple. No asymmetry or masses.  LYMPHATICS: No palpable cervical, supraclavicular, axillary, or inguinal lymphadenopathy.  RESP: Lungs clear to auscultation bilaterally without rales, rhonchi or wheezes.\", BREAST: Right- No mass, nipple discharge or axillary adenopathy. Left- No mass, nipple discharge or axillary adenopathy \"CARDIOVASCULAR: Regular rate and rhythm. Normal S1, S2; no S3 or S4. No murmur, gallop, or rub.  ABDOMEN: Soft, nontender. Bowel sounds normal. No palpable organomegaly or masses.  MUSCULOSKELETAL: Extremities without gross " deformities noted. No edema of bilateral lower extremities.  SKIN: No suspicious lesions or rashes.  NEURO: Alert and oriented x 3. Cranial nerves II-XII grossly intact.  PSYCHIATRIC: Mentation and affect appear normal.    Laboratory/Imaging Studies:  Orders Only on 09/10/2018   Component Date Value Ref Range Status     CA 27-29 09/10/2018 24  0 - 39 U/mL Final    Assay Method:  Chemiluminescence using Siemens Centaur XP     WBC 09/10/2018 6.9  4.0 - 11.0 10e9/L Final     RBC Count 09/10/2018 4.28  3.8 - 5.2 10e12/L Final     Hemoglobin 09/10/2018 13.9  11.7 - 15.7 g/dL Final     Hematocrit 09/10/2018 41.1  35.0 - 47.0 % Final     MCV 09/10/2018 96  78 - 100 fl Final     MCH 09/10/2018 32.5  26.5 - 33.0 pg Final     MCHC 09/10/2018 33.8  31.5 - 36.5 g/dL Final     RDW 09/10/2018 12.4  10.0 - 15.0 % Final     Platelet Count 09/10/2018 153  150 - 450 10e9/L Final     Diff Method 09/10/2018 Automated Method   Final     % Neutrophils 09/10/2018 60.7  % Final     % Lymphocytes 09/10/2018 28.9  % Final     % Monocytes 09/10/2018 7.0  % Final     % Eosinophils 09/10/2018 2.2  % Final     % Basophils 09/10/2018 0.9  % Final     % Immature Granulocytes 09/10/2018 0.3  % Final     Nucleated RBCs 09/10/2018 0  0 /100 Final     Absolute Neutrophil 09/10/2018 4.2  1.6 - 8.3 10e9/L Final     Absolute Lymphocytes 09/10/2018 2.0  0.8 - 5.3 10e9/L Final     Absolute Monocytes 09/10/2018 0.5  0.0 - 1.3 10e9/L Final     Absolute Basophils 09/10/2018 0.1  0.0 - 0.2 10e9/L Final     Abs Immature Granulocytes 09/10/2018 0.0  0 - 0.4 10e9/L Final     Absolute Nucleated RBC 09/10/2018 0.0   Final     Sodium 09/10/2018 146* 133 - 144 mmol/L Final     Potassium 09/10/2018 4.0  3.4 - 5.3 mmol/L Final     Chloride 09/10/2018 106  94 - 109 mmol/L Final     Carbon Dioxide 09/10/2018 32  20 - 32 mmol/L Final     Anion Gap 09/10/2018 8  3 - 14 mmol/L Final     Glucose 09/10/2018 65* 70 - 99 mg/dL Final     Urea Nitrogen 09/10/2018 21  7 - 30 mg/dL  Final     Creatinine 09/10/2018 0.85  0.52 - 1.04 mg/dL Final     GFR Estimate 09/10/2018 70  >60 mL/min/1.7m2 Final    Non  GFR Calc     GFR Estimate If Black 09/10/2018 84  >60 mL/min/1.7m2 Final    African American GFR Calc     Calcium 09/10/2018 8.6  8.5 - 10.1 mg/dL Final     Bilirubin Total 09/10/2018 0.2  0.2 - 1.3 mg/dL Final     Albumin 09/10/2018 3.7  3.4 - 5.0 g/dL Final     Protein Total 09/10/2018 7.0  6.8 - 8.8 g/dL Final     Alkaline Phosphatase 09/10/2018 95  40 - 150 U/L Final     ALT 09/10/2018 27  0 - 50 U/L Final     AST 09/10/2018 17  0 - 45 U/L Final          Assessment and plan:    (C50.412,  Z17.0) Malignant neoplasm of upper-outer quadrant of left breast in female, estrogen receptor positive (H)  (primary encounter diagnosis)  I reviewed with patient most recent laboratory test.  There is no clinical evidence of breast cancer recurrence.  Patient will continue on tamoxifen 20 mg orally daily to complete 10 years.  I will see the patient again in 6 months with her annual mammogram.    Plan: CBC with platelets differential, Comprehensive  metabolic panel, Ca27.29  breast tumor marker,  MA Screening Digital Bilateral    (E03.9) Hypothyroidism, unspecified type  Patient currently on Synthroid 88 mcg daily.    The patient is ready to learn, no apparent learning barriers were identified.  Diagnosis and treatment plans were explained to the patient. The patient expressed understanding of the content. The patient asked appropriate questions. The patient questions were answered to her satisfaction.    Chart documentation with Dragon Voice recognition Software. Although reviewed after completion, some words and grammatical errors may remain.

## 2018-09-11 NOTE — NURSING NOTE
"Oncology Rooming Note    September 11, 2018 3:47 PM   Emma Nunn is a 52 year old female who presents for:    Chief Complaint   Patient presents with     Oncology Clinic Visit     6 month follow up for Malignant neoplasm of upper-outer quadrant of left breast in female, estrogen receptor positive     Medication Therapy Management     tamoxifen (NOLVADEX) 20 MG tablet     Initial Vitals: /54  Pulse 74  Temp 97.3  F (36.3  C) (Temporal)  Resp 20  Ht 1.715 m (5' 7.5\")  Wt 60 kg (132 lb 4.8 oz)  SpO2 100%  BMI 20.42 kg/m2 Estimated body mass index is 20.42 kg/(m^2) as calculated from the following:    Height as of this encounter: 1.715 m (5' 7.5\").    Weight as of this encounter: 60 kg (132 lb 4.8 oz). Body surface area is 1.69 meters squared.  No Pain (0) Comment: Data Unavailable   No LMP recorded. Patient is not currently having periods (Reason: Chemotherapy).  Allergies reviewed: Yes  Medications reviewed: Yes    Medications: Medication refills not needed today.  Pharmacy name entered into EPIC:    [facility] - [facility], [place] - [address] [facility] [place], [place] - [address] [place] [address]       8 minutes for nursing intake (face to face time)     Chani VIERA CMA              "
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
## 2018-09-11 NOTE — ASSESSMENT & PLAN NOTE
Emma Nunn was diagnosed at age 47, premenopausally through self-breast check, felt a lump in her left outer upper quadrant. Diagnostic mammogram early 07/2013 identified asymmetrical density associated with palpable findings around 1-2 o'clock position, 8-10 cm from the nipple. Limited sonogram revealed hypoechoic mass with irregular borders, measured about 0.8 cm trocar position. Sonogram-guided biopsy indicating invasive ductal cancer, grade 2, with associated DCIS, ER/PA 90% positive, HER-2/sandy positive, FISH ratio greater than 11.7. she had lumpectomy 7/2013 - 1.3 cm LAURA, GII, + ALI, margin is close <=1 mm, +DCIS, 8LN negative.  She has [id number] stage I disease. TCH was recommended from 8//15/2013 to 12/2013 and herceptin after. . She finished RT 2/28/2014 and tamoxifen started in 3/2014.

## 2018-09-11 NOTE — PATIENT INSTRUCTIONS
Please follow up with Dr. Ocampo in 6 months.  Please schedule & complete lab work and mammogram scan 3-7 days prior to follow up appointment.    Lab Date/Time:    Mammogram Date/Time:    No deodorant, creams, lotions, or powders.  [facility] is now doing 3D Mammograms.  If you are interested, please check with your insurance and then let the Mammogram technician know.    Follow Up Date/Time:     If you have any questions or concerns please feel free to call.    If you need to reschedule please call:  Clinic or Lab Appointment - [phone]  Infusion - [phone]  Imaging - [phone]

## 2018-09-26 ENCOUNTER — TELEPHONE (OUTPATIENT)
Dept: FAMILY MEDICINE | Facility: CLINIC | Age: 53
End: 2018-09-26

## 2018-09-26 ENCOUNTER — APPOINTMENT (OUTPATIENT)
Dept: MRI IMAGING | Facility: CLINIC | Age: 53
End: 2018-09-26
Attending: NURSE PRACTITIONER
Payer: COMMERCIAL

## 2018-09-26 ENCOUNTER — APPOINTMENT (OUTPATIENT)
Dept: CT IMAGING | Facility: CLINIC | Age: 53
End: 2018-09-26
Attending: NURSE PRACTITIONER
Payer: COMMERCIAL

## 2018-09-26 ENCOUNTER — HOSPITAL ENCOUNTER (EMERGENCY)
Facility: CLINIC | Age: 53
Discharge: HOME | End: 2018-09-26
Attending: NURSE PRACTITIONER | Admitting: NURSE PRACTITIONER
Payer: COMMERCIAL

## 2018-09-26 VITALS
BODY MASS INDEX: 20.63 KG/M2 | DIASTOLIC BLOOD PRESSURE: 67 MMHG | HEART RATE: 68 BPM | TEMPERATURE: 98.1 F | RESPIRATION RATE: 14 BRPM | WEIGHT: 133.7 LBS | OXYGEN SATURATION: 98 % | SYSTOLIC BLOOD PRESSURE: 91 MMHG

## 2018-09-26 DIAGNOSIS — R42 LIGHT HEADED: ICD-10-CM

## 2018-09-26 LAB
ALBUMIN SERPL-MCNC: 3.4 G/DL (ref 3.4–5)
ALP SERPL-CCNC: 70 U/L (ref 40–150)
ALT SERPL W P-5'-P-CCNC: 33 U/L (ref 0–50)
ANION GAP SERPL CALCULATED.3IONS-SCNC: 5 MMOL/L (ref 3–14)
AST SERPL W P-5'-P-CCNC: 23 U/L (ref 0–45)
BASOPHILS # BLD AUTO: 0 10E9/L (ref 0–0.2)
BASOPHILS NFR BLD AUTO: 0.6 %
BILIRUB SERPL-MCNC: 0.4 MG/DL (ref 0.2–1.3)
BUN SERPL-MCNC: 15 MG/DL (ref 7–30)
CALCIUM SERPL-MCNC: 8.5 MG/DL (ref 8.5–10.1)
CHLORIDE SERPL-SCNC: 106 MMOL/L (ref 94–109)
CO2 SERPL-SCNC: 30 MMOL/L (ref 20–32)
CREAT SERPL-MCNC: 0.76 MG/DL (ref 0.52–1.04)
CRP SERPL-MCNC: <2.9 MG/L (ref 0–8)
DIFFERENTIAL METHOD BLD: NORMAL
EOSINOPHIL NFR BLD AUTO: 1.9 %
ERYTHROCYTE [DISTWIDTH] IN BLOOD BY AUTOMATED COUNT: 12.2 % (ref 10–15)
GFR SERPL CREATININE-BSD FRML MDRD: 80 ML/MIN/1.7M2
GLUCOSE SERPL-MCNC: 89 MG/DL (ref 70–99)
HCT VFR BLD AUTO: 40.1 % (ref 35–47)
HGB BLD-MCNC: 13.6 G/DL (ref 11.7–15.7)
IMM GRANULOCYTES # BLD: 0 10E9/L (ref 0–0.4)
IMM GRANULOCYTES NFR BLD: 0.2 %
LYMPHOCYTES # BLD AUTO: 2 10E9/L (ref 0.8–5.3)
LYMPHOCYTES NFR BLD AUTO: 30.7 %
MCH RBC QN AUTO: 32 PG (ref 26.5–33)
MCHC RBC AUTO-ENTMCNC: 33.9 G/DL (ref 31.5–36.5)
MCV RBC AUTO: 94 FL (ref 78–100)
MONOCYTES # BLD AUTO: 0.4 10E9/L (ref 0–1.3)
MONOCYTES NFR BLD AUTO: 6.6 %
NEUTROPHILS # BLD AUTO: 3.9 10E9/L (ref 1.6–8.3)
NEUTROPHILS NFR BLD AUTO: 60 %
NRBC # BLD AUTO: 0 10*3/UL
NRBC BLD AUTO-RTO: 0 /100
PLATELET # BLD AUTO: 163 10E9/L (ref 150–450)
POTASSIUM SERPL-SCNC: 4 MMOL/L (ref 3.4–5.3)
PROT SERPL-MCNC: 6.7 G/DL (ref 6.8–8.8)
RBC # BLD AUTO: 4.25 10E12/L (ref 3.8–5.2)
SODIUM SERPL-SCNC: 141 MMOL/L (ref 133–144)
T4 FREE SERPL-MCNC: 1.1 NG/DL (ref 0.76–1.46)
TSH SERPL DL<=0.005 MIU/L-ACNC: 4.71 MU/L (ref 0.4–4)
WBC # BLD AUTO: 6.4 10E9/L (ref 4–11)

## 2018-09-26 PROCEDURE — 84443 ASSAY THYROID STIM HORMONE: CPT | Performed by: NURSE PRACTITIONER

## 2018-09-26 PROCEDURE — 96361 HYDRATE IV INFUSION ADD-ON: CPT | Performed by: NURSE PRACTITIONER

## 2018-09-26 PROCEDURE — 70450 CT HEAD/BRAIN W/O DYE: CPT

## 2018-09-26 PROCEDURE — 86140 C-REACTIVE PROTEIN: CPT | Performed by: NURSE PRACTITIONER

## 2018-09-26 PROCEDURE — 80053 COMPREHEN METABOLIC PANEL: CPT | Performed by: NURSE PRACTITIONER

## 2018-09-26 PROCEDURE — 70553 MRI BRAIN STEM W/O & W/DYE: CPT

## 2018-09-26 PROCEDURE — 93005 ELECTROCARDIOGRAM TRACING: CPT | Performed by: NURSE PRACTITIONER

## 2018-09-26 PROCEDURE — 93010 ELECTROCARDIOGRAM REPORT: CPT | Mod: Z6 | Performed by: NURSE PRACTITIONER

## 2018-09-26 PROCEDURE — 84439 ASSAY OF FREE THYROXINE: CPT | Performed by: NURSE PRACTITIONER

## 2018-09-26 PROCEDURE — 25000128 H RX IP 250 OP 636: Performed by: NURSE PRACTITIONER

## 2018-09-26 PROCEDURE — 96374 THER/PROPH/DIAG INJ IV PUSH: CPT | Mod: 59 | Performed by: NURSE PRACTITIONER

## 2018-09-26 PROCEDURE — 85025 COMPLETE CBC W/AUTO DIFF WBC: CPT | Performed by: NURSE PRACTITIONER

## 2018-09-26 PROCEDURE — 25000132 ZZH RX MED GY IP 250 OP 250 PS 637: Performed by: NURSE PRACTITIONER

## 2018-09-26 PROCEDURE — 99285 EMERGENCY DEPT VISIT HI MDM: CPT | Mod: 25 | Performed by: NURSE PRACTITIONER

## 2018-09-26 PROCEDURE — A9585 GADOBUTROL INJECTION: HCPCS | Performed by: NURSE PRACTITIONER

## 2018-09-26 PROCEDURE — 25500064 ZZH RX 255 OP 636: Performed by: NURSE PRACTITIONER

## 2018-09-26 RX ORDER — GADOBUTROL 604.72 MG/ML
6 INJECTION INTRAVENOUS ONCE
Status: COMPLETED | OUTPATIENT
Start: 2018-09-26 | End: 2018-09-26

## 2018-09-26 RX ORDER — ONDANSETRON 2 MG/ML
4 INJECTION INTRAMUSCULAR; INTRAVENOUS EVERY 30 MIN PRN
Status: DISCONTINUED | OUTPATIENT
Start: 2018-09-26 | End: 2018-09-26 | Stop reason: HOSPADM

## 2018-09-26 RX ORDER — MECLIZINE HYDROCHLORIDE 25 MG/1
25 TABLET ORAL ONCE
Status: COMPLETED | OUTPATIENT
Start: 2018-09-26 | End: 2018-09-26

## 2018-09-26 RX ORDER — SODIUM CHLORIDE 9 MG/ML
INJECTION, SOLUTION INTRAVENOUS ONCE
Status: COMPLETED | OUTPATIENT
Start: 2018-09-26 | End: 2018-09-26

## 2018-09-26 RX ADMIN — ONDANSETRON 4 MG: 2 INJECTION INTRAMUSCULAR; INTRAVENOUS at 16:20

## 2018-09-26 RX ADMIN — GADOBUTROL 7.5 ML: 604.72 INJECTION INTRAVENOUS at 18:53

## 2018-09-26 RX ADMIN — SODIUM CHLORIDE 1000 ML: 9 INJECTION, SOLUTION INTRAVENOUS at 16:19

## 2018-09-26 RX ADMIN — MECLIZINE HYDROCHLORIDE 25 MG: 25 TABLET ORAL at 16:20

## 2018-09-26 RX ADMIN — SODIUM CHLORIDE, PRESERVATIVE FREE: 5 INJECTION INTRAVENOUS at 18:53

## 2018-09-26 NOTE — ED NOTES
Patient up to use the bathroom with SBA of RN.  Reports her dizziness and nausea have subsided.  Provider notified. Jennifer Garibay RN
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
## 2018-09-26 NOTE — ED AVS SNAPSHOT
[facility] Emergency Department    [address]     [place] [address]    Phone:  [phone]    Fax:  [phone]                                       Emma Nunn   MRN: [id number]    Department:  [facility] Emergency Department   Date of Visit:  9/26/2018           Patient Information     Date Of Birth          1965        Your diagnoses for this visit were:     Light headed Brief syncopal episode       You were seen by Katerine Hernandez, BUZZ CNP.      Follow-up Information     Follow up with Arelis Schaefer MD.    Specialty:  Family Practice    Why:  10/05/2018 as scheduled    Contact information:    [address] [facility]   [place] [id number] [phone]          Follow up with [facility] Emergency Department.    Specialty:  EMERGENCY MEDICINE    Why:  If symptoms worsen    Contact information:    [address] [place] [id number] [phone]    Additional information:    From [address]: Exit at [address] on [place] side of [place]. Turn right on [address]. Turn left at stoplight on [address]. [facility] will be in view two blocks ahead        Discharge Instructions         Possible Causes of Dizziness or Fainting    Dizziness and fainting can have many causes. Below are some examples of possible causes your healthcare provider will look to rule out.  Benign paroxysmal positional vertigo (BPPV)  BPPV results when calcium crystals inside the inner ear shift into the wrong position. BPPV causes episodes of vertigo (a spinning sensation). Episodes most often happen when the head is moved in a certain way. This is more common in people 65 and older.   Infection or inflammation  The semicircular canals of the ear may become infected or inflamed. In this case, they can send the wrong balance signals. This can cause vertigo.  Meniere disease  Meniere disease happens when there is too much fluid in the semicircular canals.  This can cause vertigo. It also can cause hearing problems and buzzing or ringing in the ears (called tinnitus). You may also have a feeling of pressure or fullness in the ear.  Syncope  Syncope is fainting that happens when the brain doesn t get enough oxygen-rich blood. It can be caused by low heart rate or low blood pressure. This is called vasovagal syncope. It can also be caused by sitting or standing up too quickly. This is called orthostatic hypotension. Syncope may also be due to a heart valve problem, an abnormal heart rhythm, or other heart problems. Dizziness can also happen from stroke, hemorrhage in the brain, or other problems in the brain. Your healthcare provider may do certain tests to rule out these conditions.  Other causes  Other causes include:    Medicines. Certain medicines can cause dizziness and even fainting. In some cases, stopping a medicine too quickly can lead to withdrawal symptoms, including dizziness and fainting.    Anxiety. Being anxious can lead to breathing changes, such as hyperventilation. These can lead to dizziness and fainting.  Additional causes for dizziness and fainting also exist. Talk to your healthcare provider for more information.     Date Last Reviewed: 10/6/2015    6029-0887 [employer]. [address] [place] [address]. All rights reserved. This information is not intended as a substitute for professional medical care. Always follow your healthcare professional's instructions.          Your next 10 appointments already scheduled     Oct 05, 2018  8:00 AM CDT   Office Visit with Arelis Schaefer MD   [facility] ([facility])    [address] [place] [id number] [phone]           Bring a current list of meds and any records pertaining to this visit. For Physicals, please bring immunization records and any forms needing to be filled out. Please arrive 10 minutes early to complete  paperwork.              24 Hour Appointment Hotline       To make an appointment at any [facility], call [phone] ([phone]). If you don't have a family doctor or clinic, we will help you find one. [facility] clinics are conveniently located to serve the needs of you and your family.             Review of your medicines      Our records show that you are taking the medicines listed below. If these are incorrect, please call your family doctor or clinic.        Dose / Directions Last dose taken    IBUPROFEN PO   Dose:  600 mg        Take 600 mg by mouth every 6 hours as needed for moderate pain   Refills:  0        levothyroxine 88 MCG tablet   Commonly known as:  SYNTHROID/LEVOTHROID   Dose:  88 mcg   Quantity:  90 tablet        Take 1 tablet (88 mcg) by mouth daily   Refills:  3        tamoxifen 20 MG tablet   Commonly known as:  NOLVADEX   Dose:  20 mg   Quantity:  90 tablet        Take 1 tablet (20 mg) by mouth daily   Refills:  1                Procedures and tests performed during your visit     Procedure/Test Number of Times Performed    CBC with platelets differential 1    CRP inflammation 1    CT Head w/o Contrast 1    Comprehensive metabolic panel 1    EKG 12-lead, tracing only 1    MR Brain w/o & w Contrast 1    Peripheral IV catheter 1    T4 free 2    TSH with free T4 reflex 1      Orders Needing Specimen Collection     None      Pending Results     Date and Time Order Name Status Description    9/26/2018 1605 T4 free In process             Pending Culture Results     No orders found from 9/24/2018 to 9/27/2018.            Pending Results Instructions     If you had any lab results that were not finalized at the time of your Discharge, you can call the ED Lab Result RN at [phone]. You will be contacted by this team for any positive Lab results or changes in treatment. The nurses are available 7 days a week from 10A to 6:30P.  You can leave a message 24 hours per day and they will  "return your call.        Thank you for choosing [facility]       Thank you for choosing [facility] for your care. Our goal is always to provide you with excellent care. Hearing back from our patients is one way we can continue to improve our services. Please take a few minutes to complete the written survey that you may receive in the mail after you visit with us. Thank you!        [employer] Information     [employer] lets you send messages to your doctor, view your test results, renew your prescriptions, schedule appointments and more. To sign up, go to [email or web address] . Click on \"Log in\" on the left side of the screen, which will take you to the Welcome page. Then click on \"Sign up Now\" on the right side of the page.     You will be asked to enter the access code listed below, as well as some personal information. Please follow the directions to create your username and password.     Your access code is: [email or web address]  Expires: 2018  3:47 PM     Your access code will  in 90 days. If you need help or a new code, please call your [facility] clinic or [phone].        Care EveryWhere ID     This is your Care EveryWhere ID. This could be used by other organizations to access your [facility] medical records  [id number]        Equal Access to Services     REYNA SINGH : Mohsen Betancourt, waaxda luqadaha, qaybta kaalmada adeegyada, danay irwin. So [facility] [phone].    ATENCIÓN: Si habla español, tiene a jang disposición servicios gratuitos de asistencia lingüística. Llame al [phone].    We comply with applicable federal civil rights laws and [place] laws. We do not discriminate on the basis of race, color, national origin, age, disability, sex, sexual orientation, or gender identity.            After Visit Summary       This is your record. Keep this with you and show to your community pharmacist(s) and doctor(s) at your next visit.                  "

## 2018-09-26 NOTE — TELEPHONE ENCOUNTER
Emma Nunn is a 52 year old female who call with concerning symptoms.  She states that she was sitting at her desk at work and 30 minutes ago she blacked out.  She states she feels confused and is having a hard time putting her words together..    Allergies:   Allergies   Allergen Reactions     No Known Drug Allergies          RECOMMENDED DISPOSITION:  To ED, another person to drive - She will have her  bring her.  Will comply with recommendation: Yes  If further questions/concerns or if symptoms do not improve, worsen or new symptoms develop, call your PCP or [facility] as soon as possible.      Guideline used:  Telephone Triage Protocols for Nurses, Fifth Edition, Delmi Tolentino RN

## 2018-09-26 NOTE — ED AVS SNAPSHOT
[facility] Emergency Department    [address] [place] [address]    Phone:  [phone]    Fax:  [phone]                                       Emma Nunn   MRN: [id number]    Department:  [facility] Emergency Department   Date of Visit:  9/26/2018           After Visit Summary Signature Page     I have received my discharge instructions, and my questions have been answered. I have discussed any challenges I see with this plan with the nurse or doctor.    ..........................................................................................................................................  Patient/Patient Representative Signature      ..........................................................................................................................................  Patient Representative Print Name and Relationship to Patient    ..................................................               ................................................  Date                                   Time    ..........................................................................................................................................  Reviewed by Signature/Title    ...................................................              ..............................................  Date                                               Time          22EPIC Rev 08/18

## 2018-09-26 NOTE — TELEPHONE ENCOUNTER
Reason for call:  Patient reporting a symptom    Symptom or request: Was seen a week ago and was told she had low blood sugar. Now today her hands and feet are tingling, she is light headed and confused and almost passed out.     Duration (how long have symptoms been present): confusion just started a little bit ago today    Have you been treated for this before? Yes    Additional comments:     Phone Number patient can be reached at:  Home number on file [phone] (home)    Best Time:  any    Can we leave a detailed message on this number:  YES    Call taken on 9/26/2018 at 2:27 PM by Malina Rodrigez

## 2018-09-27 NOTE — DISCHARGE INSTRUCTIONS
Possible Causes of Dizziness or Fainting    Dizziness and fainting can have many causes. Below are some examples of possible causes your healthcare provider will look to rule out.  Benign paroxysmal positional vertigo (BPPV)  BPPV results when calcium crystals inside the inner ear shift into the wrong position. BPPV causes episodes of vertigo (a spinning sensation). Episodes most often happen when the head is moved in a certain way. This is more common in people 65 and older.   Infection or inflammation  The semicircular canals of the ear may become infected or inflamed. In this case, they can send the wrong balance signals. This can cause vertigo.  Meniere disease  Meniere disease happens when there is too much fluid in the semicircular canals. This can cause vertigo. It also can cause hearing problems and buzzing or ringing in the ears (called tinnitus). You may also have a feeling of pressure or fullness in the ear.  Syncope  Syncope is fainting that happens when the brain doesn t get enough oxygen-rich blood. It can be caused by low heart rate or low blood pressure. This is called vasovagal syncope. It can also be caused by sitting or standing up too quickly. This is called orthostatic hypotension. Syncope may also be due to a heart valve problem, an abnormal heart rhythm, or other heart problems. Dizziness can also happen from stroke, hemorrhage in the brain, or other problems in the brain. Your healthcare provider may do certain tests to rule out these conditions.  Other causes  Other causes include:    Medicines. Certain medicines can cause dizziness and even fainting. In some cases, stopping a medicine too quickly can lead to withdrawal symptoms, including dizziness and fainting.    Anxiety. Being anxious can lead to breathing changes, such as hyperventilation. These can lead to dizziness and fainting.  Additional causes for dizziness and fainting also exist. Talk to your healthcare provider for more  information.     Date Last Reviewed: 10/6/2015    6352-5865 [employer]. [address] [place] [address], [place] [address]. All rights reserved. This information is not intended as a substitute for professional medical care. Always follow your healthcare professional's instructions.

## 2018-09-28 DIAGNOSIS — Z17.0 MALIGNANT NEOPLASM OF UPPER-OUTER QUADRANT OF LEFT BREAST IN FEMALE, ESTROGEN RECEPTOR POSITIVE (H): ICD-10-CM

## 2018-09-28 DIAGNOSIS — C50.412 MALIGNANT NEOPLASM OF UPPER-OUTER QUADRANT OF LEFT BREAST IN FEMALE, ESTROGEN RECEPTOR POSITIVE (H): ICD-10-CM

## 2018-09-28 RX ORDER — TAMOXIFEN CITRATE 20 MG/1
TABLET ORAL
Qty: 90 TABLET | Refills: 1 | Status: SHIPPED | OUTPATIENT
Start: 2018-09-28 | End: 2018-10-05

## 2018-09-28 NOTE — TELEPHONE ENCOUNTER
Tamoxifen 20 MG      Last Written Prescription Date:  3/6/18  Last Fill Quantity: 90,   # refills: 1  Last Office Visit: 5/24/17  Future Office visit:    Next 5 appointments (look out 90 days)     Oct 05, 2018  8:00 AM CDT   [facility]    [address] [place] [id number]   [phone]                   Routing refill request to provider for review/approval because:  Drug not on the [facility], [facility] or  Health refill protocol or controlled substance

## 2018-10-05 ENCOUNTER — OFFICE VISIT (OUTPATIENT)
Dept: FAMILY MEDICINE | Facility: CLINIC | Age: 53
End: 2018-10-05
Payer: COMMERCIAL

## 2018-10-05 VITALS
HEART RATE: 89 BPM | BODY MASS INDEX: 20.68 KG/M2 | TEMPERATURE: 97.1 F | OXYGEN SATURATION: 100 % | DIASTOLIC BLOOD PRESSURE: 64 MMHG | WEIGHT: 134 LBS | SYSTOLIC BLOOD PRESSURE: 118 MMHG

## 2018-10-05 DIAGNOSIS — C50.412 MALIGNANT NEOPLASM OF UPPER-OUTER QUADRANT OF LEFT BREAST IN FEMALE, ESTROGEN RECEPTOR POSITIVE (H): ICD-10-CM

## 2018-10-05 DIAGNOSIS — R20.2 PARESTHESIAS: ICD-10-CM

## 2018-10-05 DIAGNOSIS — E03.4 HYPOTHYROIDISM DUE TO ACQUIRED ATROPHY OF THYROID: ICD-10-CM

## 2018-10-05 DIAGNOSIS — Z17.0 MALIGNANT NEOPLASM OF UPPER-OUTER QUADRANT OF LEFT BREAST IN FEMALE, ESTROGEN RECEPTOR POSITIVE (H): ICD-10-CM

## 2018-10-05 DIAGNOSIS — R42 LIGHTHEADEDNESS: Primary | ICD-10-CM

## 2018-10-05 DIAGNOSIS — R23.2 HOT FLASHES: ICD-10-CM

## 2018-10-05 DIAGNOSIS — M62.838 MUSCLE SPASM: ICD-10-CM

## 2018-10-05 PROCEDURE — 99214 OFFICE O/P EST MOD 30 MIN: CPT | Performed by: FAMILY MEDICINE

## 2018-10-05 ASSESSMENT — PAIN SCALES - GENERAL: PAINLEVEL: NO PAIN (0)

## 2018-10-05 NOTE — MR AVS SNAPSHOT
"              After Visit Summary   10/5/2018    Emma Nunn    MRN: [id number]           Patient Information     Date Of Birth          1965        Visit Information        Provider Department      10/5/2018 8:00 AM Arelis Schaefer MD [facility]         Follow-ups after your visit        Who to contact     If you have questions or need follow up information about today's clinic visit or your schedule please contact [facility] directly at [phone].  Normal or non-critical lab and imaging results will be communicated to you by MyChart, letter or phone within 4 business days after the clinic has received the results. If you do not hear from us within 7 days, please contact the clinic through [employer] or phone. If you have a critical or abnormal lab result, we will notify you by phone as soon as possible.  Submit refill requests through [employer] or call your pharmacy and they will forward the refill request to us. Please allow 3 business days for your refill to be completed.          Additional Information About Your Visit        [facility] Information     IS[employer]X lets you send messages to your doctor, view your test results, renew your prescriptions, schedule appointments and more. To sign up, go to [email or web address] . Click on \"Log in\" on the left side of the screen, which will take you to the Welcome page. Then click on \"Sign up Now\" on the right side of the page.     You will be asked to enter the access code listed below, as well as some personal information. Please follow the directions to create your username and password.     Your access code is: [email or web address]  Expires: 2018  3:47 PM     Your access code will  in 90 days. If you need help or a new code, please call your [facility] or [phone].        Care EveryWhere ID     This is your Care EveryWhere ID. This could be used by other organizations to access your [facility] medical " records  [id number]        Your Vitals Were     Pulse Temperature Last Period Pulse Oximetry Breastfeeding? BMI (Body Mass Index)    89 97.1  F (36.2  C) (Temporal) (LMP Unknown) 100% No 20.68 kg/m2       Blood Pressure from Last 3 Encounters:   10/05/18 118/64   09/26/18 91/67   09/11/18 100/54    Weight from Last 3 Encounters:   10/05/18 134 lb (60.8 kg)   09/26/18 133 lb 11.2 oz (60.6 kg)   09/11/18 132 lb 4.8 oz (60 kg)              Today, you had the following     No orders found for display       Primary Care Provider Office Phone # Fax #    Arelis Nhi Schaefer [phone] [address] [place] [address]        Equal Access to Services     GLORY SINGH : Hadii aad ku hadasho Soomaali, waaxda luqadaha, qaybta kaalmada adesengyalakeisha, danay kauffman . So [facility] [phone].    ATENCIÓN: Si habla español, tiene a jang disposición servicios gratuitos de asistencia lingüística. [place] al [phone].    We comply with applicable federal civil rights laws and [place] laws. We do not discriminate on the basis of race, color, national origin, age, disability, sex, sexual orientation, or gender identity.            Thank you!     Thank you for choosing [facility]  for your care. Our goal is always to provide you with excellent care. Hearing back from our patients is one way we can continue to improve our services. Please take a few minutes to complete the written survey that you may receive in the mail after your visit with us. Thank you!             Your Updated Medication List - Protect others around you: Learn how to safely use, store and throw away your medicines at www.disposemymeds.org.          This list is accurate as of 10/5/18  9:24 AM.  Always use your most recent med list.                   Brand Name Dispense Instructions for use Diagnosis    IBUPROFEN PO      Take 600 mg by mouth every 6 hours as needed for moderate pain         levothyroxine 88 MCG tablet    SYNTHROID/LEVOTHROID    90 tablet    Take 1 tablet (88 mcg) by mouth daily    Malignant neoplasm of upper-outer quadrant of left breast in female, estrogen receptor positive (H)       tamoxifen 20 MG tablet    NOLVADEX    90 tablet    Take 1 tablet (20 mg) by mouth daily    Malignant neoplasm of upper-outer quadrant of left breast in female, estrogen receptor positive (H)
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
## 2019-01-20 ENCOUNTER — HOSPITAL ENCOUNTER (EMERGENCY)
Facility: CLINIC | Age: 54
Discharge: HOME | End: 2019-01-20
Attending: EMERGENCY MEDICINE | Admitting: EMERGENCY MEDICINE
Payer: COMMERCIAL

## 2019-01-20 ENCOUNTER — APPOINTMENT (OUTPATIENT)
Dept: CT IMAGING | Facility: CLINIC | Age: 54
End: 2019-01-20
Payer: COMMERCIAL

## 2019-01-20 VITALS
WEIGHT: 123.46 LBS | RESPIRATION RATE: 14 BRPM | DIASTOLIC BLOOD PRESSURE: 73 MMHG | SYSTOLIC BLOOD PRESSURE: 117 MMHG | BODY MASS INDEX: 19.05 KG/M2 | TEMPERATURE: 97 F | OXYGEN SATURATION: 100 %

## 2019-01-20 DIAGNOSIS — R10.9 ACUTE RIGHT FLANK PAIN: ICD-10-CM

## 2019-01-20 LAB
ALBUMIN UR-MCNC: NEGATIVE MG/DL
APPEARANCE UR: CLEAR
BACTERIA #/AREA URNS HPF: ABNORMAL /HPF
BILIRUB UR QL STRIP: NEGATIVE
COLOR UR AUTO: COLORLESS
GLUCOSE UR STRIP-MCNC: NEGATIVE MG/DL
HGB UR QL STRIP: ABNORMAL
KETONES UR STRIP-MCNC: NEGATIVE MG/DL
LEUKOCYTE ESTERASE UR QL STRIP: NEGATIVE
MUCOUS THREADS #/AREA URNS LPF: PRESENT /LPF
NITRATE UR QL: NEGATIVE
PH UR STRIP: 6 PH (ref 5–7)
RBC #/AREA URNS AUTO: <1 /HPF (ref 0–2)
SOURCE: ABNORMAL
SP GR UR STRIP: 1 (ref 1–1.03)
UROBILINOGEN UR STRIP-MCNC: 0 MG/DL (ref 0–2)
WBC #/AREA URNS AUTO: <1 /HPF (ref 0–5)

## 2019-01-20 PROCEDURE — 99284 EMERGENCY DEPT VISIT MOD MDM: CPT | Mod: Z6 | Performed by: EMERGENCY MEDICINE

## 2019-01-20 PROCEDURE — 81001 URINALYSIS AUTO W/SCOPE: CPT | Performed by: EMERGENCY MEDICINE

## 2019-01-20 PROCEDURE — 74176 CT ABD & PELVIS W/O CONTRAST: CPT

## 2019-01-20 PROCEDURE — 99284 EMERGENCY DEPT VISIT MOD MDM: CPT | Mod: 25 | Performed by: EMERGENCY MEDICINE

## 2019-01-20 PROCEDURE — 87088 URINE BACTERIA CULTURE: CPT | Performed by: EMERGENCY MEDICINE

## 2019-01-20 PROCEDURE — 87086 URINE CULTURE/COLONY COUNT: CPT | Performed by: EMERGENCY MEDICINE

## 2019-01-20 PROCEDURE — 87186 SC STD MICRODIL/AGAR DIL: CPT | Performed by: EMERGENCY MEDICINE

## 2019-01-20 ASSESSMENT — ENCOUNTER SYMPTOMS
FLANK PAIN: 1
SHORTNESS OF BREATH: 0
ABDOMINAL PAIN: 0
FREQUENCY: 1
HEMATURIA: 0
FEVER: 0

## 2019-01-20 NOTE — ED TRIAGE NOTES
Presents with left back pain, urinary frequency, burning. States she does not feel like she is emptying her bladder when she urinates.

## 2019-01-20 NOTE — DISCHARGE INSTRUCTIONS
Continue to observe for any new symptoms such as fever, or intense discomfort, blood in urine.  If noted would recommend returning to the ED at the clinic.  There is no identified intra-abdominal or pelvic process for this right flank pain including no signs for urinary tract infection nor kidney stone.  CT imaging was unremarkable.  Will process a urine culture that you have been having ongoing burning with urination.  Activity as tolerated.  Use ibuprofen or Tylenol for discomfort.

## 2019-01-20 NOTE — ED AVS SNAPSHOT
[facility] Emergency Department  [address] [place] [address]  Phone:  [phone]  Fax:  [phone]                                    Emma Nunn   MRN: [id number]    Department:  [facility] Emergency Department   Date of Visit:  1/20/2019           After Visit Summary Signature Page    I have received my discharge instructions, and my questions have been answered. I have discussed any challenges I see with this plan with the nurse or doctor.    ..........................................................................................................................................  Patient/Patient Representative Signature      ..........................................................................................................................................  Patient Representative Print Name and Relationship to Patient    ..................................................               ................................................  Date                                   Time    ..........................................................................................................................................  Reviewed by Signature/Title    ...................................................              ..............................................  Date                                               Time          22EPIC Rev 08/18

## 2019-01-20 NOTE — ED PROVIDER NOTES
History     Chief Complaint   Patient presents with     Urinary Frequency     HPI     Emma Nunn is a 53 year old female who presents for evaluation of the left flank pain.  Reports yesterday around noon she had sudden onset of left flank discomfort.  Since that time she has had urinary frequency.  She does report mechanical low back pain.  Worse with bending twisting and moving.  She does not have any prior history for chronic low back pain just intermittent low back pain.  She has had no increased activity or fall/trauma to incite acute low back pain.  No previous history for renal colic.  She has had no fever, chills or night sweats.  Rates pain 6/10 intensity.  Has not taken anything for discomfort.  She is noted no gross hematuria.    Allergies:  Allergies   Allergen Reactions     No Known Drug Allergies        Problem List:    Patient Active Problem List    Diagnosis Date Noted     Concussion with loss of consciousness, unspecified duration, subsequent encounter 05/10/2017     Priority: Medium     Cervical cancer screening 12/02/2016     Priority: Medium     5/27/15 NIL paps  1/19/16 Breast cancer.   11/25/16 NIL pap/neg HR HPV. Plan: pap in 3 years.         Hypothyroidism, unspecified type 11/25/2016     Priority: Medium     Anxiety 04/24/2015     Priority: Medium     Shoulder joint pain 04/23/2015     Priority: Medium     Malignant neoplasm of female breast (H) 10/02/2014     Priority: Medium     Problem list name updated by automated process. Provider to review       Hypotension 12/17/2013     Priority: Medium     Drug-induced neutropenia (H) 12/16/2013     Priority: Medium     Problem list name updated by automated process. Provider to review       Dehydration 11/19/2013     Priority: Medium     Cancer associated pain 11/19/2013     Priority: Medium     Joint pain 09/24/2012     Priority: Medium     CARDIOVASCULAR SCREENING; LDL GOAL LESS THAN 160 10/31/2010     Priority: Medium     Closed head injury  2008     Priority: Medium     Cervicalgia 2008     Priority: Medium     External hemorrhoids 2003     Priority: Medium     Problem list name updated by automated process. Provider to review       Anal fissure      Priority: Medium     Hypothyroidism      Priority: Medium     Problem list name updated by automated process. Provider to review          Past Medical History:    Past Medical History:   Diagnosis Date     Breast cancer (H) 2013     Diffuse cystic mastopathy      Endometriosis      External hemorrhoids without mention of complication      Unspecified hypothyroidism        Past Surgical History:    Past Surgical History:   Procedure Laterality Date     BREAST BIOPSY, CORE RT/LT Left 2013    cancer     C NONSPECIFIC PROCEDURE      Outpatient surgery for anal fissure     COLONOSCOPY N/A 2014    normal, repeat 5 years due to family risk     LAPAROSCOPIC CHOLECYSTECTOMY N/A 2017    Procedure: LAPAROSCOPIC CHOLECYSTECTOMY;  Laparoscopic Cholecystectomy;  Surgeon: Jason Munoz MD;  Location: [facility]     LAPAROSCOPY      for endometriosis     LUMPECTOMY BREAST WITH SENTINEL NODE, COMBINED  2013    Procedure: COMBINED LUMPECTOMY BREAST WITH SENTINEL NODE;  Left Breast Lumpectomy with Dixon Node Biopsy;  Surgeon: Jason Munoz MD;  Location: [facility] OR       Family History:    Family History   Problem Relation Age of Onset     Cancer Other 55        Breast, living     Cancer Paternal Aunt 50        Colon,      Cancer Paternal Grandmother         Colon,  86 years old     Osteoporosis Mother      Heart Disease Mother         heart disease     Hypertension Mother      Arthritis Mother      Osteoporosis Maternal Grandfather      Heart Disease Maternal Grandfather         heart attack     Heart Disease Maternal Grandmother         Bypass and heart attack     Heart Disease Sister         heart disease/compl. pneumonia     Genetic Disorder Sister          down's Syn       Social History:  Marital Status:   [2]  Social History     Tobacco Use     Smoking status: Never Smoker     Smokeless tobacco: Never Used   Substance Use Topics     Alcohol use: No     Alcohol/week: 0.0 oz     Drug use: No        Medications:      IBUPROFEN PO   levothyroxine (SYNTHROID/LEVOTHROID) 88 MCG tablet   tamoxifen (NOLVADEX) 20 MG tablet         Review of Systems   Constitutional: Negative for fever.   Respiratory: Negative for shortness of breath.    Cardiovascular: Negative for chest pain.   Gastrointestinal: Negative for abdominal pain.   Genitourinary: Positive for flank pain, frequency and urgency. Negative for hematuria and vaginal discharge.   All other systems reviewed and are negative.        Physical Exam   BP: 117/73  Heart Rate: 60  Temp: 97  F (36.1  C)  Resp: 14  Weight: 56 kg (123 lb 7.3 oz)  SpO2: 100 %      Physical Exam   Constitutional: She is oriented to person, place, and time. She appears well-developed and well-nourished. No distress.   HENT:   Head: Normocephalic and atraumatic.   Eyes: No scleral icterus.   Neck: Normal range of motion. Neck supple.   Cardiovascular: Normal rate and regular rhythm.   Pulmonary/Chest: Effort normal and breath sounds normal.   Abdominal: Soft. She exhibits no distension. There is no tenderness.   Musculoskeletal:   Patient was slow to move from sitting to standing position.  Loss of lordotic curve consistent with paralumbar muscle spasm was more evident on the right during palpation.  She had a restrictive lumbar range of motion due to a mechanical type low back pain.  She had no radicular symptoms with SLR testing negative bilateral.   Neurological: She is alert and oriented to person, place, and time.   Skin: Skin is warm and dry. No rash noted. She is not diaphoretic. No erythema. No pallor.   Nursing note and vitals reviewed.           ED Course        Procedures                   Results for orders placed or  performed during the hospital encounter of 01/20/19 (from the past 24 hour(s))   UA reflex to Microscopic and Culture   Result Value Ref Range    Color Urine Colorless     Appearance Urine Clear     Glucose Urine Negative NEG^Negative mg/dL    Bilirubin Urine Negative NEG^Negative    Ketones Urine Negative NEG^Negative mg/dL    Specific Gravity Urine 1.004 1.003 - 1.035    Blood Urine Small (A) NEG^Negative    pH Urine 6.0 5.0 - 7.0 pH    Protein Albumin Urine Negative NEG^Negative mg/dL    Urobilinogen mg/dL 0.0 0.0 - 2.0 mg/dL    Nitrite Urine Negative NEG^Negative    Leukocyte Esterase Urine Negative NEG^Negative    Source Midstream Urine     RBC Urine <1 0 - 2 /HPF    WBC Urine <1 0 - 5 /HPF    Bacteria Urine Few (A) NEG^Negative /HPF    Mucous Urine Present (A) NEG^Negative /LPF   CT Abdomen Pelvis w/o Contrast    Narrative    CT ABDOMEN AND PELVIS WITHOUT CONTRAST  1/20/2019 11:04 AM     HISTORY: Hematuria, unknown cause. Sudden right flank pain. Onset noon  yesterday.    Radiation dose for this scan was reduced using automated exposure  control, adjustment of the mA and/or kV according to patient size, or  iterative reconstruction technique.    FINDINGS: No renal stone or hydronephrosis is demonstrated. No  ureteral dilatation or ureteral stone is identified. There is a normal  retrocecal appendix. Cholecystectomy surgical clips are noted. The  liver and spleen are unremarkable for the unenhanced technique. No  evidence of bowel obstruction or pericolonic inflammatory stranding.  Pelvic contents are grossly unremarkable for the unenhanced technique.  The uterus appears to be retroverted.      Impression    IMPRESSION: No evidence of urinary tract stone or hydronephrosis. No  unenhanced CT evidence of an acute inflammatory process in the abdomen  or pelvis.    NAVID MOORE MD       Medications - No data to display    Assessments & Plan (with Medical Decision Making)   53-year-old female presents with sudden  onset of right flank pain.  Around noon yesterday.  She has had some complaint of urinary urgency and a sense of urinary frequency.  She had no fever, chills or any evidence for gross hematuria.  She has had some occasional acute low back pain but denies any recent low back injury or fall.  She is never had any renal colic/stone she presented question if she might have a urinary tract infection.  Her examination of that she was afebrile.  Vital signs were normal.  She had mechanical low back pain easily reproduced with the repositioning and she had loss of lordotic curve due to acute paralumbar muscle spasm.  Her abdominal examination was benign.  Lungs were clear to auscultation no respiratory splinting.  There is no rash of zoster.  Urinalysis did not show any evidence for infection.  She had a trace of blood on dipstick but not under microscopic review.  With the sudden onset of her discomfort and a family history for renal lithiasis I elected to do a CT stone run which did not show any acute pathology.  There is no perinephric stranding.  There is a normal-appearing kidney and ureter with no signs for any stone.  The remainder of the abdominal cavity looked normal absent of a gallbladder.  With no evidence for urinary tract infection pending UC, no evidence for renal colic/stone and examination findings consistent with myofascial cause for low back I did not feel any further workup is necessary at this time.  She did not wanting for pain.  She discharged home instruction to apply ice to the low back to light stretching and take some ibuprofen.  Is not improving or if she develops any new symptoms such as fever, worsening she should return the emergency department for reassessment.     I have reviewed the nursing notes.    I have reviewed the findings, diagnosis, plan and need for follow up with the patient.         Medication List      There are no discharge medications for this visit.         Final diagnoses:    Acute right flank pain       1/20/2019   [facility] EMERGENCY DEPARTMENT     Mckinley Tolentino,   01/20/19 2216

## 2019-01-21 LAB
BACTERIA SPEC CULT: ABNORMAL
Lab: ABNORMAL
SPECIMEN SOURCE: ABNORMAL

## 2019-01-22 NOTE — RESULT ENCOUNTER NOTE
Final Urine Culture Report on 1/21/19  Emergency Dept/Urgent Care discharge antibiotic prescribed: None;  Per ED lab result protocol Rx for Nitrofurantoin Macrocrystal-Monohydrate (Macrobid) 100 mg PO capsule, 1 capsule (100 mg) by mouth 2 times daily for 5 days  #1. Bacteria, 50,000 to 100,000 colonies/ml Escherichia coli, is SUSCEPTIBLE to Antibiotic.    As per [place] ED Lab Result protocol, no change in antibiotic therapy.

## 2019-01-28 ENCOUNTER — OFFICE VISIT (OUTPATIENT)
Dept: FAMILY MEDICINE | Facility: CLINIC | Age: 54
End: 2019-01-28
Payer: COMMERCIAL

## 2019-01-28 VITALS
DIASTOLIC BLOOD PRESSURE: 72 MMHG | HEIGHT: 67 IN | HEART RATE: 99 BPM | RESPIRATION RATE: 18 BRPM | SYSTOLIC BLOOD PRESSURE: 118 MMHG | OXYGEN SATURATION: 100 % | WEIGHT: 145 LBS | TEMPERATURE: 98.4 F | BODY MASS INDEX: 22.76 KG/M2

## 2019-01-28 DIAGNOSIS — B02.9 HERPES ZOSTER WITHOUT COMPLICATION: Primary | ICD-10-CM

## 2019-01-28 PROCEDURE — 99213 OFFICE O/P EST LOW 20 MIN: CPT | Performed by: FAMILY MEDICINE

## 2019-01-28 RX ORDER — VALACYCLOVIR HYDROCHLORIDE 1 G/1
1000 TABLET, FILM COATED ORAL 3 TIMES DAILY
Qty: 21 TABLET | Refills: 0 | Status: SHIPPED | OUTPATIENT
Start: 2019-01-28 | End: 2019-04-16

## 2019-01-28 ASSESSMENT — PAIN SCALES - GENERAL: PAINLEVEL: MILD PAIN (2)

## 2019-01-28 ASSESSMENT — MIFFLIN-ST. JEOR: SCORE: 1295.35

## 2019-01-28 NOTE — PROGRESS NOTES
"  SUBJECTIVE:   Emma Nunn is a 53 year old female who presents to clinic today for the following health issues:      Shingles, right side of neck.         Problem list and histories reviewed & adjusted, as indicated.  Additional history: as documented        Reviewed and updated as needed this visit by clinical staff  Tobacco  Allergies  Meds  Problems  Med Hx  Surg Hx  Fam Hx       Reviewed and updated as needed this visit by Provider  Tobacco  Allergies  Meds  Problems  Med Hx  Surg Hx  Fam Hx         Patient here for evaluation of a rash on the right side of the posterior neck that started 2 days ago and has progressively worsened.  She also has discomfort on the occiput and going down but she is not sure if there is a rash there or not.  No weeping or discharge and only having pain if the rash is contacted.  Chickenpox disease when she was younger.  She has not been vaccinated for shingles.    ROS:  10 point ROS of systems including Constitutional, Eyes, HENT, Respiratory, Cardiovascular, Gastroenterology, Genitourinary, Integumentary, Muscularskeletal, Psychiatric were all negative except for pertinent positives noted in my HPI.     OBJECTIVE:   /72   Pulse 99   Temp 98.4  F (36.9  C) (Temporal)   Resp 18   Ht 1.702 m (5' 7\")   Wt 65.8 kg (145 lb)   SpO2 100%   BMI 22.71 kg/m    Body mass index is 22.71 kg/m .  Physical Exam   Constitutional: She appears well-developed and well-nourished.   Cardiovascular: Normal rate, regular rhythm, S1 normal, S2 normal and normal heart sounds.   No murmur heard.  Pulmonary/Chest: Effort normal and breath sounds normal. No respiratory distress. She has no wheezes. She has no rhonchi. She has no rales.   Neurological: She is alert.   Skin:   Right side of the neck is a grouping of erythematous vesicles consistent with shingles on the right posterior neck.       ASSESSMENT/PLAN:       ICD-10-CM    1. Herpes zoster without complication B02.9 " valACYclovir (VALTREX) 1000 mg tablet     PLAN:  1.  Treatment with Valtrex for 7 days.  Discussed Tylenol/ibuprofen for pain management.  She will contact us if her symptoms worsen or fail to improve.  Did recommend a shingles vaccine when she gets better.    Follow up with Provider - Return for recheck if symptoms worsen or fail to improve.      Richi Quinones MD   [facility]

## 2019-01-28 NOTE — PATIENT INSTRUCTIONS
Patient Education     Shingles  Shingles is a viral infection caused by the same virus that causes chicken pox. Anyone who has had chicken pox may get shingles later in life. The virus stays in the body, but remains asleep (dormant). Shingles often occurs in older persons or persons with lowered immunity. But it can affect anyone at any age.  Shingles starts as a tingling patch of skin on one side of the body. Small, painful blisters may then appear. The rash rarely spreads to other parts of the body.  Exposure to shingles can't cause shingles. However, it can cause chicken pox in anyone who has not had chicken pox or has not been vaccinated. The contagious period ends when all blisters have crusted over, generally 1 to 2 weeks after the illness starts.  After the blisters heal, the affected skin may be sensitive or painful for weeks or months, gradually resolving over time. But, sometimes this can last longer and be permanent (called postherpetic neuralgia.)  Shingles vaccines are available. Vaccination can help prevent shingles or make it less painful. It is generally recommended for adults older than 50, even if you've had singles in the past. Talk with your healthcare provider about when to get vaccinated and which vaccine is best for you.  Home care    Medicines may be prescribed to help relieve pain. Take these medicines as directed. Ask your healthcare provider or pharmacist before using over-the-counter medicines for helping treat pain and itching.    In certain cases, antiviral medicines may be prescribed to reduce pain, shorten the illness, and prevent neuralgia. Take these medicines as directed.    Compresses made from a solution of cool water mixed with cornstarch or baking soda may help relieve pain and itching.     Gently wash skin daily with soap and water to help prevent infection. Be certain to rinse off all of the soap, which can be irritating.    Trim fingernails and try not to scratch.  Scratching the sores may leave scars.    Stay home from work or school until all blisters have formed a crust and you are no longer contagious.  Follow-up care  Follow up with your healthcare provider, or as directed.  When to seek medical advice    Fever of 100.4 F (38 C) or higher, or as directed by your healthcare provider    Affected skin is on the face or neck and any of the following occur:  ? Headache  ? Eye pain  ? Changes in vision  ? Sores near the eye  ? Weakness of facial muscles    Blisters occurring on new areas of the body    Pain, redness, or swelling of a joint    Signs of skin infection: colored drainage from the sores, warmth, increasing redness, fever, or increasing pain   Date Last Reviewed: 4/1/2018 2000-2018 [employer]. [address] [place] [address], [place] [address]. All rights reserved. This information is not intended as a substitute for professional medical care. Always follow your healthcare professional's instructions.           Ibuprofen 800 mg every 8 hours with or without acetaminophen 1000 mg every 6 hours.

## 2019-02-18 ENCOUNTER — OFFICE VISIT (OUTPATIENT)
Dept: OTOLARYNGOLOGY | Facility: CLINIC | Age: 54
End: 2019-02-18
Payer: COMMERCIAL

## 2019-02-18 ENCOUNTER — OFFICE VISIT (OUTPATIENT)
Dept: AUDIOLOGY | Facility: CLINIC | Age: 54
End: 2019-02-18
Payer: COMMERCIAL

## 2019-02-18 VITALS
SYSTOLIC BLOOD PRESSURE: 91 MMHG | DIASTOLIC BLOOD PRESSURE: 63 MMHG | BODY MASS INDEX: 21.77 KG/M2 | WEIGHT: 139 LBS | OXYGEN SATURATION: 100 % | HEART RATE: 77 BPM

## 2019-02-18 DIAGNOSIS — H90.71 MIXED CONDUCTIVE AND SENSORINEURAL HEARING LOSS OF RIGHT EAR WITH UNRESTRICTED HEARING OF LEFT EAR: Primary | ICD-10-CM

## 2019-02-18 DIAGNOSIS — H90.11 CONDUCTIVE HEARING LOSS OF RIGHT EAR WITH UNRESTRICTED HEARING OF LEFT EAR: Primary | ICD-10-CM

## 2019-02-18 PROCEDURE — 99203 OFFICE O/P NEW LOW 30 MIN: CPT | Performed by: OTOLARYNGOLOGY

## 2019-02-18 PROCEDURE — 92557 COMPREHENSIVE HEARING TEST: CPT | Performed by: AUDIOLOGIST

## 2019-02-18 PROCEDURE — 92550 TYMPANOMETRY & REFLEX THRESH: CPT | Performed by: AUDIOLOGIST

## 2019-02-18 PROCEDURE — 99207 ZZC NO CHARGE LOS: CPT | Performed by: AUDIOLOGIST
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
## 2019-02-18 NOTE — PROGRESS NOTES
AUDIOLOGY REPORT     SUMMARY: Audiology visit completed. See audiogram for results.     RECOMMENDATIONS: Follow-up with ENT    Mayte Wynne Licensed Audiologist #[id number]

## 2019-03-22 DIAGNOSIS — C50.412 MALIGNANT NEOPLASM OF UPPER-OUTER QUADRANT OF LEFT BREAST IN FEMALE, ESTROGEN RECEPTOR POSITIVE (H): ICD-10-CM

## 2019-03-22 DIAGNOSIS — Z17.0 MALIGNANT NEOPLASM OF UPPER-OUTER QUADRANT OF LEFT BREAST IN FEMALE, ESTROGEN RECEPTOR POSITIVE (H): ICD-10-CM

## 2019-03-25 RX ORDER — TAMOXIFEN CITRATE 20 MG/1
TABLET ORAL
Qty: 90 TABLET | Refills: 1 | Status: SHIPPED | OUTPATIENT
Start: 2019-03-25 | End: 2019-04-16

## 2019-03-25 NOTE — TELEPHONE ENCOUNTER
Routing refill request to provider for review/approval because:  A break in medication  Sending to Oncology to fill.  This is a cancer medication.    Last Written Prescription Date:  3/6/18  Last Fill Quantity: 90,  # refills: 1   Last office visit: 1/28/2019 with prescribing provider:  9/11/18 with Oncology   Future Office Visit:          KATH Sanchez, RN    Requested Prescriptions   Pending Prescriptions Disp Refills     tamoxifen (NOLVADEX) 20 MG tablet [Pharmacy Med Name: TAMOXIFEN CITRATE 20MG TABS] 90 tablet 1     Sig: TAKE ONE TABLET BY MOUTH EVERY DAY    There is no refill protocol information for this order

## 2019-04-02 ENCOUNTER — HOSPITAL ENCOUNTER (OUTPATIENT)
Dept: MAMMOGRAPHY | Facility: CLINIC | Age: 54
Discharge: HOME | End: 2019-04-02
Attending: INTERNAL MEDICINE | Admitting: INTERNAL MEDICINE
Payer: COMMERCIAL

## 2019-04-02 DIAGNOSIS — Z12.31 VISIT FOR SCREENING MAMMOGRAM: ICD-10-CM

## 2019-04-02 PROCEDURE — 77063 BREAST TOMOSYNTHESIS BI: CPT

## 2019-04-15 DIAGNOSIS — M62.838 MUSCLE SPASM: ICD-10-CM

## 2019-04-15 DIAGNOSIS — Z17.0 MALIGNANT NEOPLASM OF UPPER-OUTER QUADRANT OF LEFT BREAST IN FEMALE, ESTROGEN RECEPTOR POSITIVE (H): ICD-10-CM

## 2019-04-15 DIAGNOSIS — C50.412 MALIGNANT NEOPLASM OF UPPER-OUTER QUADRANT OF LEFT BREAST IN FEMALE, ESTROGEN RECEPTOR POSITIVE (H): ICD-10-CM

## 2019-04-15 DIAGNOSIS — R20.2 PARESTHESIAS: ICD-10-CM

## 2019-04-15 DIAGNOSIS — R23.2 HOT FLASHES: ICD-10-CM

## 2019-04-15 DIAGNOSIS — E03.4 HYPOTHYROIDISM DUE TO ACQUIRED ATROPHY OF THYROID: ICD-10-CM

## 2019-04-15 LAB
ALBUMIN SERPL-MCNC: 4 G/DL (ref 3.4–5)
ALP SERPL-CCNC: 79 U/L (ref 40–150)
ALT SERPL W P-5'-P-CCNC: 32 U/L (ref 0–50)
ANION GAP SERPL CALCULATED.3IONS-SCNC: 8 MMOL/L (ref 3–14)
AST SERPL W P-5'-P-CCNC: 20 U/L (ref 0–45)
BASOPHILS # BLD AUTO: 0.1 10E9/L (ref 0–0.2)
BASOPHILS NFR BLD AUTO: 0.7 %
BILIRUB SERPL-MCNC: 0.4 MG/DL (ref 0.2–1.3)
BUN SERPL-MCNC: 15 MG/DL (ref 7–30)
CALCIUM SERPL-MCNC: 9.5 MG/DL (ref 8.5–10.1)
CANCER AG27-29 SERPL-ACNC: 26 U/ML (ref 0–39)
CHLORIDE SERPL-SCNC: 105 MMOL/L (ref 94–109)
CO2 SERPL-SCNC: 28 MMOL/L (ref 20–32)
CREAT SERPL-MCNC: 0.77 MG/DL (ref 0.52–1.04)
DIFFERENTIAL METHOD BLD: NORMAL
EOSINOPHIL NFR BLD AUTO: 2 %
ERYTHROCYTE [DISTWIDTH] IN BLOOD BY AUTOMATED COUNT: 12.1 % (ref 10–15)
GFR SERPL CREATININE-BSD FRML MDRD: 88 ML/MIN/{1.73_M2}
GLUCOSE SERPL-MCNC: 95 MG/DL (ref 70–99)
HCT VFR BLD AUTO: 42 % (ref 35–47)
HGB BLD-MCNC: 14.1 G/DL (ref 11.7–15.7)
IMM GRANULOCYTES # BLD: 0 10E9/L (ref 0–0.4)
IMM GRANULOCYTES NFR BLD: 0.6 %
LYMPHOCYTES # BLD AUTO: 2.1 10E9/L (ref 0.8–5.3)
LYMPHOCYTES NFR BLD AUTO: 30.6 %
MCH RBC QN AUTO: 32.5 PG (ref 26.5–33)
MCHC RBC AUTO-ENTMCNC: 33.6 G/DL (ref 31.5–36.5)
MCV RBC AUTO: 97 FL (ref 78–100)
MONOCYTES # BLD AUTO: 0.4 10E9/L (ref 0–1.3)
MONOCYTES NFR BLD AUTO: 5.9 %
NEUTROPHILS # BLD AUTO: 4.2 10E9/L (ref 1.6–8.3)
NEUTROPHILS NFR BLD AUTO: 60.2 %
NRBC # BLD AUTO: 0 10*3/UL
NRBC BLD AUTO-RTO: 0 /100
PLATELET # BLD AUTO: 193 10E9/L (ref 150–450)
POTASSIUM SERPL-SCNC: 4.4 MMOL/L (ref 3.4–5.3)
PROT SERPL-MCNC: 7.4 G/DL (ref 6.8–8.8)
RBC # BLD AUTO: 4.34 10E12/L (ref 3.8–5.2)
SODIUM SERPL-SCNC: 141 MMOL/L (ref 133–144)
T4 FREE SERPL-MCNC: 1.16 NG/DL (ref 0.76–1.46)
TSH SERPL DL<=0.005 MIU/L-ACNC: 7.03 MU/L (ref 0.4–4)
VIT B12 SERPL-MCNC: 469 PG/ML (ref 193–986)
WBC # BLD AUTO: 6.9 10E9/L (ref 4–11)

## 2019-04-15 PROCEDURE — 84443 ASSAY THYROID STIM HORMONE: CPT | Performed by: FAMILY MEDICINE

## 2019-04-15 PROCEDURE — 84439 ASSAY OF FREE THYROXINE: CPT | Performed by: FAMILY MEDICINE

## 2019-04-15 PROCEDURE — 82607 VITAMIN B-12: CPT | Performed by: FAMILY MEDICINE

## 2019-04-15 PROCEDURE — 80053 COMPREHEN METABOLIC PANEL: CPT | Performed by: FAMILY MEDICINE

## 2019-04-15 PROCEDURE — 36415 COLL VENOUS BLD VENIPUNCTURE: CPT | Performed by: FAMILY MEDICINE

## 2019-04-15 PROCEDURE — 85025 COMPLETE CBC W/AUTO DIFF WBC: CPT | Performed by: FAMILY MEDICINE

## 2019-04-15 PROCEDURE — 86300 IMMUNOASSAY TUMOR CA 15-3: CPT | Performed by: FAMILY MEDICINE

## 2019-04-16 ENCOUNTER — TELEPHONE (OUTPATIENT)
Dept: FAMILY MEDICINE | Facility: CLINIC | Age: 54
End: 2019-04-16

## 2019-04-16 ENCOUNTER — VIRTUAL VISIT (OUTPATIENT)
Dept: FAMILY MEDICINE | Facility: CLINIC | Age: 54
End: 2019-04-16
Payer: COMMERCIAL

## 2019-04-16 ENCOUNTER — ONCOLOGY VISIT (OUTPATIENT)
Dept: ONCOLOGY | Facility: CLINIC | Age: 54
End: 2019-04-16
Payer: COMMERCIAL

## 2019-04-16 VITALS
HEIGHT: 67 IN | RESPIRATION RATE: 16 BRPM | HEART RATE: 79 BPM | DIASTOLIC BLOOD PRESSURE: 64 MMHG | TEMPERATURE: 96.1 F | BODY MASS INDEX: 21.02 KG/M2 | SYSTOLIC BLOOD PRESSURE: 88 MMHG | WEIGHT: 133.9 LBS | OXYGEN SATURATION: 100 %

## 2019-04-16 DIAGNOSIS — C50.412 MALIGNANT NEOPLASM OF UPPER-OUTER QUADRANT OF LEFT BREAST IN FEMALE, ESTROGEN RECEPTOR POSITIVE (H): Primary | ICD-10-CM

## 2019-04-16 DIAGNOSIS — Z17.0 MALIGNANT NEOPLASM OF UPPER-OUTER QUADRANT OF LEFT BREAST IN FEMALE, ESTROGEN RECEPTOR POSITIVE (H): Primary | ICD-10-CM

## 2019-04-16 DIAGNOSIS — E03.9 HYPOTHYROIDISM, UNSPECIFIED TYPE: ICD-10-CM

## 2019-04-16 DIAGNOSIS — E03.9 HYPOTHYROIDISM, UNSPECIFIED TYPE: Primary | ICD-10-CM

## 2019-04-16 PROCEDURE — 99214 OFFICE O/P EST MOD 30 MIN: CPT | Performed by: INTERNAL MEDICINE

## 2019-04-16 PROCEDURE — 99441 ZZC PHYSICIAN TELEPHONE EVALUATION 5-10 MIN: CPT | Performed by: FAMILY MEDICINE

## 2019-04-16 RX ORDER — LEVOTHYROXINE SODIUM 100 UG/1
100 TABLET ORAL DAILY
Qty: 90 TABLET | Refills: 0 | Status: SHIPPED | OUTPATIENT
Start: 2019-04-16 | End: 2019-07-15

## 2019-04-16 RX ORDER — TAMOXIFEN CITRATE 20 MG/1
20 TABLET ORAL DAILY
Qty: 90 TABLET | Refills: 1 | Status: SHIPPED | OUTPATIENT
Start: 2019-04-16 | End: 2019-10-16

## 2019-04-16 ASSESSMENT — MIFFLIN-ST. JEOR: SCORE: 1245

## 2019-04-16 NOTE — ASSESSMENT & PLAN NOTE
Emma Nunn was diagnosed at age 47, premenopausally through self-breast check, felt a lump in her left outer upper quadrant. Diagnostic mammogram early 07/2013 identified asymmetrical density associated with palpable findings around 1-2 o'clock position, 8-10 cm from the nipple. Limited sonogram revealed hypoechoic mass with irregular borders, measured about 0.8 cm trocar position. Sonogram-guided biopsy indicating invasive ductal cancer, grade 2, with associated DCIS, ER/SD 90% positive, HER-2/sandy positive, FISH ratio greater than 11.7. she had lumpectomy 7/2013 - 1.3 cm LAURA, GII, + ALI, margin is close <=1 mm, +DCIS, 8LN negative.  She has I3cL6G1 stage I disease. TCH was recommended from 8//15/2013 to 12/2013 and herceptin after. . She finished RT 2/28/2014 and tamoxifen started in 3/2014.

## 2019-04-16 NOTE — PATIENT INSTRUCTIONS
Please follow up with Dr. Ocampo in 6 months.  Please schedule labs 1-7 days prior to follow up appointment.    Lab Date/Time:    Office visit follow up with Dr. Ocampo Date/Time:     If you have any questions or concerns please feel free to call.    If you need to reschedule please call:  Clinic or Lab Appointment - [phone]  Infusion - [phone]  Imaging - [phone]    Fortino Garcia, RN, BSN, OCN  [facility]   Oncology/Hematology Care Coordinator RN  [facility]  [phone]

## 2019-04-16 NOTE — NURSING NOTE
DISCHARGE PLAN:  Next appointments: See patient instruction section  Departure Mode: Ambulatory  Accompanied by: unknown  0 minutes for nursing discharge (face to face time)     Emma Nunn is here today for Oncology follow up.  Patient was not seen by writing nurse at time of appointment. Patient left prior to scheduling follow up.  Follow up in 6 months with labs prior.  Scheduling will reach out to patient.  See patient instructions and Oncologist's Progress note for further details. Questions and concerns addressed to patient's satisfaction. Patient verbalized and demonstrated understanding of plan.  Contact information provided and patient is encouraged to call with any that arise in the interim of care.    Fortino Garcia RN, BSN, OCN   Oncology Care Coordinator RN  [facility]  [phone]  4/16/2019, 4:20 PM

## 2019-04-16 NOTE — PROGRESS NOTES
Hematology/ Oncology Follow-up Visit:  Apr 16, 2019    Reason for Visit:   Chief Complaint   Patient presents with     Oncology Clinic Visit     6 month follow up for Malignant neoplasm of upper-outer quadrant of left breast in female, estrogen receptor positive     Results     Mammo 4/2/19, labs 4/15/19     Medication Therapy Management     tamoxifen (NOLVADEX) 20 MG tablet       Oncologic History:  Malignant neoplasm of female breast (H)  Emma Nunn was diagnosed at age 47, premenopausally through self-breast check, felt a lump in her left outer upper quadrant. Diagnostic mammogram early 07/2013 identified asymmetrical density associated with palpable findings around 1-2 o'clock position, 8-10 cm from the nipple. Limited sonogram revealed hypoechoic mass with irregular borders, measured about 0.8 cm trocar position. Sonogram-guided biopsy indicating invasive ductal cancer, grade 2, with associated DCIS, ER/OR 90% positive, HER-2/sandy positive, FISH ratio greater than 11.7. she had lumpectomy 7/2013 - 1.3 cm LAURA, GII, + ALI, margin is close <=1 mm, +DCIS, 8LN negative.  She has D4iY1A0 stage I disease. TCH was recommended from 8//15/2013 to 12/2013 and herceptin after. . She finished RT 2/28/2014 and tamoxifen started in 3/2014.      Interval History:  Follow-up visit.  She has been feeling well without recent complaints weight loss night sweats fever or chills.  She denies any nausea vomiting or diarrhea.  She has been on tamoxifen with mild hot flashes.    Review Of Systems:  Constitutional: Negative for fever, chills, and night sweats.  Skin: negative.  Eyes: negative.  Ears/Nose/Throat: negative.  Respiratory: No shortness of breath, dyspnea on exertion, cough, or hemoptysis.  Cardiovascular: negative.  Gastrointestinal: negative.  Genitourinary: negative.  Musculoskeletal: negative.  Neurologic: negative.  Psychiatric: negative.  Hematologic/Lymphatic/Immunologic: negative.  Endocrine: negative.    All other ROS  "negative unless mentioned in interval history.    Past medical, social, surgical, and family histories reviewed.    Allergies:  Allergies as of 04/16/2019 - Reviewed 04/16/2019   Allergen Reaction Noted     No known drug allergies  04/02/2001       Current Medications:  Current Outpatient Medications   Medication Sig Dispense Refill     IBUPROFEN PO Take 600 mg by mouth every 6 hours as needed for moderate pain       levothyroxine (SYNTHROID/LEVOTHROID) 88 MCG tablet Take 1 tablet (88 mcg) by mouth daily 90 tablet 3     tamoxifen (NOLVADEX) 20 MG tablet Take 1 tablet (20 mg) by mouth daily 90 tablet 1        Physical Exam:  BP (!) 88/64   Pulse 79   Temp 96.1  F (35.6  C) (Temporal)   Resp 16   Ht 1.702 m (5' 7\")   Wt 60.7 kg (133 lb 14.4 oz)   SpO2 100%   BMI 20.97 kg/m    Wt Readings from Last 12 Encounters:   04/16/19 60.7 kg (133 lb 14.4 oz)   02/18/19 63 kg (139 lb)   01/28/19 65.8 kg (145 lb)   01/20/19 56 kg (123 lb 7.3 oz)   10/05/18 60.8 kg (134 lb)   09/26/18 60.6 kg (133 lb 11.2 oz)   09/11/18 60 kg (132 lb 4.8 oz)   05/08/18 60.3 kg (133 lb)   03/06/18 59.3 kg (130 lb 12.8 oz)   03/06/18 58.1 kg (128 lb)   01/16/18 58.1 kg (128 lb)   12/05/17 56.7 kg (125 lb)     ECOG performance status: 0  GENERAL APPEARANCE: Healthy, alert and in no acute distress.  HEENT: Sclerae anicteric. PERRLA. Oropharynx without ulcers, lesions, or thrush.  NECK: Supple. No asymmetry or masses.  LYMPHATICS: No palpable cervical, supraclavicular, axillary, or inguinal lymphadenopathy.  RESP: Lungs clear to auscultation bilaterally without rales, rhonchi or wheezes.\", BREAST: Right- No mass, nipple discharge or axillary adenopathy. Left- No mass, nipple discharge or axillary adenopathy \"CARDIOVASCULAR: Regular rate and rhythm. Normal S1, S2; no S3 or S4. No murmur, gallop, or rub.  ABDOMEN: Soft, nontender. Bowel sounds normal. No palpable organomegaly or masses.  MUSCULOSKELETAL: Extremities without gross deformities noted. " No edema of bilateral lower extremities.  SKIN: No suspicious lesions or rashes.  NEURO: Alert and oriented x 3. Cranial nerves II-XII grossly intact.  PSYCHIATRIC: Mentation and affect appear normal.    Laboratory/Imaging Studies:  Orders Only on 04/15/2019   Component Date Value Ref Range Status     TSH 04/15/2019 7.03* 0.40 - 4.00 mU/L Final     Vitamin B12 04/15/2019 469  193 - 986 pg/mL Final     CA 27-29 04/15/2019 26  0 - 39 U/mL Final    Assay Method:  Chemiluminescence using Siemens Centaur XP     Sodium 04/15/2019 141  133 - 144 mmol/L Final     Potassium 04/15/2019 4.4  3.4 - 5.3 mmol/L Final     Chloride 04/15/2019 105  94 - 109 mmol/L Final     Carbon Dioxide 04/15/2019 28  20 - 32 mmol/L Final     Anion Gap 04/15/2019 8  3 - 14 mmol/L Final     Glucose 04/15/2019 95  70 - 99 mg/dL Final     Urea Nitrogen 04/15/2019 15  7 - 30 mg/dL Final     Creatinine 04/15/2019 0.77  0.52 - 1.04 mg/dL Final     GFR Estimate 04/15/2019 88  >60 mL/min/[1.73_m2] Final    Comment: Non  GFR Calc  Starting 12/18/2018, serum creatinine based estimated GFR (eGFR) will be   calculated using the Chronic Kidney Disease Epidemiology Collaboration   (CKD-EPI) equation.       GFR Estimate If Black 04/15/2019 >90  >60 mL/min/[1.73_m2] Final    Comment:  GFR Calc  Starting 12/18/2018, serum creatinine based estimated GFR (eGFR) will be   calculated using the Chronic Kidney Disease Epidemiology Collaboration   (CKD-EPI) equation.       Calcium 04/15/2019 9.5  8.5 - 10.1 mg/dL Final     Bilirubin Total 04/15/2019 0.4  0.2 - 1.3 mg/dL Final     Albumin 04/15/2019 4.0  3.4 - 5.0 g/dL Final     Protein Total 04/15/2019 7.4  6.8 - 8.8 g/dL Final     Alkaline Phosphatase 04/15/2019 79  40 - 150 U/L Final     ALT 04/15/2019 32  0 - 50 U/L Final     AST 04/15/2019 20  0 - 45 U/L Final     WBC 04/15/2019 6.9  4.0 - 11.0 10e9/L Final     RBC Count 04/15/2019 4.34  3.8 - 5.2 10e12/L Final     Hemoglobin  04/15/2019 14.1  11.7 - 15.7 g/dL Final     Hematocrit 04/15/2019 42.0  35.0 - 47.0 % Final     MCV 04/15/2019 97  78 - 100 fl Final     MCH 04/15/2019 32.5  26.5 - 33.0 pg Final     MCHC 04/15/2019 33.6  31.5 - 36.5 g/dL Final     RDW 04/15/2019 12.1  10.0 - 15.0 % Final     Platelet Count 04/15/2019 193  150 - 450 10e9/L Final     Diff Method 04/15/2019 Automated Method   Final     % Neutrophils 04/15/2019 60.2  % Final     % Lymphocytes 04/15/2019 30.6  % Final     % Monocytes 04/15/2019 5.9  % Final     % Eosinophils 04/15/2019 2.0  % Final     % Basophils 04/15/2019 0.7  % Final     % Immature Granulocytes 04/15/2019 0.6  % Final     Nucleated RBCs 04/15/2019 0  0 /100 Final     Absolute Neutrophil 04/15/2019 4.2  1.6 - 8.3 10e9/L Final     Absolute Lymphocytes 04/15/2019 2.1  0.8 - 5.3 10e9/L Final     Absolute Monocytes 04/15/2019 0.4  0.0 - 1.3 10e9/L Final     Absolute Basophils 04/15/2019 0.1  0.0 - 0.2 10e9/L Final     Abs Immature Granulocytes 04/15/2019 0.0  0 - 0.4 10e9/L Final     Absolute Nucleated RBC 04/15/2019 0.0   Final     T4 Free 04/15/2019 1.16  0.76 - 1.46 ng/dL Final        Recent Results (from the past 744 hour(s))   MA Screen Bilateral w/Jluis    Narrative    MA SCREENING BILATERAL WITH TOMOSYNTHESIS With CAD 4/2/2019 3:36 PM    BREAST SYMPTOMS: No current breast complaints. Personal history of  left breast cancer status post lumpectomy, radiation therapy and  chemotherapy in 2013. Patient takes tamoxifen.    COMPARISON:  3/15/18, 10/24/16, 10/19/15, 10/17/14.    BREAST DENSITY: Scattered fibroglandular densities    COMMENTS: Post conservation therapy changes of the left breast are  stable in appearance. No new findings of concern for malignancy.      Impression    IMPRESSION: BI-RADS CATEGORY: 2 - Benign Finding(s).    RECOMMENDED FOLLOW-UP: Annual Mammography    Exam results letter mailed to patient.    RANJITH ARRIAGA MD       Assessment and plan:    (C50.412,  Z17.0) Malignant neoplasm  of upper-outer quadrant of left breast in female, estrogen receptor positive (H)  (primary encounter diagnosis)  I reviewed with the patient today most recent laboratory test and imaging studies.  The patient currently on tamoxifen 20 mg orally daily.  We will continue on tamoxifen for now.  I will see the patient again in 6 months or sooner if there are new developments or concerns.    (E03.9) Hypothyroidism, unspecified type  Patient currently on Synthroid 88 mcg orally daily.     The patient is ready to learn, no apparent learning barriers were identified.  Diagnosis and treatment plans were explained to the patient. The patient expressed understanding of the content. The patient asked appropriate questions. The patient questions were answered to her satisfaction.    Chart documentation with Dragon Voice recognition Software. Although reviewed after completion, some words and grammatical errors may remain.

## 2019-04-16 NOTE — RESULT ENCOUNTER NOTE
Notify Emma that her thyroid level is even further off than last time.  She really should have an increase in her dose.  I'd like to have a visit to discuss options. This could be in person or via telephone visit. Her B12 level is fine.   Arelis Schaefer MD

## 2019-04-16 NOTE — PROGRESS NOTES
Patient opted to conduct today's return visit via telephone vs an in person visit to the clinic.    I spoke with: Emma.     The reason for the telephone visit was: follow up hypothyroidism, review lab results.    Pertinent history and review of systems: Emma has had her thyroid levels drawn yesterday.  Her TSH is a little higher than it was last time in September.  When I met with her in October I recommended a dose increase but she felt like she was already getting too much so she declined it at that time.  We just checked her again now and her TSH has risen further.  We again discussed the possibility of raising her dose at this time she does agree.  She says she is currently having more symptoms of hypothyroidism.  She has low energy.  She feels tired.  She is a little bit more constipated than usual.  She cannot lose weight.  She is noting more hair loss and her feet are swollen.  Her weight is actually stable overall but she feels she should be losing faster.  She is working hard on weight loss through healthy eating and exercise.  Every once in a while she will get some palpitations but not bad.    Assessment:     ICD-10-CM    1. Hypothyroidism, unspecified type E03.9 levothyroxine (SYNTHROID/LEVOTHROID) 100 MCG tablet     TSH with free T4 reflex        Advice/instructions given to patient/guardian including prescriptions, follow up appointment or orders for diagnostic testing: We will raise her dose of levothyroxine to 100 mcg daily.  She has been doing some reading and asks about Nettie Thyroid.  I told her this is an option if we have difficulty balancing her on the levothyroxine.  I prefer to try a raise in her dose and she agrees.  If we cannot get her symptoms improved even with normal levels then would consider switching her to Nettie.  Or if her symptoms contradict her levels and we can't get her feeling well then I would also consider that.  We will have her recheck labs after 2 months and address again  at that time.  She will follow-up sooner as needed.      Phone call contact time    Call Started at: 5:34    Call Ended at: 5:43 pm    Arelis Schaefer MD

## 2019-04-16 NOTE — TELEPHONE ENCOUNTER
----- Message from Arelis Schaefer MD sent at 4/16/2019  1:17 PM CDT -----  Notify Emma that her thyroid level is even further off than last time.  She really should have an increase in her dose.  I'd like to have a visit to discuss options. This could be in person or via telephone visit. Her B12 level is fine.   Arelis Schaefer MD

## 2019-04-16 NOTE — TELEPHONE ENCOUNTER
Pt would like to do a telephone visit. Will ask provider where and when pt appointment should be placed. Merced Velazquez, CMA

## 2019-04-16 NOTE — LETTER
4/16/2019         RE: Emma Nunn  [address]  [facility] [address]        Dear Colleague,    Thank you for referring your patient, Emma Nunn, to the [facility]. Please see a copy of my visit note below.    Hematology/ Oncology Follow-up Visit:  Apr 16, 2019    Reason for Visit:   Chief Complaint   Patient presents with     Oncology Clinic Visit     6 month follow up for Malignant neoplasm of upper-outer quadrant of left breast in female, estrogen receptor positive     Results     Mammo 4/2/19, labs 4/15/19     Medication Therapy Management     tamoxifen (NOLVADEX) 20 MG tablet       Oncologic History:  Malignant neoplasm of female breast (H)  Emma Nunn was diagnosed at age 47, premenopausally through self-breast check, felt a lump in her left outer upper quadrant. Diagnostic mammogram early 07/2013 identified asymmetrical density associated with palpable findings around 1-2 o'clock position, 8-10 cm from the nipple. Limited sonogram revealed hypoechoic mass with irregular borders, measured about 0.8 cm trocar position. Sonogram-guided biopsy indicating invasive ductal cancer, grade 2, with associated DCIS, ER/AR 90% positive, HER-2/sandy positive, FISH ratio greater than 11.7. she had lumpectomy 7/2013 - 1.3 cm LAURA, GII, + ALI, margin is close <=1 mm, +DCIS, 8LN negative.  She has [id number] stage I disease. TCH was recommended from 8//15/2013 to 12/2013 and herceptin after. . She finished RT 2/28/2014 and tamoxifen started in 3/2014.      Interval History:  Follow-up visit.  She has been feeling well without recent complaints weight loss night sweats fever or chills.  She denies any nausea vomiting or diarrhea.  She has been on tamoxifen with mild hot flashes.    Review Of Systems:  Constitutional: Negative for fever, chills, and night sweats.  Skin: negative.  Eyes: negative.  Ears/Nose/Throat: negative.  Respiratory: No shortness of breath, dyspnea on exertion, cough, or  "hemoptysis.  Cardiovascular: negative.  Gastrointestinal: negative.  Genitourinary: negative.  Musculoskeletal: negative.  Neurologic: negative.  Psychiatric: negative.  Hematologic/Lymphatic/Immunologic: negative.  Endocrine: negative.    All other ROS negative unless mentioned in interval history.    Past medical, social, surgical, and family histories reviewed.    Allergies:  Allergies as of 04/16/2019 - Reviewed 04/16/2019   Allergen Reaction Noted     No known drug allergies  04/02/2001       Current Medications:  Current Outpatient Medications   Medication Sig Dispense Refill     IBUPROFEN PO Take 600 mg by mouth every 6 hours as needed for moderate pain       levothyroxine (SYNTHROID/LEVOTHROID) 88 MCG tablet Take 1 tablet (88 mcg) by mouth daily 90 tablet 3     tamoxifen (NOLVADEX) 20 MG tablet Take 1 tablet (20 mg) by mouth daily 90 tablet 1        Physical Exam:  BP (!) 88/64   Pulse 79   Temp 96.1  F (35.6  C) (Temporal)   Resp 16   Ht 1.702 m (5' 7\")   Wt 60.7 kg (133 lb 14.4 oz)   SpO2 100%   BMI 20.97 kg/m     Wt Readings from Last 12 Encounters:   04/16/19 60.7 kg (133 lb 14.4 oz)   02/18/19 63 kg (139 lb)   01/28/19 65.8 kg (145 lb)   01/20/19 56 kg (123 lb 7.3 oz)   10/05/18 60.8 kg (134 lb)   09/26/18 60.6 kg (133 lb 11.2 oz)   09/11/18 60 kg (132 lb 4.8 oz)   05/08/18 60.3 kg (133 lb)   03/06/18 59.3 kg (130 lb 12.8 oz)   03/06/18 58.1 kg (128 lb)   01/16/18 58.1 kg (128 lb)   12/05/17 56.7 kg (125 lb)     ECOG performance status: 0  GENERAL APPEARANCE: Healthy, alert and in no acute distress.  HEENT: Sclerae anicteric. PERRLA. Oropharynx without ulcers, lesions, or thrush.  NECK: Supple. No asymmetry or masses.  LYMPHATICS: No palpable cervical, supraclavicular, axillary, or inguinal lymphadenopathy.  RESP: Lungs clear to auscultation bilaterally without rales, rhonchi or wheezes.\", BREAST: Right- No mass, nipple discharge or axillary adenopathy. Left- No mass, nipple discharge or axillary " "adenopathy \"CARDIOVASCULAR: Regular rate and rhythm. Normal S1, S2; no S3 or S4. No murmur, gallop, or rub.  ABDOMEN: Soft, nontender. Bowel sounds normal. No palpable organomegaly or masses.  MUSCULOSKELETAL: Extremities without gross deformities noted. No edema of bilateral lower extremities.  SKIN: No suspicious lesions or rashes.  NEURO: Alert and oriented x 3. Cranial nerves II-XII grossly intact.  PSYCHIATRIC: Mentation and affect appear normal.    Laboratory/Imaging Studies:  Orders Only on 04/15/2019   Component Date Value Ref Range Status     TSH 04/15/2019 7.03* 0.40 - 4.00 mU/L Final     Vitamin B12 04/15/2019 469  193 - 986 pg/mL Final     CA 27-29 04/15/2019 26  0 - 39 U/mL Final    Assay Method:  Chemiluminescence using Siemens Centaur XP     Sodium 04/15/2019 141  133 - 144 mmol/L Final     Potassium 04/15/2019 4.4  3.4 - 5.3 mmol/L Final     Chloride 04/15/2019 105  94 - 109 mmol/L Final     Carbon Dioxide 04/15/2019 28  20 - 32 mmol/L Final     Anion Gap 04/15/2019 8  3 - 14 mmol/L Final     Glucose 04/15/2019 95  70 - 99 mg/dL Final     Urea Nitrogen 04/15/2019 15  7 - 30 mg/dL Final     Creatinine 04/15/2019 0.77  0.52 - 1.04 mg/dL Final     GFR Estimate 04/15/2019 88  >60 mL/min/[1.73_m2] Final    Comment: Non  GFR Calc  Starting 12/18/2018, serum creatinine based estimated GFR (eGFR) will be   calculated using the Chronic Kidney Disease Epidemiology Collaboration   (CKD-EPI) equation.       GFR Estimate If Black 04/15/2019 >90  >60 mL/min/[1.73_m2] Final    Comment:  GFR Calc  Starting 12/18/2018, serum creatinine based estimated GFR (eGFR) will be   calculated using the Chronic Kidney Disease Epidemiology Collaboration   (CKD-EPI) equation.       Calcium 04/15/2019 9.5  8.5 - 10.1 mg/dL Final     Bilirubin Total 04/15/2019 0.4  0.2 - 1.3 mg/dL Final     Albumin 04/15/2019 4.0  3.4 - 5.0 g/dL Final     Protein Total 04/15/2019 7.4  6.8 - 8.8 g/dL Final     " Alkaline Phosphatase 04/15/2019 79  40 - 150 U/L Final     ALT 04/15/2019 32  0 - 50 U/L Final     AST 04/15/2019 20  0 - 45 U/L Final     WBC 04/15/2019 6.9  4.0 - 11.0 10e9/L Final     RBC Count 04/15/2019 4.34  3.8 - 5.2 10e12/L Final     Hemoglobin 04/15/2019 14.1  11.7 - 15.7 g/dL Final     Hematocrit 04/15/2019 42.0  35.0 - 47.0 % Final     MCV 04/15/2019 97  78 - 100 fl Final     MCH 04/15/2019 32.5  26.5 - 33.0 pg Final     MCHC 04/15/2019 33.6  31.5 - 36.5 g/dL Final     RDW 04/15/2019 12.1  10.0 - 15.0 % Final     Platelet Count 04/15/2019 193  150 - 450 10e9/L Final     Diff Method 04/15/2019 Automated Method   Final     % Neutrophils 04/15/2019 60.2  % Final     % Lymphocytes 04/15/2019 30.6  % Final     % Monocytes 04/15/2019 5.9  % Final     % Eosinophils 04/15/2019 2.0  % Final     % Basophils 04/15/2019 0.7  % Final     % Immature Granulocytes 04/15/2019 0.6  % Final     Nucleated RBCs 04/15/2019 0  0 /100 Final     Absolute Neutrophil 04/15/2019 4.2  1.6 - 8.3 10e9/L Final     Absolute Lymphocytes 04/15/2019 2.1  0.8 - 5.3 10e9/L Final     Absolute Monocytes 04/15/2019 0.4  0.0 - 1.3 10e9/L Final     Absolute Basophils 04/15/2019 0.1  0.0 - 0.2 10e9/L Final     Abs Immature Granulocytes 04/15/2019 0.0  0 - 0.4 10e9/L Final     Absolute Nucleated RBC 04/15/2019 0.0   Final     T4 Free 04/15/2019 1.16  0.76 - 1.46 ng/dL Final        Recent Results (from the past 744 hour(s))   MA Screen Bilateral w/Jluis    Narrative    MA SCREENING BILATERAL WITH TOMOSYNTHESIS With CAD 4/2/2019 3:36 PM    BREAST SYMPTOMS: No current breast complaints. Personal history of  left breast cancer status post lumpectomy, radiation therapy and  chemotherapy in 2013. Patient takes tamoxifen.    COMPARISON:  3/15/18, 10/24/16, 10/19/15, 10/17/14.    BREAST DENSITY: Scattered fibroglandular densities    COMMENTS: Post conservation therapy changes of the left breast are  stable in appearance. No new findings of concern for  malignancy.      Impression    IMPRESSION: BI-RADS CATEGORY: 2 - Benign Finding(s).    RECOMMENDED FOLLOW-UP: Annual Mammography    Exam results letter mailed to patient.    RANJITH ARRIAGA MD       Assessment and plan:    (C50.412,  Z17.0) Malignant neoplasm of upper-outer quadrant of left breast in female, estrogen receptor positive (H)  (primary encounter diagnosis)  I reviewed with the patient today most recent laboratory test and imaging studies.  The patient currently on tamoxifen 20 mg orally daily.  We will continue on tamoxifen for now.  I will see the patient again in 6 months or sooner if there are new developments or concerns.    (E03.9) Hypothyroidism, unspecified type  Patient currently on Synthroid 88 mcg orally daily.     The patient is ready to learn, no apparent learning barriers were identified.  Diagnosis and treatment plans were explained to the patient. The patient expressed understanding of the content. The patient asked appropriate questions. The patient questions were answered to her satisfaction.    Chart documentation with Dragon Voice recognition Software. Although reviewed after completion, some words and grammatical errors may remain.    Again, thank you for allowing me to participate in the care of your patient.        Sincerely,        Richard Ocampo MD

## 2019-06-12 DIAGNOSIS — B37.31 YEAST INFECTION OF THE VAGINA: Primary | ICD-10-CM

## 2019-06-12 NOTE — TELEPHONE ENCOUNTER
"Emma Nunn is a 53 year old female who calls with vaginal itching, burning, white discharge. Pt reports she was in a hot tub and noticed symptoms afterward. Pt also states she \"hasn't been eating yogurt.\" She states she used to get frequent yeast infections and took diflucan for them. Pt states she is on day 3 of Monistat 3 today with little improvement of symptoms. She requests medication be sent to [facility] pharmacy.     NURSING ASSESSMENT:  Description:  Vaginal itching, burning, discharge  Onset/duration:  3 days  Precip. factors:  Hot tub, stopped eating yogurt  Associated symptoms:  See above  Improves/worsens symptoms:  Monistat 3 relieved symptoms somewhat  Last exam/Treatment:  4/16/19  Allergies:   Allergies   Allergen Reactions     No Known Drug Allergies        NURSING PLAN: Routed to provider Yes    RECOMMENDED DISPOSITION:  Home care advice - If does not clear with OTC medication patient requests diflucan as she has previously taken this.  Will comply with recommendation: Yes  If further questions/concerns or if symptoms do not improve, worsen or new symptoms develop, call your PCP or [facility] as soon as possible.      Guideline used: Vaginal Discharge/Pain/Itching  Telephone Triage Protocols for Nurses, Fifth Edition, Delmi Serrano RN    "

## 2019-06-12 NOTE — TELEPHONE ENCOUNTER
Emma states she was in a hot tub, and developed a yeast infection, she is requesting a Diflucan prescription please.     Thanks  Elizabeth Clancy [facility]   [phone]

## 2019-06-12 NOTE — TELEPHONE ENCOUNTER
TRIAGE RN Patient Contact    Attempt # 1    Was call answered?  No.  Left message on voicemail with information to call me back.    Laura Serrano RN on 6/12/2019 at 5:28 PM

## 2019-06-14 RX ORDER — FLUCONAZOLE 150 MG/1
150 TABLET ORAL ONCE
Qty: 1 TABLET | Refills: 0 | Status: SHIPPED | OUTPATIENT
Start: 2019-06-14 | End: 2019-06-14

## 2019-07-15 DIAGNOSIS — E03.9 HYPOTHYROIDISM, UNSPECIFIED TYPE: ICD-10-CM

## 2019-07-16 DIAGNOSIS — E03.9 HYPOTHYROIDISM, UNSPECIFIED TYPE: ICD-10-CM

## 2019-07-16 LAB — TSH SERPL DL<=0.005 MIU/L-ACNC: 0.56 MU/L (ref 0.4–4)

## 2019-07-16 PROCEDURE — 84443 ASSAY THYROID STIM HORMONE: CPT | Performed by: FAMILY MEDICINE

## 2019-07-16 PROCEDURE — 36415 COLL VENOUS BLD VENIPUNCTURE: CPT | Performed by: FAMILY MEDICINE

## 2019-07-16 RX ORDER — LEVOTHYROXINE SODIUM 100 UG/1
TABLET ORAL
Qty: 90 TABLET | Refills: 1 | Status: SHIPPED | OUTPATIENT
Start: 2019-07-16 | End: 2020-01-20

## 2019-07-16 NOTE — TELEPHONE ENCOUNTER
Levothyroxine  Last Written Prescription Date:  4/16/19  Last Fill Quantity: 90,  # refills: 0   Last office visit: 4/16/2019 with prescribing provider:     Future Office Visit:    Component      Latest Ref Rng & Units 3/2/2018 9/26/2018 4/15/2019   TSH      0.40 - 4.00 mU/L 3.06 4.71 (H) 7.03 (H)   T4 Free      0.76 - 1.46 ng/dL  1.10 1.16   NOTE from 4/1619 - We will raise her dose of levothyroxine to 100 mcg daily.  She has been doing some reading and asks about [place] Thyroid.  I told her this is an option if we have difficulty balancing her on the levothyroxine.  I prefer to try a raise in her dose and she agrees.  If we cannot get her symptoms improved even with normal levels then would consider switching her to [place].  Or if her symptoms contradict her levels and we can't get her feeling well then I would also consider that.  We will have her recheck labs after 2 months and address again at that time.  She will follow-up sooner as needed.     Routing refill request to provider for review/approval because:  Patient needs to be seen because:  She needs to check her thyroid labs again.  ELDON Mata

## 2019-07-16 NOTE — RESULT ENCOUNTER NOTE
See telephone encounter, this is normal.  Will continue same dose and recheck in 6 months.  Arelis Schaefer MD

## 2019-07-16 NOTE — TELEPHONE ENCOUNTER
I spoke to Emma and reviewed her lab results.  She feels good at this dose.  Her energy is better.  I am going to give her refills for 6 months and she will see me for an office visit at that time.  She will follow-up sooner if she feels her level is off again with either overdose or underdosed or any other concerns.  Arelis Schaefer MD

## 2019-09-18 ENCOUNTER — OFFICE VISIT (OUTPATIENT)
Dept: ALLERGY | Facility: OTHER | Age: 54
End: 2019-09-18
Payer: COMMERCIAL

## 2019-09-18 VITALS
DIASTOLIC BLOOD PRESSURE: 72 MMHG | HEART RATE: 89 BPM | TEMPERATURE: 97.9 F | WEIGHT: 131 LBS | OXYGEN SATURATION: 99 % | SYSTOLIC BLOOD PRESSURE: 96 MMHG | BODY MASS INDEX: 20.56 KG/M2 | HEIGHT: 67 IN

## 2019-09-18 DIAGNOSIS — E03.9 HYPOTHYROIDISM, UNSPECIFIED TYPE: Primary | ICD-10-CM

## 2019-09-18 DIAGNOSIS — L50.5 CHOLINERGIC URTICARIA: ICD-10-CM

## 2019-09-18 DIAGNOSIS — L50.1 CHRONIC IDIOPATHIC URTICARIA: ICD-10-CM

## 2019-09-18 LAB
BASOPHILS # BLD AUTO: 0 10E9/L (ref 0–0.2)
BASOPHILS NFR BLD AUTO: 0.1 %
DIFFERENTIAL METHOD BLD: NORMAL
EOSINOPHIL # BLD AUTO: 0.1 10E9/L (ref 0–0.7)
EOSINOPHIL NFR BLD AUTO: 1.1 %
ERYTHROCYTE [DISTWIDTH] IN BLOOD BY AUTOMATED COUNT: 11.9 % (ref 10–15)
HCT VFR BLD AUTO: 44.5 % (ref 35–47)
HGB BLD-MCNC: 15.2 G/DL (ref 11.7–15.7)
LYMPHOCYTES # BLD AUTO: 1.4 10E9/L (ref 0.8–5.3)
LYMPHOCYTES NFR BLD AUTO: 16.7 %
MCH RBC QN AUTO: 32.5 PG (ref 26.5–33)
MCHC RBC AUTO-ENTMCNC: 34.2 G/DL (ref 31.5–36.5)
MCV RBC AUTO: 95 FL (ref 78–100)
MONOCYTES # BLD AUTO: 0.5 10E9/L (ref 0–1.3)
MONOCYTES NFR BLD AUTO: 6.3 %
NEUTROPHILS # BLD AUTO: 6.2 10E9/L (ref 1.6–8.3)
NEUTROPHILS NFR BLD AUTO: 75.8 %
PLATELET # BLD AUTO: 194 10E9/L (ref 150–450)
RBC # BLD AUTO: 4.68 10E12/L (ref 3.8–5.2)
TSH SERPL DL<=0.005 MIU/L-ACNC: 3.77 MU/L (ref 0.4–4)
WBC # BLD AUTO: 8.2 10E9/L (ref 4–11)

## 2019-09-18 PROCEDURE — 99000 SPECIMEN HANDLING OFFICE-LAB: CPT | Performed by: ALLERGY & IMMUNOLOGY

## 2019-09-18 PROCEDURE — 82306 VITAMIN D 25 HYDROXY: CPT | Performed by: ALLERGY & IMMUNOLOGY

## 2019-09-18 PROCEDURE — 86162 COMPLEMENT TOTAL (CH50): CPT | Mod: 90 | Performed by: ALLERGY & IMMUNOLOGY

## 2019-09-18 PROCEDURE — 84443 ASSAY THYROID STIM HORMONE: CPT | Performed by: ALLERGY & IMMUNOLOGY

## 2019-09-18 PROCEDURE — 99204 OFFICE O/P NEW MOD 45 MIN: CPT | Performed by: ALLERGY & IMMUNOLOGY

## 2019-09-18 PROCEDURE — 85025 COMPLETE CBC W/AUTO DIFF WBC: CPT | Performed by: ALLERGY & IMMUNOLOGY

## 2019-09-18 PROCEDURE — 86376 MICROSOMAL ANTIBODY EACH: CPT | Performed by: ALLERGY & IMMUNOLOGY

## 2019-09-18 PROCEDURE — 36415 COLL VENOUS BLD VENIPUNCTURE: CPT | Performed by: ALLERGY & IMMUNOLOGY

## 2019-09-18 PROCEDURE — 86800 THYROGLOBULIN ANTIBODY: CPT | Performed by: ALLERGY & IMMUNOLOGY

## 2019-09-18 RX ORDER — CETIRIZINE HYDROCHLORIDE 10 MG/1
10 TABLET ORAL 2 TIMES DAILY
Qty: 60 TABLET | Refills: 3 | Status: SHIPPED | OUTPATIENT
Start: 2019-09-18 | End: 2020-12-30

## 2019-09-18 ASSESSMENT — MIFFLIN-ST. JEOR: SCORE: 1231.84

## 2019-09-18 NOTE — PATIENT INSTRUCTIONS
Allergy Staff Appt Hours Shot Hours Locations    Physician     Pacheco Reynolds DO       Support Staff     ELDON Cortes, [facility]  Tuesday:        [place] 7-4:20     Wednesday:        [place]: 7-5 Thursday:                    [place] 7-6:40     Friday:  [place]  7-2:40   [place]        Thursday: 1-5:50        Friday: 7-10:50     [place]        Tuesday: 7- 3:20        Wednesday: 7-4:20     [place] Monday: 7-4:20        Tuesday: 1-6:20         [facility]  [address] [place] [address]  Appt Line: [phone]  Allergy RN:  [phone]    [facility]  [address] [place] [address]  Appt Line: [phone]  Allergy RN:  [phone]       Important Scheduling Information  Aspirin Desensitization: Appt will last 2 clinic days. Please call the Allergy RN line for your clinic to schedule. Discontinue antihistamines 7 days prior to the appointment.     Food Challenges: Appt will last 3-4 hours. Please call the Allergy RN line for your clinic to schedule. Discontinue antihistamines 7 days prior to the appointment.     Penicillin Testing: Appt will last 2-3 hours. Please call the Allergy RN line for your clinic to schedule. Discontinue antihistamines 7 days prior to the appointment.     Skin Testing: Appt will about 40 minutes. Call the appointment line for your clinic to schedule. Discontinue antihistamines 7 days prior to the appointment.     Venom Testing: Appt will last 2-3 hours. Please call the Allergy RN line for your clinic to schedule. Discontinue antihistamines 7 days prior to the appointment.     Thank you for trusting us with your Allergy, Asthma, and Immunology care. Please feel free to contact us with any questions or concerns you may have.      - Start Zyrtec (cetirizine) 10mg by mouth twice daily. If any breakthrough rash please let us know.   - Blood testing today.   - Return in 3 months.

## 2019-09-18 NOTE — ASSESSMENT & PLAN NOTE
Chronic spontaneous urticaria.  No clear trigger identified.  History of hypothyroidism.  Recent shingles infection.  Hives on a daily basis.  No scarring or discoloration.  Angioedema associated.  Zyrtec has been beneficial.  Recent thyroid hormone adjustment. Heat is a trigger.     -Blood testing as noted.  - Unclear etiology.  Possibly associated with autoimmune urticaria.  Checking thyroid autoantibodies as noted.  -Start Zyrtec 10 mg by mouth twice daily.  I want her 100% high free.  If hives persist she was instructed to contact our office.  -Return to clinic in 3 months.

## 2019-09-18 NOTE — LETTER
9/18/2019         RE: Emma Nunn  [address]  [facility] [address]        Dear Colleague,    Thank you for referring your patient, Emma Nunn, to the [facility]. Please see a copy of my visit note below.    Emma Nunn is a 53 year old White female with previous medical history significant for breast cancer and hypothyroidism. Emam Nunn is being seen today for evaluation of chronic hives.     The patient reports that over the course the last few months she has had daily erythematous, raised, pruritic welts that this could occur anywhere in her body.  She has had some eye swelling and lip swelling.  Tends to wake up first thing in the morning with a rash.  Tried Zyrtec 10 mg by mouth once on one occasion and significantly beneficial.  Lesions last hours and resolved without scarring or discoloration.  Rash started after she had shingles.  No new medications.  She has a history of hypothyroidism.  She is on Synthroid.  She had her thyroid hormone dose adjusted recently.  Unclear if autoimmune hypothyroidism.  She is on tamoxifen.  She has been on this for years.  No dose change.  No association with foods, medications, soaps, shampoos, detergents.  Occurred in [place] and in [place].  Made worse with heat both active and passive.  No other systemic symptoms.  She has never had hives in the past.    The patient has no history of asthma, eczema, food allergies, medications allergies.     ENVIRONMENTAL HISTORY: [place] The home is heated with a forced air. They do have central air conditioning. The patient's bedroom is furnished with carpeting in bedroom.  Pets inside the house include 1 dog(s). There is no history of cockroach or mice infestation. There is/are 0 smokers in the house.  The house does not have a damp basement.       Past Medical History:   Diagnosis Date     Breast cancer (H) 07/2013    Lumpectomy, chemo and radiation.        Diffuse cystic mastopathy     Fibrocystic breast disease     Endometriosis      External hemorrhoids without mention of complication      Unspecified hypothyroidism      Family History   Problem Relation Age of Onset     Cancer Other 55        Breast, living     Cancer Paternal Aunt 50        Colon,      Cancer Paternal Grandmother         Colon,  86 years old     Osteoporosis Mother      Heart Disease Mother         heart disease     Hypertension Mother      Arthritis Mother      Osteoporosis Maternal Grandfather      Heart Disease Maternal Grandfather         heart attack     Heart Disease Maternal Grandmother         Bypass and heart attack     Heart Disease Sister         heart disease/compl. pneumonia     Genetic Disorder Sister         down's Syn     Past Surgical History:   Procedure Laterality Date     BREAST BIOPSY, CORE RT/LT Left 2013    cancer     C NONSPECIFIC PROCEDURE      Outpatient surgery for anal fissure     COLONOSCOPY N/A 2014    normal, repeat 5 years due to family risk     LAPAROSCOPIC CHOLECYSTECTOMY N/A 2017    Procedure: LAPAROSCOPIC CHOLECYSTECTOMY;  Laparoscopic Cholecystectomy;  Surgeon: Jason Munoz MD;  Location: [facility] OR     LAPAROSCOPY      for endometriosis     LUMPECTOMY BREAST WITH SENTINEL NODE, COMBINED  2013    Procedure: COMBINED LUMPECTOMY BREAST WITH SENTINEL NODE;  Left Breast Lumpectomy with Deep Run Node Biopsy;  Surgeon: Jason Munoz MD;  Location: [facility] OR       REVIEW OF SYSTEMS:  General: negative for weight gain. negative for weight loss. negative for changes in sleep.   Ears: negative for fullness. negative for hearing loss. negative for dizziness.   Nose: negative for snoring.negative for changes in smell. negative for drainage.   Eyes: negative for eye watering. negative for eye itching. negative for vision changes. negative for eye redness.  Throat: negative for hoarseness. negative for sore throat. negative  for trouble swallowing.   Lungs: negative for shortness of breath.negative for wheezing. negative for sputum production.   Cardiovascular: negative for chest pain. negative for swelling of ankles. negative for fast or irregular heartbeat.   Gastrointestinal: negative for nausea. negative for heartburn. negative for acid reflux.   Musculoskeletal: negative for joint pain. negative for joint stiffness. negative for joint swelling.   Neurologic: negative for seizures. negative for fainting. negative for weakness.   Psychiatric: negative for changes in mood. negative for anxiety.   Endocrine: negative for cold intolerance. negative for heat intolerance. negative for tremors.   Lymphatic: negative for lower extremity swelling. negative for lymph node swelling.   Hematologic: negative for easy bruising. negative for easy bleeding.  Integumentary: positive  for rash. negative for scaling. negative for nail changes.       Current Outpatient Medications:      cetirizine (ZYRTEC) 10 MG tablet, Take 1 tablet (10 mg) by mouth 2 times daily, Disp: 60 tablet, Rfl: 3     IBUPROFEN PO, Take 600 mg by mouth every 6 hours as needed for moderate pain, Disp: , Rfl:      levothyroxine (SYNTHROID/LEVOTHROID) 100 MCG tablet, TAKE ONE TABLET BY MOUTH ONCE DAILY. (DOSE CHANGE), Disp: 90 tablet, Rfl: 1     tamoxifen (NOLVADEX) 20 MG tablet, Take 1 tablet (20 mg) by mouth daily, Disp: 90 tablet, Rfl: 1  Immunization History   Administered Date(s) Administered     TD (ADULT, 7+) 02/20/2003     Varicella Pt Report Hx of Varicella/Chicken Pox 01/01/1970     Allergies   Allergen Reactions     No Known Drug Allergies          EXAM:   Constitutional:  Appears well-developed and well-nourished. No distress.   HEENT:   Head: Normocephalic.   Mouth/Throat:   No cobblestoning of posterior oropharynx.   Nasal tissue pink and normal appearing.  No rhinorrhea noted.    Eyes: Conjunctivae are non-erythematous   Cardiovascular: Normal rate, regular rhythm  and normal heart sounds. Exam reveals no gallop and no friction rub.   No murmur heard.  Respiratory: Effort normal and breath sounds normal. No respiratory distress. No wheezes. No rales.   Musculoskeletal: Normal range of motion.   Neuro: Oriented to person, place, and time.  Skin: Erythematous welts consistent with hives noted on bilateral upper extremities.  Psychiatric: Normal mood and affect.     Nursing note and vitals reviewed.    ASSESSMENT/PLAN:  Problem List Items Addressed This Visit        Endocrine    Hypothyroidism - Primary     Long standing. Checking thyroid autoantibodies.             Musculoskeletal and Integumentary    Chronic idiopathic urticaria     Chronic spontaneous urticaria.  No clear trigger identified.  History of hypothyroidism.  Recent shingles infection.  Hives on a daily basis.  No scarring or discoloration.  Angioedema associated.  Zyrtec has been beneficial.  Recent thyroid hormone adjustment. Heat is a trigger.     -Blood testing as noted.  - Unclear etiology.  Possibly associated with autoimmune urticaria.  Checking thyroid autoantibodies as noted.  -Start Zyrtec 10 mg by mouth twice daily.  I want her 100% high free.  If hives persist she was instructed to contact our office.  -Return to clinic in 3 months.         Relevant Medications    cetirizine (ZYRTEC) 10 MG tablet    Other Relevant Orders    Anti thyroglobulin antibody (Completed)    Thyroid peroxidase antibody (Completed)    Vitamin D Deficiency (Completed)    TSH with free T4 reflex (Completed)    CBC with platelets differential (Completed)    Complement total (Completed)    Cholinergic urticaria    Relevant Medications    cetirizine (ZYRTEC) 10 MG tablet      Return in 3 months.     Chart documentation with Dragon Voice recognition Software. Although reviewed after completion, some words and grammatical errors may remain.    Pacheco Reynolds DO FAAAAI  Allergy/Immunology  [facility]  [place]      Again, thank you for allowing me to participate in the care of your patient.        Sincerely,        Pacheco Reynolds, DO

## 2019-09-18 NOTE — PROGRESS NOTES
Emma Nunn is a 53 year old White female with previous medical history significant for breast cancer and hypothyroidism. Emma Nunn is being seen today for evaluation of chronic hives.     The patient reports that over the course the last few months she has had daily erythematous, raised, pruritic welts that this could occur anywhere in her body.  She has had some eye swelling and lip swelling.  Tends to wake up first thing in the morning with a rash.  Tried Zyrtec 10 mg by mouth once on one occasion and significantly beneficial.  Lesions last hours and resolved without scarring or discoloration.  Rash started after she had shingles.  No new medications.  She has a history of hypothyroidism.  She is on Synthroid.  She had her thyroid hormone dose adjusted recently.  Unclear if autoimmune hypothyroidism.  She is on tamoxifen.  She has been on this for years.  No dose change.  No association with foods, medications, soaps, shampoos, detergents.  Occurred in [place] and in [place].  Made worse with heat both active and passive.  No other systemic symptoms.  She has never had hives in the past.    The patient has no history of asthma, eczema, food allergies, medications allergies.     ENVIRONMENTAL HISTORY: [place] The home is heated with a forced air. They do have central air conditioning. The patient's bedroom is furnished with carpeting in bedroom.  Pets inside the house include 1 dog(s). There is no history of cockroach or mice infestation. There is/are 0 smokers in the house.  The house does not have a damp basement.       Past Medical History:   Diagnosis Date     Breast cancer (H) 07/2013    Lumpectomy, chemo and radiation.       Diffuse cystic mastopathy     Fibrocystic breast disease     Endometriosis 1993     External hemorrhoids without mention of complication      Unspecified hypothyroidism      Family History   Problem Relation Age of Onset     Cancer Other  55        Breast, living     Cancer Paternal Aunt 50        Colon,      Cancer Paternal Grandmother         Colon,  86 years old     Osteoporosis Mother      Heart Disease Mother         heart disease     Hypertension Mother      Arthritis Mother      Osteoporosis Maternal Grandfather      Heart Disease Maternal Grandfather         heart attack     Heart Disease Maternal Grandmother         Bypass and heart attack     Heart Disease Sister         heart disease/compl. pneumonia     Genetic Disorder Sister         down's Syn     Past Surgical History:   Procedure Laterality Date     BREAST BIOPSY, CORE RT/LT Left 2013    cancer     C NONSPECIFIC PROCEDURE      Outpatient surgery for anal fissure     COLONOSCOPY N/A 2014    normal, repeat 5 years due to family risk     LAPAROSCOPIC CHOLECYSTECTOMY N/A 2017    Procedure: LAPAROSCOPIC CHOLECYSTECTOMY;  Laparoscopic Cholecystectomy;  Surgeon: Jason Munoz MD;  Location: [facility]     LAPAROSCOPY      for endometriosis     LUMPECTOMY BREAST WITH SENTINEL NODE, COMBINED  2013    Procedure: COMBINED LUMPECTOMY BREAST WITH SENTINEL NODE;  Left Breast Lumpectomy with [place] Node Biopsy;  Surgeon: Jason Munoz MD;  Location: [facility] OR       REVIEW OF SYSTEMS:  General: negative for weight gain. negative for weight loss. negative for changes in sleep.   Ears: negative for fullness. negative for hearing loss. negative for dizziness.   Nose: negative for snoring.negative for changes in smell. negative for drainage.   Eyes: negative for eye watering. negative for eye itching. negative for vision changes. negative for eye redness.  Throat: negative for hoarseness. negative for sore throat. negative for trouble swallowing.   Lungs: negative for shortness of breath.negative for wheezing. negative for sputum production.   Cardiovascular: negative for chest pain. negative for swelling of ankles. negative for fast or irregular heartbeat.    Gastrointestinal: negative for nausea. negative for heartburn. negative for acid reflux.   Musculoskeletal: negative for joint pain. negative for joint stiffness. negative for joint swelling.   Neurologic: negative for seizures. negative for fainting. negative for weakness.   Psychiatric: negative for changes in mood. negative for anxiety.   Endocrine: negative for cold intolerance. negative for heat intolerance. negative for tremors.   Lymphatic: negative for lower extremity swelling. negative for lymph node swelling.   Hematologic: negative for easy bruising. negative for easy bleeding.  Integumentary: positive  for rash. negative for scaling. negative for nail changes.       Current Outpatient Medications:      cetirizine (ZYRTEC) 10 MG tablet, Take 1 tablet (10 mg) by mouth 2 times daily, Disp: 60 tablet, Rfl: 3     IBUPROFEN PO, Take 600 mg by mouth every 6 hours as needed for moderate pain, Disp: , Rfl:      levothyroxine (SYNTHROID/LEVOTHROID) 100 MCG tablet, TAKE ONE TABLET BY MOUTH ONCE DAILY. (DOSE CHANGE), Disp: 90 tablet, Rfl: 1     tamoxifen (NOLVADEX) 20 MG tablet, Take 1 tablet (20 mg) by mouth daily, Disp: 90 tablet, Rfl: 1  Immunization History   Administered Date(s) Administered     TD (ADULT, 7+) 02/20/2003     Varicella Pt Report Hx of Varicella/Chicken Pox 01/01/1970     Allergies   Allergen Reactions     No Known Drug Allergies          EXAM:   Constitutional:  Appears well-developed and well-nourished. No distress.   HEENT:   Head: Normocephalic.   Mouth/Throat:   No cobblestoning of posterior oropharynx.   Nasal tissue pink and normal appearing.  No rhinorrhea noted.    Eyes: Conjunctivae are non-erythematous   Cardiovascular: Normal rate, regular rhythm and normal heart sounds. Exam reveals no gallop and no friction rub.   No murmur heard.  Respiratory: Effort normal and breath sounds normal. No respiratory distress. No wheezes. No rales.   Musculoskeletal: Normal range of motion.   Neuro:  Oriented to person, place, and time.  Skin: Erythematous welts consistent with hives noted on bilateral upper extremities.  Psychiatric: Normal mood and affect.     Nursing note and vitals reviewed.    ASSESSMENT/PLAN:  Problem List Items Addressed This Visit        Endocrine    Hypothyroidism - Primary     Long standing. Checking thyroid autoantibodies.             Musculoskeletal and Integumentary    Chronic idiopathic urticaria     Chronic spontaneous urticaria.  No clear trigger identified.  History of hypothyroidism.  Recent shingles infection.  Hives on a daily basis.  No scarring or discoloration.  Angioedema associated.  Zyrtec has been beneficial.  Recent thyroid hormone adjustment. Heat is a trigger.     -Blood testing as noted.  - Unclear etiology.  Possibly associated with autoimmune urticaria.  Checking thyroid autoantibodies as noted.  -Start Zyrtec 10 mg by mouth twice daily.  I want her 100% high free.  If hives persist she was instructed to contact our office.  -Return to clinic in 3 months.         Relevant Medications    cetirizine (ZYRTEC) 10 MG tablet    Other Relevant Orders    Anti thyroglobulin antibody (Completed)    Thyroid peroxidase antibody (Completed)    Vitamin D Deficiency (Completed)    TSH with free T4 reflex (Completed)    CBC with platelets differential (Completed)    Complement total (Completed)    Cholinergic urticaria    Relevant Medications    cetirizine (ZYRTEC) 10 MG tablet      Return in 3 months.     Chart documentation with Dragon Voice recognition Software. Although reviewed after completion, some words and grammatical errors may remain.    Pacheco Reynolds DO FAAAAI  Allergy/Immunology  [place], [place]

## 2019-09-19 LAB
DEPRECATED CALCIDIOL+CALCIFEROL SERPL-MC: 42 UG/L (ref 20–75)
THYROGLOB AB SERPL IA-ACNC: 23 IU/ML (ref 0–40)
THYROPEROXIDASE AB SERPL-ACNC: 3766 IU/ML

## 2019-09-20 LAB — CH50 SERPL-ACNC: 93 CAE UNITS (ref 60–144)

## 2019-09-20 NOTE — RESULT ENCOUNTER NOTE
Labs are normal aside from an elevated thyroid peroxidase antibody level.  This is indicative of autoimmune hypothyroidism.  Auto immune hypothyroidism has been associated with autoimmune urticaria.  Treatment remains the same as we discussed in clinic.  Thank you.

## 2019-10-10 ENCOUNTER — TELEPHONE (OUTPATIENT)
Dept: ORTHOPEDICS | Facility: OTHER | Age: 54
End: 2019-10-10

## 2019-10-10 NOTE — TELEPHONE ENCOUNTER
Reason for Call:  Other appointment    Detailed comments: 's office calling to see if Dr. Macario can do a telephone consultation with her . Please call Elizabeth at [phone] and let her know if we are able to do this or not.     Phone Number Patient can be reached at: Home number on file      Best Time: anyu    Can we leave a detailed message on this number? YES    Call taken on 10/10/2019 at 8:29 AM by Juliet Browning

## 2019-10-10 NOTE — TELEPHONE ENCOUNTER
Spoke to Elizabeth, she is trying to set up a 15 minute phone conference between Dr. Macario and patient's .  Please advise regarding possible times for call.    Kami Bennett, ATC

## 2019-10-11 NOTE — TELEPHONE ENCOUNTER
"LVM with Elizabeth's office.     This message should be relayed if Elizabeth returns the call:    \"Dr. Macario is focused upon providing patient care and is unable to attend to your request.  Dr. Macario has documented his assessment and treatment of the patient in the medical record.  If you wish to request a narrative report or medical records, please send your request to [facility] at [phone]\"    Cynthia Montilla M.Ed., LAT, ATC  Clinic Coordinator for Dr. Radha Macario    "

## 2019-10-14 DIAGNOSIS — C50.412 MALIGNANT NEOPLASM OF UPPER-OUTER QUADRANT OF LEFT BREAST IN FEMALE, ESTROGEN RECEPTOR POSITIVE (H): ICD-10-CM

## 2019-10-14 DIAGNOSIS — Z17.0 MALIGNANT NEOPLASM OF UPPER-OUTER QUADRANT OF LEFT BREAST IN FEMALE, ESTROGEN RECEPTOR POSITIVE (H): ICD-10-CM

## 2019-10-14 LAB
ALBUMIN SERPL-MCNC: 4 G/DL (ref 3.4–5)
ALP SERPL-CCNC: 90 U/L (ref 40–150)
ALT SERPL W P-5'-P-CCNC: 23 U/L (ref 0–50)
ANION GAP SERPL CALCULATED.3IONS-SCNC: 3 MMOL/L (ref 3–14)
AST SERPL W P-5'-P-CCNC: 16 U/L (ref 0–45)
BASOPHILS # BLD AUTO: 0.1 10E9/L (ref 0–0.2)
BASOPHILS NFR BLD AUTO: 1.1 %
BILIRUB SERPL-MCNC: 0.4 MG/DL (ref 0.2–1.3)
BUN SERPL-MCNC: 13 MG/DL (ref 7–30)
CALCIUM SERPL-MCNC: 9 MG/DL (ref 8.5–10.1)
CANCER AG27-29 SERPL-ACNC: 20 U/ML (ref 0–39)
CHLORIDE SERPL-SCNC: 106 MMOL/L (ref 94–109)
CO2 SERPL-SCNC: 32 MMOL/L (ref 20–32)
CREAT SERPL-MCNC: 0.8 MG/DL (ref 0.52–1.04)
DIFFERENTIAL METHOD BLD: NORMAL
EOSINOPHIL NFR BLD AUTO: 1.7 %
ERYTHROCYTE [DISTWIDTH] IN BLOOD BY AUTOMATED COUNT: 12.1 % (ref 10–15)
GFR SERPL CREATININE-BSD FRML MDRD: 83 ML/MIN/{1.73_M2}
GLUCOSE SERPL-MCNC: 88 MG/DL (ref 70–99)
HCT VFR BLD AUTO: 43.9 % (ref 35–47)
HGB BLD-MCNC: 14.7 G/DL (ref 11.7–15.7)
IMM GRANULOCYTES # BLD: 0 10E9/L (ref 0–0.4)
IMM GRANULOCYTES NFR BLD: 0.3 %
LYMPHOCYTES # BLD AUTO: 2 10E9/L (ref 0.8–5.3)
LYMPHOCYTES NFR BLD AUTO: 31.7 %
MCH RBC QN AUTO: 32 PG (ref 26.5–33)
MCHC RBC AUTO-ENTMCNC: 33.5 G/DL (ref 31.5–36.5)
MCV RBC AUTO: 95 FL (ref 78–100)
MONOCYTES # BLD AUTO: 0.4 10E9/L (ref 0–1.3)
MONOCYTES NFR BLD AUTO: 6.9 %
NEUTROPHILS # BLD AUTO: 3.7 10E9/L (ref 1.6–8.3)
NEUTROPHILS NFR BLD AUTO: 58.3 %
NRBC # BLD AUTO: 0 10*3/UL
NRBC BLD AUTO-RTO: 0 /100
PLATELET # BLD AUTO: 209 10E9/L (ref 150–450)
POTASSIUM SERPL-SCNC: 3.9 MMOL/L (ref 3.4–5.3)
PROT SERPL-MCNC: 7.5 G/DL (ref 6.8–8.8)
RBC # BLD AUTO: 4.6 10E12/L (ref 3.8–5.2)
SODIUM SERPL-SCNC: 141 MMOL/L (ref 133–144)
WBC # BLD AUTO: 6.4 10E9/L (ref 4–11)

## 2019-10-14 PROCEDURE — 85025 COMPLETE CBC W/AUTO DIFF WBC: CPT | Performed by: INTERNAL MEDICINE

## 2019-10-14 PROCEDURE — 86300 IMMUNOASSAY TUMOR CA 15-3: CPT | Performed by: INTERNAL MEDICINE

## 2019-10-14 PROCEDURE — 36415 COLL VENOUS BLD VENIPUNCTURE: CPT | Performed by: INTERNAL MEDICINE

## 2019-10-14 PROCEDURE — 80053 COMPREHEN METABOLIC PANEL: CPT | Performed by: INTERNAL MEDICINE

## 2019-10-15 NOTE — TELEPHONE ENCOUNTER
Spoke with Elizabeth from [facility]. I informed her that we decline the phone conference but would be happy to supply a narrative report or request for medical records.     [facility] fax number provided for narrative report.     She stated she would let the  know.     Cynthia Montlila M.Ed., LAT, ATC  Clinic Coordinator for Dr. Radha Macario

## 2019-10-16 DIAGNOSIS — Z17.0 MALIGNANT NEOPLASM OF UPPER-OUTER QUADRANT OF LEFT BREAST IN FEMALE, ESTROGEN RECEPTOR POSITIVE (H): ICD-10-CM

## 2019-10-16 DIAGNOSIS — C50.412 MALIGNANT NEOPLASM OF UPPER-OUTER QUADRANT OF LEFT BREAST IN FEMALE, ESTROGEN RECEPTOR POSITIVE (H): ICD-10-CM

## 2019-10-16 RX ORDER — TAMOXIFEN CITRATE 20 MG/1
20 TABLET ORAL DAILY
Qty: 90 TABLET | Refills: 1 | Status: SHIPPED | OUTPATIENT
Start: 2019-10-16 | End: 2020-04-15

## 2019-10-16 NOTE — TELEPHONE ENCOUNTER
tamoxifen  Last Written Prescription Date:  4/16/19  Last Fill Quantity: 90,  # refills: 1   Last office visit: 4/16/2019 with prescribing provider:  4/16/19   Future Office Visit:   Next 5 appointments (look out 90 days)    Oct 17, 2019  1:30 PM CDT  Return Visit with Richard Ocampo MD  [address] [address]  [phone]   Oct 24, 2019  3:20 PM CDT  PHYSICAL with Jame Crocker NP  [facility] ([address] [address]  [phone]           Requested Prescriptions   Pending Prescriptions Disp Refills     tamoxifen (NOLVADEX) 20 MG tablet 90 tablet 1     Sig: Take 1 tablet (20 mg) by mouth daily       There is no refill protocol information for this order            Nathalia KAY RN. . .  10/16/2019, 9:17 AM

## 2019-10-17 ENCOUNTER — ONCOLOGY VISIT (OUTPATIENT)
Dept: ONCOLOGY | Facility: CLINIC | Age: 54
End: 2019-10-17
Payer: COMMERCIAL

## 2019-10-17 VITALS
DIASTOLIC BLOOD PRESSURE: 54 MMHG | TEMPERATURE: 97.1 F | SYSTOLIC BLOOD PRESSURE: 98 MMHG | RESPIRATION RATE: 16 BRPM | OXYGEN SATURATION: 100 % | HEART RATE: 66 BPM | WEIGHT: 133.1 LBS | HEIGHT: 67 IN | BODY MASS INDEX: 20.89 KG/M2

## 2019-10-17 DIAGNOSIS — C50.412 MALIGNANT NEOPLASM OF UPPER-OUTER QUADRANT OF LEFT BREAST IN FEMALE, ESTROGEN RECEPTOR POSITIVE (H): ICD-10-CM

## 2019-10-17 DIAGNOSIS — E03.9 HYPOTHYROIDISM, UNSPECIFIED TYPE: Primary | ICD-10-CM

## 2019-10-17 DIAGNOSIS — Z12.31 VISIT FOR SCREENING MAMMOGRAM: ICD-10-CM

## 2019-10-17 DIAGNOSIS — Z17.0 MALIGNANT NEOPLASM OF UPPER-OUTER QUADRANT OF LEFT BREAST IN FEMALE, ESTROGEN RECEPTOR POSITIVE (H): ICD-10-CM

## 2019-10-17 PROCEDURE — 99214 OFFICE O/P EST MOD 30 MIN: CPT | Performed by: INTERNAL MEDICINE

## 2019-10-17 ASSESSMENT — MIFFLIN-ST. JEOR: SCORE: 1241.37

## 2019-10-17 NOTE — LETTER
10/17/2019         RE: Emma Nunn  [address]  [facility] [address]        Dear Colleague,    Thank you for referring your patient, Emma Nunn, to the [facility]. Please see a copy of my visit note below.    Hematology/ Oncology Follow-up Visit:  Oct 17, 2019    Reason for Visit:   Chief Complaint   Patient presents with     Oncology Clinic Visit     6 month follow up-Malignant neoplasm of upper-outer quadrant of left breast in female, estrogen receptor positive      Results     labs completed 10/14/19     Medication Therapy Management     tamoxifen (NOLVADEX) 20 MG tablet       Oncologic History:    Malignant neoplasm of female breast (H)  Emma Nunn was diagnosed at age 47, premenopausally through self-breast check, felt a lump in her left outer upper quadrant. Diagnostic mammogram early 07/2013 identified asymmetrical density associated with palpable findings around 1-2 o'clock position, 8-10 cm from the nipple. Limited sonogram revealed hypoechoic mass with irregular borders, measured about 0.8 cm trocar position. Sonogram-guided biopsy indicating invasive ductal cancer, grade 2, with associated DCIS, ER/VT 90% positive, HER-2/sandy positive, FISH ratio greater than 11.7. she had lumpectomy 7/2013 - 1.3 cm LAURA, GII, + ALI, margin is close <=1 mm, +DCIS, 8LN negative.  She has [id number] stage I disease. TCH was recommended from 8//15/2013 to 12/2013 and herceptin after. . She finished RT 2/28/2014 and tamoxifen started in 3/2014.      Interval History:  Patient returning today for follow-up visit.  He has been feeling well without any recent complaints weight loss night sweats fever or chills patient denies any nausea vomiting or diarrhea.  She has been on tamoxifen and she has been tolerating tamoxifen without significant side effects.    Review Of Systems:  Constitutional: Negative for fever, chills, and night sweats.  Skin: negative.  Eyes: negative.  Ears/Nose/Throat:  "negative.  Respiratory: No shortness of breath, dyspnea on exertion, cough, or hemoptysis.  Cardiovascular: negative.  Gastrointestinal: negative.  Genitourinary: negative.  Musculoskeletal: negative.  Neurologic: negative.  Psychiatric: negative.  Hematologic/Lymphatic/Immunologic: negative.  Endocrine: negative.    All other ROS negative unless mentioned in interval history.    Past medical, social, surgical, and family histories reviewed.    Allergies:  Allergies as of 10/17/2019 - Reviewed 10/17/2019   Allergen Reaction Noted     No known drug allergies  04/02/2001       Current Medications:  Current Outpatient Medications   Medication Sig Dispense Refill     cetirizine (ZYRTEC) 10 MG tablet Take 1 tablet (10 mg) by mouth 2 times daily 60 tablet 3     IBUPROFEN PO Take 600 mg by mouth every 6 hours as needed for moderate pain       levothyroxine (SYNTHROID/LEVOTHROID) 100 MCG tablet TAKE ONE TABLET BY MOUTH ONCE DAILY. (DOSE CHANGE) 90 tablet 1     tamoxifen (NOLVADEX) 20 MG tablet Take 1 tablet (20 mg) by mouth daily 90 tablet 1        Physical Exam:  BP 98/54   Pulse 66   Temp 97.1  F (36.2  C) (Temporal)   Resp 16   Ht 1.702 m (5' 7\")   Wt 60.4 kg (133 lb 1.6 oz)   SpO2 100%   BMI 20.85 kg/m     Wt Readings from Last 12 Encounters:   10/17/19 60.4 kg (133 lb 1.6 oz)   09/18/19 59.4 kg (131 lb)   04/16/19 60.7 kg (133 lb 14.4 oz)   02/18/19 63 kg (139 lb)   01/28/19 65.8 kg (145 lb)   01/20/19 56 kg (123 lb 7.3 oz)   10/05/18 60.8 kg (134 lb)   09/26/18 60.6 kg (133 lb 11.2 oz)   09/11/18 60 kg (132 lb 4.8 oz)   05/08/18 60.3 kg (133 lb)   03/06/18 59.3 kg (130 lb 12.8 oz)   03/06/18 58.1 kg (128 lb)     ECOG performance status: 0  GENERAL APPEARANCE: Healthy, alert and in no acute distress.  HEENT: Sclerae anicteric. PERRLA. Oropharynx without ulcers, lesions, or thrush.  NECK: Supple. No asymmetry or masses.  LYMPHATICS: No palpable cervical, supraclavicular, axillary, or inguinal " "lymphadenopathy.  RESP: Lungs clear to auscultation bilaterally without rales, rhonchi or wheezes.\",  BREAST: Right- No mass, nipple discharge or axillary adenopathy. Left- No mass, nipple discharge or axillary adenopathy \"CARDIOVASCULAR: Regular rate and rhythm. Normal S1, S2; no S3 or S4. No murmur, gallop, or rub.  ABDOMEN: Soft, nontender. Bowel sounds normal. No palpable organomegaly or masses.  MUSCULOSKELETAL: Extremities without gross deformities noted. No edema of bilateral lower extremities.  SKIN: No suspicious lesions or rashes.  NEURO: Alert and oriented x 3. Cranial nerves II-XII grossly intact.  PSYCHIATRIC: Mentation and affect appear normal.    Laboratory/Imaging Studies:  Orders Only on 10/14/2019   Component Date Value Ref Range Status     CA 27-29 10/14/2019 20  0 - 39 U/mL Final    Assay Method:  Chemiluminescence using Siemens Centaur XP     Sodium 10/14/2019 141  133 - 144 mmol/L Final     Potassium 10/14/2019 3.9  3.4 - 5.3 mmol/L Final     Chloride 10/14/2019 106  94 - 109 mmol/L Final     Carbon Dioxide 10/14/2019 32  20 - 32 mmol/L Final     Anion Gap 10/14/2019 3  3 - 14 mmol/L Final     Glucose 10/14/2019 88  70 - 99 mg/dL Final     Urea Nitrogen 10/14/2019 13  7 - 30 mg/dL Final     Creatinine 10/14/2019 0.80  0.52 - 1.04 mg/dL Final     GFR Estimate 10/14/2019 83  >60 mL/min/[1.73_m2] Final    Comment: Non  GFR Calc  Starting 12/18/2018, serum creatinine based estimated GFR (eGFR) will be   calculated using the Chronic Kidney Disease Epidemiology Collaboration   (CKD-EPI) equation.       GFR Estimate If Black 10/14/2019 >90  >60 mL/min/[1.73_m2] Final    Comment:  GFR Calc  Starting 12/18/2018, serum creatinine based estimated GFR (eGFR) will be   calculated using the Chronic Kidney Disease Epidemiology Collaboration   (CKD-EPI) equation.       Calcium 10/14/2019 9.0  8.5 - 10.1 mg/dL Final     Bilirubin Total 10/14/2019 0.4  0.2 - 1.3 mg/dL Final     " Albumin 10/14/2019 4.0  3.4 - 5.0 g/dL Final     Protein Total 10/14/2019 7.5  6.8 - 8.8 g/dL Final     Alkaline Phosphatase 10/14/2019 90  40 - 150 U/L Final     ALT 10/14/2019 23  0 - 50 U/L Final     AST 10/14/2019 16  0 - 45 U/L Final     WBC 10/14/2019 6.4  4.0 - 11.0 10e9/L Final     RBC Count 10/14/2019 4.60  3.8 - 5.2 10e12/L Final     Hemoglobin 10/14/2019 14.7  11.7 - 15.7 g/dL Final     Hematocrit 10/14/2019 43.9  35.0 - 47.0 % Final     MCV 10/14/2019 95  78 - 100 fl Final     MCH 10/14/2019 32.0  26.5 - 33.0 pg Final     MCHC 10/14/2019 33.5  31.5 - 36.5 g/dL Final     RDW 10/14/2019 12.1  10.0 - 15.0 % Final     Platelet Count 10/14/2019 209  150 - 450 10e9/L Final     Diff Method 10/14/2019 Automated Method   Final     % Neutrophils 10/14/2019 58.3  % Final     % Lymphocytes 10/14/2019 31.7  % Final     % Monocytes 10/14/2019 6.9  % Final     % Eosinophils 10/14/2019 1.7  % Final     % Basophils 10/14/2019 1.1  % Final     % Immature Granulocytes 10/14/2019 0.3  % Final     Nucleated RBCs 10/14/2019 0  0 /100 Final     Absolute Neutrophil 10/14/2019 3.7  1.6 - 8.3 10e9/L Final     Absolute Lymphocytes 10/14/2019 2.0  0.8 - 5.3 10e9/L Final     Absolute Monocytes 10/14/2019 0.4  0.0 - 1.3 10e9/L Final     Absolute Basophils 10/14/2019 0.1  0.0 - 0.2 10e9/L Final     Abs Immature Granulocytes 10/14/2019 0.0  0 - 0.4 10e9/L Final     Absolute Nucleated RBC 10/14/2019 0.0   Final          Assessment and plan:    (C50.412,  Z17.0) Malignant neoplasm of upper-outer quadrant of left breast in female, estrogen receptor positive (H)  I reviewed with the patient today most recent laboratory test and mammographic images.  There is no clinical evidence of breast cancer recurrence.  Patient will continue tamoxifen 20 mg orally daily.  I will see the patient again in 6 months time following her annual mammogram.    (Z12.31) Visit for screening mammogram  Plan: MA Screening Digital Bilateral    (E03.9)  Hypothyroidism, unspecified type    Patient currently on Synthroid 100 mcg orally daily.    The patient is ready to learn, no apparent learning barriers were identified.  Diagnosis and treatment plans were explained to the patient. The patient expressed understanding of the content. The patient asked appropriate questions. The patient questions were answered to her satisfaction.    Chart documentation with Dragon Voice recognition Software. Although reviewed after completion, some words and grammatical errors may remain.    Again, thank you for allowing me to participate in the care of your patient.        Sincerely,        Richard Ocampo MD

## 2019-10-17 NOTE — PROGRESS NOTES
Hematology/ Oncology Follow-up Visit:  Oct 17, 2019    Reason for Visit:   Chief Complaint   Patient presents with     Oncology Clinic Visit     6 month follow up-Malignant neoplasm of upper-outer quadrant of left breast in female, estrogen receptor positive      Results     labs completed 10/14/19     Medication Therapy Management     tamoxifen (NOLVADEX) 20 MG tablet       Oncologic History:    Malignant neoplasm of female breast (H)  Emma Nunn was diagnosed at age 47, premenopausally through self-breast check, felt a lump in her left outer upper quadrant. Diagnostic mammogram early 07/2013 identified asymmetrical density associated with palpable findings around 1-2 o'clock position, 8-10 cm from the nipple. Limited sonogram revealed hypoechoic mass with irregular borders, measured about 0.8 cm trocar position. Sonogram-guided biopsy indicating invasive ductal cancer, grade 2, with associated DCIS, ER/CO 90% positive, HER-2/sandy positive, FISH ratio greater than 11.7. she had lumpectomy 7/2013 - 1.3 cm LAURA, GII, + ALI, margin is close <=1 mm, +DCIS, 8LN negative.  She has [id number] stage I disease. TCH was recommended from 8//15/2013 to 12/2013 and herceptin after. . She finished RT 2/28/2014 and tamoxifen started in 3/2014.      Interval History:  Patient returning today for follow-up visit.  He has been feeling well without any recent complaints weight loss night sweats fever or chills patient denies any nausea vomiting or diarrhea.  She has been on tamoxifen and she has been tolerating tamoxifen without significant side effects.    Review Of Systems:  Constitutional: Negative for fever, chills, and night sweats.  Skin: negative.  Eyes: negative.  Ears/Nose/Throat: negative.  Respiratory: No shortness of breath, dyspnea on exertion, cough, or hemoptysis.  Cardiovascular: negative.  Gastrointestinal: negative.  Genitourinary: negative.  Musculoskeletal: negative.  Neurologic: negative.  Psychiatric:  "negative.  Hematologic/Lymphatic/Immunologic: negative.  Endocrine: negative.    All other ROS negative unless mentioned in interval history.    Past medical, social, surgical, and family histories reviewed.    Allergies:  Allergies as of 10/17/2019 - Reviewed 10/17/2019   Allergen Reaction Noted     No known drug allergies  04/02/2001       Current Medications:  Current Outpatient Medications   Medication Sig Dispense Refill     cetirizine (ZYRTEC) 10 MG tablet Take 1 tablet (10 mg) by mouth 2 times daily 60 tablet 3     IBUPROFEN PO Take 600 mg by mouth every 6 hours as needed for moderate pain       levothyroxine (SYNTHROID/LEVOTHROID) 100 MCG tablet TAKE ONE TABLET BY MOUTH ONCE DAILY. (DOSE CHANGE) 90 tablet 1     tamoxifen (NOLVADEX) 20 MG tablet Take 1 tablet (20 mg) by mouth daily 90 tablet 1        Physical Exam:  BP 98/54   Pulse 66   Temp 97.1  F (36.2  C) (Temporal)   Resp 16   Ht 1.702 m (5' 7\")   Wt 60.4 kg (133 lb 1.6 oz)   SpO2 100%   BMI 20.85 kg/m    Wt Readings from Last 12 Encounters:   10/17/19 60.4 kg (133 lb 1.6 oz)   09/18/19 59.4 kg (131 lb)   04/16/19 60.7 kg (133 lb 14.4 oz)   02/18/19 63 kg (139 lb)   01/28/19 65.8 kg (145 lb)   01/20/19 56 kg (123 lb 7.3 oz)   10/05/18 60.8 kg (134 lb)   09/26/18 60.6 kg (133 lb 11.2 oz)   09/11/18 60 kg (132 lb 4.8 oz)   05/08/18 60.3 kg (133 lb)   03/06/18 59.3 kg (130 lb 12.8 oz)   03/06/18 58.1 kg (128 lb)     ECOG performance status: 0  GENERAL APPEARANCE: Healthy, alert and in no acute distress.  HEENT: Sclerae anicteric. PERRLA. Oropharynx without ulcers, lesions, or thrush.  NECK: Supple. No asymmetry or masses.  LYMPHATICS: No palpable cervical, supraclavicular, axillary, or inguinal lymphadenopathy.  RESP: Lungs clear to auscultation bilaterally without rales, rhonchi or wheezes.\",  BREAST: Right- No mass, nipple discharge or axillary adenopathy. Left- No mass, nipple discharge or axillary adenopathy \"CARDIOVASCULAR: Regular rate and " rhythm. Normal S1, S2; no S3 or S4. No murmur, gallop, or rub.  ABDOMEN: Soft, nontender. Bowel sounds normal. No palpable organomegaly or masses.  MUSCULOSKELETAL: Extremities without gross deformities noted. No edema of bilateral lower extremities.  SKIN: No suspicious lesions or rashes.  NEURO: Alert and oriented x 3. Cranial nerves II-XII grossly intact.  PSYCHIATRIC: Mentation and affect appear normal.    Laboratory/Imaging Studies:  Orders Only on 10/14/2019   Component Date Value Ref Range Status     CA 27-29 10/14/2019 20  0 - 39 U/mL Final    Assay Method:  Chemiluminescence using Siemens Centaur XP     Sodium 10/14/2019 141  133 - 144 mmol/L Final     Potassium 10/14/2019 3.9  3.4 - 5.3 mmol/L Final     Chloride 10/14/2019 106  94 - 109 mmol/L Final     Carbon Dioxide 10/14/2019 32  20 - 32 mmol/L Final     Anion Gap 10/14/2019 3  3 - 14 mmol/L Final     Glucose 10/14/2019 88  70 - 99 mg/dL Final     Urea Nitrogen 10/14/2019 13  7 - 30 mg/dL Final     Creatinine 10/14/2019 0.80  0.52 - 1.04 mg/dL Final     GFR Estimate 10/14/2019 83  >60 mL/min/[1.73_m2] Final    Comment: Non  GFR Calc  Starting 12/18/2018, serum creatinine based estimated GFR (eGFR) will be   calculated using the Chronic Kidney Disease Epidemiology Collaboration   (CKD-EPI) equation.       GFR Estimate If Black 10/14/2019 >90  >60 mL/min/[1.73_m2] Final    Comment:  GFR Calc  Starting 12/18/2018, serum creatinine based estimated GFR (eGFR) will be   calculated using the Chronic Kidney Disease Epidemiology Collaboration   (CKD-EPI) equation.       Calcium 10/14/2019 9.0  8.5 - 10.1 mg/dL Final     Bilirubin Total 10/14/2019 0.4  0.2 - 1.3 mg/dL Final     Albumin 10/14/2019 4.0  3.4 - 5.0 g/dL Final     Protein Total 10/14/2019 7.5  6.8 - 8.8 g/dL Final     Alkaline Phosphatase 10/14/2019 90  40 - 150 U/L Final     ALT 10/14/2019 23  0 - 50 U/L Final     AST 10/14/2019 16  0 - 45 U/L Final     WBC 10/14/2019  6.4  4.0 - 11.0 10e9/L Final     RBC Count 10/14/2019 4.60  3.8 - 5.2 10e12/L Final     Hemoglobin 10/14/2019 14.7  11.7 - 15.7 g/dL Final     Hematocrit 10/14/2019 43.9  35.0 - 47.0 % Final     MCV 10/14/2019 95  78 - 100 fl Final     MCH 10/14/2019 32.0  26.5 - 33.0 pg Final     MCHC 10/14/2019 33.5  31.5 - 36.5 g/dL Final     RDW 10/14/2019 12.1  10.0 - 15.0 % Final     Platelet Count 10/14/2019 209  150 - 450 10e9/L Final     Diff Method 10/14/2019 Automated Method   Final     % Neutrophils 10/14/2019 58.3  % Final     % Lymphocytes 10/14/2019 31.7  % Final     % Monocytes 10/14/2019 6.9  % Final     % Eosinophils 10/14/2019 1.7  % Final     % Basophils 10/14/2019 1.1  % Final     % Immature Granulocytes 10/14/2019 0.3  % Final     Nucleated RBCs 10/14/2019 0  0 /100 Final     Absolute Neutrophil 10/14/2019 3.7  1.6 - 8.3 10e9/L Final     Absolute Lymphocytes 10/14/2019 2.0  0.8 - 5.3 10e9/L Final     Absolute Monocytes 10/14/2019 0.4  0.0 - 1.3 10e9/L Final     Absolute Basophils 10/14/2019 0.1  0.0 - 0.2 10e9/L Final     Abs Immature Granulocytes 10/14/2019 0.0  0 - 0.4 10e9/L Final     Absolute Nucleated RBC 10/14/2019 0.0   Final          Assessment and plan:    (C50.412,  Z17.0) Malignant neoplasm of upper-outer quadrant of left breast in female, estrogen receptor positive (H)  I reviewed with the patient today most recent laboratory test and mammographic images.  There is no clinical evidence of breast cancer recurrence.  Patient will continue tamoxifen 20 mg orally daily.  I will see the patient again in 6 months time following her annual mammogram.    (Z12.31) Visit for screening mammogram  Plan: [place] Screening Digital Bilateral    (E03.9) Hypothyroidism, unspecified type    Patient currently on Synthroid 100 mcg orally daily.    The patient is ready to learn, no apparent learning barriers were identified.  Diagnosis and treatment plans were explained to the patient. The patient expressed understanding of  the content. The patient asked appropriate questions. The patient questions were answered to her satisfaction.    Chart documentation with Dragon Voice recognition Software. Although reviewed after completion, some words and grammatical errors may remain.

## 2019-10-17 NOTE — ASSESSMENT & PLAN NOTE
Emma Nunn was diagnosed at age 47, premenopausally through self-breast check, felt a lump in her left outer upper quadrant. Diagnostic mammogram early 07/2013 identified asymmetrical density associated with palpable findings around 1-2 o'clock position, 8-10 cm from the nipple. Limited sonogram revealed hypoechoic mass with irregular borders, measured about 0.8 cm trocar position. Sonogram-guided biopsy indicating invasive ductal cancer, grade 2, with associated DCIS, ER/DC 90% positive, HER-2/sandy positive, FISH ratio greater than 11.7. she had lumpectomy 7/2013 - 1.3 cm LAURA, GII, + ALI, margin is close <=1 mm, +DCIS, 8LN negative.  She has [id number] stage I disease. TCH was recommended from 8//15/2013 to 12/2013 and herceptin after. . She finished RT 2/28/2014 and tamoxifen started in 3/2014.

## 2019-11-14 ENCOUNTER — OFFICE VISIT (OUTPATIENT)
Dept: FAMILY MEDICINE | Facility: CLINIC | Age: 54
End: 2019-11-14
Payer: COMMERCIAL

## 2019-11-14 VITALS
TEMPERATURE: 96.2 F | WEIGHT: 142.7 LBS | BODY MASS INDEX: 22.35 KG/M2 | HEART RATE: 103 BPM | DIASTOLIC BLOOD PRESSURE: 62 MMHG | RESPIRATION RATE: 18 BRPM | SYSTOLIC BLOOD PRESSURE: 98 MMHG | OXYGEN SATURATION: 98 %

## 2019-11-14 DIAGNOSIS — Z23 NEED FOR PROPHYLACTIC VACCINATION AND INOCULATION AGAINST INFLUENZA: ICD-10-CM

## 2019-11-14 DIAGNOSIS — Z00.00 ROUTINE GENERAL MEDICAL EXAMINATION AT A HEALTH CARE FACILITY: Primary | ICD-10-CM

## 2019-11-14 DIAGNOSIS — Z12.31 ENCOUNTER FOR SCREENING MAMMOGRAM FOR BREAST CANCER: ICD-10-CM

## 2019-11-14 DIAGNOSIS — Z23 NEED FOR VACCINATION: ICD-10-CM

## 2019-11-14 PROCEDURE — 90471 IMMUNIZATION ADMIN: CPT | Performed by: NURSE PRACTITIONER

## 2019-11-14 PROCEDURE — 90682 RIV4 VACC RECOMBINANT DNA IM: CPT | Performed by: NURSE PRACTITIONER

## 2019-11-14 PROCEDURE — 90472 IMMUNIZATION ADMIN EACH ADD: CPT | Performed by: NURSE PRACTITIONER

## 2019-11-14 PROCEDURE — 90715 TDAP VACCINE 7 YRS/> IM: CPT | Performed by: NURSE PRACTITIONER

## 2019-11-14 PROCEDURE — 99396 PREV VISIT EST AGE 40-64: CPT | Mod: 25 | Performed by: NURSE PRACTITIONER

## 2019-11-14 ASSESSMENT — ENCOUNTER SYMPTOMS
HEMATOCHEZIA: 0
HEMATURIA: 0
COUGH: 0
ABDOMINAL PAIN: 0
CHILLS: 0
FEVER: 0
DIARRHEA: 0
FREQUENCY: 0
EYE PAIN: 0
CONSTIPATION: 0
DIZZINESS: 0
NERVOUS/ANXIOUS: 0

## 2019-11-14 ASSESSMENT — PAIN SCALES - GENERAL: PAINLEVEL: NO PAIN (0)

## 2019-11-14 NOTE — PROGRESS NOTES
SUBJECTIVE:   CC: Emma Nunn is an 54 year old woman who presents for preventive health visit.     Patient presents today for preventive health care visit she has a history of triple positive breast cancer, she was diagnosed in 2013 completed chemotherapy, Herceptin, radiation, and a lobectomy.  This is of the left breast.  He continues to follow with oncology and is on tamoxifen.  Plan for now as a minimum of 5 years most likely looking at 10 years of treatment.  No issues with the tamoxifen that she can relate other than mild hot flashes.  She is also followed for hypothyroidism which became an issue after her first pregnancy.  She is on Synthroid 100 mcg daily, she admits to not taking her meds last couple weeks and would like us to wait to recheck her TSH for a couple more weeks, in terms of the thyroid she is asymptomatic.  She has no other new acute symptoms or concerns.  Her mood is good with no new depression or anxiety.  Exercises on a regular basis and eats healthy.    Healthy Habits:     Getting at least 3 servings of Calcium per day:  Yes    Bi-annual eye exam:  Yes    Dental care twice a year:  Yes    Sleep apnea or symptoms of sleep apnea:  Daytime drowsiness    Diet:  Regular (no restrictions)    Frequency of exercise:  2-3 days/week    Duration of exercise:  15-30 minutes    Taking medications regularly:  Yes    Medication side effects:  Other    PHQ-2 Total Score: 0    Additional concerns today:  No        Today's PHQ-2 Score:   PHQ-2 ( 1999 Pfizer) 11/14/2019   Q1: Little interest or pleasure in doing things 0   Q2: Feeling down, depressed or hopeless 0   PHQ-2 Score 0   Q1: Little interest or pleasure in doing things Not at all   Q2: Feeling down, depressed or hopeless Not at all   PHQ-2 Score 0       Abuse: Current or Past(Physical, Sexual or Emotional)- No  Do you feel safe in your environment? Yes        Social History     Tobacco Use     Smoking status: Never Smoker     Smokeless tobacco:  Never Used   Substance Use Topics     Alcohol use: No     Alcohol/week: 0.0 standard drinks     If you drink alcohol do you typically have >3 drinks per day or >7 drinks per week? No    Alcohol Use 11/14/2019   Prescreen: >3 drinks/day or >7 drinks/week? No   Prescreen: >3 drinks/day or >7 drinks/week? -       Reviewed orders with patient.  Reviewed health maintenance and updated orders accordingly - Yes  Lab work is in process  Labs reviewed in EPIC  Patient Active Problem List   Diagnosis     Anal fissure     Hypothyroidism     External hemorrhoids     Closed head injury     Cervicalgia     CARDIOVASCULAR SCREENING; LDL GOAL LESS THAN 160     Joint pain     Dehydration     Cancer associated pain     Drug-induced neutropenia (H)     Hypotension     Malignant neoplasm of female breast (H)     Shoulder joint pain     Anxiety     Hypothyroidism, unspecified type     Cervical cancer screening     Concussion with loss of consciousness, unspecified duration, subsequent encounter     Chronic idiopathic urticaria     Cholinergic urticaria     Past Surgical History:   Procedure Laterality Date     BREAST BIOPSY, CORE RT/LT Left 07/08/2013    cancer     C NONSPECIFIC PROCEDURE  1993    Outpatient surgery for anal fissure     COLONOSCOPY N/A 12/1/2014    normal, repeat 5 years due to family risk     LAPAROSCOPIC CHOLECYSTECTOMY N/A 5/24/2017    Procedure: LAPAROSCOPIC CHOLECYSTECTOMY;  Laparoscopic Cholecystectomy;  Surgeon: Jason Munoz MD;  Location:  OR     LAPAROSCOPY  1993    for endometriosis     LUMPECTOMY BREAST WITH SENTINEL NODE, COMBINED  7/31/2013    Procedure: COMBINED LUMPECTOMY BREAST WITH SENTINEL NODE;  Left Breast Lumpectomy with [place] Node Biopsy;  Surgeon: Jason Munoz MD;  Location:  OR       Social History     Tobacco Use     Smoking status: Never Smoker     Smokeless tobacco: Never Used   Substance Use Topics     Alcohol use: No     Alcohol/week: 0.0 standard drinks     Family History    Problem Relation Age of Onset     Cancer Other 55        Breast, living     Cancer Paternal Aunt 50        Colon,      Cancer Paternal Grandmother         Colon,  86 years old     Osteoporosis Mother      Heart Disease Mother         heart disease     Hypertension Mother      Arthritis Mother      Osteoporosis Maternal Grandfather      Heart Disease Maternal Grandfather         heart attack     Heart Disease Maternal Grandmother         Bypass and heart attack     Heart Disease Sister         heart disease/compl. pneumonia     Genetic Disorder Sister         down's Syn         Current Outpatient Medications   Medication Sig Dispense Refill     cetirizine (ZYRTEC) 10 MG tablet Take 1 tablet (10 mg) by mouth 2 times daily 60 tablet 3     levothyroxine (SYNTHROID/LEVOTHROID) 100 MCG tablet TAKE ONE TABLET BY MOUTH ONCE DAILY. (DOSE CHANGE) 90 tablet 1     tamoxifen (NOLVADEX) 20 MG tablet Take 1 tablet (20 mg) by mouth daily 90 tablet 1     Allergies   Allergen Reactions     No Known Drug Allergies            Pertinent mammograms are reviewed under the imaging tab.  History of abnormal Pap smear: NO - age 30-65 PAP every 5 years with negative HPV co-testing recommended  PAP / HPV Latest Ref Rng & Units 2016   PAP - NIL NIL NIL   HPV 16 DNA NEG Negative - -   HPV 18 DNA NEG Negative - -   OTHER HR HPV NEG Negative - -     Reviewed and updated as needed this visit by clinical staff  Tobacco  Allergies  Meds  Problems  Med Hx  Surg Hx  Fam Hx         Reviewed and updated as needed this visit by Provider  Tobacco  Allergies  Meds  Problems  Med Hx  Surg Hx  Fam Hx            Review of Systems   Constitutional: Negative for chills and fever.   HENT: Negative for congestion and ear pain.    Eyes: Negative for pain.   Respiratory: Negative for cough.    Cardiovascular: Negative for chest pain.   Gastrointestinal: Negative for abdominal pain, constipation, diarrhea and  hematochezia.   Genitourinary: Negative for frequency and hematuria.   Neurological: Negative for dizziness.   Psychiatric/Behavioral: The patient is not nervous/anxious.           OBJECTIVE:   BP 98/62   Pulse 103   Temp 96.2  F (35.7  C) (Temporal)   Resp 18   Wt 64.7 kg (142 lb 11.2 oz)   SpO2 98%   BMI 22.35 kg/m    Physical Exam  GENERAL APPEARANCE: healthy, alert and no distress  EYES: Eyes grossly normal to inspection, PERRL and conjunctivae and sclerae normal  HENT: ear canals and TM's normal, nose and mouth without ulcers or lesions, oropharynx clear and oral mucous membranes moist  NECK: no adenopathy, no asymmetry, masses, or scars and thyroid normal to palpation  RESP: lungs clear to auscultation - no rales, rhonchi or wheezes  BREAST: normal without masses, tenderness or nipple discharge and no palpable axillary masses or adenopathy, left side lumpectomy scar well-healed, very little radiation changes noted on the left breast.  CV: regular rate and rhythm, normal S1 S2, no S3 or S4, no murmur, click or rub, no peripheral edema and peripheral pulses strong  ABDOMEN: soft, nontender, no hepatosplenomegaly, no masses and bowel sounds normal  MS: no musculoskeletal defects are noted and gait is age appropriate without ataxia  SKIN: no suspicious lesions or rashes  NEURO: Normal strength and tone, sensory exam grossly normal, mentation intact and speech normal  PSYCH: mentation appears normal and affect normal/bright    Diagnostic Test Results:  Labs reviewed in Epic    ASSESSMENT/PLAN:   1. Routine general medical examination at a health care facility  -Check the TSH first part of December if she is back on her medication.  - TSH with free T4 reflex; Future    2. Encounter for screening mammogram for breast cancer  -Ryan will be due in April 2020 we will get the order in for.  - *MA Screening Digital Bilateral; Future    3. Need for vaccination  -Tolerating the past with vaccinations taken for today  "she wants to hold off on the pneumococcal and shingles vaccine she will do those at her next visit.  - TDAP, IM (10 - 64 YRS) - Adacel  - INITIAL VACCINE ADMINSTRATION    4. Need for prophylactic vaccination and inoculation against influenza    - INFLUENZA QUAD, RECOMBINANT, P-FREE (RIV4) (FLUBLOCK) [09753]  - Vaccine Administration, Initial [74419]    COUNSELING:  Reviewed preventive health counseling, as reflected in patient instructions  Special attention given to:        Regular exercise       Healthy diet/nutrition       (Elizabet)menopause management    Estimated body mass index is 22.35 kg/m  as calculated from the following:    Height as of 10/17/19: 1.702 m (5' 7\").    Weight as of this encounter: 64.7 kg (142 lb 11.2 oz).         reports that she has never smoked. She has never used smokeless tobacco.      Counseling Resources:  ATP IV Guidelines  Pooled Cohorts Equation Calculator  Breast Cancer Risk Calculator  FRAX Risk Assessment  ICSI Preventive Guidelines  Dietary Guidelines for Americans, 2010  USDA's MyPlate  ASA Prophylaxis  Lung CA Screening    Jame Crocker NP  [facility]  "

## 2019-12-26 ENCOUNTER — TELEPHONE (OUTPATIENT)
Dept: FAMILY MEDICINE | Facility: CLINIC | Age: 54
End: 2019-12-26

## 2019-12-26 NOTE — TELEPHONE ENCOUNTER
Patient reports the emotional support animal is for her father.     [facility] and [facility] are [facility]. Informed that they need this to contact the [facility] for further information.     Lena Sharp LPN........12/26/2019 3:36 PM

## 2019-12-26 NOTE — TELEPHONE ENCOUNTER
Reason for Call:  Other call back    Detailed comments: Patient calling with questions regarding a letter for an emotional support animal due to anxiety. Requesting to speak with a nurse.  Please call and advise.     Phone Number Patient can be reached at: Cell number on file:    Telephone Information:   Mobile [phone]       Best Time: Anytime     Can we leave a detailed message on this number? YES    Call taken on 12/26/2019 at 2:42 PM by Madi R. Seipel Vaccari

## 2020-01-20 DIAGNOSIS — E03.9 HYPOTHYROIDISM, UNSPECIFIED TYPE: ICD-10-CM

## 2020-01-20 RX ORDER — LEVOTHYROXINE SODIUM 100 UG/1
TABLET ORAL
Qty: 90 TABLET | Refills: 0 | Status: SHIPPED | OUTPATIENT
Start: 2020-01-20 | End: 2020-04-16

## 2020-01-20 NOTE — TELEPHONE ENCOUNTER
"Levothyroxine  Last Written Prescription Date:  07/16/2019  Last Fill Quantity: 90,  # refills: 1   Last office visit: 11/14/2019 with prescribing provider:     Future Office Visit:      Requested Prescriptions   Pending Prescriptions Disp Refills     levothyroxine (SYNTHROID/LEVOTHROID) 100 MCG tablet [Pharmacy Med Name: LEVOTHYROXINE 100MCG TAB] 90 tablet 1     Sig: TAKE ONE TABLET BY MOUTH ONCE DAILY ( DUE FOR LAB RECHECK )       Thyroid Protocol Passed - 1/20/2020 11:34 AM        Passed - Patient is 12 years or older        Passed - Recent (12 mo) or future (30 days) visit within the authorizing provider's specialty     Patient has had an office visit with the authorizing provider or a provider within the authorizing providers department within the previous 12 mos or has a future within next 30 days. See \"Patient Info\" tab in inbasket, or \"Choose Columns\" in Meds & Orders section of the refill encounter.              Passed - Medication is active on med list        Passed - Normal TSH on file in past 12 months     Recent Labs   Lab Test 09/18/19  0810   TSH 3.77              Passed - No active pregnancy on record     If patient is pregnant or has had a positive pregnancy test, please check TSH.          Passed - No positive pregnancy test in past 12 months     If patient is pregnant or has had a positive pregnancy test, please check TSH.          Prescription approved per [facility] [place] [place] Refill Protocol.  Whit Negron RN BSN    "

## 2020-04-15 DIAGNOSIS — E03.9 HYPOTHYROIDISM, UNSPECIFIED TYPE: ICD-10-CM

## 2020-04-15 DIAGNOSIS — Z17.0 MALIGNANT NEOPLASM OF UPPER-OUTER QUADRANT OF LEFT BREAST IN FEMALE, ESTROGEN RECEPTOR POSITIVE (H): ICD-10-CM

## 2020-04-15 DIAGNOSIS — C50.412 MALIGNANT NEOPLASM OF UPPER-OUTER QUADRANT OF LEFT BREAST IN FEMALE, ESTROGEN RECEPTOR POSITIVE (H): ICD-10-CM

## 2020-04-15 RX ORDER — TAMOXIFEN CITRATE 20 MG/1
20 TABLET ORAL DAILY
Qty: 90 TABLET | Refills: 1 | Status: SHIPPED | OUTPATIENT
Start: 2020-04-15 | End: 2020-12-30

## 2020-04-15 NOTE — TELEPHONE ENCOUNTER
Chief Complaint   Patient presents with     Refill Request     Tamoxifen Citrate 20MG      Refill request received via fax by pharmacy      [facility] - [facility], [place] - [address], [place] - [address] [place] [address]  tel:[phone]      Pending Prescriptions:                       Disp   Refills    tamoxifen (NOLVADEX) 20 MG tablet         90 tab*1            Sig: Take 1 tablet (20 mg) by mouth daily         Last Written Prescription Date:  10/16/2019  Last Fill Quantity: 90,   # refills: 1  Last Office Visit with prescribing provider: 10/17/2019  Future Office visit: None scheduled.     Medication is ready to be sent if appropriate.   Mary Akbar CMA on 4/15/2020 at 1:28 PM

## 2020-04-16 RX ORDER — LEVOTHYROXINE SODIUM 100 UG/1
TABLET ORAL
Qty: 90 TABLET | Refills: 1 | Status: SHIPPED | OUTPATIENT
Start: 2020-04-16 | End: 2020-12-30

## 2020-05-04 ENCOUNTER — TELEPHONE (OUTPATIENT)
Dept: ONCOLOGY | Facility: CLINIC | Age: 55
End: 2020-05-04

## 2020-05-04 NOTE — TELEPHONE ENCOUNTER
Reason for Call:  Other appointment    Detailed comments:Pt would like to know if she is supposed to schedule an apt or does she need to wait?     Phone Number Patient can be reached at: Home number on file [phone] (home)    Best Time: NA    Can we leave a detailed message on this number? Not Applicable    Call taken on 5/4/2020 at 11:09 AM by Cristina Loera

## 2020-05-04 NOTE — TELEPHONE ENCOUNTER
Patient scheduled for 1-2 months out with labs and mammogram prior.  Patient denies any new concerns and agrees with waiting.  Appointments scheduled and calendar mailed to patient.      Fortino Garcia RN, BSN, OCN  5/4/2020, 11:40 AM

## 2020-07-13 ENCOUNTER — TELEPHONE (OUTPATIENT)
Dept: FAMILY MEDICINE | Facility: CLINIC | Age: 55
End: 2020-07-13

## 2020-07-13 DIAGNOSIS — E03.9 HYPOTHYROIDISM, UNSPECIFIED TYPE: Primary | ICD-10-CM

## 2020-07-13 NOTE — TELEPHONE ENCOUNTER
Reason for Call:  Other call back    Detailed comments: patient calling to see if you can place lab orders for thyroid she has an appointment tomorrow for her oncologist. Please advise     Phone Number Patient can be reached at: Home number on file [phone] (home)    Best Time: Anytime     Can we leave a detailed message on this number? YES    Call taken on 7/13/2020 at 2:28 PM by Miranda Seipel Vaccari

## 2020-07-14 ENCOUNTER — TELEPHONE (OUTPATIENT)
Dept: FAMILY MEDICINE | Facility: CLINIC | Age: 55
End: 2020-07-14

## 2020-07-14 ENCOUNTER — HOSPITAL ENCOUNTER (OUTPATIENT)
Dept: MAMMOGRAPHY | Facility: CLINIC | Age: 55
Discharge: HOME | End: 2020-07-14
Attending: NURSE PRACTITIONER | Admitting: NURSE PRACTITIONER
Payer: COMMERCIAL

## 2020-07-14 DIAGNOSIS — Z17.0 MALIGNANT NEOPLASM OF UPPER-OUTER QUADRANT OF LEFT BREAST IN FEMALE, ESTROGEN RECEPTOR POSITIVE (H): ICD-10-CM

## 2020-07-14 DIAGNOSIS — E03.9 HYPOTHYROIDISM, UNSPECIFIED TYPE: Primary | ICD-10-CM

## 2020-07-14 DIAGNOSIS — Z00.00 ROUTINE GENERAL MEDICAL EXAMINATION AT A HEALTH CARE FACILITY: ICD-10-CM

## 2020-07-14 DIAGNOSIS — C50.412 MALIGNANT NEOPLASM OF UPPER-OUTER QUADRANT OF LEFT BREAST IN FEMALE, ESTROGEN RECEPTOR POSITIVE (H): ICD-10-CM

## 2020-07-14 DIAGNOSIS — Z12.31 ENCOUNTER FOR SCREENING MAMMOGRAM FOR BREAST CANCER: ICD-10-CM

## 2020-07-14 LAB
ALBUMIN SERPL-MCNC: 4.1 G/DL (ref 3.4–5)
ALP SERPL-CCNC: 75 U/L (ref 40–150)
ALT SERPL W P-5'-P-CCNC: 34 U/L (ref 0–50)
ANION GAP SERPL CALCULATED.3IONS-SCNC: 5 MMOL/L (ref 3–14)
AST SERPL W P-5'-P-CCNC: 29 U/L (ref 0–45)
BASOPHILS # BLD AUTO: 0.1 10E9/L (ref 0–0.2)
BASOPHILS NFR BLD AUTO: 1.3 %
BILIRUB SERPL-MCNC: 0.7 MG/DL (ref 0.2–1.3)
BUN SERPL-MCNC: 12 MG/DL (ref 7–30)
CALCIUM SERPL-MCNC: 9 MG/DL (ref 8.5–10.1)
CANCER AG27-29 SERPL-ACNC: 21 U/ML (ref 0–39)
CHLORIDE SERPL-SCNC: 108 MMOL/L (ref 94–109)
CO2 SERPL-SCNC: 29 MMOL/L (ref 20–32)
CREAT SERPL-MCNC: 0.94 MG/DL (ref 0.52–1.04)
DIFFERENTIAL METHOD BLD: ABNORMAL
EOSINOPHIL NFR BLD AUTO: 2.3 %
ERYTHROCYTE [DISTWIDTH] IN BLOOD BY AUTOMATED COUNT: 12.1 % (ref 10–15)
GFR SERPL CREATININE-BSD FRML MDRD: 69 ML/MIN/{1.73_M2}
GLUCOSE SERPL-MCNC: 97 MG/DL (ref 70–99)
HCT VFR BLD AUTO: 41.7 % (ref 35–47)
HGB BLD-MCNC: 13.6 G/DL (ref 11.7–15.7)
IMM GRANULOCYTES # BLD: 0 10E9/L (ref 0–0.4)
IMM GRANULOCYTES NFR BLD: 0.3 %
LYMPHOCYTES # BLD AUTO: 1 10E9/L (ref 0.8–5.3)
LYMPHOCYTES NFR BLD AUTO: 25.7 %
MCH RBC QN AUTO: 31.6 PG (ref 26.5–33)
MCHC RBC AUTO-ENTMCNC: 32.6 G/DL (ref 31.5–36.5)
MCV RBC AUTO: 97 FL (ref 78–100)
MONOCYTES # BLD AUTO: 0.3 10E9/L (ref 0–1.3)
MONOCYTES NFR BLD AUTO: 8.7 %
NEUTROPHILS # BLD AUTO: 2.4 10E9/L (ref 1.6–8.3)
NEUTROPHILS NFR BLD AUTO: 61.7 %
NRBC # BLD AUTO: 0 10*3/UL
NRBC BLD AUTO-RTO: 0 /100
PLATELET # BLD AUTO: 149 10E9/L (ref 150–450)
POTASSIUM SERPL-SCNC: 3.8 MMOL/L (ref 3.4–5.3)
PROT SERPL-MCNC: 7.2 G/DL (ref 6.8–8.8)
RBC # BLD AUTO: 4.31 10E12/L (ref 3.8–5.2)
SODIUM SERPL-SCNC: 142 MMOL/L (ref 133–144)
T4 FREE SERPL-MCNC: 1.66 NG/DL (ref 0.76–1.46)
TSH SERPL DL<=0.005 MIU/L-ACNC: 0.34 MU/L (ref 0.4–4)
WBC # BLD AUTO: 3.9 10E9/L (ref 4–11)

## 2020-07-14 PROCEDURE — 84439 ASSAY OF FREE THYROXINE: CPT | Performed by: NURSE PRACTITIONER

## 2020-07-14 PROCEDURE — 86300 IMMUNOASSAY TUMOR CA 15-3: CPT | Performed by: NURSE PRACTITIONER

## 2020-07-14 PROCEDURE — 80050 GENERAL HEALTH PANEL: CPT | Performed by: NURSE PRACTITIONER

## 2020-07-14 PROCEDURE — 36415 COLL VENOUS BLD VENIPUNCTURE: CPT | Performed by: NURSE PRACTITIONER

## 2020-07-14 PROCEDURE — 77063 BREAST TOMOSYNTHESIS BI: CPT

## 2020-07-14 RX ORDER — LEVOTHYROXINE SODIUM 88 UG/1
88 TABLET ORAL DAILY
Qty: 30 TABLET | Refills: 3 | Status: SHIPPED | OUTPATIENT
Start: 2020-07-14 | End: 2020-11-30

## 2020-07-14 NOTE — TELEPHONE ENCOUNTER
Patient has been called with results.     We will drop the dose of the Synthroid to 88 mcg daily from 100 mcg daily. TSH is now low and the T4 is mildly elevated.     Plan will be repeat labs in 12 weeks.     Please get labs scheduled for 12 weeks the orders have been placed.     Patient verbalized understanding of plan of care.     Jame Crocker CNP

## 2020-07-16 ENCOUNTER — ONCOLOGY VISIT (OUTPATIENT)
Dept: ONCOLOGY | Facility: CLINIC | Age: 55
End: 2020-07-16
Payer: COMMERCIAL

## 2020-07-16 VITALS
WEIGHT: 141 LBS | DIASTOLIC BLOOD PRESSURE: 58 MMHG | HEART RATE: 85 BPM | BODY MASS INDEX: 22.08 KG/M2 | SYSTOLIC BLOOD PRESSURE: 94 MMHG | OXYGEN SATURATION: 99 % | TEMPERATURE: 98 F

## 2020-07-16 DIAGNOSIS — Z17.0 MALIGNANT NEOPLASM OF UPPER-OUTER QUADRANT OF LEFT BREAST IN FEMALE, ESTROGEN RECEPTOR POSITIVE (H): Primary | ICD-10-CM

## 2020-07-16 DIAGNOSIS — Z12.31 VISIT FOR SCREENING MAMMOGRAM: ICD-10-CM

## 2020-07-16 DIAGNOSIS — C50.412 MALIGNANT NEOPLASM OF UPPER-OUTER QUADRANT OF LEFT BREAST IN FEMALE, ESTROGEN RECEPTOR POSITIVE (H): Primary | ICD-10-CM

## 2020-07-16 PROCEDURE — 99214 OFFICE O/P EST MOD 30 MIN: CPT | Performed by: INTERNAL MEDICINE

## 2020-07-16 ASSESSMENT — PAIN SCALES - GENERAL: PAINLEVEL: MILD PAIN (3)

## 2020-07-16 NOTE — PROGRESS NOTES
Hematology/ Oncology Follow-up Visit:  Jul 16, 2020    Reason for Visit:   Chief Complaint   Patient presents with     Oncology Clinic Visit     8 month f/u - Malignant neoplasm of upper-outer quadrant of left breast in female, estrogen receptor positive     Results     Mammogram & lab - 07/14/2020     Oral Chemotherapy Management     tamoxifen (NOLVADEX) 20MG       Oncologic History:  Malignant neoplasm of female breast (H)  Emma Nunn was diagnosed at age 47, premenopausally through self-breast check, felt a lump in her left outer upper quadrant. Diagnostic mammogram early 07/2013 identified asymmetrical density associated with palpable findings around 1-2 o'clock position, 8-10 cm from the nipple. Limited sonogram revealed hypoechoic mass with irregular borders, measured about 0.8 cm trocar position. Sonogram-guided biopsy indicating invasive ductal cancer, grade 2, with associated DCIS, ER/MS 90% positive, HER-2/sandy positive, FISH ratio greater than 11.7. she had lumpectomy 7/2013 - 1.3 cm LAURA, GII, + ALI, margin is close <=1 mm, +DCIS, 8LN negative.  She has [id number] stage I disease. TCH was recommended from 8//15/2013 to 12/2013 and herceptin after. . She finished RT 2/28/2014 and tamoxifen started in 3/2014.      Interval History:  Patient is here today for follow-up visit.  She has been doing well without any recent complaints weight loss night sweats fever or chills patient denies any nausea vomiting or diarrhea patient is currently on tamoxifen.  She finished 6 years of tamoxifen.    Review Of Systems:  Constitutional: Negative for fever, chills, and night sweats.  Skin: negative.  Eyes: negative.  Ears/Nose/Throat: negative.  Respiratory: No shortness of breath, dyspnea on exertion, cough, or hemoptysis.  Cardiovascular: negative.  Gastrointestinal: negative.  Genitourinary: negative.  Musculoskeletal: negative.  Neurologic: negative.  Psychiatric: negative.  Hematologic/Lymphatic/Immunologic:  "negative.  Endocrine: negative.    All other ROS negative unless mentioned in interval history.    Past medical, social, surgical, and family histories reviewed.    Allergies:  Allergies as of 07/16/2020 - Reviewed 07/16/2020   Allergen Reaction Noted     No known drug allergies  04/02/2001       Current Medications:  Current Outpatient Medications   Medication Sig Dispense Refill     levothyroxine (SYNTHROID/LEVOTHROID) 100 MCG tablet TAKE ONE TABLET BY MOUTH ONCE DAILY (DUE FOR LAB RECHECK) 90 tablet 1     levothyroxine (SYNTHROID/LEVOTHROID) 88 MCG tablet Take 1 tablet (88 mcg) by mouth daily 30 tablet 3     tamoxifen (NOLVADEX) 20 MG tablet Take 1 tablet (20 mg) by mouth daily 90 tablet 1     cetirizine (ZYRTEC) 10 MG tablet Take 1 tablet (10 mg) by mouth 2 times daily (Patient not taking: Reported on 7/16/2020) 60 tablet 3        Physical Exam:  BP 94/58   Pulse 85   Temp 98  F (36.7  C) (Temporal)   Wt 64 kg (141 lb)   SpO2 99%   Breastfeeding No   BMI 22.08 kg/m    Wt Readings from Last 12 Encounters:   07/16/20 64 kg (141 lb)   11/14/19 64.7 kg (142 lb 11.2 oz)   10/17/19 60.4 kg (133 lb 1.6 oz)   09/18/19 59.4 kg (131 lb)   04/16/19 60.7 kg (133 lb 14.4 oz)   02/18/19 63 kg (139 lb)   01/28/19 65.8 kg (145 lb)   01/20/19 56 kg (123 lb 7.3 oz)   10/05/18 60.8 kg (134 lb)   09/26/18 60.6 kg (133 lb 11.2 oz)   09/11/18 60 kg (132 lb 4.8 oz)   05/08/18 60.3 kg (133 lb)     ECOG performance status:  GENERAL APPEARANCE: Healthy, alert and in no acute distress.  HEENT: Sclerae anicteric. PERRLA. Oropharynx without ulcers, lesions, or thrush.  NECK: Supple. No asymmetry or masses.  LYMPHATICS: No palpable cervical, supraclavicular, axillary, or inguinal lymphadenopathy.  RESP: Lungs clear to auscultation bilaterally without rales, rhonchi or wheezes.\",  BREAST: Right- No mass, nipple discharge or axillary adenopathy. Left- No mass, nipple discharge or axillary adenopathy \"CARDIOVASCULAR: Regular rate and " rhythm. Normal S1, S2; no S3 or S4. No murmur, gallop, or rub.  ABDOMEN: Soft, nontender. Bowel sounds normal. No palpable organomegaly or masses.  MUSCULOSKELETAL: Extremities without gross deformities noted. No edema of bilateral lower extremities.  SKIN: No suspicious lesions or rashes.  NEURO: Alert and oriented x 3. Cranial nerves II-XII grossly intact.  PSYCHIATRIC: Mentation and affect appear normal.    Laboratory/Imaging Studies:  Orders Only on 07/14/2020   Component Date Value Ref Range Status     TSH 07/14/2020 0.34* 0.40 - 4.00 mU/L Final     CA 27-29 07/14/2020 21  0 - 39 U/mL Final    Assay Method:  Chemiluminescence using Siemens Centaur XP     Sodium 07/14/2020 142  133 - 144 mmol/L Final     Potassium 07/14/2020 3.8  3.4 - 5.3 mmol/L Final     Chloride 07/14/2020 108  94 - 109 mmol/L Final     Carbon Dioxide 07/14/2020 29  20 - 32 mmol/L Final     Anion Gap 07/14/2020 5  3 - 14 mmol/L Final     Glucose 07/14/2020 97  70 - 99 mg/dL Final     Urea Nitrogen 07/14/2020 12  7 - 30 mg/dL Final     Creatinine 07/14/2020 0.94  0.52 - 1.04 mg/dL Final     GFR Estimate 07/14/2020 69  >60 mL/min/[1.73_m2] Final    Comment: Non  GFR Calc  Starting 12/18/2018, serum creatinine based estimated GFR (eGFR) will be   calculated using the Chronic Kidney Disease Epidemiology Collaboration   (CKD-EPI) equation.       GFR Estimate If Black 07/14/2020 80  >60 mL/min/[1.73_m2] Final    Comment:  GFR Calc  Starting 12/18/2018, serum creatinine based estimated GFR (eGFR) will be   calculated using the Chronic Kidney Disease Epidemiology Collaboration   (CKD-EPI) equation.       Calcium 07/14/2020 9.0  8.5 - 10.1 mg/dL Final     Bilirubin Total 07/14/2020 0.7  0.2 - 1.3 mg/dL Final     Albumin 07/14/2020 4.1  3.4 - 5.0 g/dL Final     Protein Total 07/14/2020 7.2  6.8 - 8.8 g/dL Final     Alkaline Phosphatase 07/14/2020 75  40 - 150 U/L Final     ALT 07/14/2020 34  0 - 50 U/L Final     AST  07/14/2020 29  0 - 45 U/L Final     WBC 07/14/2020 3.9* 4.0 - 11.0 10e9/L Final     RBC Count 07/14/2020 4.31  3.8 - 5.2 10e12/L Final     Hemoglobin 07/14/2020 13.6  11.7 - 15.7 g/dL Final     Hematocrit 07/14/2020 41.7  35.0 - 47.0 % Final     MCV 07/14/2020 97  78 - 100 fl Final     MCH 07/14/2020 31.6  26.5 - 33.0 pg Final     MCHC 07/14/2020 32.6  31.5 - 36.5 g/dL Final     RDW 07/14/2020 12.1  10.0 - 15.0 % Final     Platelet Count 07/14/2020 149* 150 - 450 10e9/L Final     Diff Method 07/14/2020 Automated Method   Final     % Neutrophils 07/14/2020 61.7  % Final     % Lymphocytes 07/14/2020 25.7  % Final     % Monocytes 07/14/2020 8.7  % Final     % Eosinophils 07/14/2020 2.3  % Final     % Basophils 07/14/2020 1.3  % Final     % Immature Granulocytes 07/14/2020 0.3  % Final     Nucleated RBCs 07/14/2020 0  0 /100 Final     Absolute Neutrophil 07/14/2020 2.4  1.6 - 8.3 10e9/L Final     Absolute Lymphocytes 07/14/2020 1.0  0.8 - 5.3 10e9/L Final     Absolute Monocytes 07/14/2020 0.3  0.0 - 1.3 10e9/L Final     Absolute Basophils 07/14/2020 0.1  0.0 - 0.2 10e9/L Final     Abs Immature Granulocytes 07/14/2020 0.0  0 - 0.4 10e9/L Final     Absolute Nucleated RBC 07/14/2020 0.0   Final     T4 Free 07/14/2020 1.66* 0.76 - 1.46 ng/dL Final        Recent Results (from the past 744 hour(s))   MA Screen Bilateral w/Jluis    Narrative    MAMMOGRAPHY SCREENING BILATERAL W/ TOMOSYNTHESIS  With CAD 7/14/2020  11:42 AM    BREAST SYMPTOMS: No current breast complaints. Personal history of  left breast cancer status post lumpectomy, radiation treatment and  chemotherapy in 2013.    COMPARISON: 4/2/2019, 3/15/2018, 10/24/2016, 10/19/2015.    BREAST DENSITY: Scattered fibroglandular densities.    COMMENTS: Conservation therapy changes of the left breast are stable  in appearance. No new mammographic findings of concern for malignancy.      Impression    IMPRESSION: BI-RADS CATEGORY: 2 - Benign Finding(s).    RECOMMENDED  FOLLOW-UP: Annual Mammography    Exam results letter mailed to patient.    RANJITH ARRIAGA MD       Assessment and plan:    (C50.412,  Z17.0) Malignant neoplasm of upper-outer quadrant of left breast in female, estrogen receptor positive (H)  (primary encounter diagnosis)  I reviewed with the patient today most recent laboratory test and mammographic imaging.  We had a long discussion today about adjuvant endocrine therapy.  Patient completed 6 years of tamoxifen.  After long discussion we decided to stop tamoxifen at this point.  We will continue with follow-up with annual mammography and laboratory test.  I will see the patient again in 1 year or sooner developments or concerns    The patient is ready to learn, no apparent learning barriers were identified.  Diagnosis and treatment plans were explained to the patient. The patient expressed understanding of the content. The patient asked appropriate questions. The patient questions were answered to her satisfaction.    Chart documentation with Dragon Voice recognition Software. Although reviewed after completion, some words and grammatical errors may remain.

## 2020-07-16 NOTE — PROGRESS NOTES
"Oncology Rooming Note    July 16, 2020 3:34 PM   Emma Nunn is a 54 year old female who presents for:    Chief Complaint   Patient presents with     Oncology Clinic Visit     8 month f/u - Malignant neoplasm of upper-outer quadrant of left breast in female, estrogen receptor positive     Results     Mammogram & lab - 07/14/2020     Oral Chemotherapy Management     tamoxifen (NOLVADEX) 20MG     Initial Vitals: There were no vitals taken for this visit. Estimated body mass index is 22.35 kg/m  as calculated from the following:    Height as of 10/17/19: 1.702 m (5' 7\").    Weight as of 11/14/19: 64.7 kg (142 lb 11.2 oz). There is no height or weight on file to calculate BSA.  Data Unavailable Comment: Data Unavailable   No LMP recorded. (Menstrual status: Chemotherapy).  Allergies reviewed: Yes  Medications reviewed: Yes    Medications: Medication refills not needed today.  Pharmacy name entered into EPIC:    [facility] - [place], [place] - [address] [place] [address] PHARM[address]     Clinical concerns: Pain in hands. Concerns reviewed with Dr. Terrence Akbar, [facility] on 7/16/2020 at 3:34 PM              "

## 2020-07-16 NOTE — LETTER
"    7/16/2020         RE: Emma Nunn  [address] [facility] [address]        Dear Colleague,    Thank you for referring your patient, Emma Nunn, to the [facility]. Please see a copy of my visit note below.    Oncology Rooming Note    July 16, 2020 3:34 PM   Emma Nunn is a 54 year old female who presents for:    Chief Complaint   Patient presents with     Oncology Clinic Visit     8 month f/u - Malignant neoplasm of upper-outer quadrant of left breast in female, estrogen receptor positive     Results     Mammogram & lab - 07/14/2020     Oral Chemotherapy Management     tamoxifen (NOLVADEX) 20MG     Initial Vitals: There were no vitals taken for this visit. Estimated body mass index is 22.35 kg/m  as calculated from the following:    Height as of 10/17/19: 1.702 m (5' 7\").    Weight as of 11/14/19: 64.7 kg (142 lb 11.2 oz). There is no height or weight on file to calculate BSA.  Data Unavailable Comment: Data Unavailable   No LMP recorded. (Menstrual status: Chemotherapy).  Allergies reviewed: Yes  Medications reviewed: Yes    Medications: Medication refills not needed today.  Pharmacy name entered into EPIC:    [facility] - [address]ARMAC[address]     Clinical concerns: Pain in hands. Concerns reviewed with Dr. Terrence Akbar, [facility] on 7/16/2020 at 3:34 PM                Hematology/ Oncology Follow-up Visit:  Jul 16, 2020    Reason for Visit:   Chief Complaint   Patient presents with     Oncology Clinic Visit     8 month f/u - Malignant neoplasm of upper-outer quadrant of left breast in female, estrogen receptor positive     Results     Mammogram & lab - 07/14/2020     Oral Chemotherapy Management     tamoxifen (NOLVADEX) 20MG       Oncologic History:  Malignant neoplasm of female breast (H)  Emma Nunn was diagnosed at age 47, premenopausally through self-breast check, felt a " lump in her left outer upper quadrant. Diagnostic mammogram early 07/2013 identified asymmetrical density associated with palpable findings around 1-2 o'clock position, 8-10 cm from the nipple. Limited sonogram revealed hypoechoic mass with irregular borders, measured about 0.8 cm trocar position. Sonogram-guided biopsy indicating invasive ductal cancer, grade 2, with associated DCIS, ER/MN 90% positive, HER-2/sandy positive, FISH ratio greater than 11.7. she had lumpectomy 7/2013 - 1.3 cm LAURA, GII, + ALI, margin is close <=1 mm, +DCIS, 8LN negative.  She has [id number] stage I disease. TCH was recommended from 8//15/2013 to 12/2013 and herceptin after. . She finished RT 2/28/2014 and tamoxifen started in 3/2014.      Interval History:  Patient is here today for follow-up visit.  She has been doing well without any recent complaints weight loss night sweats fever or chills patient denies any nausea vomiting or diarrhea patient is currently on tamoxifen.  She finished 6 years of tamoxifen.    Review Of Systems:  Constitutional: Negative for fever, chills, and night sweats.  Skin: negative.  Eyes: negative.  Ears/Nose/Throat: negative.  Respiratory: No shortness of breath, dyspnea on exertion, cough, or hemoptysis.  Cardiovascular: negative.  Gastrointestinal: negative.  Genitourinary: negative.  Musculoskeletal: negative.  Neurologic: negative.  Psychiatric: negative.  Hematologic/Lymphatic/Immunologic: negative.  Endocrine: negative.    All other ROS negative unless mentioned in interval history.    Past medical, social, surgical, and family histories reviewed.    Allergies:  Allergies as of 07/16/2020 - Reviewed 07/16/2020   Allergen Reaction Noted     No known drug allergies  04/02/2001       Current Medications:  Current Outpatient Medications   Medication Sig Dispense Refill     levothyroxine (SYNTHROID/LEVOTHROID) 100 MCG tablet TAKE ONE TABLET BY MOUTH ONCE DAILY (DUE FOR LAB RECHECK) 90 tablet 1     levothyroxine  "(SYNTHROID/LEVOTHROID) 88 MCG tablet Take 1 tablet (88 mcg) by mouth daily 30 tablet 3     tamoxifen (NOLVADEX) 20 MG tablet Take 1 tablet (20 mg) by mouth daily 90 tablet 1     cetirizine (ZYRTEC) 10 MG tablet Take 1 tablet (10 mg) by mouth 2 times daily (Patient not taking: Reported on 7/16/2020) 60 tablet 3        Physical Exam:  BP 94/58   Pulse 85   Temp 98  F (36.7  C) (Temporal)   Wt 64 kg (141 lb)   SpO2 99%   Breastfeeding No   BMI 22.08 kg/m    Wt Readings from Last 12 Encounters:   07/16/20 64 kg (141 lb)   11/14/19 64.7 kg (142 lb 11.2 oz)   10/17/19 60.4 kg (133 lb 1.6 oz)   09/18/19 59.4 kg (131 lb)   04/16/19 60.7 kg (133 lb 14.4 oz)   02/18/19 63 kg (139 lb)   01/28/19 65.8 kg (145 lb)   01/20/19 56 kg (123 lb 7.3 oz)   10/05/18 60.8 kg (134 lb)   09/26/18 60.6 kg (133 lb 11.2 oz)   09/11/18 60 kg (132 lb 4.8 oz)   05/08/18 60.3 kg (133 lb)     ECOG performance status:  GENERAL APPEARANCE: Healthy, alert and in no acute distress.  HEENT: Sclerae anicteric. PERRLA. Oropharynx without ulcers, lesions, or thrush.  NECK: Supple. No asymmetry or masses.  LYMPHATICS: No palpable cervical, supraclavicular, axillary, or inguinal lymphadenopathy.  RESP: Lungs clear to auscultation bilaterally without rales, rhonchi or wheezes.\",  BREAST: Right- No mass, nipple discharge or axillary adenopathy. Left- No mass, nipple discharge or axillary adenopathy \"CARDIOVASCULAR: Regular rate and rhythm. Normal S1, S2; no S3 or S4. No murmur, gallop, or rub.  ABDOMEN: Soft, nontender. Bowel sounds normal. No palpable organomegaly or masses.  MUSCULOSKELETAL: Extremities without gross deformities noted. No edema of bilateral lower extremities.  SKIN: No suspicious lesions or rashes.  NEURO: Alert and oriented x 3. Cranial nerves II-XII grossly intact.  PSYCHIATRIC: Mentation and affect appear normal.    Laboratory/Imaging Studies:  Orders Only on 07/14/2020   Component Date Value Ref Range Status     TSH 07/14/2020 " 0.34* 0.40 - 4.00 mU/L Final     CA 27-29 07/14/2020 21  0 - 39 U/mL Final    Assay Method:  Chemiluminescence using Siemens Centaur XP     Sodium 07/14/2020 142  133 - 144 mmol/L Final     Potassium 07/14/2020 3.8  3.4 - 5.3 mmol/L Final     Chloride 07/14/2020 108  94 - 109 mmol/L Final     Carbon Dioxide 07/14/2020 29  20 - 32 mmol/L Final     Anion Gap 07/14/2020 5  3 - 14 mmol/L Final     Glucose 07/14/2020 97  70 - 99 mg/dL Final     Urea Nitrogen 07/14/2020 12  7 - 30 mg/dL Final     Creatinine 07/14/2020 0.94  0.52 - 1.04 mg/dL Final     GFR Estimate 07/14/2020 69  >60 mL/min/[1.73_m2] Final    Comment: Non  GFR Calc  Starting 12/18/2018, serum creatinine based estimated GFR (eGFR) will be   calculated using the Chronic Kidney Disease Epidemiology Collaboration   (CKD-EPI) equation.       GFR Estimate If Black 07/14/2020 80  >60 mL/min/[1.73_m2] Final    Comment:  GFR Calc  Starting 12/18/2018, serum creatinine based estimated GFR (eGFR) will be   calculated using the Chronic Kidney Disease Epidemiology Collaboration   (CKD-EPI) equation.       Calcium 07/14/2020 9.0  8.5 - 10.1 mg/dL Final     Bilirubin Total 07/14/2020 0.7  0.2 - 1.3 mg/dL Final     Albumin 07/14/2020 4.1  3.4 - 5.0 g/dL Final     Protein Total 07/14/2020 7.2  6.8 - 8.8 g/dL Final     Alkaline Phosphatase 07/14/2020 75  40 - 150 U/L Final     ALT 07/14/2020 34  0 - 50 U/L Final     AST 07/14/2020 29  0 - 45 U/L Final     WBC 07/14/2020 3.9* 4.0 - 11.0 10e9/L Final     RBC Count 07/14/2020 4.31  3.8 - 5.2 10e12/L Final     Hemoglobin 07/14/2020 13.6  11.7 - 15.7 g/dL Final     Hematocrit 07/14/2020 41.7  35.0 - 47.0 % Final     MCV 07/14/2020 97  78 - 100 fl Final     MCH 07/14/2020 31.6  26.5 - 33.0 pg Final     MCHC 07/14/2020 32.6  31.5 - 36.5 g/dL Final     RDW 07/14/2020 12.1  10.0 - 15.0 % Final     Platelet Count 07/14/2020 149* 150 - 450 10e9/L Final     Diff Method 07/14/2020 Automated Method   Final      % Neutrophils 07/14/2020 61.7  % Final     % Lymphocytes 07/14/2020 25.7  % Final     % Monocytes 07/14/2020 8.7  % Final     % Eosinophils 07/14/2020 2.3  % Final     % Basophils 07/14/2020 1.3  % Final     % Immature Granulocytes 07/14/2020 0.3  % Final     Nucleated RBCs 07/14/2020 0  0 /100 Final     Absolute Neutrophil 07/14/2020 2.4  1.6 - 8.3 10e9/L Final     Absolute Lymphocytes 07/14/2020 1.0  0.8 - 5.3 10e9/L Final     Absolute Monocytes 07/14/2020 0.3  0.0 - 1.3 10e9/L Final     Absolute Basophils 07/14/2020 0.1  0.0 - 0.2 10e9/L Final     Abs Immature Granulocytes 07/14/2020 0.0  0 - 0.4 10e9/L Final     Absolute Nucleated RBC 07/14/2020 0.0   Final     T4 Free 07/14/2020 1.66* 0.76 - 1.46 ng/dL Final        Recent Results (from the past 744 hour(s))   MA Screen Bilateral w/Jluis    Narrative    MAMMOGRAPHY SCREENING BILATERAL W/ TOMOSYNTHESIS  With CAD 7/14/2020  11:42 AM    BREAST SYMPTOMS: No current breast complaints. Personal history of  left breast cancer status post lumpectomy, radiation treatment and  chemotherapy in 2013.    COMPARISON: 4/2/2019, 3/15/2018, 10/24/2016, 10/19/2015.    BREAST DENSITY: Scattered fibroglandular densities.    COMMENTS: Conservation therapy changes of the left breast are stable  in appearance. No new mammographic findings of concern for malignancy.      Impression    IMPRESSION: BI-RADS CATEGORY: 2 - Benign Finding(s).    RECOMMENDED FOLLOW-UP: Annual Mammography    Exam results letter mailed to patient.    RANJITH ARRIAGA MD       Assessment and plan:    (C50.412,  Z17.0) Malignant neoplasm of upper-outer quadrant of left breast in female, estrogen receptor positive (H)  (primary encounter diagnosis)  I reviewed with the patient today most recent laboratory test and mammographic imaging.  We had a long discussion today about adjuvant endocrine therapy.  Patient completed 6 years of tamoxifen.  After long discussion we decided to stop tamoxifen at this point.  We  will continue with follow-up with annual mammography and laboratory test.  I will see the patient again in 1 year or sooner developments or concerns    The patient is ready to learn, no apparent learning barriers were identified.  Diagnosis and treatment plans were explained to the patient. The patient expressed understanding of the content. The patient asked appropriate questions. The patient questions were answered to her satisfaction.    Chart documentation with Dragon Voice recognition Software. Although reviewed after completion, some words and grammatical errors may remain.    Again, thank you for allowing me to participate in the care of your patient.        Sincerely,        Richard Ocampo MD

## 2020-07-16 NOTE — ASSESSMENT & PLAN NOTE
Emma Nunn was diagnosed at age 47, premenopausally through self-breast check, felt a lump in her left outer upper quadrant. Diagnostic mammogram early 07/2013 identified asymmetrical density associated with palpable findings around 1-2 o'clock position, 8-10 cm from the nipple. Limited sonogram revealed hypoechoic mass with irregular borders, measured about 0.8 cm trocar position. Sonogram-guided biopsy indicating invasive ductal cancer, grade 2, with associated DCIS, ER/VT 90% positive, HER-2/sandy positive, FISH ratio greater than 11.7. she had lumpectomy 7/2013 - 1.3 cm LAURA, GII, + ALI, margin is close <=1 mm, +DCIS, 8LN negative.  She has [id number] stage I disease. TCH was recommended from 8//15/2013 to 12/2013 and herceptin after. . She finished RT 2/28/2014 and tamoxifen started in 3/2014.

## 2020-07-16 NOTE — PATIENT INSTRUCTIONS
Patient will stop tamoxifen.  Follow-up in 1 year with repeat laboratory test and annual mammography.    - Today:  Stop the tamoxifen - follow up in 1 year with Dr. Ocampo. Labs and mammo 1-5 days prior to follow up.     Mammogram Date/Time:     Lab Date/Time:    Office visit follow up with Dr. Ocampo Date/Time:      If you have any questions or concerns please feel free to call.    If you need to reschedule please call:  Clinic or Lab Appointment - [phone]  Infusion - [phone]  Imaging - [phone]      [facility] Oncology/Hematology [facility] [place] Team

## 2020-11-30 ENCOUNTER — TELEPHONE (OUTPATIENT)
Dept: FAMILY MEDICINE | Facility: CLINIC | Age: 55
End: 2020-11-30

## 2020-11-30 DIAGNOSIS — E03.9 HYPOTHYROIDISM, UNSPECIFIED TYPE: Primary | ICD-10-CM

## 2020-11-30 DIAGNOSIS — E03.9 HYPOTHYROIDISM, UNSPECIFIED TYPE: ICD-10-CM

## 2020-11-30 LAB — TSH SERPL DL<=0.005 MIU/L-ACNC: 3.21 MU/L (ref 0.4–4)

## 2020-11-30 PROCEDURE — 84443 ASSAY THYROID STIM HORMONE: CPT | Performed by: NURSE PRACTITIONER

## 2020-11-30 PROCEDURE — 36415 COLL VENOUS BLD VENIPUNCTURE: CPT | Performed by: NURSE PRACTITIONER

## 2020-11-30 RX ORDER — LEVOTHYROXINE SODIUM 88 UG/1
88 TABLET ORAL DAILY
Qty: 30 TABLET | Refills: 1 | Status: SHIPPED | OUTPATIENT
Start: 2020-11-30 | End: 2021-02-05

## 2020-11-30 NOTE — TELEPHONE ENCOUNTER
Patient was instructed to come back and have thyroid checked, patient is here. Order placed and pended.

## 2020-12-01 ENCOUNTER — TELEPHONE (OUTPATIENT)
Dept: FAMILY MEDICINE | Facility: CLINIC | Age: 55
End: 2020-12-01

## 2020-12-01 NOTE — TELEPHONE ENCOUNTER
Called and LM with spouse to have patient call back. Please relay results below and schedule a physical appointment with pap. Patient does not need to be seen today please disregard that part of Jame note.   Esha Fonseca MA

## 2020-12-01 NOTE — TELEPHONE ENCOUNTER
----- Message from Jame Crocker NP sent at 11/30/2020  5:31 PM CST -----  Please call and get on my schedule today. Please let her know the TSH is now in normal range. Continue with current dose of Synthroid. We should recheck in about 6 months. She needs to be scheduled for her Preventative care visit, please get her scheduled with Primary care provider.     Thank you,     Jame Crocker CNP

## 2020-12-16 ENCOUNTER — OFFICE VISIT (OUTPATIENT)
Dept: SURGERY | Facility: CLINIC | Age: 55
End: 2020-12-16
Payer: COMMERCIAL

## 2020-12-16 ENCOUNTER — TELEPHONE (OUTPATIENT)
Dept: ONCOLOGY | Facility: CLINIC | Age: 55
End: 2020-12-16

## 2020-12-16 VITALS
SYSTOLIC BLOOD PRESSURE: 120 MMHG | BODY MASS INDEX: 21.44 KG/M2 | TEMPERATURE: 97.9 F | HEIGHT: 68 IN | DIASTOLIC BLOOD PRESSURE: 70 MMHG

## 2020-12-16 DIAGNOSIS — Z17.0 MALIGNANT NEOPLASM OF UPPER-OUTER QUADRANT OF LEFT BREAST IN FEMALE, ESTROGEN RECEPTOR POSITIVE (H): ICD-10-CM

## 2020-12-16 DIAGNOSIS — N64.4 BREAST PAIN, LEFT: Primary | ICD-10-CM

## 2020-12-16 DIAGNOSIS — Z90.12 S/P PARTIAL MASTECTOMY, LEFT: ICD-10-CM

## 2020-12-16 DIAGNOSIS — L98.8 LESION OF SKIN OF BREAST: ICD-10-CM

## 2020-12-16 DIAGNOSIS — C50.412 MALIGNANT NEOPLASM OF UPPER-OUTER QUADRANT OF LEFT BREAST IN FEMALE, ESTROGEN RECEPTOR POSITIVE (H): ICD-10-CM

## 2020-12-16 PROCEDURE — 99214 OFFICE O/P EST MOD 30 MIN: CPT | Performed by: SURGERY

## 2020-12-16 ASSESSMENT — PAIN SCALES - GENERAL: PAINLEVEL: NO PAIN (0)

## 2020-12-16 NOTE — PROGRESS NOTES
General Surgery Consultation    Emma Nunn MRN# [id number]   Age: 55 year old YOB: 1965     Reason for consult: Left breast pain/skin lesion                         Assessment and Plan:   I was asked to see this patient at the request of Dr. Ocampo for evaluation of left breast pain/skin lesion .  Emma Nunn is a 55 year old female who presented with history, exam, laboratory and imaging most consistent with:        ICD-10-CM    1. Breast pain, left  N64.4    2. Lesion of skin of breast, left  N64.9    3. Malignant neoplasm of upper-outer quadrant of left breast in female, estrogen receptor positive (H)  C50.412     Z17.0    4. S/P partial mastectomy, left  Z90.12     done in 2013     I reassured him that her area of tenderness in the left breast and the fibrotic skin lesion is not related to breast cancer.  I am not sure why she has a fibrotic skin lesion that starts around the area of the nipple areolar complex and runs all the way down into the subcostal area.  I would leave this alone as not related to her breast tissue.  This may go away with warm pack, cold pack or with time.  Still presents in a few months, we can further evaluate this.  But I once again reassured her that this is not related to breast cancer.      I thank Dr. Ocampo for the opportunity to participate in the patient's care.           Chief Complaint:   Left breast pain and skin lesion      History is obtained from the patient         History of Present Illness:   This patient is a 55 year old  female with a significant past medical history of left invasive ductal carcinoma status post partial mastectomy with adjuvant chemotherapy and adjuvant radiation for her HER-2 positive who presents with an onset of left breast pain and skin lesion around the left breast.  Patient stated this came about last night.  He was doing a breast exam and noted a fibrotic cord that runs from the nipple areolar complex all the way down  the center of her breast.  Patient stated that there was some bruising there is some pain.  Patient stated she is never had this before.  She is very anxious as she has history of breast cancer on the side of her breast.  Patient denies any recent injuries, scratches, trauma, infection to this breast.  Denies any fevers or chills.  No obvious mass noted within the breast tissue itself.  No skin changes within her breast.  No nipple discharge.  No firmness in the axilla.          Past Medical History:    has a past medical history of Breast cancer (H) (07/2013), Diffuse cystic mastopathy, Endometriosis (1993), External hemorrhoids without mention of complication, and Unspecified hypothyroidism.          Past Surgical History:     Past Surgical History:   Procedure Laterality Date     BREAST BIOPSY, CORE RT/LT Left 07/08/2013    cancer     COLONOSCOPY N/A 12/1/2014    normal, repeat 5 years due to family risk     LAPAROSCOPIC CHOLECYSTECTOMY N/A 5/24/2017    Procedure: LAPAROSCOPIC CHOLECYSTECTOMY;  Laparoscopic Cholecystectomy;  Surgeon: Jason Munoz MD;  Location: [facility]     LAPAROSCOPY  1993    for endometriosis     LUMPECTOMY BREAST WITH SENTINEL NODE, COMBINED  7/31/2013    Procedure: COMBINED LUMPECTOMY BREAST WITH SENTINEL NODE;  Left Breast Lumpectomy with [place] Node Biopsy;  Surgeon: Jason Munoz MD;  Location: [facility]     ZZC NONSPECIFIC PROCEDURE  1993    Outpatient surgery for anal fissure           Medications:        levothyroxine (SYNTHROID/LEVOTHROID) 88 MCG tablet, Take 1 tablet (88 mcg) by mouth daily       cetirizine (ZYRTEC) 10 MG tablet, Take 1 tablet (10 mg) by mouth 2 times daily (Patient not taking: Reported on 7/16/2020)       levothyroxine (SYNTHROID/LEVOTHROID) 100 MCG tablet, TAKE ONE TABLET BY MOUTH ONCE DAILY (DUE FOR LAB RECHECK) (Patient not taking: Reported on 12/16/2020)       tamoxifen (NOLVADEX) 20 MG tablet, Take 1 tablet (20 mg) by mouth daily (Patient not taking:  "Reported on 12/16/2020)    No current facility-administered medications on file prior to visit.         Allergies:      Allergies   Allergen Reactions     No Known Drug Allergies             Social History:   Emma Nunn  reports that she has never smoked. She has never used smokeless tobacco. She reports that she does not drink alcohol or use drugs.          Family History:   The patient has no family history of any bleeding, clotting or anesthesia problems.          Review of Systems:     Constitutional: Denies fever or chills   Eyes: Denies change in visual acuity   HENT: Denies nasal congestion or sore throat   Respiratory: Denies cough or shortness of breath   Cardiovascular: Denies chest pain or edema   GI: Denies abdominal pain, nausea, vomiting, bloody stools or diarrhea   : Denies dysuria   Musculoskeletal: Denies back pain or joint pain   Integument: Denies rash   Neurologic: Denies headache, focal weakness or sensory changes   Endocrine: Denies polyuria or polydipsia   Lymphatic: Denies swollen glands   Psychiatric: Denies depression or anxiety          Physical Exam:     Vitals: /70   Temp 97.9  F (36.6  C) (Tympanic)   Ht 1.727 m (5' 8\")   BMI 21.44 kg/m    BMI= Body mass index is 21.44 kg/m .  Constitutional: Awake, alert, no acute distress.  Eyes:  No scleral icterus.  Conjunctiva are without injection.  ENMT: Mucous membranes moist, dentition and gums are intact.   Neck: Soft, supple, trachea midline.    Endocrine: n/a  Lymphatic: There is no cervical, submandibular, or supraclavicular adenopathy.  Respiratory: No audible wheezes, no acute distress  Cardiovascular: Regular; S1, S2   Breasts:  Left:  normal without suspicious masses, skin changes or axillary nodes, symmetric fibrous changes in both upper outer quadrants, self exam is taught and encouraged.  Noted thin fibrotic cord that runs from the area under the nipple areolar complex down to the subcostal area.  This cord is very " "obvious when you elevate the breast tissue.  There is no masses.  No skin changes.  Right: normal without suspicious masses, skin changes or axillary nodes, symmetric fibrous changes in both upper outer quadrants, self exam is taught and encouraged.  Abdomen: Non-distended, non-tender, normoactive bowel sounds present, No masses,   Musculoskeletal: No spinal or CVA tenderness. Full range of motion in the upper and lower extremities.    Skin: No skin rashes or lesions to inspection.  No petechia.    Neurologic: Cranial nerves II through XII are grossly intact and symmetric.  Psychiatric: The patient is alert and oriented times 3.  The patient's affect is not blunted and mood is appropriate.          Data:   Vital Signs:  /70   Temp 97.9  F (36.6  C) (Tympanic)   Ht 1.727 m (5' 8\")   BMI 21.44 kg/m       WBC -   WBC   Date Value Ref Range Status   07/14/2020 3.9 (L) 4.0 - 11.0 10e9/L Final   ], HgB -   Hemoglobin   Date Value Ref Range Status   07/14/2020 13.6 11.7 - 15.7 g/dL Final   ]   Liver Function Studies -   Recent Labs   Lab Test 07/14/20  1122   PROTTOTAL 7.2   ALBUMIN 4.1   BILITOTAL 0.7   ALKPHOS 75   AST 29   ALT 34     No results found for this or any previous visit (from the past 744 hour(s)).     [facility], DO 12/16/2020 1:19 PM    Disclaimer: This note consists of words and symbols derived from keyboarding and dictation using voice recognition software.  As a result, there may be errors that have gone undetected.  Please consider this when interpreting information found in this note.       "

## 2020-12-16 NOTE — TELEPHONE ENCOUNTER
Patient calling to report that during her self breast exam last night she noted a red streak starting under left nipple that is about 3 inches long and slightly raised.  Little pain.  Patient also noted a light bruise above the left nipple.  She denies any recent injuries, scratches or possibility of her braw pinching the skin.  Patient very anxious.  Chart review and patient had left breast lumpectomy in 2013.  Reviewed with Dr. Martinez, surgeon on site who said she could see patient today.  Called and scheduled patient this afternoon.    Fortino Garcia RN, BSN, OCN  12/16/2020, 11:54 AM

## 2020-12-16 NOTE — LETTER
12/16/2020         RE: Emma Nunn  [address] [place] [address]        Dear Colleague,    Thank you for referring your patient, Emma Nunn, to the [facility]. Please see a copy of my visit note below.    General Surgery Consultation    Emma Nunn MRN# [id number]   Age: 55 year old YOB: 1965     Reason for consult: Left breast pain/skin lesion                         Assessment and Plan:   I was asked to see this patient at the request of Dr. Ocampo for evaluation of left breast pain/skin lesion .  Emma Nunn is a 55 year old female who presented with history, exam, laboratory and imaging most consistent with:        ICD-10-CM    1. Breast pain, left  N64.4    2. Lesion of skin of breast, left  N64.9    3. Malignant neoplasm of upper-outer quadrant of left breast in female, estrogen receptor positive (H)  C50.412     Z17.0    4. S/P partial mastectomy, left  Z90.12     done in 2013     I reassured him that her area of tenderness in the left breast and the fibrotic skin lesion is not related to breast cancer.  I am not sure why she has a fibrotic skin lesion that starts around the area of the nipple areolar complex and runs all the way down into the subcostal area.  I would leave this alone as not related to her breast tissue.  This may go away with warm pack, cold pack or with time.  Still presents in a few months, we can further evaluate this.  But I once again reassured her that this is not related to breast cancer.      I thank Dr. Ocampo for the opportunity to participate in the patient's care.           Chief Complaint:   Left breast pain and skin lesion      History is obtained from the patient         History of Present Illness:   This patient is a 55 year old  female with a significant past medical history of left invasive ductal carcinoma status post partial mastectomy with adjuvant chemotherapy and adjuvant radiation for her HER-2  positive who presents with an onset of left breast pain and skin lesion around the left breast.  Patient stated this came about last night.  He was doing a breast exam and noted a fibrotic cord that runs from the nipple areolar complex all the way down the center of her breast.  Patient stated that there was some bruising there is some pain.  Patient stated she is never had this before.  She is very anxious as she has history of breast cancer on the side of her breast.  Patient denies any recent injuries, scratches, trauma, infection to this breast.  Denies any fevers or chills.  No obvious mass noted within the breast tissue itself.  No skin changes within her breast.  No nipple discharge.  No firmness in the axilla.          Past Medical History:    has a past medical history of Breast cancer (H) (07/2013), Diffuse cystic mastopathy, Endometriosis (1993), External hemorrhoids without mention of complication, and Unspecified hypothyroidism.          Past Surgical History:     Past Surgical History:   Procedure Laterality Date     BREAST BIOPSY, CORE RT/LT Left 07/08/2013    cancer     COLONOSCOPY N/A 12/1/2014    normal, repeat 5 years due to family risk     LAPAROSCOPIC CHOLECYSTECTOMY N/A 5/24/2017    Procedure: LAPAROSCOPIC CHOLECYSTECTOMY;  Laparoscopic Cholecystectomy;  Surgeon: Jason Munoz MD;  Location: [facility]     LAPAROSCOPY  1993    for endometriosis     LUMPECTOMY BREAST WITH SENTINEL NODE, COMBINED  7/31/2013    Procedure: COMBINED LUMPECTOMY BREAST WITH SENTINEL NODE;  Left Breast Lumpectomy with [place] Node Biopsy;  Surgeon: Jason Munoz MD;  Location:  OR     [facility] NONSPECIFIC PROCEDURE  1993    Outpatient surgery for anal fissure           Medications:        levothyroxine (SYNTHROID/LEVOTHROID) 88 MCG tablet, Take 1 tablet (88 mcg) by mouth daily       cetirizine (ZYRTEC) 10 MG tablet, Take 1 tablet (10 mg) by mouth 2 times daily (Patient not taking: Reported on 7/16/2020)        "levothyroxine (SYNTHROID/LEVOTHROID) 100 MCG tablet, TAKE ONE TABLET BY MOUTH ONCE DAILY (DUE FOR LAB RECHECK) (Patient not taking: Reported on 12/16/2020)       tamoxifen (NOLVADEX) 20 MG tablet, Take 1 tablet (20 mg) by mouth daily (Patient not taking: Reported on 12/16/2020)    No current facility-administered medications on file prior to visit.         Allergies:      Allergies   Allergen Reactions     No Known Drug Allergies             Social History:   Emma Nunn  reports that she has never smoked. She has never used smokeless tobacco. She reports that she does not drink alcohol or use drugs.          Family History:   The patient has no family history of any bleeding, clotting or anesthesia problems.          Review of Systems:     Constitutional: Denies fever or chills   Eyes: Denies change in visual acuity   HENT: Denies nasal congestion or sore throat   Respiratory: Denies cough or shortness of breath   Cardiovascular: Denies chest pain or edema   GI: Denies abdominal pain, nausea, vomiting, bloody stools or diarrhea   : Denies dysuria   Musculoskeletal: Denies back pain or joint pain   Integument: Denies rash   Neurologic: Denies headache, focal weakness or sensory changes   Endocrine: Denies polyuria or polydipsia   Lymphatic: Denies swollen glands   Psychiatric: Denies depression or anxiety          Physical Exam:     Vitals: /70   Temp 97.9  F (36.6  C) (Tympanic)   Ht 1.727 m (5' 8\")   BMI 21.44 kg/m    BMI= Body mass index is 21.44 kg/m .  Constitutional: Awake, alert, no acute distress.  Eyes:  No scleral icterus.  Conjunctiva are without injection.  ENMT: Mucous membranes moist, dentition and gums are intact.   Neck: Soft, supple, trachea midline.    Endocrine: n/a  Lymphatic: There is no cervical, submandibular, or supraclavicular adenopathy.  Respiratory: No audible wheezes, no acute distress  Cardiovascular: Regular; S1, S2   Breasts:  Left:  normal without suspicious masses, skin " "changes or axillary nodes, symmetric fibrous changes in both upper outer quadrants, self exam is taught and encouraged.  Noted thin fibrotic cord that runs from the area under the nipple areolar complex down to the subcostal area.  This cord is very obvious when you elevate the breast tissue.  There is no masses.  No skin changes.  Right: normal without suspicious masses, skin changes or axillary nodes, symmetric fibrous changes in both upper outer quadrants, self exam is taught and encouraged.  Abdomen: Non-distended, non-tender, normoactive bowel sounds present, No masses,   Musculoskeletal: No spinal or CVA tenderness. Full range of motion in the upper and lower extremities.    Skin: No skin rashes or lesions to inspection.  No petechia.    Neurologic: Cranial nerves II through XII are grossly intact and symmetric.  Psychiatric: The patient is alert and oriented times 3.  The patient's affect is not blunted and mood is appropriate.          Data:   Vital Signs:  /70   Temp 97.9  F (36.6  C) (Tympanic)   Ht 1.727 m (5' 8\")   BMI 21.44 kg/m       WBC -   WBC   Date Value Ref Range Status   07/14/2020 3.9 (L) 4.0 - 11.0 10e9/L Final   ], HgB -   Hemoglobin   Date Value Ref Range Status   07/14/2020 13.6 11.7 - 15.7 g/dL Final   ]   Liver Function Studies -   Recent Labs   Lab Test 07/14/20  1122   PROTTOTAL 7.2   ALBUMIN 4.1   BILITOTAL 0.7   ALKPHOS 75   AST 29   ALT 34     No results found for this or any previous visit (from the past 744 hour(s)).     Bao Martinez DO 12/16/2020 1:19 PM    Disclaimer: This note consists of words and symbols derived from keyboarding and dictation using voice recognition software.  As a result, there may be errors that have gone undetected.  Please consider this when interpreting information found in this note.           Again, thank you for allowing me to participate in the care of your patient.        Sincerely,        ToneyDinorah Martinez MD    "

## 2020-12-30 ENCOUNTER — OFFICE VISIT (OUTPATIENT)
Dept: FAMILY MEDICINE | Facility: CLINIC | Age: 55
End: 2020-12-30
Payer: COMMERCIAL

## 2020-12-30 VITALS
OXYGEN SATURATION: 100 % | BODY MASS INDEX: 22.97 KG/M2 | HEART RATE: 100 BPM | SYSTOLIC BLOOD PRESSURE: 120 MMHG | TEMPERATURE: 97.5 F | RESPIRATION RATE: 16 BRPM | WEIGHT: 151.06 LBS | DIASTOLIC BLOOD PRESSURE: 78 MMHG

## 2020-12-30 DIAGNOSIS — N64.4 BREAST PAIN, LEFT: ICD-10-CM

## 2020-12-30 DIAGNOSIS — Z00.00 ROUTINE GENERAL MEDICAL EXAMINATION AT A HEALTH CARE FACILITY: Primary | ICD-10-CM

## 2020-12-30 DIAGNOSIS — F41.1 GAD (GENERALIZED ANXIETY DISORDER): ICD-10-CM

## 2020-12-30 PROCEDURE — 99396 PREV VISIT EST AGE 40-64: CPT | Performed by: NURSE PRACTITIONER

## 2020-12-30 PROCEDURE — 99214 OFFICE O/P EST MOD 30 MIN: CPT | Mod: 25 | Performed by: NURSE PRACTITIONER

## 2020-12-30 PROCEDURE — G0145 SCR C/V CYTO,THINLAYER,RESCR: HCPCS | Performed by: NURSE PRACTITIONER

## 2020-12-30 PROCEDURE — 87624 HPV HI-RISK TYP POOLED RSLT: CPT | Performed by: NURSE PRACTITIONER

## 2020-12-30 RX ORDER — VENLAFAXINE HYDROCHLORIDE 37.5 MG/1
CAPSULE, EXTENDED RELEASE ORAL
Qty: 120 CAPSULE | Refills: 0 | Status: SHIPPED | OUTPATIENT
Start: 2020-12-30 | End: 2021-03-02 | Stop reason: DRUGHIGH

## 2020-12-30 ASSESSMENT — ENCOUNTER SYMPTOMS
WEAKNESS: 0
JOINT SWELLING: 0
NAUSEA: 1
EYE PAIN: 0
ABDOMINAL PAIN: 0
CHILLS: 0
COUGH: 0
FREQUENCY: 0
DIARRHEA: 0
DIZZINESS: 0
FEVER: 0
SORE THROAT: 0
HEMATURIA: 0
NERVOUS/ANXIOUS: 1
HEADACHES: 1
DYSURIA: 0
HEARTBURN: 0
HEMATOCHEZIA: 0
ARTHRALGIAS: 1
CONSTIPATION: 0
BREAST MASS: 0
MYALGIAS: 0
SHORTNESS OF BREATH: 0
PALPITATIONS: 0
PARESTHESIAS: 0

## 2020-12-30 ASSESSMENT — PAIN SCALES - GENERAL: PAINLEVEL: NO PAIN (0)
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
## 2021-01-04 LAB
COPATH REPORT: NORMAL
PAP: NORMAL

## 2021-01-05 LAB
FINAL DIAGNOSIS: NORMAL
HPV HR 12 DNA CVX QL NAA+PROBE: NEGATIVE
HPV16 DNA SPEC QL NAA+PROBE: NEGATIVE
HPV18 DNA SPEC QL NAA+PROBE: NEGATIVE
SPECIMEN DESCRIPTION: NORMAL
SPECIMEN SOURCE CVX/VAG CYTO: NORMAL

## 2021-01-07 ENCOUNTER — HOSPITAL ENCOUNTER (OUTPATIENT)
Dept: MAMMOGRAPHY | Facility: CLINIC | Age: 56
End: 2021-01-07
Attending: NURSE PRACTITIONER
Payer: COMMERCIAL

## 2021-01-07 ENCOUNTER — HOSPITAL ENCOUNTER (OUTPATIENT)
Dept: ULTRASOUND IMAGING | Facility: CLINIC | Age: 56
End: 2021-01-07
Attending: NURSE PRACTITIONER
Payer: COMMERCIAL

## 2021-01-07 DIAGNOSIS — N64.4 BREAST PAIN, LEFT: ICD-10-CM

## 2021-01-07 PROCEDURE — 76642 ULTRASOUND BREAST LIMITED: CPT | Mod: LT

## 2021-01-07 PROCEDURE — G0279 TOMOSYNTHESIS, MAMMO: HCPCS

## 2021-02-05 DIAGNOSIS — E03.9 HYPOTHYROIDISM, UNSPECIFIED TYPE: ICD-10-CM

## 2021-02-05 RX ORDER — LEVOTHYROXINE SODIUM 88 UG/1
88 TABLET ORAL DAILY
Qty: 90 TABLET | Refills: 0 | Status: SHIPPED | OUTPATIENT
Start: 2021-02-05 | End: 2021-04-22

## 2021-03-02 ENCOUNTER — VIRTUAL VISIT (OUTPATIENT)
Dept: FAMILY MEDICINE | Facility: CLINIC | Age: 56
End: 2021-03-02
Payer: COMMERCIAL

## 2021-03-02 VITALS — BODY MASS INDEX: 21.44 KG/M2 | WEIGHT: 141 LBS

## 2021-03-02 DIAGNOSIS — F41.1 GAD (GENERALIZED ANXIETY DISORDER): Primary | ICD-10-CM

## 2021-03-02 PROCEDURE — 99213 OFFICE O/P EST LOW 20 MIN: CPT | Mod: TEL | Performed by: NURSE PRACTITIONER

## 2021-03-02 RX ORDER — VENLAFAXINE HYDROCHLORIDE 37.5 MG/1
75 CAPSULE, EXTENDED RELEASE ORAL DAILY
Qty: 180 CAPSULE | Refills: 1 | Status: SHIPPED | OUTPATIENT
Start: 2021-03-02 | End: 2021-05-18 | Stop reason: DRUGHIGH

## 2021-03-02 NOTE — PATIENT INSTRUCTIONS
Anxiety/PTSD:     1. Start taking 2- 37.5 mg capsules of the Effexor a the same time each day. I am hoping this will help you progress to being able to drive in your community at least locally so you can do errands. If you do not tolerate the higher dose after 4 weeks go back down to only 1 capsule daily.     - Start taking over the counter oral B12 supplement to help with bone and muscle pain.     - I will follow up with you in 3 months to see how you are progressing.     - Call clinic with new or worsening symptoms prior to your next follow up appointment.     DOUGIE Crokcer CNP

## 2021-03-02 NOTE — PROGRESS NOTES
Emma is a 55 year old who is being evaluated via a billable video visit.      How would you like to obtain your AVS? MyChart  If the video visit is dropped, the invitation should be resent by: Text to cell phone: [phone]  Will anyone else be joining your video visit? No      Ended up doing phone visit due to technical difficulty.     Assessment & Plan     MEG (generalized anxiety disorder)  1. Start taking 2- 37.5 mg capsules of the Effexor a the same time each day. I am hoping this will help you progress to being able to drive in your community at least locally so you can do errands. If you do not tolerate the higher dose after 4 weeks go back down to only 1 capsule daily.     - Start taking over the counter oral B12 supplement to help with bone and muscle pain.     - I will follow up with you in 3 months to see how you are progressing.     - Call clinic with new or worsening symptoms prior to your next follow up appointment.   - venlafaxine (EFFEXOR-XR) 37.5 MG 24 hr capsule; Take 2 capsules (75 mg) by mouth daily      Return in about 3 months (around 6/2/2021), or PTSD, for Recheck if symptoms worsen or fail to improve.    Jame Crocker NP  [facility]   Emma is a 55 year old who presents for the following health issues     HPI       Medication Followup of venlafaxine 37.5mg     Taking Medication as prescribed: yes    Side Effects:  None    Medication Helping Symptoms:  NO-might need to give it more time     Discussed pro's and cons of going up on the medication. She has some chronic bone and muscle pain, thinks related to side effects from the chemotherapy. She is driving out of the drive way but not able to get on main roads yet. It has been over 2 months on current dosing of the effexor. No other acute symptoms that are new.   Review of Systems   Constitutional, HEENT, cardiovascular, pulmonary, gi and gu systems are negative, except as otherwise noted.       Objective           Vitals:  No vitals were obtained today due to virtual visit.    Physical Exam   GENERAL: Healthy, alert and no distress  NEURO: mentation intact  PSYCH: Mentation appears normal, affect normal/bright, judgement and insight intact, normal speech and appearance well-groomed.            Telephone Visit: Video failed.     Time on phone total 15 minutes for total visit time.

## 2021-04-21 DIAGNOSIS — E03.9 HYPOTHYROIDISM, UNSPECIFIED TYPE: ICD-10-CM

## 2021-04-22 RX ORDER — LEVOTHYROXINE SODIUM 88 UG/1
88 TABLET ORAL DAILY
Qty: 90 TABLET | Refills: 1 | Status: SHIPPED | OUTPATIENT
Start: 2021-04-22 | End: 2021-11-01

## 2021-04-22 NOTE — TELEPHONE ENCOUNTER
Prescription approved per [facility] Refill Protocol.    ELIZABETH FisherN, RN  [facility]

## 2021-05-18 ENCOUNTER — VIRTUAL VISIT (OUTPATIENT)
Dept: FAMILY MEDICINE | Facility: CLINIC | Age: 56
End: 2021-05-18
Payer: COMMERCIAL

## 2021-05-18 DIAGNOSIS — Z00.00 HEALTHCARE MAINTENANCE: ICD-10-CM

## 2021-05-18 DIAGNOSIS — F41.1 GAD (GENERALIZED ANXIETY DISORDER): Primary | ICD-10-CM

## 2021-05-18 PROCEDURE — 99213 OFFICE O/P EST LOW 20 MIN: CPT | Mod: GT | Performed by: NURSE PRACTITIONER

## 2021-05-18 RX ORDER — VENLAFAXINE HYDROCHLORIDE 37.5 MG/1
37.5 CAPSULE, EXTENDED RELEASE ORAL DAILY
Qty: 90 CAPSULE | Refills: 3 | Status: SHIPPED | OUTPATIENT
Start: 2021-05-18 | End: 2022-04-15

## 2021-05-18 NOTE — PATIENT INSTRUCTIONS
I will refill the prescription for the 37.5 mg of Effexor daily.   Patient Education     Effexor Oral Tablet 37.5 mg  Uses  This medicine is used for the following purposes:    anxiety    depression    menopausal symptoms    muscle weakness    post-traumatic stress disorder  Instructions  Take the medicine with food.  It is very important that you take the medicine at about the same time every day. It will work best if you do this.  Keep the medicine at room temperature. Avoid heat and direct light.  It may take several weeks for this medicine to fully work.  It is important that you keep taking each dose of this medicine on time even if you are feeling well.  If you forget to take a dose on time, take it as soon as you remember. If it is almost time for the next dose, do not take the missed dose. Return to your normal dosing schedule. Do not take 2 doses of this medicine at one time.  Please tell your doctor and pharmacist about all the medicines you take. Include both prescription and over-the-counter medicines. Also tell them about any vitamins, herbal medicines, or anything else you take for your health.  Do not suddenly stop taking this medicine. Check with your doctor before stopping.  Cautions  Tell your doctor and pharmacist if you ever had an allergic reaction to a medicine. Symptoms of an allergic reaction can include trouble breathing, skin rash, itching, swelling, or severe dizziness.  Do not use the medication any more than instructed.  Your ability to stay alert or to react quickly may be impaired by this medicine. Do not drive or operate machinery until you know how this medicine will affect you.  Do not drink beverages with alcohol while on this medicine.  Family should check on the patient often. Call the doctor if patient becomes more depressed, has thoughts of suicide, or shows changes in behavior.  Call the doctor if there are any signs of confusion or unusual changes in behavior.  Tell the  doctor or pharmacist if you are pregnant, planning to be pregnant, or breastfeeding.  Ask your pharmacist if this medicine can interact with any of your other medicines. Be sure to tell them about all the medicines you take.  Do not start or stop any other medicines without first speaking to your doctor or pharmacist.  Call your doctor right away if you notice any unusual bleeding or bruising.  Do not share this medicine with anyone who has not been prescribed this medicine.  This medicine can cause serious side effects in some patients. Important information from the U.S. Food and Drug Administration (FDA) is available from your pharmacist. Please review it carefully with your pharmacist to understand the risks associated with this medicine.  Side Effects  The following is a list of some common side effects from this medicine. Please speak with your doctor about what you should do if you experience these or other side effects.    agitated feeling or trouble sleeping    decreased appetite    constipation    dizziness    drowsiness or sedation    dry mouth    high blood pressure    nausea    nervousness    sweating  Call your doctor or get medical help right away if you notice any of these more serious side effects:    bleeding that is severe or takes longer to stop    unusual bruising or discoloration on skin    chest pain    cough that does not go away    pain in the eye    hallucinations (unusual thoughts, seeing or hearing things that are not real)    fast or irregular heart beats    muscle cramps    muscle weakness    dilation of the pupils    restlessness    problems with sexual functions or desire    shakiness    shortness of breath    dark, tarry stool    blurring or changes of vision    severe or persistent vomiting  A few people may have an allergic reactions to this medicine. Symptoms can include difficulty breathing, skin rash, itching, swelling, or severe dizziness. If you notice any of these symptoms,  seek medical help quickly.  Extra  Please speak with your doctor, nurse, or pharmacist if you have any questions about this medicine.  [email or web address]  IMPORTANT NOTE: This document tells you briefly how to take your medicine, but it does not tell you all there is to know about it.Your doctor or pharmacist may give you other documents about your medicine. Please talk to them if you have any questions.Always follow their advice. There is a more complete description of this medicine available in English.Scan this code on your smartphone or tablet or use the web address below. You can also ask your pharmacist for a printout. If you have any questions, please ask your pharmacist.     2021 [employer].    If you need to increase the medication or want to taper the medication please call me and we will assist you.     Mammogram due in July.     Physical and Health maintenance visit due in December.     Call clinic with new or concerning symptoms prior to your next follow up appointment.

## 2021-05-18 NOTE — PROGRESS NOTES
Emma is a 55 year old who is being evaluated via a billable video visit.      How would you like to obtain your AVS? MyChart  If the video visit is dropped, the invitation should be resent by: Text to cell phone: [phone]  Will anyone else be joining your video visit? No    Video Start Time: 10:00 am    Assessment & Plan     MEG (generalized anxiety disorder)  - She is doing well on the 37.5 mg of effexor. She is driving again, not on the highway but she is okay with this. Mood is stable with no new significant side effects. Will continue with current plan of care.   - venlafaxine (EFFEXOR-XR) 37.5 MG 24 hr capsule; Take 1 capsule (37.5 mg) by mouth daily    Healthcare maintenance  -  Reviewed Mammogram due in July.   - REVIEW OF HEALTH MAINTENANCE PROTOCOL ORDERS      20  minutes spent on the date of the encounter doing chart review, history and exam, documentation and further activities per the note           Return in about 7 months (around 12/18/2021) for Physical Exam, Lab Work.    Jame Crocker NP  [facility]   Emma is a 55 year old who presents for the following health issues     HPI     Medication Followup of Venlafaxine     Taking Medication as prescribed: yes    Side Effects:  Joint pain    Medication Helping Symptoms:  Yes. Patient feels that her PTSD is getting better would like to decrease medication.     Patient states she started taking ostobiflex and that is helping.      She is driving and she is thrilled. The Effexor has helped a great deal but wants to stay on the lower dose. Would like to review when colonoscopy due, strong family history of colon cancer. She has no new symptoms. Of concern. Mood is stable and she is otherwise doing well. Taking 37.5 mg of Effexor. Did not want to go up to 37.5 mg daily.     Review of Systems   Constitutional, HEENT, cardiovascular, pulmonary, gi and gu systems are negative, except as otherwise noted.      Objective            Vitals:  No vitals were obtained today due to virtual visit.    Physical Exam   GENERAL: Healthy, alert and no distress  EYES: Eyes grossly normal to inspection.  No discharge or erythema, or obvious scleral/conjunctival abnormalities.  RESP: No audible wheeze, cough, or visible cyanosis.  No visible retractions or increased work of breathing.    SKIN: Visible skin clear. No significant rash, abnormal pigmentation or lesions.  NEURO: Cranial nerves grossly intact.  Mentation and speech appropriate for age.  PSYCH: Mentation appears normal, affect normal/bright, judgement and insight intact, normal speech and appearance well-groomed.    Labs and procedures, HM reviewed in EPIC            Video-Visit Details    Type of service:  Video Visit    Video End Time:10:17 AM    Originating Location (pt. Location): Home    Distant Location (provider location):  [facility]     Platform used for Video Visit: [employer]

## 2021-07-14 ENCOUNTER — MYC MEDICAL ADVICE (OUTPATIENT)
Dept: FAMILY MEDICINE | Facility: CLINIC | Age: 56
End: 2021-07-14

## 2021-07-14 DIAGNOSIS — E03.9 HYPOTHYROIDISM, UNSPECIFIED TYPE: Primary | ICD-10-CM

## 2021-07-15 ENCOUNTER — HOSPITAL ENCOUNTER (OUTPATIENT)
Dept: MAMMOGRAPHY | Facility: CLINIC | Age: 56
Discharge: HOME | End: 2021-07-15
Attending: INTERNAL MEDICINE | Admitting: INTERNAL MEDICINE
Payer: COMMERCIAL

## 2021-07-15 DIAGNOSIS — Z12.31 VISIT FOR SCREENING MAMMOGRAM: ICD-10-CM

## 2021-07-15 PROCEDURE — 77063 BREAST TOMOSYNTHESIS BI: CPT

## 2021-08-04 ENCOUNTER — LAB (OUTPATIENT)
Dept: LAB | Facility: CLINIC | Age: 56
End: 2021-08-04
Payer: COMMERCIAL

## 2021-08-04 DIAGNOSIS — E03.9 HYPOTHYROIDISM, UNSPECIFIED TYPE: ICD-10-CM

## 2021-08-04 LAB — TSH SERPL DL<=0.005 MIU/L-ACNC: 3.34 MU/L (ref 0.4–4)

## 2021-08-04 PROCEDURE — 36415 COLL VENOUS BLD VENIPUNCTURE: CPT

## 2021-08-04 PROCEDURE — 84443 ASSAY THYROID STIM HORMONE: CPT

## 2021-08-05 ENCOUNTER — ONCOLOGY VISIT (OUTPATIENT)
Dept: ONCOLOGY | Facility: CLINIC | Age: 56
End: 2021-08-05
Payer: COMMERCIAL

## 2021-08-05 VITALS
WEIGHT: 137.8 LBS | OXYGEN SATURATION: 97 % | DIASTOLIC BLOOD PRESSURE: 64 MMHG | HEIGHT: 68 IN | HEART RATE: 99 BPM | SYSTOLIC BLOOD PRESSURE: 112 MMHG | TEMPERATURE: 97.5 F | BODY MASS INDEX: 20.88 KG/M2

## 2021-08-05 DIAGNOSIS — Z17.0 MALIGNANT NEOPLASM OF UPPER-OUTER QUADRANT OF LEFT BREAST IN FEMALE, ESTROGEN RECEPTOR POSITIVE (H): ICD-10-CM

## 2021-08-05 DIAGNOSIS — C50.412 MALIGNANT NEOPLASM OF UPPER-OUTER QUADRANT OF LEFT BREAST IN FEMALE, ESTROGEN RECEPTOR POSITIVE (H): ICD-10-CM

## 2021-08-05 DIAGNOSIS — E03.9 HYPOTHYROIDISM, UNSPECIFIED TYPE: Primary | ICD-10-CM

## 2021-08-05 LAB
ALBUMIN SERPL-MCNC: 3.8 G/DL (ref 3.4–5)
ALP SERPL-CCNC: 125 U/L (ref 40–150)
ALT SERPL W P-5'-P-CCNC: 20 U/L (ref 0–50)
ANION GAP SERPL CALCULATED.3IONS-SCNC: 1 MMOL/L (ref 3–14)
AST SERPL W P-5'-P-CCNC: 19 U/L (ref 0–45)
BASOPHILS # BLD AUTO: 0.1 10E3/UL (ref 0–0.2)
BASOPHILS NFR BLD AUTO: 1 %
BILIRUB SERPL-MCNC: 0.7 MG/DL (ref 0.2–1.3)
BUN SERPL-MCNC: 15 MG/DL (ref 7–30)
CALCIUM SERPL-MCNC: 9.6 MG/DL (ref 8.5–10.1)
CANCER AG27-29 SERPL-ACNC: 17 U/ML (ref 0–39)
CHLORIDE BLD-SCNC: 106 MMOL/L (ref 94–109)
CO2 SERPL-SCNC: 34 MMOL/L (ref 20–32)
CREAT SERPL-MCNC: 0.85 MG/DL (ref 0.52–1.04)
EOSINOPHIL # BLD AUTO: 0.2 10E3/UL (ref 0–0.7)
EOSINOPHIL NFR BLD AUTO: 3 %
ERYTHROCYTE [DISTWIDTH] IN BLOOD BY AUTOMATED COUNT: 12.6 % (ref 10–15)
GFR SERPL CREATININE-BSD FRML MDRD: 77 ML/MIN/1.73M2
GLUCOSE BLD-MCNC: 95 MG/DL (ref 70–99)
HCT VFR BLD AUTO: 42.6 % (ref 35–47)
HGB BLD-MCNC: 14.2 G/DL (ref 11.7–15.7)
IMM GRANULOCYTES # BLD: 0 10E3/UL
IMM GRANULOCYTES NFR BLD: 0 %
LYMPHOCYTES # BLD AUTO: 0.7 10E3/UL (ref 0.8–5.3)
LYMPHOCYTES NFR BLD AUTO: 14 %
MCH RBC QN AUTO: 32.2 PG (ref 26.5–33)
MCHC RBC AUTO-ENTMCNC: 33.3 G/DL (ref 31.5–36.5)
MCV RBC AUTO: 97 FL (ref 78–100)
MONOCYTES # BLD AUTO: 0.5 10E3/UL (ref 0–1.3)
MONOCYTES NFR BLD AUTO: 9 %
NEUTROPHILS # BLD AUTO: 3.9 10E3/UL (ref 1.6–8.3)
NEUTROPHILS NFR BLD AUTO: 73 %
NRBC # BLD AUTO: 0 10E3/UL
NRBC BLD AUTO-RTO: 0 /100
PLATELET # BLD AUTO: 171 10E3/UL (ref 150–450)
POTASSIUM BLD-SCNC: 4.3 MMOL/L (ref 3.4–5.3)
PROT SERPL-MCNC: 7 G/DL (ref 6.8–8.8)
RBC # BLD AUTO: 4.41 10E6/UL (ref 3.8–5.2)
SODIUM SERPL-SCNC: 141 MMOL/L (ref 133–144)
WBC # BLD AUTO: 5.4 10E3/UL (ref 4–11)

## 2021-08-05 PROCEDURE — 80053 COMPREHEN METABOLIC PANEL: CPT | Performed by: INTERNAL MEDICINE

## 2021-08-05 PROCEDURE — 99214 OFFICE O/P EST MOD 30 MIN: CPT | Performed by: INTERNAL MEDICINE

## 2021-08-05 PROCEDURE — 86300 IMMUNOASSAY TUMOR CA 15-3: CPT | Performed by: INTERNAL MEDICINE

## 2021-08-05 PROCEDURE — 36415 COLL VENOUS BLD VENIPUNCTURE: CPT | Performed by: INTERNAL MEDICINE

## 2021-08-05 PROCEDURE — 85025 COMPLETE CBC W/AUTO DIFF WBC: CPT | Performed by: INTERNAL MEDICINE

## 2021-08-05 ASSESSMENT — MIFFLIN-ST. JEOR: SCORE: 1268.56

## 2021-08-05 NOTE — PROGRESS NOTES
Hematology/ Oncology Follow-up Visit:  Aug 5, 2021    Reason for Visit:   Chief Complaint   Patient presents with     Oncology Clinic Visit     Breast cancer     Results     lab today, Mammo-7/15       Oncologic History:    Malignant neoplasm of female breast (H)  Emma Nunn was diagnosed at age 47, premenopausally through self-breast check, felt a lump in her left outer upper quadrant. Diagnostic mammogram early 07/2013 identified asymmetrical density associated with palpable findings around 1-2 o'clock position, 8-10 cm from the nipple. Limited sonogram revealed hypoechoic mass with irregular borders, measured about 0.8 cm trocar position. Sonogram-guided biopsy indicating invasive ductal cancer, grade 2, with associated DCIS, ER/WA 90% positive, HER-2/sandy positive, FISH ratio greater than 11.7. she had lumpectomy 7/2013 - 1.3 cm LAURA, GII, + ALI, margin is close <=1 mm, +DCIS, 8LN negative.  She has [id number] stage I disease. TCH was recommended from 8//15/2013 to 12/2013 and herceptin after. . She finished RT 2/28/2014 and tamoxifen started in 3/2014.      Interval History:  Patient is here today for her annual follow-up.  She has been feeling well without any recent complaints of shortness of breath or cough or wheezing.  She denies any nausea vomiting or diarrhea.  She denies any fever or chills.  She denies any pain.  Most recent mammogram came back without any significant abnormalities.    Review Of Systems:  All other ROS negative unless mentioned in interval history.    Past medical, social, surgical, and family histories reviewed.    Allergies:  Allergies as of 08/05/2021 - Reviewed 08/05/2021   Allergen Reaction Noted     No known drug allergies  04/02/2001       Current Medications:  Current Outpatient Medications   Medication Sig Dispense Refill     Calcium Carbonate-Vit D-Min (CALCIUM 1200 PO)        Cyanocobalamin (B-12 PO)        venlafaxine (EFFEXOR-XR) 37.5 MG 24 hr capsule Take 1 capsule (37.5 mg) by  "mouth daily 90 capsule 3     levothyroxine (SYNTHROID/LEVOTHROID) 88 MCG tablet TAKE 1 TABLET (88 MCG) BY MOUTH DAILY 90 tablet 1        Physical Exam:  /64   Pulse 99   Temp 97.5  F (36.4  C) (Temporal)   Ht 1.727 m (5' 8\")   Wt 62.5 kg (137 lb 12.8 oz)   SpO2 97%   BMI 20.95 kg/m    Wt Readings from Last 12 Encounters:   08/05/21 62.5 kg (137 lb 12.8 oz)   03/02/21 64 kg (141 lb)   12/30/20 68.5 kg (151 lb 1 oz)   07/16/20 64 kg (141 lb)   11/14/19 64.7 kg (142 lb 11.2 oz)   10/17/19 60.4 kg (133 lb 1.6 oz)   09/18/19 59.4 kg (131 lb)   04/16/19 60.7 kg (133 lb 14.4 oz)   02/18/19 63 kg (139 lb)   01/28/19 65.8 kg (145 lb)   01/20/19 56 kg (123 lb 7.3 oz)   10/05/18 60.8 kg (134 lb)       GENERAL APPEARANCE: Healthy, alert and in no acute distress.  HEENT: Sclerae anicteric. PERRLA. Oropharynx without ulcers, lesions, or thrush.  NECK: Supple. No asymmetry or masses.  LYMPHATICS: No palpable cervical, supraclavicular, axillary, or inguinal lymphadenopathy.  RESP: Lungs clear to auscultation bilaterally without rales, rhonchi or wheezes.\",BREAST: Right- No mass, nipple discharge or axillary adenopathy. Left- No mass, nipple discharge or axillary adenopathy \"CARDIOVASCULAR: Regular rate and rhythm. Normal S1, S2; no S3 or S4. No murmur, gallop, or rub.  ABDOMEN: Soft, nontender. Bowel sounds normal. No palpable organomegaly or masses.  MUSCULOSKELETAL: Extremities without gross deformities noted. No edema of bilateral lower extremities.  SKIN: No suspicious lesions or rashes.  NEURO: Alert and oriented x 3. Cranial nerves II-XII grossly intact.  PSYCHIATRIC: Mentation and affect appear normal.    Laboratory/Imaging Studies:  Lab on 08/04/2021   Component Date Value Ref Range Status     TSH 08/04/2021 3.34  0.40 - 4.00 mU/L Final        Recent Results (from the past 744 hour(s))   MA Screen Bilateral w/Jluis    Narrative    BILATERAL FULL FIELD DIGITAL SCREENING MAMMOGRAM WITH TOMOSYNTHESIS    Performed " on: 7/15/21    Compared to: 07/14/2020 MA Screen Bilateral w/Jluis, 04/02/2019 MA Screen   Bilateral w/Jluis, 03/15/2018 MA Diagnostic Bilateral w/Jluis, and   10/24/2016 MA Screening Digital Bilateral    Findings: The breasts have scattered areas of fibroglandular density.     Stable conservation therapy changes left breast. There is no radiographic   evidence of malignancy. This study was evaluated with the assistance of   Computer-Aided Detection.  Breast Tomosynthesis was used in   interpretation.    ACR BI-RADS Category 2: benign.    RECOMMENDED FOLLOW-UP: Annual routine screening mammogram    The results and recommendations of this examination will be communicated   to the patient.         Assessment and plan:    (C50.412,  Z17.0) Malignant neoplasm of upper-outer quadrant of left breast in female, estrogen receptor positive (H)  There is no clinical evidence of breast cancer recurrence.  We will follow the patient most recent mammogram without any significant abnormalities.  In 1 year time or sooner if there are new developments or concerns.    (E03.9) Hypothyroidism, unspecified type   TSH within normal range.  Patient currently on Synthroid 88 mcg daily.    The patient is ready to learn, no apparent learning barriers were identified.  Diagnosis and treatment plans were explained to the patient. The patient expressed understanding of the content. The patient asked appropriate questions. The patient questions were answered to her satisfaction.    Chart documentation with Dragon Voice recognition Software. Although reviewed after completion, some words and grammatical errors may remain.

## 2021-08-05 NOTE — LETTER
"    8/5/2021         RE: Emma Nunn  [address] [facility] [address]        Dear Colleague,    Thank you for referring your patient, Emma Nunn, to the [facility]. Please see a copy of my visit note below.    Oncology Rooming Note    August 5, 2021 7:40 AM   Emma Nunn is a 55 year old female who presents for:    Chief Complaint   Patient presents with     Oncology Clinic Visit     Breast cancer     Results     lab today, Mammo-7/15     Initial Vitals: /64   Pulse 99   Temp 97.5  F (36.4  C) (Temporal)   Ht 1.727 m (5' 8\")   Wt 62.5 kg (137 lb 12.8 oz)   SpO2 97%   BMI 20.95 kg/m   Estimated body mass index is 20.95 kg/m  as calculated from the following:    Height as of this encounter: 1.727 m (5' 8\").    Weight as of this encounter: 62.5 kg (137 lb 12.8 oz). Body surface area is 1.73 meters squared.  Data Unavailable Comment: Data Unavailable   No LMP recorded. (Menstrual status: Chemotherapy).  Allergies reviewed: Yes  Medications reviewed: Yes    Medications: Medication refills not needed today.  Pharmacy name entered into EPIC:    [facility] [place], [place] - [address] [place] [address] [address]     Clinical concerns: None. Concerns reviewed with Dr. Terrence Velazquez, [facility]                Hematology/ Oncology Follow-up Visit:  Aug 5, 2021    Reason for Visit:   Chief Complaint   Patient presents with     Oncology Clinic Visit     Breast cancer     Results     lab today, Mammo-7/15       Oncologic History:    Malignant neoplasm of female breast (H)  Emma Nunn was diagnosed at age 47, premenopausally through self-breast check, felt a lump in her left outer upper quadrant. Diagnostic mammogram early 07/2013 identified asymmetrical density associated with palpable findings around 1-2 o'clock position, 8-10 cm from the nipple. Limited sonogram revealed hypoechoic mass with irregular " "borders, measured about 0.8 cm trocar position. Sonogram-guided biopsy indicating invasive ductal cancer, grade 2, with associated DCIS, ER/[place] 90% positive, HER-2/sandy positive, FISH ratio greater than 11.7. she had lumpectomy 7/2013 - 1.3 cm LAURA, GII, + ALI, margin is close <=1 mm, +DCIS, 8LN negative.  She has [id number] stage I disease. TCH was recommended from 8//15/2013 to 12/2013 and herceptin after. . She finished RT 2/28/2014 and tamoxifen started in 3/2014.      Interval History:  Patient is here today for her annual follow-up.  She has been feeling well without any recent complaints of shortness of breath or cough or wheezing.  She denies any nausea vomiting or diarrhea.  She denies any fever or chills.  She denies any pain.  Most recent mammogram came back without any significant abnormalities.    Review Of Systems:  All other ROS negative unless mentioned in interval history.    Past medical, social, surgical, and family histories reviewed.    Allergies:  Allergies as of 08/05/2021 - Reviewed 08/05/2021   Allergen Reaction Noted     No known drug allergies  04/02/2001       Current Medications:  Current Outpatient Medications   Medication Sig Dispense Refill     Calcium Carbonate-Vit D-Min (CALCIUM 1200 PO)        Cyanocobalamin (B-12 PO)        venlafaxine (EFFEXOR-XR) 37.5 MG 24 hr capsule Take 1 capsule (37.5 mg) by mouth daily 90 capsule 3     levothyroxine (SYNTHROID/LEVOTHROID) 88 MCG tablet TAKE 1 TABLET (88 MCG) BY MOUTH DAILY 90 tablet 1        Physical Exam:  /64   Pulse 99   Temp 97.5  F (36.4  C) (Temporal)   Ht 1.727 m (5' 8\")   Wt 62.5 kg (137 lb 12.8 oz)   SpO2 97%   BMI 20.95 kg/m    Wt Readings from Last 12 Encounters:   08/05/21 62.5 kg (137 lb 12.8 oz)   03/02/21 64 kg (141 lb)   12/30/20 68.5 kg (151 lb 1 oz)   07/16/20 64 kg (141 lb)   11/14/19 64.7 kg (142 lb 11.2 oz)   10/17/19 60.4 kg (133 lb 1.6 oz)   09/18/19 59.4 kg (131 lb)   04/16/19 60.7 kg (133 lb 14.4 oz) " "  02/18/19 63 kg (139 lb)   01/28/19 65.8 kg (145 lb)   01/20/19 56 kg (123 lb 7.3 oz)   10/05/18 60.8 kg (134 lb)       GENERAL APPEARANCE: Healthy, alert and in no acute distress.  HEENT: Sclerae anicteric. PERRLA. Oropharynx without ulcers, lesions, or thrush.  NECK: Supple. No asymmetry or masses.  LYMPHATICS: No palpable cervical, supraclavicular, axillary, or inguinal lymphadenopathy.  RESP: Lungs clear to auscultation bilaterally without rales, rhonchi or wheezes.\",BREAST: Right- No mass, nipple discharge or axillary adenopathy. Left- No mass, nipple discharge or axillary adenopathy \"CARDIOVASCULAR: Regular rate and rhythm. Normal S1, S2; no S3 or S4. No murmur, gallop, or rub.  ABDOMEN: Soft, nontender. Bowel sounds normal. No palpable organomegaly or masses.  MUSCULOSKELETAL: Extremities without gross deformities noted. No edema of bilateral lower extremities.  SKIN: No suspicious lesions or rashes.  NEURO: Alert and oriented x 3. Cranial nerves II-XII grossly intact.  PSYCHIATRIC: Mentation and affect appear normal.    Laboratory/Imaging Studies:  Lab on 08/04/2021   Component Date Value Ref Range Status     TSH 08/04/2021 3.34  0.40 - 4.00 mU/L Final        Recent Results (from the past 744 hour(s))   MA Screen Bilateral w/Jluis    Narrative    BILATERAL FULL FIELD DIGITAL SCREENING MAMMOGRAM WITH TOMOSYNTHESIS    Performed on: 7/15/21    Compared to: 07/14/2020 MA Screen Bilateral w/Jluis, 04/02/2019 MA Screen   Bilateral w/Jluis, 03/15/2018 MA Diagnostic Bilateral w/Jluis, and   10/24/2016 MA Screening Digital Bilateral    Findings: The breasts have scattered areas of fibroglandular density.     Stable conservation therapy changes left breast. There is no radiographic   evidence of malignancy. This study was evaluated with the assistance of   Computer-Aided Detection.  Breast Tomosynthesis was used in   interpretation.    ACR BI-RADS Category 2: benign.    RECOMMENDED FOLLOW-UP: Annual routine screening " mammogram    The results and recommendations of this examination will be communicated   to the patient.         Assessment and plan:    (C50.412,  Z17.0) Malignant neoplasm of upper-outer quadrant of left breast in female, estrogen receptor positive (H)  There is no clinical evidence of breast cancer recurrence.  We will follow the patient most recent mammogram without any significant abnormalities.  In 1 year time or sooner if there are new developments or concerns.    (E03.9) Hypothyroidism, unspecified type   TSH within normal range.  Patient currently on Synthroid 88 mcg daily.    The patient is ready to learn, no apparent learning barriers were identified.  Diagnosis and treatment plans were explained to the patient. The patient expressed understanding of the content. The patient asked appropriate questions. The patient questions were answered to her satisfaction.    Chart documentation with Dragon Voice recognition Software. Although reviewed after completion, some words and grammatical errors may remain.      Again, thank you for allowing me to participate in the care of your patient.        Sincerely,        Richard Ocampo MD

## 2021-08-05 NOTE — PROGRESS NOTES
"Oncology Rooming Note    August 5, 2021 7:40 AM   Emma Nunn is a 55 year old female who presents for:    Chief Complaint   Patient presents with     Oncology Clinic Visit     Breast cancer     Results     lab today, Mammo-7/15     Initial Vitals: /64   Pulse 99   Temp 97.5  F (36.4  C) (Temporal)   Ht 1.727 m (5' 8\")   Wt 62.5 kg (137 lb 12.8 oz)   SpO2 97%   BMI 20.95 kg/m   Estimated body mass index is 20.95 kg/m  as calculated from the following:    Height as of this encounter: 1.727 m (5' 8\").    Weight as of this encounter: 62.5 kg (137 lb 12.8 oz). Body surface area is 1.73 meters squared.  Data Unavailable Comment: Data Unavailable   No LMP recorded. (Menstrual status: Chemotherapy).  Allergies reviewed: Yes  Medications reviewed: Yes    Medications: Medication refills not needed today.  Pharmacy name entered into EPIC:    [email or web address] - [facility] [place] - [address] [place] [address] PHARMACY [address]     Clinical concerns: None. Concerns reviewed with Dr. Terrence Velazquez, [facility]              "

## 2021-08-05 NOTE — ASSESSMENT & PLAN NOTE
Emma Nunn was diagnosed at age 47, premenopausally through self-breast check, felt a lump in her left outer upper quadrant. Diagnostic mammogram early 07/2013 identified asymmetrical density associated with palpable findings around 1-2 o'clock position, 8-10 cm from the nipple. Limited sonogram revealed hypoechoic mass with irregular borders, measured about 0.8 cm trocar position. Sonogram-guided biopsy indicating invasive ductal cancer, grade 2, with associated DCIS, ER/[place] 90% positive, HER-2/sandy positive, FISH ratio greater than 11.7. she had lumpectomy 7/2013 - 1.3 cm LAURA, GII, + ALI, margin is close <=1 mm, +DCIS, 8LN negative.  She has [id number] stage I disease. TCH was recommended from 8//15/2013 to 12/2013 and herceptin after. . She finished RT 2/28/2014 and tamoxifen started in 3/2014.

## 2021-08-12 NOTE — PATIENT INSTRUCTIONS
Please follow up with Oncology in 1 year.  Please schedule labs and mammogram 1-7 days prior to follow up appointment.    Lab Date/Time:  07/18/22 at 3:00    Mammogram Date/Time: 07/18/22 at 3:30 - Check in at 3:15  No deodorant, creams, lotions, or powders.    Office visit follow up with Oncology Date/Time: 7/20/22 at 3:30    If you have any questions or concerns please feel free to call.    If you need to reschedule please call:  Clinic or Lab Appointment - [phone]  Infusion - [phone]  Imaging - [phone]    Fortino Garcia RN, BSN, OCN  [facility]   Oncology/Hematology Care Coordinator RN  [facility]  [phone]    After Hours Oncology Nurse Line - [phone]

## 2021-08-30 ENCOUNTER — IMMUNIZATION (OUTPATIENT)
Dept: FAMILY MEDICINE | Facility: CLINIC | Age: 56
End: 2021-08-30
Payer: COMMERCIAL

## 2021-08-30 PROCEDURE — 91301 PR COVID VAC MODERNA 100 MCG/0.5 ML IM: CPT

## 2021-08-30 PROCEDURE — 0011A PR COVID VAC MODERNA 100 MCG/0.5 ML IM: CPT

## 2021-09-27 ENCOUNTER — IMMUNIZATION (OUTPATIENT)
Dept: FAMILY MEDICINE | Facility: CLINIC | Age: 56
End: 2021-09-27
Attending: FAMILY MEDICINE
Payer: COMMERCIAL

## 2021-09-27 PROCEDURE — 0012A PR COVID VAC MODERNA 100 MCG/0.5 ML IM: CPT

## 2021-09-27 PROCEDURE — 91301 PR COVID VAC MODERNA 100 MCG/0.5 ML IM: CPT

## 2021-10-23 ENCOUNTER — HEALTH MAINTENANCE LETTER (OUTPATIENT)
Age: 56
End: 2021-10-23

## 2021-11-01 ENCOUNTER — MYC REFILL (OUTPATIENT)
Dept: FAMILY MEDICINE | Facility: CLINIC | Age: 56
End: 2021-11-01

## 2021-11-01 DIAGNOSIS — E03.9 HYPOTHYROIDISM, UNSPECIFIED TYPE: ICD-10-CM

## 2021-11-01 DIAGNOSIS — F41.1 GAD (GENERALIZED ANXIETY DISORDER): ICD-10-CM

## 2021-11-02 RX ORDER — LEVOTHYROXINE SODIUM 88 UG/1
88 TABLET ORAL DAILY
Qty: 90 TABLET | Refills: 1 | Status: SHIPPED | OUTPATIENT
Start: 2021-11-02 | End: 2022-04-15

## 2021-11-02 RX ORDER — VENLAFAXINE HYDROCHLORIDE 37.5 MG/1
37.5 CAPSULE, EXTENDED RELEASE ORAL DAILY
Qty: 90 CAPSULE | Refills: 3 | OUTPATIENT
Start: 2021-11-02

## 2021-11-02 NOTE — TELEPHONE ENCOUNTER
Prescription approved per [facility] Refill Protocol. Synthroid.    effexor filled 5/18/21 qty 90 with 3 refills.    Alicia Varma RN on 11/2/2021 at 3:03 PM

## 2022-02-12 ENCOUNTER — HEALTH MAINTENANCE LETTER (OUTPATIENT)
Age: 57
End: 2022-02-12

## 2022-04-01 ENCOUNTER — MYC MEDICAL ADVICE (OUTPATIENT)
Dept: FAMILY MEDICINE | Facility: CLINIC | Age: 57
End: 2022-04-01
Payer: COMMERCIAL

## 2022-04-01 DIAGNOSIS — F41.1 GAD (GENERALIZED ANXIETY DISORDER): ICD-10-CM

## 2022-04-01 RX ORDER — VENLAFAXINE HYDROCHLORIDE 37.5 MG/1
37.5 CAPSULE, EXTENDED RELEASE ORAL DAILY
Qty: 90 CAPSULE | Refills: 3 | OUTPATIENT
Start: 2022-04-01

## 2022-04-01 NOTE — TELEPHONE ENCOUNTER
Effexor XR denied  Refill current, sent to this pharmacy on 5/18/2021 for 90 capsules and 3 refills.    Ananya Arteaga RN

## 2022-04-01 NOTE — TELEPHONE ENCOUNTER
Let patient know Dr. Schaefer would see her first in case she wanted to add additional labs.     Greer Virk, ELIZABETHN, RN

## 2022-04-01 NOTE — TELEPHONE ENCOUNTER
Patient is requesting a refill of venlafaxine ER 75mg. Medication is not on patient's med list.   Please contact patient with any questions.    Thank you,  Mirna Chau, [facility]

## 2022-04-15 ENCOUNTER — OFFICE VISIT (OUTPATIENT)
Dept: FAMILY MEDICINE | Facility: CLINIC | Age: 57
End: 2022-04-15
Payer: COMMERCIAL

## 2022-04-15 VITALS
SYSTOLIC BLOOD PRESSURE: 116 MMHG | TEMPERATURE: 97.9 F | DIASTOLIC BLOOD PRESSURE: 74 MMHG | RESPIRATION RATE: 16 BRPM | WEIGHT: 137.2 LBS | OXYGEN SATURATION: 97 % | HEART RATE: 88 BPM | BODY MASS INDEX: 20.86 KG/M2

## 2022-04-15 DIAGNOSIS — Z12.11 SPECIAL SCREENING FOR MALIGNANT NEOPLASMS, COLON: ICD-10-CM

## 2022-04-15 DIAGNOSIS — F41.1 GAD (GENERALIZED ANXIETY DISORDER): ICD-10-CM

## 2022-04-15 DIAGNOSIS — C50.412 MALIGNANT NEOPLASM OF UPPER-OUTER QUADRANT OF LEFT BREAST IN FEMALE, ESTROGEN RECEPTOR POSITIVE (H): ICD-10-CM

## 2022-04-15 DIAGNOSIS — E03.4 HYPOTHYROIDISM DUE TO ACQUIRED ATROPHY OF THYROID: ICD-10-CM

## 2022-04-15 DIAGNOSIS — Z13.6 CARDIOVASCULAR SCREENING; LDL GOAL LESS THAN 160: ICD-10-CM

## 2022-04-15 DIAGNOSIS — Z17.0 MALIGNANT NEOPLASM OF UPPER-OUTER QUADRANT OF LEFT BREAST IN FEMALE, ESTROGEN RECEPTOR POSITIVE (H): ICD-10-CM

## 2022-04-15 DIAGNOSIS — Z13.820 SCREENING FOR OSTEOPOROSIS: ICD-10-CM

## 2022-04-15 DIAGNOSIS — Z00.00 ROUTINE PHYSICAL EXAMINATION: Primary | ICD-10-CM

## 2022-04-15 PROBLEM — S06.0X9D CONCUSSION WITH LOSS OF CONSCIOUSNESS, SUBSEQUENT ENCOUNTER: Status: RESOLVED | Noted: 2017-05-10 | Resolved: 2022-04-15

## 2022-04-15 PROCEDURE — 99396 PREV VISIT EST AGE 40-64: CPT | Performed by: FAMILY MEDICINE

## 2022-04-15 RX ORDER — LEVOTHYROXINE SODIUM 88 UG/1
88 TABLET ORAL DAILY
Qty: 90 TABLET | Refills: 0 | Status: SHIPPED | OUTPATIENT
Start: 2022-04-15 | End: 2022-08-02

## 2022-04-15 RX ORDER — VENLAFAXINE HYDROCHLORIDE 75 MG/1
75 CAPSULE, EXTENDED RELEASE ORAL DAILY
Qty: 90 CAPSULE | Refills: 3 | Status: SHIPPED | OUTPATIENT
Start: 2022-04-15 | End: 2023-04-19

## 2022-04-15 ASSESSMENT — ENCOUNTER SYMPTOMS
ARTHRALGIAS: 0
EYE PAIN: 0
HEARTBURN: 0
FREQUENCY: 0
PARESTHESIAS: 1
NAUSEA: 1
DIZZINESS: 0
COUGH: 0
CONSTIPATION: 0
NERVOUS/ANXIOUS: 1
JOINT SWELLING: 0
SHORTNESS OF BREATH: 0
DIARRHEA: 0
SORE THROAT: 0
MYALGIAS: 0
PALPITATIONS: 0
CHILLS: 0
FEVER: 0
HEMATURIA: 0
DYSURIA: 0
WEAKNESS: 0
ABDOMINAL PAIN: 0
HEMATOCHEZIA: 0
HEADACHES: 0

## 2022-04-15 ASSESSMENT — PAIN SCALES - GENERAL: PAINLEVEL: NO PAIN (0)

## 2022-04-15 NOTE — NURSING NOTE
Health Maintenance Due   Topic Date Due     Pneumococcal Vaccine: Pediatrics (0 to 5 Years) and At-Risk Patients (6 to 64 Years) (1 of 4 - PCV13) Never done     HIV SCREENING  Never done     HEPATITIS C SCREENING  Never done     ZOSTER IMMUNIZATION (1 of 2) Never done     INFLUENZA VACCINE (1) 09/01/2021     COVID-19 Vaccine (3 - Moderna risk 4-dose series) 10/25/2021     PREVENTIVE CARE VISIT  12/30/2021     Aranza MONTES LPN

## 2022-04-15 NOTE — PROGRESS NOTES
SUBJECTIVE:   CC: Emma Nunn is an 56 year old woman who presents for preventive health visit.       Patient has been advised of split billing requirements and indicates understanding: Yes  Healthy Habits:     Getting at least 3 servings of Calcium per day:  NO    Bi-annual eye exam:  Yes    Dental care twice a year:  NO    Sleep apnea or symptoms of sleep apnea:  None    Diet:  Regular (no restrictions)    Frequency of exercise:  4-5 days/week    Duration of exercise:  30-45 minutes    Taking medications regularly:  Yes    Medication side effects:  None    PHQ-2 Total Score: 1    Additional concerns today:  No      Today's PHQ-2 Score:   PHQ-2 ( 1999 Pfizer) 4/15/2022   Q1: Little interest or pleasure in doing things 0   Q2: Feeling down, depressed or hopeless 1   PHQ-2 Score 1   PHQ-2 Total Score (12-17 Years)- Positive if 3 or more points; Administer PHQ-A if positive -   Q1: Little interest or pleasure in doing things Not at all   Q2: Feeling down, depressed or hopeless Several days   PHQ-2 Score 1       Abuse: Current or Past (Physical, Sexual or Emotional) - No  Do you feel safe in your environment? Yes        Social History     Tobacco Use     Smoking status: Never Smoker     Smokeless tobacco: Never Used   Substance Use Topics     Alcohol use: No     Alcohol/week: 0.0 standard drinks     If you drink alcohol do you typically have >3 drinks per day or >7 drinks per week? No    Alcohol Use 4/15/2022   Prescreen: >3 drinks/day or >7 drinks/week? No   Prescreen: >3 drinks/day or >7 drinks/week? -       Reviewed orders with patient.  Reviewed health maintenance and updated orders accordingly - Yes  BP Readings from Last 3 Encounters:   04/15/22 116/74   08/05/21 112/64   12/30/20 120/78    Wt Readings from Last 3 Encounters:   04/15/22 62.2 kg (137 lb 3.2 oz)   08/05/21 62.5 kg (137 lb 12.8 oz)   03/02/21 64 kg (141 lb)                  Patient Active Problem List   Diagnosis     Anal fissure      Hypothyroidism     External hemorrhoids     Cervicalgia     CARDIOVASCULAR SCREENING; LDL GOAL LESS THAN 160     Joint pain     Dehydration     Malignant neoplasm of female breast (H)     Shoulder joint pain     Chronic idiopathic urticaria     Cholinergic urticaria     MEG (generalized anxiety disorder)     Past Surgical History:   Procedure Laterality Date     BREAST BIOPSY, CORE RT/LT Left 2013    cancer     COLONOSCOPY N/A 2014    normal, repeat 5 years due to family risk     LAPAROSCOPIC CHOLECYSTECTOMY N/A 2017    Procedure: LAPAROSCOPIC CHOLECYSTECTOMY;  Laparoscopic Cholecystectomy;  Surgeon: Jason Munoz MD;  Location: [facility]     LAPAROSCOPY      for endometriosis     LUMPECTOMY BREAST Left     w/ radiation & chemo     LUMPECTOMY BREAST WITH SENTINEL NODE, COMBINED  2013    Procedure: COMBINED LUMPECTOMY BREAST WITH SENTINEL NODE;  Left Breast Lumpectomy with [place] Node Biopsy;  Surgeon: Jason Munoz MD;  Location: [facility] OR     ZZC NONSPECIFIC PROCEDURE      Outpatient surgery for anal fissure       Social History     Tobacco Use     Smoking status: Never Smoker     Smokeless tobacco: Never Used   Substance Use Topics     Alcohol use: No     Alcohol/week: 0.0 standard drinks     Family History   Problem Relation Age of Onset     Cancer Other 55        Breast, living     Cancer Paternal Aunt 50        Colon,      Cancer Paternal Grandmother         Colon,  86 years old     Osteoporosis Mother      Heart Disease Mother         heart disease     Hypertension Mother      Arthritis Mother      Colon Cancer Mother 77        2019     Osteoporosis Maternal Grandfather      Heart Disease Maternal Grandfather         heart attack     Heart Disease Maternal Grandmother         Bypass and heart attack     Heart Disease Sister         heart disease/compl. pneumonia     Genetic Disorder Sister         down's Syn           Breast Cancer Screening:  Any new  diagnosis of family breast, ovarian, or bowel cancer? No    FHS-7: No flowsheet data found.  Mammogram Screening - Alternate mammogram schedule due to breast cancer history  Pertinent mammograms are reviewed under the imaging tab.    History of abnormal Pap smear: NO - age 30- 65 PAP every 3 years recommended  PAP / HPV Latest Ref Rng & Units 12/30/2020 11/25/2016 5/27/2015   PAP (Historical) - NIL NIL NIL   HPV16 NEG:Negative Negative Negative -   HPV18 NEG:Negative Negative Negative -   HRHPV NEG:Negative Negative Negative -     Reviewed and updated as needed this visit by clinical staff   Tobacco  Allergies  Meds   Med Hx  Surg Hx  Fam Hx  Soc Hx          Reviewed and updated as needed this visit by Provider                       Review of Systems   Constitutional: Negative for chills and fever.   HENT: Negative for congestion, ear pain, hearing loss and sore throat.    Eyes: Negative for pain and visual disturbance.   Respiratory: Negative for cough and shortness of breath.    Cardiovascular: Negative for chest pain, palpitations and peripheral edema.   Gastrointestinal: Positive for nausea. Negative for abdominal pain, constipation, diarrhea, heartburn and hematochezia.   Genitourinary: Negative for dysuria, frequency, genital sores, hematuria and urgency.   Musculoskeletal: Negative for arthralgias, joint swelling and myalgias.   Skin: Negative for rash.   Neurological: Positive for paresthesias. Negative for dizziness, weakness and headaches.   Psychiatric/Behavioral: Negative for mood changes. The patient is nervous/anxious.      She is doing well, except would like an increase in her Effexor dose.  She has been on 75 mg in the past but is currently only on 37.5 and would like to go back up to 75.  That worked well for her and she feels she needs it for her PTSD management.    She is on Synthroid for hypothyroidism and is doing well.  Her last TSH was normal in August '21 and she will have repeat labs  done this summer when she sees her oncologist.     OBJECTIVE:   /74 (BP Location: Right arm, Patient Position: Chair, Cuff Size: Adult Regular)   Pulse 88   Temp 97.9  F (36.6  C) (Temporal)   Resp 16   Wt 62.2 kg (137 lb 3.2 oz)   LMP  (LMP Unknown)   SpO2 97%   BMI 20.86 kg/m    Physical Exam  GENERAL: healthy, alert and no distress  EYES: Eyes grossly normal to inspection, PERRL and conjunctivae and sclerae normal  HENT: ear canals and TM's normal, nose and mouth without ulcers or lesions  NECK: no adenopathy, no asymmetry, masses, or scars and thyroid normal to palpation, no bruits.  RESP: lungs clear to auscultation - no rales, rhonchi or wheezes  BREAST: normal without masses, tenderness or nipple discharge and no palpable axillary masses or adenopathy, just some scarring in the left superolateral quadrant.  CV: regular rate and rhythm, normal S1 S2, no S3 or S4, no murmur, click or rub, no peripheral edema and peripheral pulses strong  ABDOMEN: soft, nontender, no hepatosplenomegaly, no masses and bowel sounds normal  MS: no gross musculoskeletal defects noted, no edema  SKIN: no suspicious lesions or rashes  NEURO: Normal strength and tone, mentation intact and speech normal  PSYCH: mentation appears normal, affect normal/bright    Diagnostic Test Results:  Orders Placed This Encounter   Procedures     DX Hip/Pelvis/Spine     Lipid panel reflex to direct LDL Fasting     TSH with free T4 reflex     Adult Gastro Ref - Procedure Only        ASSESSMENT/PLAN:       ICD-10-CM    1. Routine physical examination  Z00.00    2. MEG (generalized anxiety disorder)  F41.1 venlafaxine (EFFEXOR-XR) 75 MG 24 hr capsule   3. Hypothyroidism due to acquired atrophy of thyroid  E03.4 levothyroxine (SYNTHROID/LEVOTHROID) 88 MCG tablet     TSH with free T4 reflex   4. Special screening for malignant neoplasms, colon  Z12.11 Adult Gastro Ref - Procedure Only   5. Screening for osteoporosis  Z13.820 DX Hip/Pelvis/Spine  "  6. CARDIOVASCULAR SCREENING; LDL GOAL LESS THAN 160  Z13.6 Lipid panel reflex to direct LDL Fasting   7. Malignant neoplasm of upper-outer quadrant of left breast in female, estrogen receptor positive (H)  C50.412     Z17.0        Patient has been advised of split billing requirements and indicates understanding: Yes    COUNSELING:  Reviewed preventive health counseling, as reflected in patient instructions  Special attention given to:        Regular exercise       Healthy diet/nutrition       Vision screening       Immunizations    Declined: covid booster    Discussed Shingrix, will check insurance coverage.                Osteoporosis prevention/bone health - due for DEXA       Colorectal Cancer Screening - due for colonoscopy    Estimated body mass index is 20.86 kg/m  as calculated from the following:    Height as of 8/5/21: 1.727 m (5' 8\").    Weight as of this encounter: 62.2 kg (137 lb 3.2 oz).        She reports that she has never smoked. She has never used smokeless tobacco.      Counseling Resources:  ATP IV Guidelines  Pooled Cohorts Equation Calculator  Breast Cancer Risk Calculator  BRCA-Related Cancer Risk Assessment: FHS-7 Tool  FRAX Risk Assessment  ICSI Preventive Guidelines  Dietary Guidelines for Americans, 2010  USDA's MyPlate  ASA Prophylaxis  Lung CA Screening    Arelis Schaefer MD  [facility]  "

## 2022-04-18 ENCOUNTER — HOSPITAL ENCOUNTER (OUTPATIENT)
Facility: CLINIC | Age: 57
End: 2022-04-18
Attending: INTERNAL MEDICINE | Admitting: INTERNAL MEDICINE
Payer: COMMERCIAL

## 2022-04-18 ENCOUNTER — TELEPHONE (OUTPATIENT)
Dept: GASTROENTEROLOGY | Facility: CLINIC | Age: 57
End: 2022-04-18
Payer: COMMERCIAL

## 2022-04-18 NOTE — TELEPHONE ENCOUNTER
Screening Questions    BlueKIND OF PREP RedLOCATION [review exclusion criteria] GreenSEDATION TYPE      1. Are you active on mychart? Y    2. What insurance is in the chart? [employer]       3.   Ordering/Referring Provider: Arelis Schaefer MD    4. BMI   (If greater than 40 review exclusion criteria [PAC APPT IF [MAC] @ [facility])  20.8  [If yes, BMI OVER 40-EXTENDED PREP]      **(Sedation review/consideration needed)**  Do you have a legal guardian or Medical Power of    and/or are you able to give consent for your medical care?     SELF     5. Have you had a positive covid test in the last 90 days?   N     6.  Are you currently on dialysis?   N [ If yes, G-PREP & HOSPITAL setting ONLY]     7.  Do you have chronic kidney disease?  N [ If yes, G-PREP ]    8.   Do you have a diagnosis of diabetes?   N   [ If yes, G-PREP ]    9.  On a regular basis do you go 3-5 days between bowel movements?   N   [ If yes, EXTENDED PREP]    10.  Are you taking any prescription pain medications on a routine schedule?    N  [ If yes, EXTENDED PREP] [If yes, MAC]      11.   Do you have any chemical dependencies such as alcohol, street drugs, or methadone?    N [If yes, MAC]    12.   Do you have any history of post-traumatic stress syndrome, severe anxiety or history of psychosis?    Y - PTSD   [If yes, MAC]    13.  [FEMALES] Are you currently pregnant? N    If yes, how many weeks?       Respiratory/Heart Screening:  [If yes to any of the following HOSPITAL setting only]     14. Do you have Pulmonary Hypertension [Lungs]?   N       15. Do you have UNCONTROLLED asthma?   N     16.  Do you use daily home oxygen?  N      17. Do you have mod to severe Obstructive Sleep Apnea?         (OKAY @ [facility]  [facility]  [facility]  [facility]  [facility]  [facility] - if pt is not on OXYGEN)  N      18.   Have you had a heart or lung transplant?   N      19.   Have you had a stroke or Transient ischemic attack (TIA - aka  mini stroke ) within 6 months?  (If yes, please  review exclusion criteria)  N     20.   In the past 6 months, have you had any heart related issues including cardiomyopathy or heart attack?   N           If yes, did it require cardiac stenting or other implantable device?   N      21.   Do you have any implantable devices in your body (pacemaker, defib, LVAD)? (If yes, please review exclusion criteria)  N     22. Do you take nitroglycerin?   N           If yes, how often? N  (if yes, HOSPITAL setting ONLY)    23.  Are you currently taking any blood thinners?    [If yes, INFORM patient to follw up w/ ORDERING PROVIDER FOR BRIDGING INSTRUCTIONS]     N    24.   Do you transfer independently?                (If NO, please HOSPITAL setting ONLY)  Y    25.   Preferred LOCAL Pharmacy for Pre Prescription:      [facility] [place] - [address]    Scheduling Details  (Please ask for phone number if not scheduled by patient)      Caller : Emma Nunn   Date of Procedure: 06/07/2022  Surgeon: DAVID   Location: [facility]; [address] [place] [address] , [place] [address]      Sedation Type: MAC  l PTSD   Conscious Sedation- Needs  for 6 hours after the procedure  MAC/General-Needs  for 24 hours after procedure    N :[Pre-op Required] at [facility]  SH  MG and OR for MAC sedation   (advise patient they will need a pre-op WITH IN 30 DAYS of procedure date)     Type of Procedure Scheduled:   Lower Endoscopy [Colonoscopy]    Which Colonoscopy Prep was Sent?:   [facility] - PER PROTOCOL     AZEEM CF PATIENTS & GRO[facility]'S PATIENTS NEEDS EXTENDED PREP       Informed patient they will need an adult  Y  Cannot take any type of public or medical transportation alone    Pre-Pro[facility] or Externally: Y    Confirmed Nurse will call to complete assessment Y    Additional comments: N

## 2022-04-19 ENCOUNTER — HOSPITAL ENCOUNTER (OUTPATIENT)
Dept: BONE DENSITY | Facility: CLINIC | Age: 57
Discharge: HOME | End: 2022-04-19
Attending: FAMILY MEDICINE | Admitting: FAMILY MEDICINE
Payer: COMMERCIAL

## 2022-04-19 ENCOUNTER — MYC MEDICAL ADVICE (OUTPATIENT)
Dept: FAMILY MEDICINE | Facility: CLINIC | Age: 57
End: 2022-04-19
Payer: COMMERCIAL

## 2022-04-19 DIAGNOSIS — Z13.820 SCREENING FOR OSTEOPOROSIS: ICD-10-CM

## 2022-04-19 PROCEDURE — 77080 DXA BONE DENSITY AXIAL: CPT

## 2022-05-06 NOTE — RESULT ENCOUNTER NOTE
Emma, your bone density test shows mild thinning of the bones called osteopenia. I recommend 1500 mg of calcium and 1000 units of vitamin D minimum daily in your diet, and weight bearing exercise such as walking or light weight lifting 4-5 days per week. I will recommend a repeat test in 2 years.   Arelis Schaefer MD

## 2022-05-28 DIAGNOSIS — Z11.59 ENCOUNTER FOR SCREENING FOR OTHER VIRAL DISEASES: Primary | ICD-10-CM

## 2022-05-31 ENCOUNTER — MYC MEDICAL ADVICE (OUTPATIENT)
Dept: FAMILY MEDICINE | Facility: CLINIC | Age: 57
End: 2022-05-31
Payer: COMMERCIAL

## 2022-05-31 ENCOUNTER — TELEPHONE (OUTPATIENT)
Dept: GASTROENTEROLOGY | Facility: CLINIC | Age: 57
End: 2022-05-31
Payer: COMMERCIAL

## 2022-05-31 NOTE — TELEPHONE ENCOUNTER
Caller: Emma Nunn      Procedure: COLON    Date, Location, and Surgeon of Procedure Cancelled: 6/7/22, [facility], [facility]    Ordering Provider:CORAZON MÉNDEZ    Reason for cancel (please be detailed, any staff messages or encounters to note?): COVID+ ON 5/31, SYMPTOMS BEGINNING 5/28        Rescheduled: YES     If rescheduled:    Date: 7/27   Location:    Prep Resent: Y(changes to prep?)   Covid Test Rescheduled: NOT NEEDED   Note any change or update to original order/sedation:

## 2022-07-18 ENCOUNTER — LAB (OUTPATIENT)
Dept: LAB | Facility: CLINIC | Age: 57
End: 2022-07-18
Payer: COMMERCIAL

## 2022-07-18 ENCOUNTER — HOSPITAL ENCOUNTER (OUTPATIENT)
Dept: MAMMOGRAPHY | Facility: CLINIC | Age: 57
Discharge: HOME | End: 2022-07-18
Attending: INTERNAL MEDICINE | Admitting: INTERNAL MEDICINE
Payer: COMMERCIAL

## 2022-07-18 DIAGNOSIS — Z17.0 MALIGNANT NEOPLASM OF UPPER-OUTER QUADRANT OF LEFT BREAST IN FEMALE, ESTROGEN RECEPTOR POSITIVE (H): ICD-10-CM

## 2022-07-18 DIAGNOSIS — Z13.6 CARDIOVASCULAR SCREENING; LDL GOAL LESS THAN 160: ICD-10-CM

## 2022-07-18 DIAGNOSIS — Z17.0 MALIGNANT NEOPLASM OF UPPER-OUTER QUADRANT OF LEFT BREAST IN FEMALE, ESTROGEN RECEPTOR POSITIVE (H): Primary | ICD-10-CM

## 2022-07-18 DIAGNOSIS — Z12.31 VISIT FOR SCREENING MAMMOGRAM: ICD-10-CM

## 2022-07-18 DIAGNOSIS — C50.412 MALIGNANT NEOPLASM OF UPPER-OUTER QUADRANT OF LEFT BREAST IN FEMALE, ESTROGEN RECEPTOR POSITIVE (H): ICD-10-CM

## 2022-07-18 DIAGNOSIS — C50.412 MALIGNANT NEOPLASM OF UPPER-OUTER QUADRANT OF LEFT BREAST IN FEMALE, ESTROGEN RECEPTOR POSITIVE (H): Primary | ICD-10-CM

## 2022-07-18 DIAGNOSIS — E03.4 HYPOTHYROIDISM DUE TO ACQUIRED ATROPHY OF THYROID: ICD-10-CM

## 2022-07-18 LAB
ALBUMIN SERPL-MCNC: 4 G/DL (ref 3.4–5)
ALP SERPL-CCNC: 121 U/L (ref 40–150)
ALT SERPL W P-5'-P-CCNC: 19 U/L (ref 0–50)
ANION GAP SERPL CALCULATED.3IONS-SCNC: 3 MMOL/L (ref 3–14)
AST SERPL W P-5'-P-CCNC: 15 U/L (ref 0–45)
BASOPHILS # BLD AUTO: 0.1 10E3/UL (ref 0–0.2)
BASOPHILS NFR BLD AUTO: 1 %
BILIRUB SERPL-MCNC: 0.6 MG/DL (ref 0.2–1.3)
BUN SERPL-MCNC: 17 MG/DL (ref 7–30)
CALCIUM SERPL-MCNC: 9.3 MG/DL (ref 8.5–10.1)
CHLORIDE BLD-SCNC: 109 MMOL/L (ref 94–109)
CHOLEST SERPL-MCNC: 177 MG/DL
CO2 SERPL-SCNC: 32 MMOL/L (ref 20–32)
CREAT SERPL-MCNC: 0.93 MG/DL (ref 0.52–1.04)
EOSINOPHIL # BLD AUTO: 0.2 10E3/UL (ref 0–0.7)
EOSINOPHIL NFR BLD AUTO: 2 %
ERYTHROCYTE [DISTWIDTH] IN BLOOD BY AUTOMATED COUNT: 12.4 % (ref 10–15)
FASTING STATUS PATIENT QL REPORTED: YES
GFR SERPL CREATININE-BSD FRML MDRD: 72 ML/MIN/1.73M2
GLUCOSE BLD-MCNC: 91 MG/DL (ref 70–99)
HCT VFR BLD AUTO: 43.6 % (ref 35–47)
HDLC SERPL-MCNC: 88 MG/DL
HGB BLD-MCNC: 14.8 G/DL (ref 11.7–15.7)
IMM GRANULOCYTES # BLD: 0 10E3/UL
IMM GRANULOCYTES NFR BLD: 0 %
LDLC SERPL CALC-MCNC: 79 MG/DL
LYMPHOCYTES # BLD AUTO: 2 10E3/UL (ref 0.8–5.3)
LYMPHOCYTES NFR BLD AUTO: 29 %
MCH RBC QN AUTO: 32.5 PG (ref 26.5–33)
MCHC RBC AUTO-ENTMCNC: 33.9 G/DL (ref 31.5–36.5)
MCV RBC AUTO: 96 FL (ref 78–100)
MONOCYTES # BLD AUTO: 0.5 10E3/UL (ref 0–1.3)
MONOCYTES NFR BLD AUTO: 7 %
NEUTROPHILS # BLD AUTO: 4.1 10E3/UL (ref 1.6–8.3)
NEUTROPHILS NFR BLD AUTO: 61 %
NONHDLC SERPL-MCNC: 89 MG/DL
NRBC # BLD AUTO: 0 10E3/UL
NRBC BLD AUTO-RTO: 0 /100
PLATELET # BLD AUTO: 200 10E3/UL (ref 150–450)
POTASSIUM BLD-SCNC: 4 MMOL/L (ref 3.4–5.3)
PROT SERPL-MCNC: 7.7 G/DL (ref 6.8–8.8)
RBC # BLD AUTO: 4.56 10E6/UL (ref 3.8–5.2)
SODIUM SERPL-SCNC: 144 MMOL/L (ref 133–144)
TRIGL SERPL-MCNC: 51 MG/DL
TSH SERPL DL<=0.005 MIU/L-ACNC: 2.97 MU/L (ref 0.4–4)
WBC # BLD AUTO: 6.8 10E3/UL (ref 4–11)

## 2022-07-18 PROCEDURE — 80061 LIPID PANEL: CPT

## 2022-07-18 PROCEDURE — 77067 SCR MAMMO BI INCL CAD: CPT

## 2022-07-18 PROCEDURE — 36415 COLL VENOUS BLD VENIPUNCTURE: CPT

## 2022-07-18 PROCEDURE — 80050 GENERAL HEALTH PANEL: CPT | Performed by: NURSE PRACTITIONER

## 2022-07-18 NOTE — PROGRESS NOTES
Hematology/Oncology Visit    Oncologist: previously Dr. Ocampo  Diagnosis: stage I triple positive left breast cancer  Reason for visit: yearly follow-up    Cancer and Treatment Hx:  Jul 2013: presented with a left breast mass, diagnostic mammogram identified asymmetry at 1-2 o'clock, 8-10cm from the nipple. US with hypoechoic mass. Biopsy with invasive ductal carcinoma, grade 2, associated DCIS, ER/ME+, HER2+.  Jul 2013: left lumpectomy, pathology with 1.3cm IDC, grade 2, margins negative but close at 1mm, DCIS present, 8 lymph nodes negative. Staged disease as [id number].  Aug- Dec 2013: TCH followed by herceptin for a full year  Feb 2014: completed radiation to left breast  Mar 2014- Dec 2020: tamoxifen stopped after 6yrs after discussion with Dr. Ocampo    Interval History:  Emma has been feeling well since her last oncology appt. No new breast lumps, bone pain, or unintentional weight loss. She does intermittently have anxieties about cancer recurring. She is wondering when her daughter should start having mammograms and is interested in seeing a genetic specialist again to see if any new genetic testing is available to her.    Medications, imaging, and labs:  - Reviewed pertinent medications.  - Reviewed CBC/CMP from 7/18 all within normal limits.  - Reviewed read of mammogram from 7/18/22:  Findings: The breasts have scattered areas of fibroglandular density.  There are breast   conservation changes in the left breast. There is no radiographic evidence of malignancy.   IMPRESSION: ACR BI-RADS Category 2: Benign    Physical Exam:  GEN: pleasantly conversant female no acute distress  SKIN: generally intact, no visible rash, bruising, or sores   ENT: eyes non-icteric, no notable oral sores  LYMPH: no notable cervical, supraclavicular, or axillary lymphadenopathy  BREAST: scar evidence of lumpectomy left breast, no notable masses or concerning skin findings bilateral breasts  RESP: clear to auscultation  "bilaterally, on room air  CARDS: regular rate and rhythm, no notable murmurs   GI: abd soft without notable hepatosplenomegaly, non-tender to palpation  MSK: no notable lower extremity edema  NEURO: alert and oriented without obvious focal deficit  /60 (BP Location: Right arm, Patient Position: Sitting, Cuff Size: Adult Regular)   Pulse 102   Temp 97.6  F (36.4  C) (Temporal)   Ht 1.727 m (5' 8\")   Wt 65.9 kg (145 lb 6 oz)   SpO2 97%   BMI 22.10 kg/m       Wt Readings from Last 4 Encounters:   07/20/22 65.9 kg (145 lb 6 oz)   04/15/22 62.2 kg (137 lb 3.2 oz)   08/05/21 62.5 kg (137 lb 12.8 oz)   03/02/21 64 kg (141 lb)     Assessment and Plan:  Stage 1 triple positive left breast cancer  - Status post left lumpectomy, radiation, and 6 years of tamoxifen  - Mammogram July 2022 negative for malignancy, repeat yearly  - No current evidence of disease recurrence  - Oncology follow-up yearly, next visit survivorship and consider transition to PCP      The total time of this encounter amounted to 30 minutes today. This time includes face-to-face time spent with the patient, prep work, ordering tests, and performing post-visit documentation.  - Juliet Cuello, CNP    "

## 2022-07-20 ENCOUNTER — ONCOLOGY VISIT (OUTPATIENT)
Dept: ONCOLOGY | Facility: CLINIC | Age: 57
End: 2022-07-20
Payer: COMMERCIAL

## 2022-07-20 VITALS
WEIGHT: 145.38 LBS | HEIGHT: 68 IN | TEMPERATURE: 97.6 F | BODY MASS INDEX: 22.03 KG/M2 | HEART RATE: 102 BPM | SYSTOLIC BLOOD PRESSURE: 102 MMHG | OXYGEN SATURATION: 97 % | DIASTOLIC BLOOD PRESSURE: 60 MMHG

## 2022-07-20 DIAGNOSIS — Z17.0 MALIGNANT NEOPLASM OF UPPER-OUTER QUADRANT OF LEFT BREAST IN FEMALE, ESTROGEN RECEPTOR POSITIVE (H): Primary | ICD-10-CM

## 2022-07-20 DIAGNOSIS — C50.412 MALIGNANT NEOPLASM OF UPPER-OUTER QUADRANT OF LEFT BREAST IN FEMALE, ESTROGEN RECEPTOR POSITIVE (H): Primary | ICD-10-CM

## 2022-07-20 DIAGNOSIS — Z12.31 VISIT FOR SCREENING MAMMOGRAM: ICD-10-CM

## 2022-07-20 PROCEDURE — 99214 OFFICE O/P EST MOD 30 MIN: CPT | Performed by: NURSE PRACTITIONER

## 2022-07-20 RX ORDER — MULTIPLE VITAMINS W/ MINERALS TAB 9MG-400MCG
1 TAB ORAL DAILY
COMMUNITY

## 2022-07-20 ASSESSMENT — PAIN SCALES - GENERAL: PAINLEVEL: NO PAIN (0)

## 2022-07-21 ENCOUNTER — PATIENT OUTREACH (OUTPATIENT)
Dept: ONCOLOGY | Facility: CLINIC | Age: 57
End: 2022-07-21

## 2022-07-21 NOTE — PROGRESS NOTES
Writer received referral, reviewed for appropriate plan, and sent to New Patient Scheduling for completion. Patient may not need additional testing but a consult visit would be appropriate.

## 2022-07-26 ENCOUNTER — ANESTHESIA EVENT (OUTPATIENT)
Dept: GASTROENTEROLOGY | Facility: CLINIC | Age: 57
End: 2022-07-26
Payer: COMMERCIAL

## 2022-07-27 ENCOUNTER — ANESTHESIA (OUTPATIENT)
Dept: GASTROENTEROLOGY | Facility: CLINIC | Age: 57
End: 2022-07-27
Payer: COMMERCIAL

## 2022-07-27 ENCOUNTER — HOSPITAL ENCOUNTER (OUTPATIENT)
Facility: CLINIC | Age: 57
Discharge: HOME | End: 2022-07-27
Attending: INTERNAL MEDICINE | Admitting: INTERNAL MEDICINE
Payer: COMMERCIAL

## 2022-07-27 VITALS
DIASTOLIC BLOOD PRESSURE: 71 MMHG | SYSTOLIC BLOOD PRESSURE: 102 MMHG | TEMPERATURE: 98.2 F | OXYGEN SATURATION: 98 % | RESPIRATION RATE: 16 BRPM | HEART RATE: 73 BPM

## 2022-07-27 LAB — COLONOSCOPY: NORMAL

## 2022-07-27 PROCEDURE — 258N000003 HC RX IP 258 OP 636: Performed by: NURSE ANESTHETIST, CERTIFIED REGISTERED

## 2022-07-27 PROCEDURE — 250N000009 HC RX 250: Performed by: NURSE ANESTHETIST, CERTIFIED REGISTERED

## 2022-07-27 PROCEDURE — 370N000017 HC ANESTHESIA TECHNICAL FEE, PER MIN: Performed by: INTERNAL MEDICINE

## 2022-07-27 PROCEDURE — 45378 DIAGNOSTIC COLONOSCOPY: CPT | Performed by: INTERNAL MEDICINE

## 2022-07-27 PROCEDURE — G0121 COLON CA SCRN NOT HI RSK IND: HCPCS | Performed by: INTERNAL MEDICINE

## 2022-07-27 PROCEDURE — 250N000011 HC RX IP 250 OP 636: Performed by: NURSE ANESTHETIST, CERTIFIED REGISTERED

## 2022-07-27 RX ORDER — PROPOFOL 10 MG/ML
INJECTION, EMULSION INTRAVENOUS CONTINUOUS PRN
Status: DISCONTINUED | OUTPATIENT
Start: 2022-07-27 | End: 2022-07-27

## 2022-07-27 RX ORDER — PROPOFOL 10 MG/ML
INJECTION, EMULSION INTRAVENOUS PRN
Status: DISCONTINUED | OUTPATIENT
Start: 2022-07-27 | End: 2022-07-27

## 2022-07-27 RX ORDER — LIDOCAINE 40 MG/G
CREAM TOPICAL
Status: DISCONTINUED | OUTPATIENT
Start: 2022-07-27 | End: 2022-07-27 | Stop reason: HOSPADM

## 2022-07-27 RX ORDER — SODIUM CHLORIDE, SODIUM LACTATE, POTASSIUM CHLORIDE, CALCIUM CHLORIDE 600; 310; 30; 20 MG/100ML; MG/100ML; MG/100ML; MG/100ML
INJECTION, SOLUTION INTRAVENOUS CONTINUOUS
Status: DISCONTINUED | OUTPATIENT
Start: 2022-07-27 | End: 2022-07-27 | Stop reason: HOSPADM

## 2022-07-27 RX ORDER — LIDOCAINE HYDROCHLORIDE 20 MG/ML
INJECTION, SOLUTION INFILTRATION; PERINEURAL PRN
Status: DISCONTINUED | OUTPATIENT
Start: 2022-07-27 | End: 2022-07-27

## 2022-07-27 RX ADMIN — LIDOCAINE HYDROCHLORIDE 1 ML: 10 INJECTION, SOLUTION EPIDURAL; INFILTRATION; INTRACAUDAL; PERINEURAL at 12:37

## 2022-07-27 RX ADMIN — LIDOCAINE HYDROCHLORIDE 40 MG: 20 INJECTION, SOLUTION INFILTRATION; PERINEURAL at 12:44

## 2022-07-27 RX ADMIN — PROPOFOL 70 MG: 10 INJECTION, EMULSION INTRAVENOUS at 12:45

## 2022-07-27 RX ADMIN — PROPOFOL 150 MCG/KG/MIN: 10 INJECTION, EMULSION INTRAVENOUS at 12:45

## 2022-07-27 RX ADMIN — SODIUM CHLORIDE, POTASSIUM CHLORIDE, SODIUM LACTATE AND CALCIUM CHLORIDE: 600; 310; 30; 20 INJECTION, SOLUTION INTRAVENOUS at 12:37

## 2022-07-27 NOTE — ANESTHESIA POSTPROCEDURE EVALUATION
Patient: Emma Nunn    Procedure: Procedure(s):  COLONOSCOPY       Anesthesia Type:  MAC    Note:  Disposition: Outpatient   Postop Pain Control: Uneventful            Sign Out: Well controlled pain   PONV: No   Neuro/Psych: Uneventful            Sign Out: Acceptable/Baseline neuro status   Airway/Respiratory: Uneventful            Sign Out: Acceptable/Baseline resp. status   CV/Hemodynamics: Uneventful            Sign Out: Acceptable CV status   Other NRE: NONE   DID A NON-ROUTINE EVENT OCCUR? No    Event details/Postop Comments:  Pt was happy with anesthesia care.  No complications.  I will follow up with the pt if needed.           Last vitals:  Vitals Value Taken Time   /71 07/27/22 1330   Temp     Pulse 73 07/27/22 1330   Resp 16 07/27/22 1320   SpO2 99 % 07/27/22 1321   Vitals shown include unvalidated device data.    Electronically Signed By: BUZZ Macias CRNA  July 27, 2022  1:46 PM

## 2022-07-27 NOTE — H&P
[facility] Anesthesia Pre-op History and Physical    Emma Nunn MRN# [id number]   Age: 56 year old YOB: 1965      Date of Surgery: 7/27/2022 Location [facility]      Date of Exam 7/27/2022 Facility (Same day)       Home clinic: [facility]  Primary care provider: Arelis Schaefer         Chief Complaint and/or Reason for Procedure:   No chief complaint on file.    Colonoscopy.  Prior colon 12/2014.    Mother with CRC age 70.  Pt likely average risk.       Active problem list:     Patient Active Problem List    Diagnosis Date Noted     MEG (generalized anxiety disorder) 04/15/2022     Priority: Medium     Chronic idiopathic urticaria 09/18/2019     Priority: Medium     Cholinergic urticaria 09/18/2019     Priority: Medium     Shoulder joint pain 04/23/2015     Priority: Medium     Malignant neoplasm of female breast (H) 10/02/2014     Priority: Medium     Problem list name updated by automated process. Provider to review       Dehydration 11/19/2013     Priority: Medium     Joint pain 09/24/2012     Priority: Medium     CARDIOVASCULAR SCREENING; LDL GOAL LESS THAN 160 10/31/2010     Priority: Medium     Cervicalgia 07/11/2008     Priority: Medium     External hemorrhoids 02/20/2003     Priority: Medium     Problem list name updated by automated process. Provider to review       Anal fissure      Priority: Medium     Hypothyroidism      Priority: Medium     Problem list name updated by automated process. Provider to review              Medications (include herbals and vitamins):   Any Plavix use in the last 7 days? No     No current facility-administered medications for this encounter.     Current Outpatient Medications   Medication Sig     Calcium Carbonate-Vit D-Min (CALCIUM 1200 PO)      Cyanocobalamin (B-12 PO)      levothyroxine (SYNTHROID/LEVOTHROID) 88 MCG tablet Take 1 tablet (88 mcg) by mouth daily     multivitamin  w/minerals (THERA-VIT-M) tablet Take 1 tablet by mouth daily     venlafaxine (EFFEXOR-XR) 75 MG 24 hr capsule Take 1 capsule (75 mg) by mouth daily             Allergies:      Allergies   Allergen Reactions     No Known Drug Allergies      Allergy to Latex? No  Allergy to tape?   No  Intolerances:             Physical Exam:   All vitals have been reviewed  No data found.  No intake/output data recorded.  Lungs:   No increased work of breathing, good air exchange, clear to auscultation bilaterally, no crackles or wheezing     Cardiovascular:   Normal apical impulse, regular rate and rhythm, normal S1 and S2, no S3 or S4, and no murmur noted             Lab / Radiology Results:            Anesthetic risk and/or ASA classification:       Heath Watson MD

## 2022-07-27 NOTE — ANESTHESIA PREPROCEDURE EVALUATION
Anesthesia Pre-Procedure Evaluation    Patient: Emma Nunn   MRN: [id number] : 1965        Procedure : Procedure(s):  COLONOSCOPY          Past Medical History:   Diagnosis Date     Breast cancer (H) 2013    Lumpectomy, chemo and radiation.       Closed head injury 2008     Concussion with loss of consciousness, unspecified duration, subsequent encounter 5/10/2017     Diffuse cystic mastopathy     Fibrocystic breast disease     Endometriosis      External hemorrhoids without mention of complication      Unspecified hypothyroidism       Past Surgical History:   Procedure Laterality Date     BREAST BIOPSY, CORE RT/LT Left 2013    cancer     COLONOSCOPY N/A 2014    normal, repeat 5 years due to family risk     LAPAROSCOPIC CHOLECYSTECTOMY N/A 2017    Procedure: LAPAROSCOPIC CHOLECYSTECTOMY;  Laparoscopic Cholecystectomy;  Surgeon: Jason Munoz MD;  Location: [facility]     LAPAROSCOPY      for endometriosis     LUMPECTOMY BREAST Left     w/ radiation & chemo     LUMPECTOMY BREAST WITH SENTINEL NODE, COMBINED  2013    Procedure: COMBINED LUMPECTOMY BREAST WITH SENTINEL NODE;  Left Breast Lumpectomy with [place] Node Biopsy;  Surgeon: Jason Munoz MD;  Location: [facility] OR     ZZC NONSPECIFIC PROCEDURE      Outpatient surgery for anal fissure      Allergies   Allergen Reactions     No Known Drug Allergies       Social History     Tobacco Use     Smoking status: Never Smoker     Smokeless tobacco: Never Used   Substance Use Topics     Alcohol use: No     Alcohol/week: 0.0 standard drinks      Wt Readings from Last 1 Encounters:   22 65.9 kg (145 lb 6 oz)        Anesthesia Evaluation   Pt has had prior anesthetic. Type: General.    No history of anesthetic complications       ROS/MED HX  ENT/Pulmonary:  - neg pulmonary ROS     Neurologic:  - neg neurologic ROS     Cardiovascular:  - neg cardiovascular ROS     METS/Exercise Tolerance:     Hematologic:  - neg  hematologic  ROS     Musculoskeletal:  - neg musculoskeletal ROS     GI/Hepatic:  - neg GI/hepatic ROS     Renal/Genitourinary:  - neg Renal ROS     Endo:     (+) thyroid problem, hypothyroidism,     Psychiatric/Substance Use:     (+) psychiatric history anxiety     Infectious Disease:  - neg infectious disease ROS     Malignancy:   (+) Malignancy, History of Breast.Breast CA Remission status post Surgery.        Other:  - neg other ROS          Physical Exam    Airway  airway exam normal      Mallampati: II   TM distance: > 3 FB   Neck ROM: full   Mouth opening: > 3 cm    Respiratory Devices and Support         Dental  no notable dental history         Cardiovascular   cardiovascular exam normal       Rhythm and rate: regular and normal     Pulmonary   pulmonary exam normal        breath sounds clear to auscultation           OUTSIDE LABS:  CBC:   Lab Results   Component Value Date    WBC 6.8 07/18/2022    WBC 5.4 08/05/2021    HGB 14.8 07/18/2022    HGB 14.2 08/05/2021    HCT 43.6 07/18/2022    HCT 42.6 08/05/2021     07/18/2022     08/05/2021     BMP:   Lab Results   Component Value Date     07/18/2022     08/05/2021    POTASSIUM 4.0 07/18/2022    POTASSIUM 4.3 08/05/2021    CHLORIDE 109 07/18/2022    CHLORIDE 106 08/05/2021    CO2 32 07/18/2022    CO2 34 (H) 08/05/2021    BUN 17 07/18/2022    BUN 15 08/05/2021    CR 0.93 07/18/2022    CR 0.85 08/05/2021    GLC 91 07/18/2022    GLC 95 08/05/2021     COAGS: No results found for: PTT, INR, FIBR  POC:   Lab Results   Component Value Date    HCG Negative 12/20/2013     HEPATIC:   Lab Results   Component Value Date    ALBUMIN 4.0 07/18/2022    PROTTOTAL 7.7 07/18/2022    ALT 19 07/18/2022    AST 15 07/18/2022    ALKPHOS 121 07/18/2022    BILITOTAL 0.6 07/18/2022     OTHER:   Lab Results   Component Value Date    PH 6.5 02/20/2003    DEISY 9.3 07/18/2022    TSH 2.97 07/18/2022    T4 1.66 (H) 07/14/2020    CRP <2.9 09/26/2018    SED 8 11/21/2007        Anesthesia Plan    ASA Status:  2   NPO Status:  NPO Appropriate    Anesthesia Type: MAC.      Maintenance: TIVA.        Consents    Anesthesia Plan(s) and associated risks, benefits, and realistic alternatives discussed. Questions answered and patient/representative(s) expressed understanding.    - Discussed:     - Discussed with:  Patient    Use of blood products discussed: No .     Postoperative Care            Comments:    Other Comments: The risks and benefits of anesthesia, and the alternatives where applicable, have been discussed with the patient, and they wish to proceed.            BUZZ Macias CRNA

## 2022-07-27 NOTE — DISCHARGE INSTRUCTIONS
[facility]    Home Care Following Endoscopy          Activity:  You have just undergone an endoscopic procedure usually performed with conscious sedation.  Do not work or operate machinery (including a car) for at least 12 hours.    I encourage you to walk and attempt to pass this air as soon as possible.    Diet:  Return to the diet you were on before your procedure but eat lightly for the first 12-24 hours.  Drink plenty of water.  Resume any regular medications unless otherwise advised by your physician.  Please begin any new medication prescribed as a result of your procedure as directed by your physician.   Pain:  You may take Tylenol as needed for pain.  Expected Recovery:  You can expect some mild abdominal fullness and/or discomfort due to the air used to inflate your intestinal tract.     Call Your Physician if You Have:    After Colonoscopy:  Worsening persisting abdominal pain which is worse with activity.  Fevers (>101 degrees F), chills or shakes.  Passage of continued blood with bowel movements.     Any questions or concerns about your recovery, please call [phone] or after hours [phone] Nurse Advice Line.

## 2022-07-27 NOTE — ANESTHESIA CARE TRANSFER NOTE
Patient: Emma Nunn    Procedure: Procedure(s):  COLONOSCOPY       Diagnosis: Special screening for malignant neoplasms, colon [Z12.11]  Diagnosis Additional Information: No value filed.    Anesthesia Type:   MAC     Note:    Oropharynx: oropharynx clear of all foreign objects and spontaneously breathing  Level of Consciousness: drowsy  Oxygen Supplementation: room air    Independent Airway: airway patency satisfactory and stable  Dentition: dentition unchanged  Vital Signs Stable: post-procedure vital signs reviewed and stable  Report to RN Given: handoff report given  Patient transferred to: Phase II    Handoff Report: Identifed the Patient, Identified the Reponsible Provider, Reviewed the pertinent medical history, Discussed the surgical course, Reviewed Intra-OP anesthesia mangement and issues during anesthesia, Set expectations for post-procedure period and Allowed opportunity for questions and acknowledgement of understanding      Vitals:  Vitals Value Taken Time   BP     Temp     Pulse     Resp     SpO2 97 % 07/27/22 1307   Vitals shown include unvalidated device data.    Electronically Signed By: BUZZ Macias CRNA  July 27, 2022  1:08 PM   Where Do You Want The Question To Include Opioid Counseling Located?: Case Summary Tab

## 2022-08-02 ENCOUNTER — MYC MEDICAL ADVICE (OUTPATIENT)
Dept: FAMILY MEDICINE | Facility: CLINIC | Age: 57
End: 2022-08-02

## 2022-08-02 ENCOUNTER — MYC REFILL (OUTPATIENT)
Dept: FAMILY MEDICINE | Facility: CLINIC | Age: 57
End: 2022-08-02

## 2022-08-02 DIAGNOSIS — E03.4 HYPOTHYROIDISM DUE TO ACQUIRED ATROPHY OF THYROID: ICD-10-CM

## 2022-08-02 RX ORDER — LEVOTHYROXINE SODIUM 88 UG/1
88 TABLET ORAL DAILY
Qty: 90 TABLET | Refills: 3 | Status: SHIPPED | OUTPATIENT
Start: 2022-08-02 | End: 2023-07-26

## 2022-08-02 RX ORDER — LEVOTHYROXINE SODIUM 88 UG/1
88 TABLET ORAL DAILY
Qty: 90 TABLET | Refills: 0 | OUTPATIENT
Start: 2022-08-02

## 2022-09-09 ENCOUNTER — MYC MEDICAL ADVICE (OUTPATIENT)
Dept: FAMILY MEDICINE | Facility: CLINIC | Age: 57
End: 2022-09-09

## 2022-09-12 ENCOUNTER — OFFICE VISIT (OUTPATIENT)
Dept: FAMILY MEDICINE | Facility: CLINIC | Age: 57
End: 2022-09-12
Payer: COMMERCIAL

## 2022-09-12 ENCOUNTER — ANCILLARY PROCEDURE (OUTPATIENT)
Dept: GENERAL RADIOLOGY | Facility: CLINIC | Age: 57
End: 2022-09-12
Attending: PHYSICIAN ASSISTANT
Payer: COMMERCIAL

## 2022-09-12 VITALS
TEMPERATURE: 98 F | DIASTOLIC BLOOD PRESSURE: 68 MMHG | WEIGHT: 144 LBS | HEART RATE: 60 BPM | HEIGHT: 68 IN | BODY MASS INDEX: 21.82 KG/M2 | OXYGEN SATURATION: 100 % | RESPIRATION RATE: 12 BRPM | SYSTOLIC BLOOD PRESSURE: 118 MMHG

## 2022-09-12 DIAGNOSIS — R07.81 RIB PAIN ON RIGHT SIDE: ICD-10-CM

## 2022-09-12 DIAGNOSIS — R10.9 RIGHT FLANK DISCOMFORT: Primary | ICD-10-CM

## 2022-09-12 LAB
ALBUMIN SERPL-MCNC: 4.3 G/DL (ref 3.4–5)
ALBUMIN UR-MCNC: NEGATIVE MG/DL
ALP SERPL-CCNC: 114 U/L (ref 40–150)
ALT SERPL W P-5'-P-CCNC: 48 U/L (ref 0–50)
ANION GAP SERPL CALCULATED.3IONS-SCNC: 3 MMOL/L (ref 3–14)
APPEARANCE UR: CLEAR
AST SERPL W P-5'-P-CCNC: 11 U/L (ref 0–45)
BACTERIA #/AREA URNS HPF: ABNORMAL /HPF
BILIRUB SERPL-MCNC: 0.6 MG/DL (ref 0.2–1.3)
BILIRUB UR QL STRIP: NEGATIVE
BUN SERPL-MCNC: 9 MG/DL (ref 7–30)
CALCIUM SERPL-MCNC: 10 MG/DL (ref 8.5–10.1)
CHLORIDE BLD-SCNC: 104 MMOL/L (ref 94–109)
CO2 SERPL-SCNC: 31 MMOL/L (ref 20–32)
COLOR UR AUTO: YELLOW
CREAT SERPL-MCNC: 0.78 MG/DL (ref 0.52–1.04)
ERYTHROCYTE [DISTWIDTH] IN BLOOD BY AUTOMATED COUNT: 11.8 % (ref 10–15)
GFR SERPL CREATININE-BSD FRML MDRD: 89 ML/MIN/1.73M2
GLUCOSE BLD-MCNC: 100 MG/DL (ref 70–99)
GLUCOSE UR STRIP-MCNC: NEGATIVE MG/DL
HCT VFR BLD AUTO: 42.5 % (ref 35–47)
HGB BLD-MCNC: 14.3 G/DL (ref 11.7–15.7)
HGB UR QL STRIP: ABNORMAL
KETONES UR STRIP-MCNC: NEGATIVE MG/DL
LEUKOCYTE ESTERASE UR QL STRIP: NEGATIVE
MCH RBC QN AUTO: 31.4 PG (ref 26.5–33)
MCHC RBC AUTO-ENTMCNC: 33.6 G/DL (ref 31.5–36.5)
MCV RBC AUTO: 93 FL (ref 78–100)
NITRATE UR QL: NEGATIVE
PH UR STRIP: 6.5 [PH] (ref 5–7)
PLATELET # BLD AUTO: 233 10E3/UL (ref 150–450)
POTASSIUM BLD-SCNC: 4.6 MMOL/L (ref 3.4–5.3)
PROT SERPL-MCNC: 8.3 G/DL (ref 6.8–8.8)
RBC # BLD AUTO: 4.56 10E6/UL (ref 3.8–5.2)
RBC #/AREA URNS AUTO: ABNORMAL /HPF
SODIUM SERPL-SCNC: 138 MMOL/L (ref 133–144)
SP GR UR STRIP: <=1.005 (ref 1–1.03)
UROBILINOGEN UR STRIP-ACNC: 0.2 E.U./DL
WBC # BLD AUTO: 5.2 10E3/UL (ref 4–11)
WBC #/AREA URNS AUTO: ABNORMAL /HPF

## 2022-09-12 PROCEDURE — 85027 COMPLETE CBC AUTOMATED: CPT | Performed by: PHYSICIAN ASSISTANT

## 2022-09-12 PROCEDURE — 36415 COLL VENOUS BLD VENIPUNCTURE: CPT | Performed by: PHYSICIAN ASSISTANT

## 2022-09-12 PROCEDURE — 80053 COMPREHEN METABOLIC PANEL: CPT | Performed by: PHYSICIAN ASSISTANT

## 2022-09-12 PROCEDURE — 81001 URINALYSIS AUTO W/SCOPE: CPT | Performed by: PHYSICIAN ASSISTANT

## 2022-09-12 PROCEDURE — 71046 X-RAY EXAM CHEST 2 VIEWS: CPT | Mod: TC | Performed by: RADIOLOGY

## 2022-09-12 PROCEDURE — 99214 OFFICE O/P EST MOD 30 MIN: CPT | Performed by: PHYSICIAN ASSISTANT

## 2022-09-12 ASSESSMENT — PAIN SCALES - GENERAL: PAINLEVEL: MILD PAIN (2)

## 2022-09-12 NOTE — TELEPHONE ENCOUNTER
My Chart message sent to call to schedule or schedule desired vaccine through My Chart.    Ananya Arteaga RN

## 2022-09-12 NOTE — PROGRESS NOTES
Assessment & Plan     Right flank discomfort  I believe her symptoms are most suggestive of musculoskeletal strain, she needs to take it easy for the next 2 weeks, apply heat and/or ice, use ibuprofen routinely I did offer cyclobenzaprine which she declined but she may message me if she is interested.  I will get back to her with the rest of her results and we will follow-up as needed if any abnormalities are noted  - CBC with platelets; Future  - Comprehensive metabolic panel (BMP + Alb, Alk Phos, ALT, AST, Total. Bili, TP); Future  - UA Macro with Reflex to Micro and Culture - lab collect; Future  - UA Macro with Reflex to Micro and Culture - lab collect  - Comprehensive metabolic panel (BMP + Alb, Alk Phos, ALT, AST, Total. Bili, TP)  - CBC with platelets  - Urine Microscopic    Rib pain on right side  If her symptoms are not improving, I would recommend a follow-up visit in the next 2 to 3 weeks.  - XR Chest 2 Views; Future        Return in about 2 weeks (around 9/26/2022), or if symptoms worsen or fail to improve.    Klaudia Marion PA-C  [facility]    Jonathon Nunn is a 56 year old female who presents to clinic today for the following health issues accompanied by her self:    Musculoskeletal Problem    History of Present Illness       Reason for visit:  Pain lower right ribs  Symptom onset:  1-2 weeks ago  Symptoms include:  Pain in lower right ribs  Symptom intensity:  Moderate  Symptom progression:  Staying the same  Had these symptoms before:  No  What makes it worse:  Coughing, leaning to the left   What makes it better:  Not coughing ..    She eats 0-1 servings of fruits and vegetables daily.She consumes 0 sweetened beverage(s) daily.She exercises with enough effort to increase her heart rate 30 to 60 minutes per day.  She exercises with enough effort to increase her heart rate 4 days per week.   She is taking medications regularly.       Pain History:  When did you first  "notice your pain? - Acute Pain   Have you seen anyone else for your pain? No  Where in your body do you have pain? Musculoskeletal problem/pain  Onset/Duration: 1-2 weeks ago  Description  Location: rib - right  Joint Swelling: No  Redness: No  Pain: YES  Warmth: No  Intensity:  2/10  Progression of Symptoms:  same  Accompanying signs and symptoms:   Fevers: No  Numbness/tingling/weakness: No  History  Trauma to the area: No  Recent illness:  No  Previous similar problem: No  Previous evaluation:  No  Precipitating or alleviating factors:  Aggravating factors include: coughing and leaning to the left  Therapies tried and outcome: tylenol one day, this does not seem to help she has not tried ibuprofen.    Emma is here today because she has had this pain for 3 weeks.  She does notice the pain for days she does not recall any specific injury but she is very active and pushes herself to complete laboratory tasks she is doing despite pain.  She does not sit down and is always in motion per patient.  She describes it as a dull persistent pain under her ribs on the right.  Changes of position or pushing on her right upper quadrant/flank area causes tenderness.  She does not think it radiates into her back.  She does not recall any activities where she put pressure on her rib cage for any extended period of time.  She denies chest pain, shortness of breath, current URI symptoms, abdominal pain or bowel changes.      Past surgical history is significant for lap stefan May 2017  Review of Systems   Constitutional, HEENT, cardiovascular, pulmonary, gi and gu systems are negative, except as otherwise noted.      Objective    /68   Pulse 60   Temp 98  F (36.7  C) (Temporal)   Resp 12   Ht 1.727 m (5' 8\")   Wt 65.3 kg (144 lb)   LMP  (LMP Unknown)   SpO2 100%   BMI 21.90 kg/m    Body mass index is 21.9 kg/m .  Physical Exam   GENERAL: healthy, alert and no distress  NECK: no adenopathy, no asymmetry, masses, or scars " and thyroid normal to palpation  RESP: lungs clear to auscultation - no rales, rhonchi or wheezes  CV: regular rate and rhythm, normal S1 S2, no S3 or S4, no murmur, click or rub, no peripheral edema and peripheral pulses strong  ABDOMEN: soft, nontender, no hepatosplenomegaly, no masses and bowel sounds normal  MS: no gross musculoskeletal defects noted, no edema, she does wince in pain when she is lying back on the exam table as she is sitting up, she is tender to palpation over the musculature over the right flank just under her ribs.  SKIN: She does have some erythematous marks consistent with her fingernails in the area of pain, they do not appear that all vesicular or herpetic.  NEURO: Normal strength and tone, mentation intact and speech normal  PSYCH: mentation appears normal, affect normal/bright    Results for orders placed or performed in visit on 09/12/22   XR Chest 2 Views     Status: None    Narrative    CHEST TWO VIEWS  9/12/2022 10:47 AM     HISTORY:  Right rib pain.    COMPARISON: 2/27/2008.      Impression    IMPRESSION: Chest is negative and unchanged. Lungs clear.    ROSSY PERRY MD         SYSTEM ID:  [id number]   Results for orders placed or performed in visit on 09/12/22   UA Macro with Reflex to Micro and Culture - lab collect     Status: Abnormal    Specimen: Urine, Clean Catch   Result Value Ref Range    Color Urine Yellow Colorless, Straw, Light Yellow, Yellow    Appearance Urine Clear Clear    Glucose Urine Negative Negative mg/dL    Bilirubin Urine Negative Negative    Ketones Urine Negative Negative mg/dL    Specific Gravity Urine <=1.005 1.003 - 1.035    Blood Urine Trace (A) Negative    pH Urine 6.5 5.0 - 7.0    Protein Albumin Urine Negative Negative mg/dL    Urobilinogen Urine 0.2 0.2, 1.0 E.U./dL    Nitrite Urine Negative Negative    Leukocyte Esterase Urine Negative Negative   Comprehensive metabolic panel (BMP + Alb, Alk Phos, ALT, AST, Total. Bili, TP)     Status: Abnormal    Result Value Ref Range    Sodium 138 133 - 144 mmol/L    Potassium 4.6 3.4 - 5.3 mmol/L    Chloride 104 94 - 109 mmol/L    Carbon Dioxide (CO2) 31 20 - 32 mmol/L    Anion Gap 3 3 - 14 mmol/L    Urea Nitrogen 9 7 - 30 mg/dL    Creatinine 0.78 0.52 - 1.04 mg/dL    Calcium 10.0 8.5 - 10.1 mg/dL    Glucose 100 (H) 70 - 99 mg/dL    Alkaline Phosphatase 114 40 - 150 U/L    AST 11 0 - 45 U/L    ALT 48 0 - 50 U/L    Protein Total 8.3 6.8 - 8.8 g/dL    Albumin 4.3 3.4 - 5.0 g/dL    Bilirubin Total 0.6 0.2 - 1.3 mg/dL    GFR Estimate 89 >60 mL/min/1.73m2   CBC with platelets     Status: Normal   Result Value Ref Range    WBC Count 5.2 4.0 - 11.0 10e3/uL    RBC Count 4.56 3.80 - 5.20 10e6/uL    Hemoglobin 14.3 11.7 - 15.7 g/dL    Hematocrit 42.5 35.0 - 47.0 %    MCV 93 78 - 100 fL    MCH 31.4 26.5 - 33.0 pg    MCHC 33.6 31.5 - 36.5 g/dL    RDW 11.8 10.0 - 15.0 %    Platelet Count 233 150 - 450 10e3/uL   Urine Microscopic     Status: Abnormal   Result Value Ref Range    Bacteria Urine Few (A) None Seen /HPF    RBC Urine 0-2 0-2 /HPF /HPF    WBC Urine 0-5 0-5 /HPF /HPF    Narrative    Urine Culture not indicated

## 2022-10-09 ENCOUNTER — HEALTH MAINTENANCE LETTER (OUTPATIENT)
Age: 57
End: 2022-10-09

## 2022-10-10 NOTE — PROGRESS NOTES
Emma is a 56 year old who is being evaluated via a billable telephone visit.      What phone number would you like to be contacted at? [phone]  How would you like to obtain your AVS? Steve  Phone call duration: 71 minutes  10/11/2022    Referring Provider: CAROLINE Nelson    Present for Today's Visit: Emma and [facility] genetic counseling student, Selam Nelson    Presenting Information:   I met with Emma Nunn today for genetic counseling (telephone visit due to covid19) to discuss her personal and family history of cancer.  She is here today to review this history, cancer screening recommendations, and available genetic testing options.    Personal History:  Emma is a 56 year old female. She was diagnosed with a left breast cancer (invasive ductal carcinoma, grade 2, ER positive, PA positive, HER2 positive). Treatment included lumpectomy, adjuvant chemotherapy, and radiation. She was on tamoxifen until Dec. 2020. Her most recent mammogram in July 2022 was normal and her breast density was reported as scattered fibroglandular densities.      She had her first menstrual period at age 17, her first child at age 28, and is postmenopausal.  Emma has her ovaries, fallopian tubes and uterus in place.  She reports past history of oral contraceptive use for less than 5 years and that she has never been on hormone replacement therapy.      Her most recent OB-GYN exam and Pap smear in December 2020 were normal. Most recent colonoscopy in 2022 was normal and follow-up was recommended in 10 years. Colonoscopy in 2014 was normal. She does not regularly do any other cancer screening at this time.  Emma reported no tobacco use, and about 3-5 drinks per week for alcohol use.    Emma saw Monique Coulter MS, CGC in 2014 for genetic counseling and genetic testing. She completed [facility] + PALB2 genetic testing (BRCA1, BRCA2, TP53, PTEN, MLH1, MSH2, MSH6, PMS2, EPCAM, and PALB2), and results were normal.      Family History: Cancer family history and pertinent information reviewed below (Please see scanned pedigree for detailed family history information)  ? Emma's mother passed at age 78 from colon cancer diagnosed at age 77.  ? Emma's maternal first-cousin has a history of breast cancer diagnosed in her 50's.  ? Emma's maternal first-cousin passed in her 20's from an unspecified type of cancer.   ? Emma's maternal first-cousin has a history of melanoma on her nose in her 60's.   ? Emma's paternal aunt passed at age 57 from colon cancer diagnosed at age 55. This aunt has a son who passed at age 64 from colon cancer diagnosed at age 60.   ? Emma's paternal grandmother had colon cancer in her 70s and  at 80.    ? Emma has four living sisters, none of whom have had cancer.  One sister did have something removed from her tongue that was benign and multiple benign moles removed.    Her ethnicity is [place], Sami, Finnish, Scandinavian, [place], and broadly [place] .  There is no known Ashkenazi [place] ancestry on either side of her family. There is no reported consanguinity.    Discussion:    Emma's personal and family history of cancer is suggestive of a hereditary cancer syndrome.    We reviewed the features of sporadic, familial, and hereditary cancers. In looking at Emma's family history, it is possible that a cancer susceptibility gene is present due to her early-onset breast cancer history, her maternal first-cousins' breast cancer histories, her mother's colon cancer history, and her paternal aunt's and paternal grandmother's colon cancer histories.    We discussed the natural history and genetics of hereditary cancers. A detailed handout regarding the information we discussed will be sent to Emma via [employer]. Topics included: inheritance pattern, cancer risks, cancer screening recommendations, and also risks, benefits and limitations of testing.    We discussed her previous genetic testing in   that was negative for mutations in 10 genes associated with increased risk for breast and/or gynecologic cancers. I explained that there are additional genes that we routinely test for today that weere not included on her testing in 2014 including PAOLA, CDH1, STK11, CHEK2, NF1, and colorectal cancer risk genes such as the Tong syndrome genes. Tong syndrome can be caused by a mutation in one of five genes:  MLH1, MSH2, MSH6, PMS2, and EPCAM.  Tong syndrome may present with polyps, but typically a small number.  The highest cancer risks associated with Tong syndrome include colon cancer, endometrial/uterine cancer, gastric cancer, and ovarian cancer.  Based on her personal and family history, Emma meets current National Comprehensive Cancer Network (NCCN) criteria for genetic testing of breast cancer susceptibility genes. NCCN guidelines also recommends multi-gene panel genetic testing for patients who have previously tested negative for a single hereditary cancer syndrome or smaller more limited panels, but have a suspicious personal or family history.    We discussed that there are additional genes that could cause increased risk for breast, gastrointestinal, and related cancers. As many of these genes present with overlapping features in a family and accurate cancer risk cannot always be established based upon the pedigree analysis alone, it would be reasonable for Emma to consider panel genetic testing to analyze multiple genes at once.    We reviewed genetic testing options given Emma's personal and family history including targeted and expanded options. After our discussion, Emma opted to proceed with genetic testing via the Common Hereditary Cancers panel through [facility].   Genetic testing is available for 47 genes associated with cancers of the breast, ovary, uterus, prostate and gastrointestinal system: [facility] Common Hereditary Cancers panel (APC, PAOLA, AXIN2, BARD1, BMPR1A, BRCA1, BRCA2, BRIP1, CDH1, CDK4,  CDKN2A, CHEK2, CTNNA1, DICER1, EPCAM, GREM1, HOXB13, KIT, MEN1, MLH1, MSH2, MSH3, MSH6, MUTYH, NBN, NF1, NTHL1, PALB2, PDGFRA, PMS2, POLD1, POLE, PTEN,RAD50, RAD51C, RAD51D, SDHA, SDHB, SDHC, SDHD, SMAD4, SMARCA4, STK11, TP53,TSC1, TSC2, VHL).    We discussed that many of these genes are associated with specific hereditary cancer syndromes and published management guidelines: Hereditary Breast and Ovarian Cancer syndrome (BRCA1, BRCA2), Tong syndrome (MLH1, MSH2, MSH6, PMS2, EPCAM), Familial Adenomatous Polyposis (APC), Hereditary Diffuse Gastric Cancer (CDH1), Familial Atypical Multiple Mole Melanoma syndrome (CDK4, CDKN2A), Multiple Endocrine Neoplasia type 1 (MEN1), Juvenile Polyposis syndrome (BMPR1A, SMAD4), Cowden syndrome (PTEN), Li Fraumeni syndrome (TP53), Hereditary Paraganglioma and Pheochromocytoma syndrome (SDHA, SDHB, SDHC, SDHD), Peutz-Jeghers syndrome (STK11), MUTYH Associated Polyposis (MUTYH), Tuberous sclerosis complex (TSC1, TSC2), Von Hippel-Lindau disease (VHL), and Neurofibromatosis type 1 (NF1).   The PAOLA, AXIN2, BRIP1, CHEK2, GREM1, MSH3, NBN, NTHL1, PALB2, POLD1, POLE, RAD51C, and RAD51D genes are associated with increased cancer risk and have published management guidelines for certain cancers.   The remaining genes (BARD1, CTNNA1, DICER1, HOXB13, KIT, PDGFRA, RAD50, and SMARCA4) are associated with increased cancer risk and may allow us to make medical recommendations when mutations are identified.     Consent was obtained over the phone with no witness required due to the current covid19 global pandemic.    Medical Management: For Emma, we reviewed that the information from genetic testing may determine:    additional cancer screening for which Emma may qualify (i.e. mammogram and breast MRI, more frequent colonoscopies, more frequent dermatologic exams, etc.),    options for risk reducing surgeries Emma could consider (i.e. bilateral mastectomy, surgery to remove her ovaries and/or  uterus, etc.),      and targeted chemotherapies for Emma if she were to develop certain cancers in the future (i.e. immunotherapy for individuals with Tong syndrome, PARP inhibitors, etc.).     These recommendations will be discussed in detail once genetic testing is completed.     Emma will schedule a lab only appointment at any [facility] clinic at her earliest convenience.     Plan:  1) Today Emma elected to proceed with Common Hereditary Cancers panel genetic testing (47 genes) through [facility].  2) This information should be available in approximately 3 weeks.  3) Emma will be contacted by our scheduling department to set up a result disclosure appointment.       Evonne Peña MS, [facility]  Licensed, Certified Genetic Counselor  [facility]  [email or web address]

## 2022-10-11 ENCOUNTER — VIRTUAL VISIT (OUTPATIENT)
Dept: ONCOLOGY | Facility: CLINIC | Age: 57
End: 2022-10-11
Attending: NURSE PRACTITIONER
Payer: COMMERCIAL

## 2022-10-11 DIAGNOSIS — Z80.0 FAMILY HISTORY OF COLON CANCER: ICD-10-CM

## 2022-10-11 DIAGNOSIS — Z80.3 FAMILY HISTORY OF MALIGNANT NEOPLASM OF BREAST: Primary | ICD-10-CM

## 2022-10-11 DIAGNOSIS — Z17.0 MALIGNANT NEOPLASM OF UPPER-OUTER QUADRANT OF LEFT BREAST IN FEMALE, ESTROGEN RECEPTOR POSITIVE (H): ICD-10-CM

## 2022-10-11 DIAGNOSIS — Z80.8 FAMILY HISTORY OF MALIGNANT MELANOMA: ICD-10-CM

## 2022-10-11 DIAGNOSIS — C50.412 MALIGNANT NEOPLASM OF UPPER-OUTER QUADRANT OF LEFT BREAST IN FEMALE, ESTROGEN RECEPTOR POSITIVE (H): ICD-10-CM

## 2022-10-11 PROCEDURE — 96040 HC GENETIC COUNSELING, EACH 30 MINUTES: CPT | Mod: TEL,95 | Performed by: GENETIC COUNSELOR, MS

## 2022-10-11 NOTE — LETTER
Cancer Risk Management  Program Locations    [facility]  [facility]  [facility]  [facility]  [facility]  Mailing Address  Cancer Risk Management Program  [facility]  [address]  [place] [address]    New patient appointments  [phone]  October 11, 2022    Emma Nunn  [address] [facility] [address]      Dear Emma,    It was a pleasure speaking with you on the phone on 10/11/2022.  Here is a copy of the progress note from our discussion. If you have any additional questions, please feel free to call.    Referring Provider: CAROLINE Nelson    Present for Today's Visit: Emma and [facility] genetic counseling student, Selam Nelson    Presenting Information:   I met with Emma Nunn today for genetic counseling (telephone visit due to covid19) to discuss her personal and family history of cancer.  She is here today to review this history, cancer screening recommendations, and available genetic testing options.    Personal History:  Emma is a 56 year old female. She was diagnosed with a left breast cancer (invasive ductal carcinoma, grade 2, ER positive, WA positive, HER2 positive). Treatment included lumpectomy, adjuvant chemotherapy, and radiation. She was on tamoxifen until Dec. 2020. Her most recent mammogram in July 2022 was normal and her breast density was reported as scattered fibroglandular densities.      She had her first menstrual period at age 17, her first child at age 28, and is postmenopausal.  Emma has her ovaries, fallopian tubes and uterus in place.  She reports past history of oral contraceptive use for less than 5 years and that she has never been on hormone replacement therapy.      Her most recent OB-GYN exam and Pap smear in December 2020 were normal. Most recent colonoscopy in 2022 was normal and  follow-up was recommended in 10 years. Colonoscopy in  was normal. She does not regularly do any other cancer screening at this time.  Emma reported no tobacco use, and about 3-5 drinks per week for alcohol use.    Emma saw Monique Coulter MS, CGC in  for genetic counseling and genetic testing. She completed [facility] + PALB2 genetic testing (BRCA1, BRCA2, TP53, PTEN, MLH1, MSH2, MSH6, PMS2, EPCAM, and PALB2), and results were normal.     Family History: Cancer family history and pertinent information reviewed below (Please see scanned pedigree for detailed family history information)  ? Emma's mother passed at age 78 from colon cancer diagnosed at age 77.  ? Emma's maternal first-cousin has a history of breast cancer diagnosed in her 50's.  ? Emma's maternal first-cousin passed in her 20's from an unspecified type of cancer.   ? Emma's maternal first-cousin has a history of melanoma on her nose in her 60's.   ? Emma's paternal aunt passed at age 57 from colon cancer diagnosed at age 55. This aunt has a son who passed at age 64 from colon cancer diagnosed at age 60.   ? Emma's paternal grandmother had colon cancer in her 70s and  at 80.    ? Emma has four living sisters, none of whom have had cancer.  One sister did have something removed from her tongue that was benign and multiple benign moles removed.    Her ethnicity is Wallisian, Green[place]ic, Georgian, Scandinavian, [place], and broadly [place] .  There is no known Ashkenazi Zoroastrianism ancestry on either side of her family. There is no reported consanguinity.    Discussion:    Emma's personal and family history of cancer is suggestive of a hereditary cancer syndrome.    We reviewed the features of sporadic, familial, and hereditary cancers. In looking at Emma's family history, it is possible that a cancer susceptibility gene is present due to her early-onset breast cancer history, her maternal first-cousins' breast cancer histories, her mother's colon  cancer history, and her paternal aunt's and paternal grandmother's colon cancer histories.    We discussed the natural history and genetics of hereditary cancers. A detailed handout regarding the information we discussed will be sent to Emma via [employer]. Topics included: inheritance pattern, cancer risks, cancer screening recommendations, and also risks, benefits and limitations of testing.    We discussed her previous genetic testing in 2014 that was negative for mutations in 10 genes associated with increased risk for breast and/or gynecologic cancers. I explained that there are additional genes that we routinely test for today that weere not included on her testing in 2014 including PAOLA, CDH1, STK11, CHEK2, NF1, and colorectal cancer risk genes such as the Tong syndrome genes. Tong syndrome can be caused by a mutation in one of five genes:  MLH1, MSH2, MSH6, PMS2, and EPCAM.  Tong syndrome may present with polyps, but typically a small number.  The highest cancer risks associated with Tong syndrome include colon cancer, endometrial/uterine cancer, gastric cancer, and ovarian cancer.  Based on her personal and family history, Emma meets current National Comprehensive Cancer Network (NCCN) criteria for genetic testing of breast cancer susceptibility genes. NCCN guidelines also recommends multi-gene panel genetic testing for patients who have previously tested negative for a single hereditary cancer syndrome or smaller more limited panels, but have a suspicious personal or family history.    We discussed that there are additional genes that could cause increased risk for breast, gastrointestinal, and related cancers. As many of these genes present with overlapping features in a family and accurate cancer risk cannot always be established based upon the pedigree analysis alone, it would be reasonable for Emma to consider panel genetic testing to analyze multiple genes at once.    We reviewed genetic testing options  given Emma's personal and family history including targeted and expanded options. After our discussion, Emma opted to proceed with genetic testing via the Common Hereditary Cancers panel through [facility].   Genetic testing is available for 47 genes associated with cancers of the breast, ovary, uterus, prostate and gastrointestinal system: [facility] Common Hereditary Cancers panel (APC, PAOLA, AXIN2, BARD1, BMPR1A, BRCA1, BRCA2, BRIP1, CDH1, CDK4, CDKN2A, CHEK2, CTNNA1, DICER1, EPCAM, GREM1, HOXB13, KIT, MEN1, MLH1, MSH2, MSH3, MSH6, MUTYH, NBN, NF1, NTHL1, PALB2, PDGFRA, PMS2, POLD1, POLE, PTEN,RAD50, RAD51C, RAD51D, SDHA, SDHB, SDHC, SDHD, SMAD4, SMARCA4, STK11, TP53,TSC1, TSC2, VHL).    We discussed that many of these genes are associated with specific hereditary cancer syndromes and published management guidelines: Hereditary Breast and Ovarian Cancer syndrome (BRCA1, BRCA2), Tong syndrome (MLH1, MSH2, MSH6, PMS2, EPCAM), Familial Adenomatous Polyposis (APC), Hereditary Diffuse Gastric Cancer (CDH1), Familial Atypical Multiple Mole Melanoma syndrome (CDK4, CDKN2A), Multiple Endocrine Neoplasia type 1 (MEN1), Juvenile Polyposis syndrome (BMPR1A, SMAD4), Cowden syndrome (PTEN), Li Fraumeni syndrome (TP53), Hereditary Paraganglioma and Pheochromocytoma syndrome (SDHA, SDHB, SDHC, SDHD), Peutz-Jeghers syndrome (STK11), MUTYH Associated Polyposis (MUTYH), Tuberous sclerosis complex (TSC1, TSC2), Von Hippel-Lindau disease (VHL), and Neurofibromatosis type 1 (NF1).   The PAOLA, AXIN2, BRIP1, CHEK2, GREM1, MSH3, NBN, NTHL1, PALB2, POLD1, POLE, RAD51C, and RAD51D genes are associated with increased cancer risk and have published management guidelines for certain cancers.   The remaining genes (BARD1, CTNNA1, DICER1, HOXB13, KIT, PDGFRA, RAD50, and SMARCA4) are associated with increased cancer risk and may allow us to make medical recommendations when mutations are identified.     Consent was obtained over the phone with no  witness required due to the current covid19 global pandemic.    Medical Management: For Emma, we reviewed that the information from genetic testing may determine:    additional cancer screening for which Emma may qualify (i.e. mammogram and breast MRI, more frequent colonoscopies, more frequent dermatologic exams, etc.),    options for risk reducing surgeries Emma could consider (i.e. bilateral mastectomy, surgery to remove her ovaries and/or uterus, etc.),      and targeted chemotherapies for Emma if she were to develop certain cancers in the future (i.e. immunotherapy for individuals with Tong syndrome, PARP inhibitors, etc.).     These recommendations will be discussed in detail once genetic testing is completed.     Emma will schedule a lab only appointment at any [facility] clinic at her earliest convenience.     Plan:  1) Today Emma elected to proceed with Common Hereditary Cancers panel genetic testing (47 genes) through [facility].  2) This information should be available in approximately 3 weeks.  3) Emma will be contacted by our scheduling department to set up a result disclosure appointment.     Evonne Peña MS, [facility]  Licensed, Certified Genetic Counselor  [facility]  [email or web address]

## 2022-10-11 NOTE — PATIENT INSTRUCTIONS
Assessing Cancer Risk  Only about 5-10% of cancers are thought to be due to an inherited cancer susceptibility gene.    These families often have:  Several people with the same or related types of cancer  Cancers diagnosed at a young age (before age 50)  Individuals with more than one primary cancer  Multiple generations of the family affected with cancer    Some people may be candidates for genetic testing of more than one gene.  For these families, genetic testing using a multi-gene cancer panel may be offered.  These panels may test genes known to increase the risk for breast (and other) cancers: PAOLA, BRCA1, BRCA2, CDH1, CHEK2, PALB2, PTEN, and TP53.  The purpose of this handout is to serve as a brief summary of the high/moderate-risk breast cancer genes which have published clinical management guidelines for individuals who are found to carry a mutation.    Hereditary Breast and Ovarian Cancer Syndrome (HBOC)  A single mutation in one of the copies of BRCA1 or BRCA2 increases the risk for breast and ovarian cancer, among others.  The risk for pancreatic cancer and melanoma may also be slightly increased in some families.  The tables below list the chance that someone with a BRCA mutation would develop cancer in his or her lifetime1,2,3,4.          Women   Men     General Population BRCA+    General Population BRCA+   Breast  12% 60-90%  Breast <1% ~7%   Ovarian  1-2% Up to 58%  Prostate 16% 20%     A person s ethnic background is also important to consider, as individuals of Ashkenazi Scientology ancestry have a higher chance of having a BRCA gene mutation.  There are three BRCA mutations that occur more frequently in this population.    Li-Fraumeni Syndrome (LFS)  LFS is a cancer predisposition syndrome. Individuals with LFS are at an increased risk for developing cancer at a young age. The general lifetime risk for development of cancer is 50% by age 30 and 90% by age 60.  LFS is caused by a mutation in the TP53  gene.  A single mutation in one of the copies of TP53 increases the risk for multiple cancers.     Core Cancers: Sarcomas, Breast, Brain, Lung, Leukemias/Lymphomas, Adrenocortical carcinomas  Other Cancers: Gastrointestinal, Thyroid, Skin, Genitourinary    Cowden Syndrome  Cowden syndrome is a hereditary condition that increases the risk for breast, thyroid, endometrial, and kidney cancer.  Cowden syndrome is caused by a mutation in the PTEN gene.  A single mutation in one of the copies of PTEN causes Cowden syndrome and increases cancer risk.  The table below shows the chance that someone with a PTEN mutation would develop cancer in their lifetime5,6.  Other benign features seen in some individuals with Cowden syndrome include benign skin lesions (facial papules, keratoses, lipomas), learning disability, autism, thyroid nodules, colon polyps, and larger head size.      Lifetime Cancer Risk   Cancer Type General Population Cowden Syndrome   Breast  12% 25-50%*   Thyroid  1% 35%   Renal 1-2% 35%   Endometrial  2-3% 28%   Colon 5% 9%   Melanoma 2-3% >5%     *One recent study found breast cancer risk to be increased to 85%    Hereditary Diffuse Gastric Cancer (HDGC)  Currently, one gene is known to cause hereditary diffuse gastric cancer: CDH1.  Individuals with HDGC are at increased risk for diffuse gastric cancer and lobular breast cancer. Of people diagnosed with HDGC, 30-50% have a mutation in the CDH1 gene.  This suggests there are likely other genes that may cause HDGC that have not been identified yet.      Lifetime Cancer Risks    General Pop HDGC    Diffuse Gastric  <1% ~80%   Breast 12% 39-52%     Additional Genes Associated with Increased Breast Cancer Risk  PALB2  Mutations in PALB2 have been shown to increase the risk of breast cancer up to 33-58% in some families; where individuals fall within this risk range is dependent upon family history.9 PALB2 mutations have also been associated with increased risk  for pancreatic cancer, although this risk has not been quantified yet.  Individuals who inherit two PALB2 mutations--one from their mother and one from their father--have a condition called Fanconi Anemia.  This rare autosomal recessive condition is associated with short stature, developmental delay, bone marrow failure, and increased risk for childhood cancers.    PAOLA  PAOLA is a moderate-risk breast cancer gene. Women who have a mutation in PAOLA can have between a 2-4 fold increased risk for breast cancer compared to the general population.10  PAOLA mutations have also been associated with increased risk for pancreatic cancer, however an estimate of this cancer risk is not well understood.11  Individuals who inherit two PAOLA mutations have a condition called ataxia-telangiectasia (AT).  This rare autosomal recessive condition affects the nervous system and immune system, and is associated with progressive cerebellar ataxia beginning in childhood.  Individuals with ataxia-telangiectasia often have a weakened immune system and have an increased risk for childhood cancers.           CHEK2   CHEK2 is a moderate-risk breast cancer gene.  Women who have a mutation in CHEK2 have around a 2-fold increased risk for breast cancer compared to the general population, and this risk may be higher depending upon family history.12,13,14  Mutations in CHEK2 have also been shown to increase the risk of a number of other cancers, including colon and prostate, however these cancer risks are currently not well understood.         Inheritance   All of the genes reviewed above are inherited in an autosomal dominant pattern.  This means that if a parent has a mutation, each of his or her children will have a 50% chance of inheriting that same mutation.  Therefore, each child--male or female--would have a 50% chance of being at increased risk for developing cancer.    Image obtained from [employer] Home Reference, 2013     Mutations in some genes  can occur de liu, which means that a person s mutation occurred for the first time in them and was not inherited from a parent.  Now that they have the mutation, however, it can be passed on to future generations.    Genetic Testing  Genetic testing involves a blood test and will look for any harmful mutations within genes that are associated with increased cancer risk.  If possible, it is recommended that the person(s) who has had cancer be tested before other family members.  That person will give us the most useful information about whether or not a specific gene is associated with the cancer in the family.    Results  There are three possible results of genetic testing:  Positive--a harmful mutation was identified in one or more of the genes  Negative--no mutation was identified in any of the genes on this panel  Variant of unknown significance--a variation in one of the genes was identified, but it is unclear how this impacts cancer risk in the family    Advantages and Disadvantages  There are advantages and disadvantages to genetic testing.  Advantages  May clarify your cancer risk  Can help you make medical decisions  May explain the cancers in your family  May give useful information to your family members (if you share your results)    Disadvantages  Possible negative emotional impact of learning about inherited cancer risk  Uncertainty in interpreting a negative test result in some situations  Possible genetic discrimination concerns (see below)    Genetic Information Nondiscrimination Act (JARED)  JARED is a federal law that protects individuals from health insurance or employment discrimination based on a genetic test result alone.  Although rare, there are currently no legal discrimination protections in terms of life insurance, long term care, or disability insurances.  Visit the National Human Genome Research Amo genome.gov/15611952 to learn more.    Reducing Cancer Risk  Each of the genes listed  within this handout have nationally recognized cancer screening guidelines that would be recommended for individuals who test positive.  In addition to increased cancer screening, surgeries may be offered or recommended to reduce cancer risk.  Recommendations are based upon an individual s genetic test result as well as their personal and family history of cancer.    Questions to Think About Regarding Genetic Testing  What effect will the test result have on me and my relationship with my family members if I have an inherited gene mutation?  If I don t have a gene mutation?  Should I share my test results, and how will my family react to this news, which may also affect them?  Are my children ready to learn new information that may one day affect their own health?    Resources  FORCE: Facing Our Risk of Cancer Empowered facingourrisk.org   Bright Pink bebrightpink.org   Li-Fraumeni Syndrome Association lfsassociation.org   PTEN World PTENworld.Adknowledge   No stomach for cancer, Inc. nostomachforcancer.org   Stomach cancer relief network scrnet.org   Collaborative Group of the Americas on Inherited Colorectal Cancer (CGA) cgaicc.com    Cancer Care cancercare.org   American Cancer Society (ACS) cancer.org   National Cancer Prosper (NCI) cancer.gov     Please call us if you have any questions or concerns.   Cancer Risk Management Program [phone] ([phone])  Martin Gifford, MS [facility]              [phone]  Dalila Candelaria, MS, [facility] [phone]  Carmen Saravia, MS, [facility]  [phone]  Lakeshia Arriaga, MS, [facility]  [phone]  Kayla Marks, MS, [facility]  [phone]  Irais Clark, MS, [facility] [phone]  Evonne Peña, MS, [facility] [phone]    -References  A-timo A, Shade PDP, Joe S, Kyra CELIS, Antonio JE, Dunia JL, Misael N, Junior H, Abiel O, Sagar A, Maria Del Carmen B, Sandip P, Steve S, Taylor DM, Tom N, Angle E, Faustina H, Ramses E, Sameer J, Bao J, Regan B, Jameson H, Damion S, Bethany H, Jameson MCKENNA,  Elise CHRISTY, Remy DELACRUZ. Average risks of breast and ovarian cancer associated with BRCA1 or BRCA2 mutations detected in case series unselected for family history: a combined analysis of 222 studies. Am J Hum Maria Luisa. 2003;72:1117-30.  Laura N, Arielle M, Noah G.  BRCA1 and BRCA2 Hereditary Breast and Ovarian Cancer. Gene Reviews online. 2013.  Gui YC, Giancarlo S, Amy G, Donald S. Breast cancer risk among male BRCA1 and BRCA2 mutation carriers. J Natl Cancer Inst. 2007;99:1811-4.  Alfredo ROSAS, Karina I, Armando J, Laurie E, Tasia ER, Ellen F. Risk of breast cancer in male BRCA2 carriers. J Med Maria Luisa. 2010;47:710-1.  Christopher MH, Stewart J, Penny J, Fred LA, Smith WYNNE, Daniel C. Lifetime cancer risks in individuals with germline PTEN mutations. Clin Cancer Res. 2012;18:400-7.  Adarsh R. Cowden Syndrome: A Critical Review of the Clinical Literature. J Maria Luisa . 2009:18:13-27.  National Comprehensive Cancer Network. Clinical practice guidelines in oncology, colorectal cancer screening. Available online (registration required). 2013.  National Cancer [place]. SEER Cancer Stat Fact Sheets.  December 2013.  Geovany HARDING., et al. Breast-Cancer Risk in Families with Mutations in PALB2. NEJM. 2014; 371(6):497-506.  Evan A, Mikal D, Wendy S, Nelsy P, Senait T, Patience M, Fortino B, Blu H, Alf R, Sunshine K, Pema L, Alfredo ROSAS, Taylor D, Jersey DF, Roger MR, The Breast Cancer Susceptibility Collaboration (UK) & Carmen N. PAOLA mutations that cause ataxia-telangiectasia are breast cancer susceptibility alleles. Nature Genetics. 2006;38:873-875  Leopoldo N , Irvin Y, Amparo KEENE, Deny L, Jesus GM , Deo ML, Larry S, Aleman AG, Syngal S, Aj ML, Gela J , Mauricio R, Syed SZ, Rosi JR, Elio VE, Clarisa M, Vogelstein B, Salma N, Luisa RH, Pacheco KW, and Edouard AP. PAOLA mutations in patients with hereditary pancreatic cancer. Cancer Discover. 2012;2:41-46  CHEK2 Breast  Cancer Case-Control Consortium. CHEK2*1100delC and susceptibility to breast cancer: A collaborative analysis involving 10,860 breast cancer cases and 9,065 controls from 10 studies. Am J Hum Maria Luisa, 74 (2004), pp. 3604-6954  Aquiles T, Martin S, Alberto K, et al. Spectrum of Mutations in BRCA1, BRCA2, CHEK2, and TP53 in Families at High Risk of Breast Cancer. LANEY. 2006;295(12):1783-0756.   Arcenio MARTINEZ, Leslye MONTES, Cathi LAMAR, et al. Risk of breast cancer in women with a CHEK2 mutation with and without a family history of breast cancer. J Clin Oncol. 2011;29:7640-6823

## 2022-10-11 NOTE — LETTER
10/11/2022         RE: Emma Nunn  [address] [facility] [id number]        Dear Colleague,    Thank you for referring your patient, Emma Nunn, to the [facility]. Please see a copy of my visit note below.    Emma is a 56 year old who is being evaluated via a billable telephone visit.      What phone number would you like to be contacted at? [phone]  How would you like to obtain your AVS? [employer]  Phone call duration: 71 minutes  10/11/2022    Referring Provider: Juliet Cuello, BUZZ-CNP    Present for Today's Visit: [facility] genetic counseling student, Selam Nelson    Presenting Information:   I met with Emma Nunn today for genetic counseling (telephone visit due to covid19) to discuss her personal and family history of cancer.  She is here today to review this history, cancer screening recommendations, and available genetic testing options.    Personal History:  Emma is a 56 year old female. She was diagnosed with a left breast cancer (invasive ductal carcinoma, grade 2, ER positive, AR positive, HER2 positive). Treatment included lumpectomy, adjuvant chemotherapy, and radiation. She was on tamoxifen until Dec. 2020. Her most recent mammogram in July 2022 was normal and her breast density was reported as scattered fibroglandular densities.      She had her first menstrual period at age 17, her first child at age 28, and is postmenopausal.  Emma has her ovaries, fallopian tubes and uterus in place.  She reports past history of oral contraceptive use for less than 5 years and that she has never been on hormone replacement therapy.      Her most recent OB-GYN exam and Pap smear in December 2020 were normal. Most recent colonoscopy in 2022 was normal and follow-up was recommended in 10 years. Colonoscopy in 2014 was normal. She does not regularly do any other cancer screening at this time.  Emma reported no tobacco use, and about 3-5 drinks per  week for alcohol use.    Emma saw Monique Coulter MS, [facility] in  for genetic counseling and genetic testing. She completed [facility] + PALB2 genetic testing (BRCA1, BRCA2, TP53, PTEN, MLH1, MSH2, MSH6, PMS2, EPCAM, and PALB2), and results were normal.     Family History: Cancer family history and pertinent information reviewed below (Please see scanned pedigree for detailed family history information)  ? Emma's mother passed at age 78 from colon cancer diagnosed at age 77.  ? Emma's maternal first-cousin has a history of breast cancer diagnosed in her 50's.  ? Emma's maternal first-cousin passed in her 20's from an unspecified type of cancer.   ? Emma's maternal first-cousin has a history of melanoma on her nose in her 60's.   ? Emma's paternal aunt passed at age 57 from colon cancer diagnosed at age 55. This aunt has a son who passed at age 64 from colon cancer diagnosed at age 60.   ? Emma's paternal grandmother had colon cancer in her 70s and  at 80.    ? Emma has four living sisters, none of whom have had cancer.  One sister did have something removed from her tongue that was benign and multiple benign moles removed.    Her ethnicity is [place], [place]ian, [place]i, Scandinavian, [place], and broadly [place] .  There is no known Ashkenazi Nondenominational ancestry on either side of her family. There is no reported consanguinity.    Discussion:    Emma's personal and family history of cancer is suggestive of a hereditary cancer syndrome.    We reviewed the features of sporadic, familial, and hereditary cancers. In looking at Emma's family history, it is possible that a cancer susceptibility gene is present due to her early-onset breast cancer history, her maternal first-cousins' breast cancer histories, her mother's colon cancer history, and her paternal aunt's and paternal grandmother's colon cancer histories.    We discussed the natural history and genetics of hereditary cancers. A detailed handout regarding the  information we discussed will be sent to Emma via [employer]. Topics included: inheritance pattern, cancer risks, cancer screening recommendations, and also risks, benefits and limitations of testing.    We discussed her previous genetic testing in 2014 that was negative for mutations in 10 genes associated with increased risk for breast and/or gynecologic cancers. I explained that there are additional genes that we routinely test for today that weere not included on her testing in 2014 including PAOLA, CDH1, STK11, CHEK2, NF1, and colorectal cancer risk genes such as the Tong syndrome genes. Tong syndrome can be caused by a mutation in one of five genes:  MLH1, MSH2, MSH6, PMS2, and EPCAM.  Tong syndrome may present with polyps, but typically a small number.  The highest cancer risks associated with Tong syndrome include colon cancer, endometrial/uterine cancer, gastric cancer, and ovarian cancer.  Based on her personal and family history, Emma meets current National Comprehensive Cancer Network (NCCN) criteria for genetic testing of breast cancer susceptibility genes. NCCN guidelines also recommends multi-gene panel genetic testing for patients who have previously tested negative for a single hereditary cancer syndrome or smaller more limited panels, but have a suspicious personal or family history.    We discussed that there are additional genes that could cause increased risk for breast, gastrointestinal, and related cancers. As many of these genes present with overlapping features in a family and accurate cancer risk cannot always be established based upon the pedigree analysis alone, it would be reasonable for Emma to consider panel genetic testing to analyze multiple genes at once.    We reviewed genetic testing options given Emma's personal and family history including targeted and expanded options. After our discussion, Emma opted to proceed with genetic testing via the Common Hereditary Cancers panel through  [facility].   Genetic testing is available for 47 genes associated with cancers of the breast, ovary, uterus, prostate and gastrointestinal system: [facility] Common Hereditary Cancers panel (APC, PAOLA, AXIN2, BARD1, BMPR1A, BRCA1, BRCA2, BRIP1, CDH1, CDK4, CDKN2A, CHEK2, CTNNA1, DICER1, EPCAM, GREM1, HOXB13, KIT, MEN1, MLH1, MSH2, MSH3, MSH6, MUTYH, NBN, NF1, NTHL1, PALB2, PDGFRA, PMS2, POLD1, POLE, PTEN,RAD50, RAD51C, RAD51D, SDHA, SDHB, SDHC, SDHD, SMAD4, SMARCA4, STK11, TP53,TSC1, TSC2, VHL).    We discussed that many of these genes are associated with specific hereditary cancer syndromes and published management guidelines: Hereditary Breast and Ovarian Cancer syndrome (BRCA1, BRCA2), Tong syndrome (MLH1, MSH2, MSH6, PMS2, EPCAM), Familial Adenomatous Polyposis (APC), Hereditary Diffuse Gastric Cancer (CDH1), Familial Atypical Multiple Mole Melanoma syndrome (CDK4, CDKN2A), Multiple Endocrine Neoplasia type 1 (MEN1), Juvenile Polyposis syndrome (BMPR1A, SMAD4), Cowden syndrome (PTEN), Li Fraumeni syndrome (TP53), Hereditary Paraganglioma and Pheochromocytoma syndrome (SDHA, SDHB, SDHC, SDHD), Peutz-Jeghers syndrome (STK11), MUTYH Associated Polyposis (MUTYH), Tuberous sclerosis complex (TSC1, TSC2), Von Hippel-Lindau disease (VHL), and Neurofibromatosis type 1 (NF1).   The PAOLA, AXIN2, BRIP1, CHEK2, GREM1, MSH3, NBN, NTHL1, PALB2, POLD1, POLE, RAD51C, and RAD51D genes are associated with increased cancer risk and have published management guidelines for certain cancers.   The remaining genes (BARD1, CTNNA1, DICER1, HOXB13, KIT, PDGFRA, RAD50, and SMARCA4) are associated with increased cancer risk and may allow us to make medical recommendations when mutations are identified.     Consent was obtained over the phone with no witness required due to the current covid19 global pandemic.    Medical Management: For Emma, we reviewed that the information from genetic testing may determine:    additional cancer screening for  which Emma may qualify (i.e. mammogram and breast MRI, more frequent colonoscopies, more frequent dermatologic exams, etc.),    options for risk reducing surgeries Emma could consider (i.e. bilateral mastectomy, surgery to remove her ovaries and/or uterus, etc.),      and targeted chemotherapies for Emma if she were to develop certain cancers in the future (i.e. immunotherapy for individuals with Tong syndrome, PARP inhibitors, etc.).     These recommendations will be discussed in detail once genetic testing is completed.     Emma will schedule a lab only appointment at any [facility] at her earliest convenience.     Plan:  1) Today Emma elected to proceed with Common Hereditary Cancers panel genetic testing (47 genes) through [facility].  2) This information should be available in approximately 3 weeks.  3) Emma will be contacted by our scheduling department to set up a result disclosure appointment.         Again, thank you for allowing me to participate in the care of your patient.      Sincerely,    Evonne Sanon GC

## 2023-04-15 ENCOUNTER — APPOINTMENT (OUTPATIENT)
Dept: FAMILY MEDICINE | Facility: CLINIC | Age: 58
End: 2023-04-15
Payer: COMMERCIAL

## 2023-04-17 DIAGNOSIS — F41.1 GAD (GENERALIZED ANXIETY DISORDER): ICD-10-CM

## 2023-04-19 RX ORDER — VENLAFAXINE HYDROCHLORIDE 75 MG/1
75 CAPSULE, EXTENDED RELEASE ORAL DAILY
Qty: 90 CAPSULE | Refills: 1 | Status: SHIPPED | OUTPATIENT
Start: 2023-04-19 | End: 2023-05-05

## 2023-04-19 NOTE — TELEPHONE ENCOUNTER
Prescription approved per [facility] Refill Protocol.  Alicia Varma RN on 4/19/2023 at 10:35 AM

## 2023-05-05 ENCOUNTER — OFFICE VISIT (OUTPATIENT)
Dept: FAMILY MEDICINE | Facility: CLINIC | Age: 58
End: 2023-05-05
Payer: COMMERCIAL

## 2023-05-05 VITALS
SYSTOLIC BLOOD PRESSURE: 112 MMHG | DIASTOLIC BLOOD PRESSURE: 76 MMHG | RESPIRATION RATE: 14 BRPM | BODY MASS INDEX: 22.28 KG/M2 | WEIGHT: 147 LBS | TEMPERATURE: 98.4 F | OXYGEN SATURATION: 98 % | HEART RATE: 80 BPM | HEIGHT: 68 IN

## 2023-05-05 DIAGNOSIS — Z80.8 FAMILY HISTORY OF MALIGNANT MELANOMA: ICD-10-CM

## 2023-05-05 DIAGNOSIS — F43.10 PTSD (POST-TRAUMATIC STRESS DISORDER): ICD-10-CM

## 2023-05-05 DIAGNOSIS — C50.412 MALIGNANT NEOPLASM OF UPPER-OUTER QUADRANT OF LEFT BREAST IN FEMALE, ESTROGEN RECEPTOR POSITIVE (H): ICD-10-CM

## 2023-05-05 DIAGNOSIS — Z80.3 FAMILY HISTORY OF MALIGNANT NEOPLASM OF BREAST: ICD-10-CM

## 2023-05-05 DIAGNOSIS — Z80.0 FAMILY HISTORY OF COLON CANCER: ICD-10-CM

## 2023-05-05 DIAGNOSIS — F41.1 GAD (GENERALIZED ANXIETY DISORDER): ICD-10-CM

## 2023-05-05 DIAGNOSIS — Z17.0 MALIGNANT NEOPLASM OF UPPER-OUTER QUADRANT OF LEFT BREAST IN FEMALE, ESTROGEN RECEPTOR POSITIVE (H): ICD-10-CM

## 2023-05-05 DIAGNOSIS — E03.9 HYPOTHYROIDISM, UNSPECIFIED TYPE: ICD-10-CM

## 2023-05-05 DIAGNOSIS — Z00.00 ROUTINE GENERAL MEDICAL EXAMINATION AT A HEALTH CARE FACILITY: Primary | ICD-10-CM

## 2023-05-05 LAB
ANION GAP SERPL CALCULATED.3IONS-SCNC: 10 MMOL/L (ref 7–15)
BUN SERPL-MCNC: 13.4 MG/DL (ref 6–20)
CALCIUM SERPL-MCNC: 10.1 MG/DL (ref 8.6–10)
CHLORIDE SERPL-SCNC: 100 MMOL/L (ref 98–107)
CREAT SERPL-MCNC: 0.79 MG/DL (ref 0.51–0.95)
DEPRECATED HCO3 PLAS-SCNC: 31 MMOL/L (ref 22–29)
GFR SERPL CREATININE-BSD FRML MDRD: 87 ML/MIN/1.73M2
GLUCOSE SERPL-MCNC: 96 MG/DL (ref 70–99)
POTASSIUM SERPL-SCNC: 4.1 MMOL/L (ref 3.4–5.3)
SODIUM SERPL-SCNC: 141 MMOL/L (ref 136–145)
TSH SERPL DL<=0.005 MIU/L-ACNC: 2.24 UIU/ML (ref 0.3–4.2)

## 2023-05-05 PROCEDURE — 36415 COLL VENOUS BLD VENIPUNCTURE: CPT | Performed by: STUDENT IN AN ORGANIZED HEALTH CARE EDUCATION/TRAINING PROGRAM

## 2023-05-05 PROCEDURE — 99396 PREV VISIT EST AGE 40-64: CPT | Performed by: STUDENT IN AN ORGANIZED HEALTH CARE EDUCATION/TRAINING PROGRAM

## 2023-05-05 PROCEDURE — 84443 ASSAY THYROID STIM HORMONE: CPT | Performed by: STUDENT IN AN ORGANIZED HEALTH CARE EDUCATION/TRAINING PROGRAM

## 2023-05-05 PROCEDURE — 99213 OFFICE O/P EST LOW 20 MIN: CPT | Mod: 25 | Performed by: STUDENT IN AN ORGANIZED HEALTH CARE EDUCATION/TRAINING PROGRAM

## 2023-05-05 PROCEDURE — 99000 SPECIMEN HANDLING OFFICE-LAB: CPT | Performed by: STUDENT IN AN ORGANIZED HEALTH CARE EDUCATION/TRAINING PROGRAM

## 2023-05-05 PROCEDURE — 80048 BASIC METABOLIC PNL TOTAL CA: CPT | Performed by: STUDENT IN AN ORGANIZED HEALTH CARE EDUCATION/TRAINING PROGRAM

## 2023-05-05 RX ORDER — VENLAFAXINE HYDROCHLORIDE 150 MG/1
150 CAPSULE, EXTENDED RELEASE ORAL DAILY
Qty: 90 CAPSULE | Refills: 3 | Status: SHIPPED | OUTPATIENT
Start: 2023-05-05 | End: 2024-07-09
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
## 2023-05-15 LAB — SCANNED LAB RESULT: NORMAL

## 2023-05-18 ENCOUNTER — VIRTUAL VISIT (OUTPATIENT)
Dept: ONCOLOGY | Facility: CLINIC | Age: 58
End: 2023-05-18
Attending: GENETIC COUNSELOR, MS
Payer: COMMERCIAL

## 2023-05-18 DIAGNOSIS — Z80.0 FAMILY HISTORY OF COLON CANCER: ICD-10-CM

## 2023-05-18 DIAGNOSIS — Z80.3 FAMILY HISTORY OF MALIGNANT NEOPLASM OF BREAST: ICD-10-CM

## 2023-05-18 DIAGNOSIS — Z80.8 FAMILY HISTORY OF MALIGNANT MELANOMA: ICD-10-CM

## 2023-05-18 DIAGNOSIS — Z17.0 MALIGNANT NEOPLASM OF UPPER-OUTER QUADRANT OF LEFT BREAST IN FEMALE, ESTROGEN RECEPTOR POSITIVE (H): Primary | ICD-10-CM

## 2023-05-18 DIAGNOSIS — C50.412 MALIGNANT NEOPLASM OF UPPER-OUTER QUADRANT OF LEFT BREAST IN FEMALE, ESTROGEN RECEPTOR POSITIVE (H): Primary | ICD-10-CM

## 2023-05-18 PROCEDURE — 999N000069 HC STATISTIC GENETIC COUNSELING, < 16 MIN: Mod: TEL,95 | Performed by: GENETIC COUNSELOR, MS

## 2023-05-18 NOTE — NURSING NOTE
Is the patient currently in the state of [place]? YES    Visit mode:VIDEO    If the visit is dropped, the patient can be reconnected by: VIDEO VISIT: Text to cell phone: [phone]    Will anyone else be joining the visit? NO      How would you like to obtain your AVS? Mail a copy    Are changes needed to the allergy or medication list? NO    Reason for visit: Telephone

## 2023-05-18 NOTE — PATIENT INSTRUCTIONS
Negative Genetic Test Result    Genetic Testing  Genetic testing involved a blood or saliva test which looked at the genetic information in select genes for variants associated with cancer risk.  This testing may have included analysis of a single gene due to a known variant in the family, multiple genes most associated with the cancers in a family, or an expanded panel of genes related to many types of cancers.     Results  There are several possible genetic test results, including:   Positive--a harmful mutation (also known as a  pathogenic  or  likely pathogenic  variant) was identified in a gene associated with increased cancer risk.  These risks, as well as medical management options, depend on the specific genetic variant identified.    Negative--no variants were identified in the genes analyzed   Variant of unknown significance--a variant was identified in one or more genes, though it is currently unclear how this impacts cancer risk in the family.  Genetic testing labs are working to collect evidence about these uncertain variants and may provide updates in the future.    What Does a Negative Genetic Test Result Mean?  A negative genetic test results means that no genetic changes (variants) were detected in the genes tested. While no inherited risk factors for cancer were identified, you and your family may be at risk for certain cancers due to family history, environmental factors, or other genetic causes not identified by this test.  It is also possible that your family may still be at risk of carrying a genetic risk factor which you did not inherit. Your genetic counselor can help interpret the result for you and your relatives.      It is important to note which genes were included in your test. A list of these genes can be found on your test result.    Screening Recommendations  A combination of personal and family history factors may inform cancer risk and medical management recommendations.   Population cancer screening options, such as those recommended by the American Cancer Society and the National Comprehensive Cancer Network (NCCN) are appropriate for many families at average risk for cancer.  However, earlier and/or more frequent screening may be recommended based on personal factors (lifestyle, exposures, medications, screening results), family history of cancer, and sometimes genetic factors.  These cancer risk management options should be discussed in more detail with an individual's medical providers.      Please call us if you have any questions or concerns.   Cancer Risk Management Program (Appointments: [phone])

## 2023-05-18 NOTE — LETTER
"    5/18/2023         RE: Emma Nunn  [address]  [facility] [address]        Dear Colleague,    Thank you for referring your patient, Emma Nunn, to the [facility]. Please see a copy of my visit note below.    Virtual Visit Details  Type of service:  Telephone Visit   Phone call duration: 8 minutes     5/18/2023    Referring Provider: CAROLINE Nelson    Presenting Information:  I spoke to Emma by phone today to discuss her genetic testing results. Her blood was drawn on 5/5/2023 and Common Hereditary Cancers panel testing was ordered from [facility]. This testing was done because of Emma's personal and family history of breast cancer and family history of colon cancer.    Genetic Testing Result: NEGATIVE  Emma is negative for mutations in APC, PAOLA, AXIN2, BARD1, BMPR1A, BRCA1, BRCA2, BRIP1, CDH1, CDK4, CDKN2A, CHEK2, CTNNA1, DICER1, EPCAM, GREM1, HOXB13, KIT, MEN1, MLH1, MSH2, MSH3, MSH6, MUTYH, NBN, NF1, NTHL1, PALB2, PDGFRA, PMS2, POLD1, POLE, PTEN, RAD50, RAD51C, RAD51D, SDHA, SDHB, SDHC, SDHD, SMAD4, SMARCA4, STK11, TP53, TSC1, TSC2, and VHL. No mutations were found in any of the 47 genes analyzed. Please see copy of scanned test report for complete details regarding testing methodology/limitations.    A copy of the test report can be found in the Laboratory tab, dated 5/5/2023, and named \"LABORATORY MISCELLANEOUS ORDER\". The report is scanned in as a linked document.    Interpretation:  We discussed several different interpretations of this negative test result.    One explanation may be that there is a different gene or combination of genes and environment that are associated with the cancers in this family.  It is possible that her other relatives with cancer history did have a mutation in one of the above genes, and she did not inherit it.  There is also a small possibility that there is a mutation in one of these genes, and the testing laboratory could not find " it with their current testing methods.       Screening:  Based on this negative test result, it is important for Emma and her relatives to refer back to the family history for appropriate cancer screening.    Emma should continue to follow her providers' recommendations for the follow-up for her breast cancer history.  Emma s close female relatives remain at increased risk for breast cancer given their family history. Breast cancer screening is generally recommended to begin approximately 10 years younger than the earliest age of breast cancer diagnosis in the family, or at age 40, whichever comes first. Breast screening options should be discussed with an individual's primary care provider and a genetic counselor, to determine at what age to begin screening, what screening is appropriate, and if additional screening (such as breast MRI) is necessary based on personal/family history factors.  Per National Comprehensive Cancer Network (NCCN) guidelines, individuals with a first degree relative with colorectal cancer diagnosed at any age should begin colonoscopy at age 40, or 10 years before the earliest diagnosis of colorectal cancer, whichever is first. Colonoscopy should be repeated every 5 years, or based on colonoscopy findings.  This would apply to Emma, all of her siblings, her father and paternal uncle, and maternal aunts and uncles.  These recommendations may change based on personal and family history as well as personal preference, and should be discussed with an individuals physician.      Other population cancer screening options, such as those recommended by the American Cancer Society and the National Comprehensive Cancer Network (NCCN), are also appropriate for Emma and her family. These screening recommendations may change if there are changes to Emma's personal and/or family history. Final screening recommendations should be made by each individual's managing physician.    Inheritance:  We reviewed the  autosomal dominant inheritance of mutations in these 47 genes. We discussed that Emma cannot/did not pass on an identifiable mutation in these genes to her children based on this test result.  Mutations in these genes do not skip generations.      Additional Testing Considerations:  Although Emma's genetic testing result was negative, other relatives may still carry a gene mutation associated with their personal/family history of cancer. Genetic counseling is recommended for her father and paternal uncle to discuss genetic testing options. If any of these relatives do pursue genetic testing, Emma is encouraged to contact me so that we may review the impact of their test results on her.    Summary:  We do not have an explanation for Emma's personal and family history of cancer. While no genetic changes were identified, Emma may still be at risk for certain cancers due to family history, environmental factors, or other genetic causes not identified by this test.  Because of that, it is important that she continue with cancer screening based on her personal and family history as discussed above.    Genetic testing is rapidly advancing, and new cancer susceptibility genes will most likely be identified in the future.  Therefore, I encouraged Emma to contact me annually or if there are changes in her personal or family history.  This may change how we assess her cancer risk, screening, and the testing we would offer.    Plan:  1. A copy of the test results will be mailed to Emma.  2. She plans to follow-up with her primary care providers for routine care and cancer screening.  3. She should contact me regularly, or sooner if her family history changes.    Evonne Peña MS, [facility] [place]  Licensed, Certified Genetic Counselor  [facility]  [email or web address]

## 2023-05-18 NOTE — PROGRESS NOTES
"Virtual Visit Details  Type of service:  Telephone Visit   Phone call duration: 8 minutes     5/18/2023    Referring Provider: Juliet Cuello, APRN-CNP    Presenting Information:  I spoke to Emma by phone today to discuss her genetic testing results. Her blood was drawn on 5/5/2023 and Common Hereditary Cancers panel testing was ordered from [facility]. This testing was done because of Emma's personal and family history of breast cancer and family history of colon cancer.    Genetic Testing Result: NEGATIVE  Emma is negative for mutations in APC, PAOLA, AXIN2, BARD1, BMPR1A, BRCA1, BRCA2, BRIP1, CDH1, CDK4, CDKN2A, CHEK2, CTNNA1, DICER1, EPCAM, GREM1, HOXB13, KIT, MEN1, MLH1, MSH2, MSH3, MSH6, MUTYH, NBN, NF1, NTHL1, PALB2, PDGFRA, PMS2, POLD1, POLE, PTEN, RAD50, RAD51C, RAD51D, SDHA, SDHB, SDHC, SDHD, SMAD4, SMARCA4, STK11, TP53, TSC1, TSC2, and VHL. No mutations were found in any of the 47 genes analyzed. Please see copy of scanned test report for complete details regarding testing methodology/limitations.    A copy of the test report can be found in the Laboratory tab, dated 5/5/2023, and named \"LABORATORY MISCELLANEOUS ORDER\". The report is scanned in as a linked document.    Interpretation:  We discussed several different interpretations of this negative test result.    1. One explanation may be that there is a different gene or combination of genes and environment that are associated with the cancers in this family.  2. It is possible that her other relatives with cancer history did have a mutation in one of the above genes, and she did not inherit it.  3. There is also a small possibility that there is a mutation in one of these genes, and the testing laboratory could not find it with their current testing methods.       Screening:  Based on this negative test result, it is important for Emma and her relatives to refer back to the family history for appropriate cancer screening.      Emma should continue to follow her " providers' recommendations for the follow-up for her breast cancer history.    Emma s close female relatives remain at increased risk for breast cancer given their family history. Breast cancer screening is generally recommended to begin approximately 10 years younger than the earliest age of breast cancer diagnosis in the family, or at age 40, whichever comes first. Breast screening options should be discussed with an individual's primary care provider and a genetic counselor, to determine at what age to begin screening, what screening is appropriate, and if additional screening (such as breast MRI) is necessary based on personal/family history factors.  Per National Comprehensive Cancer Network (NCCN) guidelines, individuals with a first degree relative with colorectal cancer diagnosed at any age should begin colonoscopy at age 40, or 10 years before the earliest diagnosis of colorectal cancer, whichever is first. Colonoscopy should be repeated every 5 years, or based on colonoscopy findings.  This would apply to Emma, all of her siblings, her father and paternal uncle, and maternal aunts and uncles.  These recommendations may change based on personal and family history as well as personal preference, and should be discussed with an individuals physician.        Other population cancer screening options, such as those recommended by the American Cancer Society and the National Comprehensive Cancer Network (NCCN), are also appropriate for Emma and her family. These screening recommendations may change if there are changes to Emma's personal and/or family history. Final screening recommendations should be made by each individual's managing physician.    Inheritance:  We reviewed the autosomal dominant inheritance of mutations in these 47 genes. We discussed that Emma cannot/did not pass on an identifiable mutation in these genes to her children based on this test result.  Mutations in these genes do not skip generations.       Additional Testing Considerations:  Although Emma's genetic testing result was negative, other relatives may still carry a gene mutation associated with their personal/family history of cancer. Genetic counseling is recommended for her father and paternal uncle to discuss genetic testing options. If any of these relatives do pursue genetic testing, Emma is encouraged to contact me so that we may review the impact of their test results on her.    Summary:  We do not have an explanation for Emma's personal and family history of cancer. While no genetic changes were identified, Emma may still be at risk for certain cancers due to family history, environmental factors, or other genetic causes not identified by this test.  Because of that, it is important that she continue with cancer screening based on her personal and family history as discussed above.    Genetic testing is rapidly advancing, and new cancer susceptibility genes will most likely be identified in the future.  Therefore, I encouraged Emma to contact me annually or if there are changes in her personal or family history.  This may change how we assess her cancer risk, screening, and the testing we would offer.    Plan:  1. A copy of the test results will be mailed to Emma.  2. She plans to follow-up with her primary care providers for routine care and cancer screening.  3. She should contact me regularly, or sooner if her family history changes.    Evonne Peña MS, [facility]  Licensed, Certified Genetic Counselor  [facility]  [email or web address]

## 2023-07-20 ENCOUNTER — HOSPITAL ENCOUNTER (OUTPATIENT)
Dept: MAMMOGRAPHY | Facility: CLINIC | Age: 58
Discharge: HOME | End: 2023-07-20
Attending: NURSE PRACTITIONER | Admitting: NURSE PRACTITIONER
Payer: COMMERCIAL

## 2023-07-20 ENCOUNTER — LAB (OUTPATIENT)
Dept: LAB | Facility: CLINIC | Age: 58
End: 2023-07-20
Payer: COMMERCIAL

## 2023-07-20 DIAGNOSIS — C50.412 MALIGNANT NEOPLASM OF UPPER-OUTER QUADRANT OF LEFT BREAST IN FEMALE, ESTROGEN RECEPTOR POSITIVE (H): ICD-10-CM

## 2023-07-20 DIAGNOSIS — Z17.0 MALIGNANT NEOPLASM OF UPPER-OUTER QUADRANT OF LEFT BREAST IN FEMALE, ESTROGEN RECEPTOR POSITIVE (H): ICD-10-CM

## 2023-07-20 DIAGNOSIS — Z12.31 VISIT FOR SCREENING MAMMOGRAM: ICD-10-CM

## 2023-07-20 LAB
ALBUMIN SERPL BCG-MCNC: 4.9 G/DL (ref 3.5–5.2)
ALP SERPL-CCNC: 104 U/L (ref 35–104)
ALT SERPL W P-5'-P-CCNC: 21 U/L (ref 0–50)
ANION GAP SERPL CALCULATED.3IONS-SCNC: 10 MMOL/L (ref 7–15)
AST SERPL W P-5'-P-CCNC: 16 U/L (ref 0–45)
BASOPHILS # BLD AUTO: 0.1 10E3/UL (ref 0–0.2)
BASOPHILS NFR BLD AUTO: 1 %
BILIRUB SERPL-MCNC: 0.6 MG/DL
BUN SERPL-MCNC: 14.4 MG/DL (ref 6–20)
CALCIUM SERPL-MCNC: 10.1 MG/DL (ref 8.6–10)
CHLORIDE SERPL-SCNC: 101 MMOL/L (ref 98–107)
CREAT SERPL-MCNC: 0.9 MG/DL (ref 0.51–0.95)
DEPRECATED HCO3 PLAS-SCNC: 31 MMOL/L (ref 22–29)
EOSINOPHIL # BLD AUTO: 0.2 10E3/UL (ref 0–0.7)
EOSINOPHIL NFR BLD AUTO: 3 %
ERYTHROCYTE [DISTWIDTH] IN BLOOD BY AUTOMATED COUNT: 12.2 % (ref 10–15)
GFR SERPL CREATININE-BSD FRML MDRD: 74 ML/MIN/1.73M2
GLUCOSE SERPL-MCNC: 118 MG/DL (ref 70–99)
HCT VFR BLD AUTO: 46.2 % (ref 35–47)
HGB BLD-MCNC: 15.5 G/DL (ref 11.7–15.7)
IMM GRANULOCYTES # BLD: 0 10E3/UL
IMM GRANULOCYTES NFR BLD: 0 %
LYMPHOCYTES # BLD AUTO: 1.6 10E3/UL (ref 0.8–5.3)
LYMPHOCYTES NFR BLD AUTO: 27 %
MCH RBC QN AUTO: 31.8 PG (ref 26.5–33)
MCHC RBC AUTO-ENTMCNC: 33.5 G/DL (ref 31.5–36.5)
MCV RBC AUTO: 95 FL (ref 78–100)
MONOCYTES # BLD AUTO: 0.4 10E3/UL (ref 0–1.3)
MONOCYTES NFR BLD AUTO: 8 %
NEUTROPHILS # BLD AUTO: 3.5 10E3/UL (ref 1.6–8.3)
NEUTROPHILS NFR BLD AUTO: 61 %
NRBC # BLD AUTO: 0 10E3/UL
NRBC BLD AUTO-RTO: 0 /100
PLATELET # BLD AUTO: 212 10E3/UL (ref 150–450)
POTASSIUM SERPL-SCNC: 4.3 MMOL/L (ref 3.4–5.3)
PROT SERPL-MCNC: 7.4 G/DL (ref 6.4–8.3)
RBC # BLD AUTO: 4.88 10E6/UL (ref 3.8–5.2)
SODIUM SERPL-SCNC: 142 MMOL/L (ref 136–145)
WBC # BLD AUTO: 5.8 10E3/UL (ref 4–11)

## 2023-07-20 PROCEDURE — 80053 COMPREHEN METABOLIC PANEL: CPT

## 2023-07-20 PROCEDURE — 85025 COMPLETE CBC W/AUTO DIFF WBC: CPT

## 2023-07-20 PROCEDURE — 36415 COLL VENOUS BLD VENIPUNCTURE: CPT

## 2023-07-20 PROCEDURE — 77067 SCR MAMMO BI INCL CAD: CPT
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
## 2023-07-25 ENCOUNTER — ONCOLOGY VISIT (OUTPATIENT)
Dept: ONCOLOGY | Facility: CLINIC | Age: 58
End: 2023-07-25
Payer: COMMERCIAL

## 2023-07-25 VITALS
TEMPERATURE: 98.5 F | WEIGHT: 145 LBS | HEART RATE: 72 BPM | DIASTOLIC BLOOD PRESSURE: 60 MMHG | SYSTOLIC BLOOD PRESSURE: 100 MMHG | HEIGHT: 68 IN | RESPIRATION RATE: 18 BRPM | OXYGEN SATURATION: 100 % | BODY MASS INDEX: 21.98 KG/M2

## 2023-07-25 DIAGNOSIS — C50.412 MALIGNANT NEOPLASM OF UPPER-OUTER QUADRANT OF LEFT BREAST IN FEMALE, ESTROGEN RECEPTOR POSITIVE (H): Primary | ICD-10-CM

## 2023-07-25 DIAGNOSIS — Z17.0 MALIGNANT NEOPLASM OF UPPER-OUTER QUADRANT OF LEFT BREAST IN FEMALE, ESTROGEN RECEPTOR POSITIVE (H): Primary | ICD-10-CM

## 2023-07-25 PROCEDURE — 99213 OFFICE O/P EST LOW 20 MIN: CPT | Performed by: INTERNAL MEDICINE

## 2023-07-25 ASSESSMENT — PAIN SCALES - GENERAL: PAINLEVEL: NO PAIN (0)
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
## 2023-07-26 ENCOUNTER — PATIENT OUTREACH (OUTPATIENT)
Dept: ONCOLOGY | Facility: CLINIC | Age: 58
End: 2023-07-26

## 2023-07-26 DIAGNOSIS — E03.4 HYPOTHYROIDISM DUE TO ACQUIRED ATROPHY OF THYROID: ICD-10-CM

## 2023-07-26 RX ORDER — LEVOTHYROXINE SODIUM 88 UG/1
TABLET ORAL
Qty: 90 TABLET | Refills: 0 | Status: SHIPPED | OUTPATIENT
Start: 2023-07-26 | End: 2023-10-23

## 2023-07-26 NOTE — PROGRESS NOTES
New Patient Oncology Nurse Navigator Note     Referring provider: Dr Mague Silverio     Referring Clinic/Organization: [facility]     Referred to (specialty:) Survivorship     Date Referral Received: July 26, 2023     Evaluation for:  C50.412, Z17.0 (ICD-10-CM) - Malignant neoplasm of upper-outer quadrant of left breast in female, estrogen receptor positive (H)     Writer received referral, reviewed for appropriate plan, and referral transferred to New Patient Scheduling for completion.    Patient informed New Patient Scheduling she is not due for follow up until July 2024. New Patient Schedulingh as deferred order to closer to that date.

## 2023-07-27 NOTE — NURSING NOTE
DISCHARGE PLAN:  Next appointments: See patient instruction section  Departure Mode:   Accompanied by:    minutes for nursing discharge (face to face time)     Emma Nunn is here today for oncology appt.  Patient was not seen by writing nurse at time of appointment. Pt needs to return  prn.  See patient instructions and Oncologist's Progress note for further details. Questions and concerns addressed to patient's satisfaction. Patient verbalized and demonstrated understanding of plan.  Contact information provided and patient is encouraged to call with any that arise in the interim of care.    Guera ENGEL [facility]  [phone]  7/27/2023, 1:41 PM

## 2023-08-01 ENCOUNTER — MYC MEDICAL ADVICE (OUTPATIENT)
Dept: FAMILY MEDICINE | Facility: CLINIC | Age: 58
End: 2023-08-01
Payer: COMMERCIAL

## 2023-08-01 DIAGNOSIS — F43.10 PTSD (POST-TRAUMATIC STRESS DISORDER): Primary | ICD-10-CM

## 2023-08-01 DIAGNOSIS — F41.1 GAD (GENERALIZED ANXIETY DISORDER): ICD-10-CM

## 2023-08-01 RX ORDER — VENLAFAXINE HYDROCHLORIDE 75 MG/1
75 CAPSULE, EXTENDED RELEASE ORAL DAILY
Qty: 90 CAPSULE | Refills: 1 | Status: SHIPPED | OUTPATIENT
Start: 2023-08-01 | End: 2024-04-24

## 2023-08-01 RX ORDER — VENLAFAXINE HYDROCHLORIDE 37.5 MG/1
37.5 CAPSULE, EXTENDED RELEASE ORAL DAILY
Qty: 90 CAPSULE | Refills: 1 | Status: SHIPPED | OUTPATIENT
Start: 2023-08-01 | End: 2024-04-24

## 2023-10-23 DIAGNOSIS — E03.4 HYPOTHYROIDISM DUE TO ACQUIRED ATROPHY OF THYROID: ICD-10-CM

## 2023-10-23 RX ORDER — LEVOTHYROXINE SODIUM 88 UG/1
TABLET ORAL
Qty: 90 TABLET | Refills: 0 | Status: SHIPPED | OUTPATIENT
Start: 2023-10-23 | End: 2024-01-22

## 2023-10-26 ENCOUNTER — MYC MEDICAL ADVICE (OUTPATIENT)
Dept: FAMILY MEDICINE | Facility: CLINIC | Age: 58
End: 2023-10-26
Payer: COMMERCIAL

## 2023-11-29 ENCOUNTER — MYC REFILL (OUTPATIENT)
Dept: FAMILY MEDICINE | Facility: CLINIC | Age: 58
End: 2023-11-29
Payer: COMMERCIAL

## 2023-11-29 DIAGNOSIS — F41.1 GAD (GENERALIZED ANXIETY DISORDER): ICD-10-CM

## 2023-11-29 DIAGNOSIS — F43.10 PTSD (POST-TRAUMATIC STRESS DISORDER): ICD-10-CM

## 2023-11-29 RX ORDER — VENLAFAXINE HYDROCHLORIDE 75 MG/1
75 CAPSULE, EXTENDED RELEASE ORAL DAILY
Qty: 90 CAPSULE | Refills: 1 | OUTPATIENT
Start: 2023-11-29

## 2023-11-29 RX ORDER — VENLAFAXINE HYDROCHLORIDE 37.5 MG/1
37.5 CAPSULE, EXTENDED RELEASE ORAL DAILY
Qty: 90 CAPSULE | Refills: 1 | OUTPATIENT
Start: 2023-11-29

## 2024-01-21 DIAGNOSIS — E03.4 HYPOTHYROIDISM DUE TO ACQUIRED ATROPHY OF THYROID: ICD-10-CM

## 2024-01-22 RX ORDER — LEVOTHYROXINE SODIUM 88 UG/1
TABLET ORAL
Qty: 90 TABLET | Refills: 0 | Status: SHIPPED | OUTPATIENT
Start: 2024-01-22 | End: 2024-04-24

## 2024-03-26 ENCOUNTER — OFFICE VISIT (OUTPATIENT)
Dept: FAMILY MEDICINE | Facility: CLINIC | Age: 59
End: 2024-03-26
Payer: COMMERCIAL

## 2024-03-26 VITALS
RESPIRATION RATE: 20 BRPM | WEIGHT: 142 LBS | HEIGHT: 68 IN | TEMPERATURE: 98.1 F | OXYGEN SATURATION: 96 % | SYSTOLIC BLOOD PRESSURE: 126 MMHG | DIASTOLIC BLOOD PRESSURE: 84 MMHG | HEART RATE: 108 BPM | BODY MASS INDEX: 21.52 KG/M2

## 2024-03-26 DIAGNOSIS — R05.1 ACUTE COUGH: Primary | ICD-10-CM

## 2024-03-26 PROCEDURE — 99213 OFFICE O/P EST LOW 20 MIN: CPT | Performed by: STUDENT IN AN ORGANIZED HEALTH CARE EDUCATION/TRAINING PROGRAM

## 2024-03-26 ASSESSMENT — ENCOUNTER SYMPTOMS
SHORTNESS OF BREATH: 1
HEADACHES: 1
COUGH: 1

## 2024-03-26 NOTE — PROGRESS NOTES
"  Assessment & Plan   Problem List Items Addressed This Visit    None  Visit Diagnoses       Acute cough    -  Primary           Symptoms improving now and lungs clear on exam.  Suspect viral bronchitis.  Given improvement no further testing today as I do not think it would .  Negative COVID test.  Continue with symptomatic care and she will let me know if worsening symptoms or new symptoms develop.  Can use decongestant or cough suppressant as needed.  Follow-up as needed.      Jonathon Carter is a 58 year old, presenting for the following health issues:  Cough, Shortness of Breath, and Headache        3/26/2024    11:07 AM   Additional Questions   Roomed by Monique     Cough  Associated symptoms include headaches and shortness of breath.   Shortness of Breath  Associated symptoms include coughing and headaches.   Headache   Associated symptoms include shortness of breath.      Patient with several days of symptoms that settled into her chest with no current fevers.  No chest pain or palpitations.  No new edema.  Does have sick contacts with no other positive testing she is aware of.  No significant GI symptoms.  Does have some congestion.  Slightly better now but patient concern for pneumonia and wanted evaluation.        Review of Systems  Constitutional, neuro, ENT, endocrine, pulmonary, cardiac, gastrointestinal, genitourinary, musculoskeletal, integument and psychiatric systems are negative, except as otherwise noted.      Objective    /84   Pulse 108   Temp 98.1  F (36.7  C) (Temporal)   Resp 20   Ht 1.715 m (5' 7.5\")   Wt 64.4 kg (142 lb)   SpO2 96%   BMI 21.91 kg/m    Body mass index is 21.91 kg/m .  Physical Exam   GENERAL: alert and no distress  EYES: Eyes grossly normal to inspection, PERRL and conjunctivae and sclerae normal  HENT: ear canals and TM's normal, nose and mouth without ulcers or lesions  NECK: no adenopathy, no asymmetry, masses, or scars  RESP: lungs clear to " auscultation - no rales, rhonchi or wheezes  CV: regular rate and rhythm, normal S1 S2, no murmur, click or rub, no peripheral edema  ABDOMEN: soft, nontender, no hepatosplenomegaly, no masses and bowel sounds normal  MS: no gross musculoskeletal defects noted, no edema  SKIN: no suspicious lesions or rashes  NEURO: Normal strength and tone, mentation intact and speech normal  PSYCH: mentation appears normal, affect normal/bright          Signed Electronically by: Jake Quesada MD

## 2024-04-05 ENCOUNTER — PATIENT OUTREACH (OUTPATIENT)
Dept: CARE COORDINATION | Facility: CLINIC | Age: 59
End: 2024-04-05
Payer: COMMERCIAL

## 2024-04-19 ENCOUNTER — PATIENT OUTREACH (OUTPATIENT)
Dept: CARE COORDINATION | Facility: CLINIC | Age: 59
End: 2024-04-19
Payer: COMMERCIAL

## 2024-04-24 DIAGNOSIS — F41.1 GAD (GENERALIZED ANXIETY DISORDER): ICD-10-CM

## 2024-04-24 DIAGNOSIS — E03.4 HYPOTHYROIDISM DUE TO ACQUIRED ATROPHY OF THYROID: ICD-10-CM

## 2024-04-24 DIAGNOSIS — F43.10 PTSD (POST-TRAUMATIC STRESS DISORDER): ICD-10-CM

## 2024-04-24 RX ORDER — VENLAFAXINE HYDROCHLORIDE 37.5 MG/1
37.5 CAPSULE, EXTENDED RELEASE ORAL DAILY
Qty: 90 CAPSULE | Refills: 1 | Status: SHIPPED | OUTPATIENT
Start: 2024-04-24 | End: 2024-07-09

## 2024-04-24 RX ORDER — LEVOTHYROXINE SODIUM 88 UG/1
TABLET ORAL
Qty: 90 TABLET | Refills: 0 | Status: SHIPPED | OUTPATIENT
Start: 2024-04-24 | End: 2024-07-09

## 2024-04-24 RX ORDER — VENLAFAXINE HYDROCHLORIDE 75 MG/1
75 CAPSULE, EXTENDED RELEASE ORAL DAILY
Qty: 90 CAPSULE | Refills: 1 | Status: SHIPPED | OUTPATIENT
Start: 2024-04-24 | End: 2024-07-09

## 2024-05-28 ENCOUNTER — MYC MEDICAL ADVICE (OUTPATIENT)
Dept: FAMILY MEDICINE | Facility: CLINIC | Age: 59
End: 2024-05-28
Payer: COMMERCIAL

## 2024-05-28 DIAGNOSIS — E03.4 HYPOTHYROIDISM DUE TO ACQUIRED ATROPHY OF THYROID: ICD-10-CM

## 2024-05-29 RX ORDER — LEVOTHYROXINE SODIUM 88 UG/1
88 TABLET ORAL DAILY
Qty: 90 TABLET | Refills: 0 | OUTPATIENT
Start: 2024-05-29

## 2024-06-16 ENCOUNTER — MYC MEDICAL ADVICE (OUTPATIENT)
Dept: FAMILY MEDICINE | Facility: CLINIC | Age: 59
End: 2024-06-16
Payer: COMMERCIAL

## 2024-06-16 DIAGNOSIS — E03.4 HYPOTHYROIDISM DUE TO ACQUIRED ATROPHY OF THYROID: Primary | ICD-10-CM

## 2024-06-16 DIAGNOSIS — Z13.6 CARDIOVASCULAR SCREENING; LDL GOAL LESS THAN 160: ICD-10-CM

## 2024-06-16 SDOH — HEALTH STABILITY: PHYSICAL HEALTH: ON AVERAGE, HOW MANY DAYS PER WEEK DO YOU ENGAGE IN MODERATE TO STRENUOUS EXERCISE (LIKE A BRISK WALK)?: 5 DAYS

## 2024-06-16 SDOH — HEALTH STABILITY: PHYSICAL HEALTH: ON AVERAGE, HOW MANY MINUTES DO YOU ENGAGE IN EXERCISE AT THIS LEVEL?: 20 MIN

## 2024-06-16 ASSESSMENT — SOCIAL DETERMINANTS OF HEALTH (SDOH): HOW OFTEN DO YOU GET TOGETHER WITH FRIENDS OR RELATIVES?: ONCE A WEEK

## 2024-06-18 NOTE — TELEPHONE ENCOUNTER
Patient Well Exam is scheduled for July 9th, she would like to come in ahead of time to have her lab work done.  Please place orders if you agree with request and let patient know.    Charlotte MATTHEWSO/

## 2024-06-20 ENCOUNTER — PATIENT OUTREACH (OUTPATIENT)
Dept: CARE COORDINATION | Facility: CLINIC | Age: 59
End: 2024-06-20

## 2024-07-01 ENCOUNTER — LAB (OUTPATIENT)
Dept: LAB | Facility: CLINIC | Age: 59
End: 2024-07-01
Payer: COMMERCIAL

## 2024-07-01 ENCOUNTER — NURSE TRIAGE (OUTPATIENT)
Dept: FAMILY MEDICINE | Facility: CLINIC | Age: 59
End: 2024-07-01

## 2024-07-01 DIAGNOSIS — Z13.6 CARDIOVASCULAR SCREENING; LDL GOAL LESS THAN 160: ICD-10-CM

## 2024-07-01 DIAGNOSIS — E03.4 HYPOTHYROIDISM DUE TO ACQUIRED ATROPHY OF THYROID: ICD-10-CM

## 2024-07-01 LAB
ALBUMIN SERPL BCG-MCNC: 4.5 G/DL (ref 3.5–5.2)
ALP SERPL-CCNC: 111 U/L (ref 40–150)
ALT SERPL W P-5'-P-CCNC: 23 U/L (ref 0–50)
ANION GAP SERPL CALCULATED.3IONS-SCNC: 8 MMOL/L (ref 7–15)
AST SERPL W P-5'-P-CCNC: 26 U/L (ref 0–45)
BILIRUB SERPL-MCNC: 0.4 MG/DL
BUN SERPL-MCNC: 9.4 MG/DL (ref 6–20)
CALCIUM SERPL-MCNC: 9.8 MG/DL (ref 8.6–10)
CHLORIDE SERPL-SCNC: 108 MMOL/L (ref 98–107)
CHOLEST SERPL-MCNC: 137 MG/DL
CREAT SERPL-MCNC: 0.78 MG/DL (ref 0.51–0.95)
DEPRECATED HCO3 PLAS-SCNC: 30 MMOL/L (ref 22–29)
EGFRCR SERPLBLD CKD-EPI 2021: 88 ML/MIN/1.73M2
FASTING STATUS PATIENT QL REPORTED: YES
FASTING STATUS PATIENT QL REPORTED: YES
GLUCOSE SERPL-MCNC: 97 MG/DL (ref 70–99)
HDLC SERPL-MCNC: 67 MG/DL
LDLC SERPL CALC-MCNC: 58 MG/DL
NONHDLC SERPL-MCNC: 70 MG/DL
POTASSIUM SERPL-SCNC: 4.1 MMOL/L (ref 3.4–5.3)
PROT SERPL-MCNC: 7.1 G/DL (ref 6.4–8.3)
SODIUM SERPL-SCNC: 146 MMOL/L (ref 135–145)
TRIGL SERPL-MCNC: 61 MG/DL
TSH SERPL DL<=0.005 MIU/L-ACNC: 0.98 UIU/ML (ref 0.3–4.2)

## 2024-07-01 PROCEDURE — 80053 COMPREHEN METABOLIC PANEL: CPT

## 2024-07-01 PROCEDURE — 80061 LIPID PANEL: CPT

## 2024-07-01 PROCEDURE — 36415 COLL VENOUS BLD VENIPUNCTURE: CPT

## 2024-07-01 PROCEDURE — 84443 ASSAY THYROID STIM HORMONE: CPT

## 2024-07-01 NOTE — TELEPHONE ENCOUNTER
"Pt calling with breast pain that has been going on for 2 months    Pt is wanting a mammogram but when calling to set up appointment scheduling stated she needed to talk with her PCP about pain    Pt denies lumps in breast, no rash or fever. No discharge, change in shape or dimpling of skin     Pt states she thinks it is just tissue. But wants a mammogram to be sure     Wanting a diagnostics mammogram instead?     Lenora Cabral RN         Reason for Disposition   Breast pain and cause is not known    Additional Information   Negative: Chest pain   Negative: Breastfeeding questions about baby   Negative: Breastfeeding questions about mother (breast symptoms or feeling sick)   Negative: Breastfeeding questions about mother's medicines and diet   Negative: Postpartum breast pain and swelling, not breastfeeding   Negative: Small spot, skin growth or mole   Negative: SEVERE breast pain and fever > 103 F  (39.4 C)   Negative: Patient sounds very sick or weak to the triager   Negative: Breast looks infected (spreading redness, feels hot or painful to touch) and fever   Negative: Breast looks infected (spreading redness, feels hot or painful to touch) and no fever   Negative: Painful rash and multiple small blisters grouped together (i.e., dermatomal distribution or \"band\" or \"stripe\")   Negative: Cuts, burns, or bruises of breasts and suspicious history for the injury   Negative: Breast lump   Negative: Nipple discharge and bloody   Negative: Nipple is inverted (i.e., points inward)  (Exception: Long-term physical characteristic, present for many years.)   Negative: Dry flaking-peeling skin of nipple   Negative: Change in shape or appearance of breast   Negative: Dimpling of skin of breast (i.e., skin looks like the outside of an orange peel)   Negative: Patient wants to be seen    Protocols used: Breast Symptoms-A-OH    "

## 2024-07-02 NOTE — TELEPHONE ENCOUNTER
"Called patient back, offered appointment in [place] for today 7/2/24.    Patient declines appointment today, states she will wait to see Dr. Quesada on 7/9/24.    She states that she really just wants blood work done and an order for the mammogram. She states that when she went to schedule her routine mammogram, she answered \"yes\" to breast pain, so she was not able to schedule and needs an order from a doctor.     She was advised to return call or seek urgent/emergency care for any new or worsening symptoms. She verbalized understanding and had no further questions or concerns.     Caty Carey, ELIZABETHN, RN      "

## 2024-07-09 ENCOUNTER — OFFICE VISIT (OUTPATIENT)
Dept: FAMILY MEDICINE | Facility: CLINIC | Age: 59
End: 2024-07-09
Payer: COMMERCIAL

## 2024-07-09 VITALS
WEIGHT: 139.5 LBS | TEMPERATURE: 97.1 F | SYSTOLIC BLOOD PRESSURE: 118 MMHG | DIASTOLIC BLOOD PRESSURE: 60 MMHG | HEIGHT: 68 IN | HEART RATE: 80 BPM | RESPIRATION RATE: 16 BRPM | OXYGEN SATURATION: 100 % | BODY MASS INDEX: 21.14 KG/M2

## 2024-07-09 DIAGNOSIS — Z17.0 MALIGNANT NEOPLASM OF UPPER-OUTER QUADRANT OF LEFT BREAST IN FEMALE, ESTROGEN RECEPTOR POSITIVE (H): ICD-10-CM

## 2024-07-09 DIAGNOSIS — F43.10 PTSD (POST-TRAUMATIC STRESS DISORDER): ICD-10-CM

## 2024-07-09 DIAGNOSIS — Z80.0 FAMILY HISTORY OF COLON CANCER: ICD-10-CM

## 2024-07-09 DIAGNOSIS — Z00.00 ROUTINE GENERAL MEDICAL EXAMINATION AT A HEALTH CARE FACILITY: Primary | ICD-10-CM

## 2024-07-09 DIAGNOSIS — F41.1 GAD (GENERALIZED ANXIETY DISORDER): ICD-10-CM

## 2024-07-09 DIAGNOSIS — Z80.8 FAMILY HISTORY OF MALIGNANT MELANOMA: ICD-10-CM

## 2024-07-09 DIAGNOSIS — Z80.3 FAMILY HISTORY OF MALIGNANT NEOPLASM OF BREAST: ICD-10-CM

## 2024-07-09 DIAGNOSIS — C50.412 MALIGNANT NEOPLASM OF UPPER-OUTER QUADRANT OF LEFT BREAST IN FEMALE, ESTROGEN RECEPTOR POSITIVE (H): ICD-10-CM

## 2024-07-09 DIAGNOSIS — N64.4 BREAST PAIN, LEFT: ICD-10-CM

## 2024-07-09 DIAGNOSIS — E03.4 HYPOTHYROIDISM DUE TO ACQUIRED ATROPHY OF THYROID: ICD-10-CM

## 2024-07-09 PROCEDURE — G0439 PPPS, SUBSEQ VISIT: HCPCS | Performed by: STUDENT IN AN ORGANIZED HEALTH CARE EDUCATION/TRAINING PROGRAM

## 2024-07-09 PROCEDURE — 99214 OFFICE O/P EST MOD 30 MIN: CPT | Mod: 25 | Performed by: STUDENT IN AN ORGANIZED HEALTH CARE EDUCATION/TRAINING PROGRAM

## 2024-07-09 RX ORDER — LEVOTHYROXINE SODIUM 88 UG/1
88 TABLET ORAL DAILY
Qty: 90 TABLET | Refills: 3 | Status: SHIPPED | OUTPATIENT
Start: 2024-07-09

## 2024-07-09 RX ORDER — VENLAFAXINE HYDROCHLORIDE 37.5 MG/1
37.5 CAPSULE, EXTENDED RELEASE ORAL DAILY
Qty: 90 CAPSULE | Refills: 3 | Status: SHIPPED | OUTPATIENT
Start: 2024-07-09

## 2024-07-09 RX ORDER — VENLAFAXINE HYDROCHLORIDE 75 MG/1
75 CAPSULE, EXTENDED RELEASE ORAL DAILY
Qty: 90 CAPSULE | Refills: 3 | Status: SHIPPED | OUTPATIENT
Start: 2024-07-09

## 2024-07-09 SDOH — HEALTH STABILITY: PHYSICAL HEALTH: ON AVERAGE, HOW MANY MINUTES DO YOU ENGAGE IN EXERCISE AT THIS LEVEL?: 20 MIN

## 2024-07-09 SDOH — HEALTH STABILITY: PHYSICAL HEALTH: ON AVERAGE, HOW MANY DAYS PER WEEK DO YOU ENGAGE IN MODERATE TO STRENUOUS EXERCISE (LIKE A BRISK WALK)?: 5 DAYS

## 2024-07-09 ASSESSMENT — SOCIAL DETERMINANTS OF HEALTH (SDOH): HOW OFTEN DO YOU GET TOGETHER WITH FRIENDS OR RELATIVES?: ONCE A WEEK

## 2024-07-09 ASSESSMENT — PAIN SCALES - GENERAL: PAINLEVEL: MILD PAIN (2)

## 2024-07-09 NOTE — PROGRESS NOTES
Preventive Care Visit  [facility]  Jake Quesada MD, Family Medicine  Jul 9, 2024      Assessment & Plan   Problem List Items Addressed This Visit          Endocrine    Hypothyroidism    Relevant Medications    levothyroxine (SYNTHROID/LEVOTHROID) 88 MCG tablet       Behavioral    MEG (generalized anxiety disorder)    Relevant Medications    venlafaxine (EFFEXOR XR) 37.5 MG 24 hr capsule    venlafaxine (EFFEXOR XR) 75 MG 24 hr capsule    PTSD (post-traumatic stress disorder)    Relevant Medications    venlafaxine (EFFEXOR XR) 37.5 MG 24 hr capsule    venlafaxine (EFFEXOR XR) 75 MG 24 hr capsule       Other    Malignant neoplasm of female breast (H)     Other Visit Diagnoses       Routine general medical examination at a health care facility    -  Primary    Breast pain, left        Relevant Orders    MA Diagnostic Digital Bilateral    US Breast Bilateral Limited 1-3 Quadrants    Family history of malignant neoplasm of breast        Family history of colon cancer        Family history of malignant melanoma               Patient doing very well and age-appropriate screening immunization discussed.  Mood stable.  Will need repeat Pap smear next year and colonoscopy in 2027.  Lateral breast pain does seem consistent with musculoskeletal discomfort and no lumps noted on exam but diagnostic mammogram ordered given current symptoms and history.  Thyroid and lipids stable.  Continue current doses.  Consider physical therapy for stretching program with negative imaging.    Patient has been advised of split billing requirements and indicates understanding: Yes       Counseling  Appropriate preventive services were discussed with this patient, including applicable screening as appropriate for fall prevention, nutrition, physical activity, Tobacco-use cessation, weight loss and cognition.  Checklist reviewing preventive services available has been given to the patient.  Reviewed patient's  diet, addressing concerns and/or questions.   She is at risk for psychosocial distress and has been provided with information to reduce risk.         Jonathon Carter is a 58 year old, presenting for the following:  Physical        7/9/2024     7:26 AM   Additional Questions   Roomed by Aranza HOLLIS         7/9/2024     7:26 AM   Patient Reported Additional Medications   Patient reports taking the following new medications vitamin b 12        Health Care Directive  Patient does not have a Health Care Directive or Living Will: Patient states has Advance Directive and will bring in a copy to clinic.    HPI    Hx of breast cancer dx in 2012 s/p chemo, radiation and lumpectomy with 6 years of hormone therapy completed. Notes some discomfort into left breast without injury or change in activity. It is there all the time but touching it makes it more sensitive. No lumps she is aware of or lymph nodes.  No discharge, rash or irritation. No new bras or clothing out of the ordinary. Last mammogram one year ago.         7/9/2024   General Health   How would you rate your overall physical health? Good   Feel stress (tense, anxious, or unable to sleep) Only a little      (!) STRESS CONCERN      7/9/2024   Nutrition   Three or more servings of calcium each day? (!) NO   Diet: Breakfast skipped   How many servings of fruit and vegetables per day? (!) 2-3   How many sweetened beverages each day? 0-1            7/9/2024   Exercise   Days per week of moderate/strenous exercise 5 days   Average minutes spent exercising at this level 20 min            7/9/2024   Social Factors   Frequency of gathering with friends or relatives Once a week   Worry food won't last until get money to buy more No   Food not last or not have enough money for food? No   Do you have housing? (Housing is defined as stable permanent housing and does not include staying ouside in a car, in a tent, in an abandoned building, in an overnight shelter, or  couch-surfing.) Yes   Are you worried about losing your housing? No   Lack of transportation? No   Unable to get utilities (heat,electricity)? No            7/9/2024   Fall Risk   Fallen 2 or more times in the past year? Yes   Trouble with walking or balance? No   Reason Gait Speed Test Not Completed Patient declines   Reason for decline dizzy occasiomnally             7/9/2024   Dental   Dentist two times every year? Yes            6/16/2024   TB Screening   Were you born outside of the [place]? No            Today's PHQ-2 Score:       7/9/2024     7:25 AM   PHQ-2 ( 1999 [employer])   Q1: Little interest or pleasure in doing things 1   Q2: Feeling down, depressed or hopeless 0   PHQ-2 Score 1   Q1: Little interest or pleasure in doing things Several days   Q2: Feeling down, depressed or hopeless Not at all   PHQ-2 Score 1           7/9/2024   Substance Use   Alcohol more than 3/day or more than 7/wk No   Do you use any other substances recreationally? No        Social History     Tobacco Use    Smoking status: Never    Smokeless tobacco: Never   Vaping Use    Vaping status: Never Used   Substance Use Topics    Alcohol use: Not Currently    Drug use: No           7/20/2023   LAST FHS-7 RESULTS   1st degree relative breast or ovarian cancer No   Any relative bilateral breast cancer No   Any male have breast cancer No   Any ONE woman have BOTH breast AND ovarian cancer No   Any woman with breast cancer before 50yrs No   2 or more relatives with breast AND/OR ovarian cancer No   2 or more relatives with breast AND/OR bowel cancer No           Mammogram yearly with her hx and age. Diagnostic now because of syptoms.         7/9/2024   STI Screening   New sexual partner(s) since last STI/HIV test? No        History of abnormal Pap smear: No - age 30- 64 PAP with HPV every 5 years recommended        Latest Ref Rng & Units 12/30/2020     9:00 AM 12/30/2020     8:41 AM 11/25/2016     3:00 PM   PAP / HPV   PAP (Historical)   NIL      HPV 16 DNA NEG^Negative Negative   Negative    HPV 18 DNA NEG^Negative Negative   Negative    Other HR HPV NEG^Negative Negative   Negative      ASCVD Risk   The 10-year ASCVD risk score (Julissa ZAMORA, et al., 2019) is: 1.4%    Values used to calculate the score:      Age: 58 years      Sex: Female      Is Non- : No      Diabetic: No      Tobacco smoker: No      Systolic Blood Pressure: 118 mmHg      Is BP treated: No      HDL Cholesterol: 67 mg/dL      Total Cholesterol: 137 mg/dL    Fracture Risk Assessment Tool  Link to Frax Calculator  Use the information below to complete the Frax calculator  : 1965  Sex: female  Weight (kg): 63.3 kg (actual weight)  Height (cm): 172.1 cm  Previous Fragility Fracture:  No  History of parent with fractured hip:  No  Current Smoking:  No  Patient has been on glucocorticoids for more than 3 months (5mg/day or more): No  Rheumatoid Arthritis on Problem List:  No  Secondary Osteoporosis on Problem List:  No  Consumes 3 or more units of alcohol per day: No  Femoral Neck BMD (g/cm2)           Reviewed and updated as needed this visit by Provider     Meds                Past Medical History:   Diagnosis Date    Breast cancer (H) 2013    Lumpectomy, chemo and radiation.      Closed head injury 2008    Concussion with loss of consciousness, unspecified duration, subsequent encounter 05/10/2017    Diffuse cystic mastopathy     Fibrocystic breast disease    Endometriosis 1993    External hemorrhoids without mention of complication     Unspecified hypothyroidism      Past Surgical History:   Procedure Laterality Date    BREAST BIOPSY, CORE RT/LT Left 2013    cancer    COLONOSCOPY N/A 2014    normal, repeat 5 years due to family risk    COLONOSCOPY N/A 2022    Procedure: COLONOSCOPY;  Surgeon: Heath Watson MD;  Location:  GI    LAPAROSCOPIC CHOLECYSTECTOMY N/A 2017    Procedure: LAPAROSCOPIC CHOLECYSTECTOMY;   "Laparoscopic Cholecystectomy;  Surgeon: Jason Munoz MD;  Location: [facility] OR    LAPAROSCOPY      for endometriosis    LUMPECTOMY BREAST Left     w/ radiation & chemo    LUMPECTOMY BREAST WITH SENTINEL NODE, COMBINED  2013    Procedure: COMBINED LUMPECTOMY BREAST WITH SENTINEL NODE;  Left Breast Lumpectomy with Austin Node Biopsy;  Surgeon: Jason Munoz MD;  Location: [facility] OR    ZZC NONSPECIFIC PROCEDURE      Outpatient surgery for anal fissure     OB History    Para Term  AB Living   3 2 2 0 1 2   SAB IAB Ectopic Multiple Live Births   0 0 1 0 0      # Outcome Date GA Lbr Jeff/2nd Weight Sex Type Anes PTL Lv   3 Term            2 Term            1 Ectopic               Obstetric Comments   Vaginal         Review of Systems  Constitutional, HEENT, cardiovascular, pulmonary, GI, , musculoskeletal, neuro, skin, endocrine and psych systems are negative, except as otherwise noted.     Objective    Exam  /60 (BP Location: Left arm, Patient Position: Chair)   Pulse 80   Temp 97.1  F (36.2  C) (Temporal)   Resp 16   Ht 1.721 m (5' 7.75\")   Wt 63.3 kg (139 lb 8 oz)   SpO2 100%   BMI 21.37 kg/m     Estimated body mass index is 21.37 kg/m  as calculated from the following:    Height as of this encounter: 1.721 m (5' 7.75\").    Weight as of this encounter: 63.3 kg (139 lb 8 oz).    Physical Exam  GENERAL: alert and no distress  EYES: Eyes grossly normal to inspection, PERRL and conjunctivae and sclerae normal  HENT: ear canals and TM's normal, nose and mouth without ulcers or lesions  NECK: no adenopathy, no asymmetry, masses, or scars  RESP: lungs clear to auscultation - no rales, rhonchi or wheezes  CV: regular rate and rhythm, normal S1 S2, no S3 or S4, no murmur, click or rub, no peripheral edema  Breast: Left lateral breast tenderness without mass noted, no axillary lymphadenopathy and tenderness does follow along her jaimes.   ABDOMEN: soft, nontender, no " hepatosplenomegaly, no masses and bowel sounds normal  MS: no gross musculoskeletal defects noted, no edema  SKIN: no suspicious lesions or rashes  NEURO: Normal strength and tone, mentation intact and speech normal  PSYCH: mentation appears normal, affect normal/bright    Aranza present for exam    Signed Electronically by: Jake Quesada MD

## 2024-07-09 NOTE — PATIENT INSTRUCTIONS
Patient Education   Preventive Care Advice   This is general advice given by our system to help you stay healthy. However, your care team may have specific advice just for you. Please talk to your care team about your preventive care needs.  Nutrition  Eat 5 or more servings of fruits and vegetables each day.  Try wheat bread, brown rice and whole grain pasta (instead of white bread, rice, and pasta).  Get enough calcium and vitamin D. Check the label on foods and aim for 100% of the RDA (recommended daily allowance).  Lifestyle  Exercise at least 150 minutes each week  (30 minutes a day, 5 days a week).  Do muscle strengthening activities 2 days a week. These help control your weight and prevent disease.  No smoking.  Wear sunscreen to prevent skin cancer.  Have a dental exam and cleaning every 6 months.  Yearly exams  See your health care team every year to talk about:  Any changes in your health.  Any medicines your care team has prescribed.  Preventive care, family planning, and ways to prevent chronic diseases.  Shots (vaccines)   HPV shots (up to age 26), if you've never had them before.  Hepatitis B shots (up to age 59), if you've never had them before.  COVID-19 shot: Get this shot when it's due.  Flu shot: Get a flu shot every year.  Tetanus shot: Get a tetanus shot every 10 years.  Pneumococcal, hepatitis A, and RSV shots: Ask your care team if you need these based on your risk.  Shingles shot (for age 50 and up)  General health tests  Diabetes screening:  Starting at age 35, Get screened for diabetes at least every 3 years.  If you are younger than age 35, ask your care team if you should be screened for diabetes.  Cholesterol test: At age 39, start having a cholesterol test every 5 years, or more often if advised.  Bone density scan (DEXA): At age 50, ask your care team if you should have this scan for osteoporosis (brittle bones).  Hepatitis C: Get tested at least once in your life.  STIs (sexually  transmitted infections)  Before age 24: Ask your care team if you should be screened for STIs.  After age 24: Get screened for STIs if you're at risk. You are at risk for STIs (including HIV) if:  You are sexually active with more than one person.  You don't use condoms every time.  You or a partner was diagnosed with a sexually transmitted infection.  If you are at risk for HIV, ask about PrEP medicine to prevent HIV.  Get tested for HIV at least once in your life, whether you are at risk for HIV or not.  Cancer screening tests  Cervical cancer screening: If you have a cervix, begin getting regular cervical cancer screening tests starting at age 21.  Breast cancer scan (mammogram): If you've ever had breasts, begin having regular mammograms starting at age 40. This is a scan to check for breast cancer.  Colon cancer screening: It is important to start screening for colon cancer at age 45.  Have a colonoscopy test every 10 years (or more often if you're at risk) Or, ask your provider about stool tests like a FIT test every year or Cologuard test every 3 years.  To learn more about your testing options, visit:   .  For help making a decision, visit:   https://bit.ly/tl95205.  Prostate cancer screening test: If you have a prostate, ask your care team if a prostate cancer screening test (PSA) at age 55 is right for you.  Lung cancer screening: If you are a current or former smoker ages 50 to 80, ask your care team if ongoing lung cancer screenings are right for you.  For informational purposes only. Not to replace the advice of your health care provider. Copyright   2023 [facility]. All rights reserved. Clinically reviewed by the [facility] Program. [employer] [id number] - REV 01/24.  Preventing Falls: Care Instructions  Injuries and health problems such as trouble walking or poor eyesight can increase your risk of falling. So can some medicines. But there are things you can do to help  "prevent falls. You can exercise to get stronger. You can also arrange your home to make it safer.    Talk to your doctor about the medicines you take. Ask if any of them increase the risk of falls and whether they can be changed or stopped.   Try to exercise regularly. It can help improve your strength and balance. This can help lower your risk of falling.     Practice fall safety and prevention.    Wear low-heeled shoes that fit well and give your feet good support. Talk to your doctor if you have foot problems that make this hard.  Carry a cellphone or wear a medical alert device that you can use to call for help.  Use stepladders instead of chairs to reach high objects. Don't climb if you're at risk for falls. Ask for help, if needed.  Wear the correct eyeglasses, if you need them.    Make your home safer.    Remove rugs, cords, clutter, and furniture from walkways.  Keep your house well lit. Use night-lights in hallways and bathrooms.  Install and use sturdy handrails on stairways.  Wear nonskid footwear, even inside. Don't walk barefoot or in socks without shoes.    Be safe outside.    Use handrails, curb cuts, and ramps whenever possible.  Keep your hands free by using a shoulder bag or backpack.  Try to walk in well-lit areas. Watch out for uneven ground, changes in pavement, and debris.  Be careful in the winter. Walk on the grass or gravel when sidewalks are slippery. Use de-icer on steps and walkways. Add non-slip devices to shoes.    Put grab bars and nonskid mats in your shower or tub and near the toilet. Try to use a shower chair or bath bench when bathing.   Get into a tub or shower by putting in your weaker leg first. Get out with your strong side first. Have a phone or medical alert device in the bathroom with you.   Where can you learn more?  Go to https://www.[employer].net/patiented  Enter G117 in the search box to learn more about \"Preventing Falls: Care Instructions.\"  Current as of: July 17, " 2023               Content Version: 14.0    6587-8342 [employer].   Care instructions adapted under license by your healthcare professional. If you have questions about a medical condition or this instruction, always ask your healthcare professional. [employer] disclaims any warranty or liability for your use of this information.

## 2024-07-16 ENCOUNTER — HOSPITAL ENCOUNTER (OUTPATIENT)
Dept: ULTRASOUND IMAGING | Facility: CLINIC | Age: 59
Discharge: HOME | End: 2024-07-16
Attending: STUDENT IN AN ORGANIZED HEALTH CARE EDUCATION/TRAINING PROGRAM
Payer: COMMERCIAL

## 2024-07-16 ENCOUNTER — HOSPITAL ENCOUNTER (OUTPATIENT)
Dept: MAMMOGRAPHY | Facility: CLINIC | Age: 59
Discharge: HOME | End: 2024-07-16
Attending: STUDENT IN AN ORGANIZED HEALTH CARE EDUCATION/TRAINING PROGRAM
Payer: COMMERCIAL

## 2024-07-16 DIAGNOSIS — N64.4 BREAST PAIN, LEFT: ICD-10-CM

## 2024-07-16 PROCEDURE — 76642 ULTRASOUND BREAST LIMITED: CPT | Mod: 50

## 2024-07-16 PROCEDURE — 77062 BREAST TOMOSYNTHESIS BI: CPT

## 2024-09-23 ENCOUNTER — E-VISIT (OUTPATIENT)
Dept: FAMILY MEDICINE | Facility: CLINIC | Age: 59
End: 2024-09-23
Payer: COMMERCIAL

## 2024-09-23 DIAGNOSIS — R21 RASH: Primary | ICD-10-CM

## 2024-09-23 PROCEDURE — 99207 PR NON-BILLABLE SERV PER CHARTING: CPT | Performed by: STUDENT IN AN ORGANIZED HEALTH CARE EDUCATION/TRAINING PROGRAM

## 2024-09-24 NOTE — PATIENT INSTRUCTIONS
Dear Emma Nunn?     After reviewing your responses, I am unable to make a diagnosis that can be treated online.    You will not be charged for this eVisit.     We are dedicated to helping you achieve your best health and would like to see you in one of our many clinic locations - a primary care provider would be ideal for your concern.    Please use [employer] to schedule a visit with a provider or call [phone]) to schedule at any of our locations.    Thanks for choosing?us?as your health care partner,?   ?   Jake Quesada MD?

## 2024-09-25 ENCOUNTER — NURSE TRIAGE (OUTPATIENT)
Dept: FAMILY MEDICINE | Facility: CLINIC | Age: 59
End: 2024-09-25
Payer: COMMERCIAL

## 2024-09-25 NOTE — TELEPHONE ENCOUNTER
Patient calling regarding bijuit submitted 9/23. She notes that she has been having hives all over that ranges from mild-moderate x1-2 months. She notes that at times she feels that hands/feet also slightly burn/itch off and on. Patient denies any other symptoms.     Patient notes she was seen last year by PCP for this as well and was told to take benadryl and has tried steroids with no improvement.     RN reviewed red flag symptoms with patient and when to see emergency care. They agreed and understood.     Patient scheduled 9/26 per protocol.     Future Appointments 9/25/2024 - 3/24/2025        Date Visit Type Length Department Provider     9/26/2024 11:00 AM OFFICE VISIT 30 min  FAMILY PRACTICE Maria Luisa Tapia PA-C    Location Instructions:     [facility] is located at [address], within [facility]. This is near the [address] exit off of [address]. Turn north off the exit, then west onto [address]. Park in the [place]. The [facility] and [facility] center share a main entrance and .                    KATH Donaldson, RN       Reason for Disposition   Hives persist > 1 week    Additional Information   Negative: Difficulty breathing or wheezing now   Negative: Rapid onset of swollen tongue   Negative: Rapid onset of hoarseness or cough   Negative: Very weak (can't stand)   Negative: Difficult to awaken or acting confused (e.g., disoriented, slurred speech)   Negative: Life-threatening reaction (anaphylaxis) in the past to similar substance (e.g., food, insect bite/sting, chemical, etc.) and < 2 hours since exposure   Negative: Sounds like a life-threatening emergency to the triager   Negative: Bee, wasp, or yellow jacket sting within last 24 hours   Negative: Taking a new medicine now or within last 3 days  (Exception: Antihistamine, decongestant or other OTC cough/cold medicines.)   Negative: Doesn't  match the SYMPTOMS of hives   Negative: Swollen tongue   Negative: Widespread hives, itching, or facial swelling and onset < 2 hours of exposure to high-risk allergen (e.g., 1st dose of antibiotic, nuts, sting)   Negative: Patient sounds very sick or weak to the triager   Negative: MODERATE-SEVERE hives persist (i.e., hives interfere with normal activities or work) and taking antihistamine (e.g., Benadryl, Claritin) > 24 hours   Negative: Hives have become worse and taking oral steroids (e.g., prednisone) > 24 hours   Negative: Abdominal pain   Negative: Joint swelling   Negative: Fever   Negative: Patient wants to be seen    Protocols used: Hives-A-[place]

## 2024-09-26 ENCOUNTER — OFFICE VISIT (OUTPATIENT)
Dept: FAMILY MEDICINE | Facility: CLINIC | Age: 59
End: 2024-09-26
Payer: COMMERCIAL

## 2024-09-26 VITALS
DIASTOLIC BLOOD PRESSURE: 58 MMHG | OXYGEN SATURATION: 97 % | BODY MASS INDEX: 22.07 KG/M2 | HEIGHT: 67 IN | HEART RATE: 87 BPM | WEIGHT: 140.6 LBS | RESPIRATION RATE: 18 BRPM | SYSTOLIC BLOOD PRESSURE: 112 MMHG

## 2024-09-26 DIAGNOSIS — L50.9 HIVES: Primary | ICD-10-CM

## 2024-09-26 PROCEDURE — 99213 OFFICE O/P EST LOW 20 MIN: CPT

## 2024-09-26 RX ORDER — HYDROXYZINE HYDROCHLORIDE 25 MG/1
25 TABLET, FILM COATED ORAL EVERY 6 HOURS PRN
Qty: 90 TABLET | Refills: 3 | Status: SHIPPED | OUTPATIENT
Start: 2024-09-26

## 2024-09-26 RX ORDER — PREDNISONE 20 MG/1
TABLET ORAL
Qty: 20 TABLET | Refills: 0 | Status: SHIPPED | OUTPATIENT
Start: 2024-09-26

## 2024-09-26 ASSESSMENT — PAIN SCALES - GENERAL: PAINLEVEL: NO PAIN (0)

## 2024-09-26 NOTE — PROGRESS NOTES
Assessment & Plan     Hives  - predniSONE (DELTASONE) 20 MG tablet; Take 3 tabs by mouth daily x 3 days, then 2 tabs daily x 3 days, then 1 tab daily x 3 days, then 1/2 tab daily x 3 days.  - hydrOXYzine HCl (ATARAX) 25 MG tablet; Take 1 tablet (25 mg) by mouth every 6 hours as needed for itching.  - Adult Allergy/Asthma  Referral; Future      I spent a total of 20 minutes on the day of the visit.   Time spent by me doing chart review, history and exam, documentation and further activities per the note        See Patient Instructions  Patient Instructions   Plan  - Medication change: Replace Benadryl with hydroxyzine, an antihistamine that helps with pruritus and discomfort. Hydroxyzine should be taken at bedtime due to its sedative effect. Continue taking Zyrtec in the morning.  - Steroid taper: Start prednisone taper if urticaria reappears. It's important to complete the taper to avoid rebound of urticaria.  - Referral to allergist: Given the chronic urticaria and potential allergen exposure, a referral to an allergist is recommended for further evaluation and testing.  - Emergency instructions: If patient experiences angioedema, wheezing, or dysphagia, they should go to an ER immediately due to risk of anaphylaxis.  - Follow-up: Patient should monitor symptoms and contact via MyChart if urticaria exacerbation or symptoms worsen.    Patient understood and verbally consented to the treatment plan. Discussed symptoms that would warrant an urgent or emergent visit. All of the patients' questions were answered. Patient was instructed to contact the clinic if questions or concerns arise. Recommend follow up appointments if symptoms worsen or fail to improve. Recommend follow up as needed. Recommend ER in the case of an emergency.    Maria Luisa Tapia PA-C    Please note: Voice recognition software may have been used in preparing this note, unintended word substitutions may be present.      Subjective   Emma is  a 58 year old, presenting for the following health issues:  Hives        9/26/2024    10:38 AM   Additional Questions   Roomed by Mary TREVINO     History of Present Illness       Reason for visit:  Hives  Symptom onset:  More than a month  Symptoms include:  Hives, itchy, painful, swelling. difficulty swallowing  Symptom intensity:  Moderate  Symptom progression:  Worsening  Had these symptoms before:  Yes  Has tried/received treatment for these symptoms:  Yes  Previous treatment was successful:  Yes  Prior treatment description:  Allergy meds  What makes it worse:  Heat  What makes it better:  Oat baths, allergy meds   She is taking medications regularly.     1 month ago, antihistamines, work . Bendaryl, zyrtec. Had some steriod pills. Seems to be gewttting worse, shortness of breath last, lnight, face swollen, thraot is swollen. No new products. Started in the green house. Outside a lot. Starts with hand itching. Hsa picutres of rash. Woke up with hives, better now/.     Subjective  Chronic Hives:  The patient reports experiencing chronic hives for about a month. The hives were initially treated with antihistamines, which were effective, but the hives return as soon as the patient stops taking them. The patient has tried Benadryl and Zyrtec, as well as some old steroid pills from a previous case of Poison Ivy. The hives seem to be getting worse, with the patient waking up in the middle of the night having difficulty breathing due to facial and throat swelling. The patient also reports difficulty swallowing and a sensation of overall swelling, particularly in the face and throat. The patient's hands and feet also become extremely itchy and hot, particularly at night. 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 The patient also mentions being bitten by a bee around the same time the hives started, but does  not believe this to be the cause.    Thyroid Condition:  The patient mentions having a thyroid condition for which they are taking Synthroid.    Other Medications:  In addition to Synthroid, the patient is also taking a multivitamin and venlafaxine.    Allergies:  The patient is unsure of any specific allergies but suspects that they may be allergic to something in their environment, possibly related to their gardening activities.    Other Symptoms:  The patient reports experiencing severe itching and burning sensations associated with the hives, particularly in their hands and feet. They also report feeling generally swollen, especially in their face and throat.    Misc:  The patient has been trying to self-diagnose and manage their symptoms for about a month before seeking medical attention. They have been using leather gloves while gardening to avoid potential allergens and have been taking Benadryl three times a day to manage their symptoms.    Objective  Physical exam: Patient presented with swollen face and throat, dysphagia, and raised bumps on the skin. Patient showed pictures of the hives.    Assessment  - Chronic urticaria: Patient has a history of urticaria, which were previously attributed to thyroid dysfunction. Current episode started about a month ago and is not responding to antihistamines (Benadryl and Zyrtec) as well as previous corticosteroid therapy. Urticaria reappear when patient stops taking antihistamines. 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 Also reported being bitten by bees around the same time the urticaria started.  - Angioedema and dysphagia: Patient woke up with facial and pharyngeal edema, causing dysphagia. This could be a sign of an allergic reaction or anaphylaxis.    Plan  - Medication change: Replace Benadryl with hydroxyzine, an antihistamine that helps with pruritus and discomfort. Hydroxyzine should be taken at bedtime  "due to its sedative effect. Continue taking Zyrtec in the morning.  - Steroid taper: Start prednisone taper if urticaria reappears. It's important to complete the taper to avoid rebound of urticaria.  - Referral to allergist: Given the chronic urticaria and potential allergen exposure, a referral to an allergist is recommended for further evaluation and testing.  - Emergency instructions: If patient experiences angioedema, wheezing, or dysphagia, they should go to an ER immediately due to risk of anaphylaxis.  - Follow-up: Patient should monitor symptoms and contact via MyChart if urticaria exacerbation or symptoms worsen.                Review of Systems  Constitutional, HEENT, cardiovascular, pulmonary, GI, , musculoskeletal, neuro, skin, endocrine and psych systems are negative, except as otherwise noted.      Objective    /58   Pulse 87   Resp 18   Ht 1.702 m (5' 7\")   Wt 63.8 kg (140 lb 9.6 oz)   SpO2 97%   BMI 22.02 kg/m    Body mass index is 22.02 kg/m .  Physical Exam     Skin: no skin lesions. She showed me pictures on her phone of facial swelling and hives along the chest and back that were taken earlier this morning.          Signed Electronically by: Maria Luisa Tapia PA-C    "

## 2024-09-26 NOTE — PATIENT INSTRUCTIONS
Plan  - Medication change: Replace Benadryl with hydroxyzine, an antihistamine that helps with pruritus and discomfort. Hydroxyzine should be taken at bedtime due to its sedative effect. Continue taking Zyrtec in the morning.  - Steroid taper: Start prednisone taper if urticaria reappears. It's important to complete the taper to avoid rebound of urticaria.  - Referral to allergist: Given the chronic urticaria and potential allergen exposure, a referral to an allergist is recommended for further evaluation and testing.  - Emergency instructions: If patient experiences angioedema, wheezing, or dysphagia, they should go to an ER immediately due to risk of anaphylaxis.  - Follow-up: Patient should monitor symptoms and contact via MyChart if urticaria exacerbation or symptoms worsen.    Patient understood and verbally consented to the treatment plan. Discussed symptoms that would warrant an urgent or emergent visit. All of the patients' questions were answered. Patient was instructed to contact the clinic if questions or concerns arise. Recommend follow up appointments if symptoms worsen or fail to improve. Recommend follow up as needed. Recommend ER in the case of an emergency.    Maria Luisa Tapia PA-C    Please note: Voice recognition software may have been used in preparing this note, unintended word substitutions may be present.

## 2024-10-01 ENCOUNTER — TELEPHONE (OUTPATIENT)
Dept: ALLERGY | Facility: OTHER | Age: 59
End: 2024-10-01
Payer: COMMERCIAL

## 2024-10-01 NOTE — TELEPHONE ENCOUNTER
This encounter is being sent to inform the clinic that this patient has a referral from [facility] for the diagnoses of Seasonal allergy- hives and has requested that this patient be seen within 3-5 days.  Based on the availability of our provider(s), we are unable to accommodate this request.    Were all sites offered this patient?  Yes- pt is also open to [facility] but ER is the preferred location    Does scheduling algorithm request to schedule next available?  Patient has been scheduled for the first available opening with Dr. Anderson on 02/05.  We have informed the patient that the clinic will review their referral and reach out if a sooner appointment is medically necessary.

## 2024-10-01 NOTE — TELEPHONE ENCOUNTER
Called and spoke with patient to offer her an appointment with Dr. Rachel on Thursday October 3, 2024 at 10:40 am in [place]. Patient accepted appointment. Patient was advised to arrive 15 minutes early and clinic located in [address] [place] [address]. Patient understood and agreed.     Rishi Toelntino RN on 10/1/2024 at 12:00 PM

## 2024-10-02 ASSESSMENT — ASTHMA QUESTIONNAIRES
QUESTION_3 LAST FOUR WEEKS HOW OFTEN DID YOUR ASTHMA SYMPTOMS (WHEEZING, COUGHING, SHORTNESS OF BREATH, CHEST TIGHTNESS OR PAIN) WAKE YOU UP AT NIGHT OR EARLIER THAN USUAL IN THE MORNING: ONCE OR TWICE
QUESTION_4 LAST FOUR WEEKS HOW OFTEN HAVE YOU USED YOUR RESCUE INHALER OR NEBULIZER MEDICATION (SUCH AS ALBUTEROL): NOT AT ALL
ACT_TOTALSCORE: 19
QUESTION_1 LAST FOUR WEEKS HOW MUCH OF THE TIME DID YOUR ASTHMA KEEP YOU FROM GETTING AS MUCH DONE AT WORK, SCHOOL OR AT HOME: NONE OF THE TIME
QUESTION_2 LAST FOUR WEEKS HOW OFTEN HAVE YOU HAD SHORTNESS OF BREATH: ONCE OR TWICE A WEEK
QUESTION_5 LAST FOUR WEEKS HOW WOULD YOU RATE YOUR ASTHMA CONTROL: NOT CONTROLLED AT ALL
ACT_TOTALSCORE: 19

## 2024-10-03 ENCOUNTER — OFFICE VISIT (OUTPATIENT)
Dept: ALLERGY | Facility: CLINIC | Age: 59
End: 2024-10-03
Payer: COMMERCIAL

## 2024-10-03 VITALS — HEART RATE: 71 BPM | DIASTOLIC BLOOD PRESSURE: 67 MMHG | OXYGEN SATURATION: 100 % | SYSTOLIC BLOOD PRESSURE: 106 MMHG

## 2024-10-03 DIAGNOSIS — L50.8 CHRONIC URTICARIA: ICD-10-CM

## 2024-10-03 PROCEDURE — 99243 OFF/OP CNSLTJ NEW/EST LOW 30: CPT | Mod: 25 | Performed by: ALLERGY & IMMUNOLOGY

## 2024-10-03 PROCEDURE — 95004 PERQ TESTS W/ALRGNC XTRCS: CPT | Performed by: ALLERGY & IMMUNOLOGY

## 2024-10-03 NOTE — PROGRESS NOTES
Emma Nunn was seen in the Allergy Clinic at [facility].    Emma Nunn is a 58 year old White female being seen today at the request of Maria Luisa Tapia PA-C in consultation for hives.    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  - Previous episode of hives about a year ago, resolved with allergy medication  - Current episode did not resolve with allergy medication  - Hives flare up and then subside intermittently  - Symptoms include burning and itching hands and feet, usually waking her up at night  - Hives spread throughout the body, including face swelling  - Difficulty swallowing and breathing during severe flare-ups  - No relief from Zyrtec or Benadryl, even at high doses  - No recent travel, changes in living situation, work, hobbies or diet reported  - Possible exposure to various soaps and detergents  - No recent illnesses reported  - Infrequent use of ibuprofen or naproxen, not during this period of time  - Infrequent alcohol consumption, none during this period of time  - Last year's episode lasted about a week before seeking medical help and resolved quickly with Benadryl  - Also reports a small area on the back of her neck that flares up with itching and small bumps intermittently throughout the year  - No seasonal allergy symptoms like itchy, watery eyes, runny nose, sneezing or congestion reported  - Symptoms have been ongoing for about six weeks.           Component      Latest Ref Rng 7/1/2024  1:15 PM   Sodium      135 - 145 mmol/L 146 (H)    Potassium      3.4 - 5.3 mmol/L 4.1    Carbon Dioxide (CO2)      22 - 29 mmol/L 30 (H)    Anion Gap      7 - 15 mmol/L 8    Urea Nitrogen      6.0 - 20.0 mg/dL 9.4    Creatinine      0.51 - 0.95 mg/dL 0.78    GFR Estimate      >60 mL/min/1.73m2 88    Calcium      8.6 - 10.0 mg/dL 9.8     Chloride      98 - 107 mmol/L 108 (H)    Glucose      70 - 99 mg/dL 97    Alkaline Phosphatase      40 - 150 U/L 111    AST      0 - 45 U/L 26    ALT      0 - 50 U/L 23    Protein Total      6.4 - 8.3 g/dL 7.1    Albumin      3.5 - 5.2 g/dL 4.5    Bilirubin Total      <=1.2 mg/dL 0.4    Patient Fasting? Yes    TSH      0.30 - 4.20 uIU/mL 0.98       Legend:  (H) High      Past Medical History:   Diagnosis Date    Breast cancer (H) 2013    Lumpectomy, chemo and radiation.      Closed head injury 2008    Concussion with loss of consciousness, unspecified duration, subsequent encounter 05/10/2017    Diffuse cystic mastopathy     Fibrocystic breast disease    Endometriosis     External hemorrhoids without mention of complication     Unspecified hypothyroidism      Family History   Problem Relation Age of Onset    Cancer Other 55        Breast, living    Cancer Paternal Aunt 50        Colon,     Cancer Paternal Grandmother         Colon,  86 years old    Osteoporosis Mother     Heart Disease Mother         heart disease    Hypertension Mother     Arthritis Mother     Colon Cancer Mother 77        Colon    Osteoporosis Maternal Grandfather     Heart Disease Maternal Grandfather         heart attack    Heart Disease Maternal Grandmother         Bypass and heart attack    Heart Disease Sister         heart disease/compl. pneumonia    Genetic Disorder Sister         down's Syn     Past Surgical History:   Procedure Laterality Date    BREAST BIOPSY, CORE RT/LT Left 2013    cancer    COLONOSCOPY N/A 2014    normal, repeat 5 years due to family risk    COLONOSCOPY N/A 2022    Procedure: COLONOSCOPY;  Surgeon: Heath Watson MD;  Location:  GI    LAPAROSCOPIC CHOLECYSTECTOMY N/A 2017    Procedure: LAPAROSCOPIC CHOLECYSTECTOMY;  Laparoscopic Cholecystectomy;  Surgeon: Jason Munoz MD;  Location:  OR    LAPAROSCOPY      for endometriosis    LUMPECTOMY BREAST Left  2013    w/ radiation & chemo    LUMPECTOMY BREAST WITH SENTINEL NODE, COMBINED  7/31/2013    Procedure: COMBINED LUMPECTOMY BREAST WITH SENTINEL NODE;  Left Breast Lumpectomy with [place] Node Biopsy;  Surgeon: Jason Munoz MD;  Location:  OR    [facility] NONSPECIFIC PROCEDURE  1993    Outpatient surgery for anal fissure       ENVIRONMENTAL HISTORY:   Em[place] The home is heated with a forced air. They do have central air conditioning. The patient's bedroom is furnished with feather/wool bedding or pillows and carpeting in bedroom.  Pets inside the house include None. There is no history of cockroach or mice infestation. Do you smoke cigarettes or other recreational drugs? No Do you vape or use an e-cigarette? No. There is/are 0 smokers living in the house. There is/are 0 who smoke ecigarettes/vape living in the house. The house does not have a damp basement.     SOCIAL HISTORY:   Emma is retired. She lives with her spouse.        Current Outpatient Medications:     Calcium Carbonate-Vit D-Min (CALCIUM 1200 PO), , Disp: , Rfl:     levothyroxine (SYNTHROID/LEVOTHROID) 88 MCG tablet, Take 1 tablet (88 mcg) by mouth daily, Disp: 90 tablet, Rfl: 3    multivitamin w/minerals (THERA-VIT-M) tablet, Take 1 tablet by mouth daily, Disp: , Rfl:     predniSONE (DELTASONE) 20 MG tablet, Take 3 tabs by mouth daily x 3 days, then 2 tabs daily x 3 days, then 1 tab daily x 3 days, then 1/2 tab daily x 3 days., Disp: 20 tablet, Rfl: 0    venlafaxine (EFFEXOR XR) 37.5 MG 24 hr capsule, Take 1 capsule (37.5 mg) by mouth daily With 75 mg capsule, Disp: 90 capsule, Rfl: 3    venlafaxine (EFFEXOR XR) 75 MG 24 hr capsule, Take 1 capsule (75 mg) by mouth daily With 37.5 mg capsule, Disp: 90 capsule, Rfl: 3    hydrOXYzine HCl (ATARAX) 25 MG tablet, Take 1 tablet (25 mg) by mouth every 6 hours as needed for itching. (Patient not taking: Reported on 10/3/2024), Disp: 90 tablet, Rfl: 3  Immunization  History   Administered Date(s) Administered    COVID-19 Monovalent 18+ (Moderna) 08/30/2021, 09/27/2021    Influenza Vaccine 18-64 (Flublok) 11/14/2019    TD,PF 7+ (Tenivac) 02/20/2003    TDAP Vaccine (Adacel) 11/14/2019    Td (Adult), Adsorbed 02/20/2003    Varicella Pt Report Hx of Varicella/Chicken Pox 01/01/1970     No Known Allergies        EXAM:   /67 (BP Location: Right arm, Patient Position: Sitting, Cuff Size: Adult Regular)   Pulse 71   SpO2 100%   Physical Exam  Vitals and nursing note reviewed.   Constitutional:       Appearance: Normal appearance.   HENT:      Head: Normocephalic and atraumatic.      Right Ear: External ear normal.      Left Ear: External ear normal.      Nose: No mucosal edema, congestion or rhinorrhea.      Mouth/Throat:      Mouth: Mucous membranes are moist. No oral lesions.      Pharynx: Oropharynx is clear. Uvula midline. No posterior oropharyngeal erythema.   Eyes:      General: Lids are normal.      Extraocular Movements: Extraocular movements intact.      Conjunctiva/sclera: Conjunctivae normal.   Neck:      Comments: No asymmetry, masses, or scars  Cardiovascular:      Rate and Rhythm: Normal rate and regular rhythm.      Heart sounds: S1 normal and S2 normal. No murmur heard.  Pulmonary:      Effort: Pulmonary effort is normal. No respiratory distress.      Breath sounds: Normal breath sounds and air entry.   Musculoskeletal:      Comments: No musculoskeletal defects noted   Skin:     General: Skin is warm and dry.      Findings: No lesion or rash.   Neurological:      General: No focal deficit present.      Mental Status: She is alert.   Psychiatric:         Mood and Affect: Mood and affect normal.           WORKUP: Skin testing    ENVIRONMENTAL PERCUTANEOUS SKIN TESTING: ADULT      10/3/2024    11:00 AM   [facility]   Consent Y   Ordering Physician Dr. Rachel   Interpreting Physician Dr. Rachel   Testing Technician ELDON Raymundo   Location Back   Time  start: 11:09   Time End: 11:24   Positive Control: Histatrol*ALK 1 mg/ml 5/8   Negative Control: 50% Glycerin 0   Cat Hair*ALK (10,000 BAU/ml) 0   AP Dog Hair/Dander (1:100 w/v) 0   Dust Mite p. 30,000 AU/ml 0   Dust Mite f. (30,000 AU/ml) 0   Rajan (W/F in millimeters) 0   Maik Grass (100,000 BAU/mL) 0   Red Cedar (W/F in millimeters) 0   Maple/[place] (W/F in millimeters) 0   Hackberry (W/F in millimeters) 0   McDuffie (W/F in millimeters) 0   Ocean *ALK (W/F in millimeters) 0   American Elm (W/F in millimeters) 0   Prov[place] (W/F in millimeters) 0   Black Avenel (W/F in millimeters) 0   Birch Mix (W/F in millimeters) 0   Lisle (W/F in millimeters) 0   Oak (W/F in millimeters) 0   Cocklebur (W/F in millimeters) 0   Albemarle (W/F in millimeters) 0   White [place] (W/F in millimeters) 0   Careless (W/F in millimeters) 0   Nettle (W/F in millimeters) 0   English Plantain (W/F in millimeters) 0   Kochia (W/F in millimeters) 0   Lamb's Quarter (W/F in millimeters) 0   Marshelder (W/F in millimeters) 0   Ragweed Mix* ALK (W/F in millimeters) 0   Russian Thistle (W/F in millimeters) 0   Sagebrush/Mugwort (W/F in millimeters) 0   Sheep Sorrel (W/F in millimeters) 0   Feather Mix* ALK (W/F in millimeters) 0   Penicillium Mix (1:10 w/v) 0   Curvularia spicifera (1:10 w/v) 0   Epicoccum (1:10 w/v) 0   Aspergillus fumigatus (1:10 w/v): 0   Alternaria tenius (1:10 w/v) 0   H. Cladosporium (1:10 w/v) 0   Phoma herbarum (1:10 w/v) 0      Appropriate response to controls, all others negative.    ASSESSMENT/PLAN:  Emma Nunn is a 58 year old female here for evaluation of hives.    1. Chronic urticaria - History of urticaria present daily for the past 6 weeks. No specific cause or trigger that has been identified. Symptoms are consistent with chronic spontaneous urticaria. Counseled regarding the pathophysiology and management of her symptoms. This is generally considered to be an autoimmune condition and a specific cause  or trigger may not be identified. Advised that symptoms are not due to underlying food or environmental allergies.    - increase cetirizine to 10mg twice daily - if symptoms continue to persist, increase up to 20mg twice daily  - consider addition of H2 blocker and or Xolair for persistent symptoms  - Adult Allergy Clinic Follow-Up Order; Future  - ALLERGY SKIN TESTS,ALLERGENS      Follow-up in 3 months, sooner if needed      Thank you for allowing me to participate in the care of Emma Nunn.      Zan Rachel MD, FAAAAI  Allergy/Immunology  [facility] - [facility]    Consent was obtained from the patient to use an AI documentation tool in the creation of this note.    Chart documentation done in part with Dragon Voice Recognition Software. Although reviewed after completion, some word and grammatical errors may remain.

## 2024-10-03 NOTE — Clinical Note
10/3/2024      Emma Nunn  [address]  [facility] [address]      Dear Colleague,    Thank you for referring your patient, Emma Nunn, to the [facility]. Please see a copy of my visit note below.    Emma Nunn was seen in the Allergy Clinic at [facility].    Emma Nunn is a 58 year old White female being seen today at the request of Maria Luisa Tapia PA-C in consultation for hives.    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  - Previous episode of hives about a year ago, resolved with allergy medication  - Current episode did not resolve with allergy medication  - Hives flare up and then subside intermittently  - Symptoms include burning and itching hands and feet, usually waking her up at night  - Hives spread throughout the body, including face swelling  - Difficulty swallowing and breathing during severe flare-ups  - No relief from Zyrtec or Benadryl, even at high doses  - No recent travel, changes in living situation, work, hobbies or diet reported  - Possible exposure to various soaps and detergents  - No recent illnesses reported  - Infrequent use of ibuprofen or naproxen, not during this period of time  - Infrequent alcohol consumption, not during this period of time  - Last year's episode lasted about a week before seeking medical help and resolved quickly with Benadryl  - Also reports a small area on the back of her neck that flares up with itching and small bumps intermittently throughout the year  - No seasonal allergy symptoms like itchy, watery eyes, runny nose, sneezing or congestion reported  - Symptoms have been ongoing for about a month to six weeks.           Past Medical History:   Diagnosis Date    Breast cancer (H) 07/2013    Lumpectomy, chemo and radiation.      Closed head injury 07/11/2008     Concussion with loss of consciousness, unspecified duration, subsequent encounter 05/10/2017    Diffuse cystic mastopathy     Fibrocystic breast disease    Endometriosis     External hemorrhoids without mention of complication     Unspecified hypothyroidism      Family History   Problem Relation Age of Onset    Cancer Other 55        Breast, living    Cancer Paternal Aunt 50        Colon,     Cancer Paternal Grandmother         Colon,  86 years old    Osteoporosis Mother     Heart Disease Mother         heart disease    Hypertension Mother     Arthritis Mother     Colon Cancer Mother 77        Colon    Osteoporosis Maternal Grandfather     Heart Disease Maternal Grandfather         heart attack    Heart Disease Maternal Grandmother         Bypass and heart attack    Heart Disease Sister         heart disease/compl. pneumonia    Genetic Disorder Sister         down's Syn     Past Surgical History:   Procedure Laterality Date    BREAST BIOPSY, CORE RT/LT Left 2013    cancer    COLONOSCOPY N/A 2014    normal, repeat 5 years due to family risk    COLONOSCOPY N/A 2022    Procedure: COLONOSCOPY;  Surgeon: Heath Watson MD;  Location:  GI    LAPAROSCOPIC CHOLECYSTECTOMY N/A 2017    Procedure: LAPAROSCOPIC CHOLECYSTECTOMY;  Laparoscopic Cholecystectomy;  Surgeon: Jason Munoz MD;  Location: [facility] OR    LAPAROSCOPY      for endometriosis    LUMPECTOMY BREAST Left     w/ radiation & chemo    LUMPECTOMY BREAST WITH SENTINEL NODE, COMBINED  2013    Procedure: COMBINED LUMPECTOMY BREAST WITH SENTINEL NODE;  Left Breast Lumpectomy with [place] Node Biopsy;  Surgeon: Jason Munoz MD;  Location:  OR    ZZC NONSPECIFIC PROCEDURE      Outpatient surgery for anal fissure       ENVIRONMENTAL HISTORY:   Emma lives in a [facility] [place] The home is heated with a forced air. They do have central air conditioning. The patient's bedroom is furnished  with feather/wool bedding or pillows and carpeting in bedroom.  Pets inside the house include None. There is no history of cockroach or mice infestation. Do you smoke cigarettes or other recreational drugs? No Do you vape or use an e-cigarette? No. There is/are 0 smokers living in the house. There is/are 0 who smoke ecigarettes/vape living in the house. The house does not have a damp basement.     SOCIAL HISTORY:   Emma is retired. She lives with her spouse.        Current Outpatient Medications:     Calcium Carbonate-Vit D-Min (CALCIUM 1200 PO), , Disp: , Rfl:     levothyroxine (SYNTHROID/LEVOTHROID) 88 MCG tablet, Take 1 tablet (88 mcg) by mouth daily, Disp: 90 tablet, Rfl: 3    multivitamin w/minerals (THERA-VIT-M) tablet, Take 1 tablet by mouth daily, Disp: , Rfl:     predniSONE (DELTASONE) 20 MG tablet, Take 3 tabs by mouth daily x 3 days, then 2 tabs daily x 3 days, then 1 tab daily x 3 days, then 1/2 tab daily x 3 days., Disp: 20 tablet, Rfl: 0    venlafaxine (EFFEXOR XR) 37.5 MG 24 hr capsule, Take 1 capsule (37.5 mg) by mouth daily With 75 mg capsule, Disp: 90 capsule, Rfl: 3    venlafaxine (EFFEXOR XR) 75 MG 24 hr capsule, Take 1 capsule (75 mg) by mouth daily With 37.5 mg capsule, Disp: 90 capsule, Rfl: 3    hydrOXYzine HCl (ATARAX) 25 MG tablet, Take 1 tablet (25 mg) by mouth every 6 hours as needed for itching. (Patient not taking: Reported on 10/3/2024), Disp: 90 tablet, Rfl: 3  Immunization History   Administered Date(s) Administered    COVID-19 Monovalent 18+ (Moderna) 08/30/2021, 09/27/2021    Influenza Vaccine 18-64 (Flublok) 11/14/2019    TD,PF 7+ (Tenivac) 02/20/2003    TDAP Vaccine (Adacel) 11/14/2019    Td (Adult), Adsorbed 02/20/2003    Varicella Pt Report Hx of Varicella/Chicken Pox 01/01/1970     No Known Allergies        EXAM:   /67 (BP Location: Right arm, Patient Position: Sitting, Cuff Size: Adult Regular)   Pulse 71   SpO2 100%   Physical Exam  Vitals and nursing note reviewed.    Constitutional:       Appearance: Normal appearance.   HENT:      Head: Normocephalic and atraumatic.      Right Ear: External ear normal.      Left Ear: External ear normal.      Nose: No mucosal edema, congestion or rhinorrhea.      Mouth/Throat:      Mouth: Mucous membranes are moist. No oral lesions.      Pharynx: Oropharynx is clear. Uvula midline. No posterior oropharyngeal erythema.   Eyes:      General: Lids are normal.      Extraocular Movements: Extraocular movements intact.      Conjunctiva/sclera: Conjunctivae normal.   Neck:      Comments: No asymmetry, masses, or scars  Cardiovascular:      Rate and Rhythm: Normal rate and regular rhythm.      Heart sounds: S1 normal and S2 normal. No murmur heard.  Pulmonary:      Effort: Pulmonary effort is normal. No respiratory distress.      Breath sounds: Normal breath sounds and air entry.   Musculoskeletal:      Comments: No musculoskeletal defects noted   Skin:     General: Skin is warm and dry.      Findings: No lesion or rash.   Neurological:      General: No focal deficit present.      Mental Status: She is alert.   Psychiatric:         Mood and Affect: Mood and affect normal.           WORKUP: Skin testing    ASSESSMENT/PLAN:  Emma Nunn is a 58 year old female here for evaluation of hives.    Assessment & Plan    Hives:  - Chronic hives likely due to an autoimmune condition, possibly associated with the patient's Hashimoto's disease. The timing of the hives suggests a potential seasonal pattern.  - Increase dosage of Zyrtec to up to four pills a day. If ineffective, consider other medications. Avoid prednisone if possible. Consider skin testing to check for seasonal allergies.           Follow-up in ***      Thank you for allowing me to participate in the care of Emma Nunn.      A total of *** minutes, outside of separately billable procedures and injections, was spent on the day of the encounter performing chart review, history and exam,  documentation, and counseling and coordination of care as noted above.       Zan Rachel MD, FAAAAI  Allergy/Immunology  [facility]    Consent was obtained from the patient to use an AI documentation tool in the creation of this note.    Chart documentation done in part with Dragon Voice Recognition Software. Although reviewed after completion, some word and grammatical errors may remain.    Per provider verbal order, placed Adult Environmental Panel scratch test.  Consent was obtained prior to procedure.  Once panels were placed, patient was monitored for 15 minutes in clinic.  Provider read test after 15 minutes.  Pt tolerated procedure well.  All questions and concerns were addressed at office visit.     KATH Zeng, RN, PHN                 Again, thank you for allowing me to participate in the care of your patient.        Sincerely,        Zan Rachel MD

## 2024-10-03 NOTE — PROGRESS NOTES
Per provider verbal order, placed Adult Environmental Panel scratch test.  Consent was obtained prior to procedure.  Once panels were placed, patient was monitored for 15 minutes in clinic.  Provider read test after 15 minutes.  Pt tolerated procedure well.  All questions and concerns were addressed at office visit.     ELIZABETH ZengN, RN, PHN

## 2024-10-03 NOTE — PATIENT INSTRUCTIONS
If you have any questions regarding your allergies, asthma, or what we discussed during your visit today please call the allergy clinic or contact us via [employer].    [facility] Allergy RN Line: [phone] - call this number with any questions during or after business/clinic hours  [facility] Allergy Scheduling - Adult Patients: [phone]  [facility] Allergy Scheduling - Pediatric Patients: [phone]    All visits for food challenges, medication/drug allergy testing, and drug challenges MUST be scheduled through the allergy clinic nurse. Please call the nurse at [phone] or send a [employer] message for scheduling. Appointments for these visits that are made through the schedulers or via Copiny may be cancelled or rescheduled.    Clinic Schedule:   [place] - Monday, Tuesday, and Thursday  [address] [place] [address]    [facility] - Wednesday  [address] [place] [address]      Start with Cetirizine (Zyrtec) - 10mg twice a day. If you are still having symptoms increase up to 20mg (2 tablets) twice a day.  Follow-up in 3 months - sooner if you are still having symptoms

## 2024-11-29 ENCOUNTER — MYC MEDICAL ADVICE (OUTPATIENT)
Dept: FAMILY MEDICINE | Facility: CLINIC | Age: 59
End: 2024-11-29
Payer: COMMERCIAL

## 2024-11-29 DIAGNOSIS — R21 RASH: Primary | ICD-10-CM

## 2025-01-21 ENCOUNTER — HOSPITAL ENCOUNTER (EMERGENCY)
Facility: CLINIC | Age: 60
Discharge: HOME | End: 2025-01-21
Attending: NURSE PRACTITIONER | Admitting: NURSE PRACTITIONER
Payer: COMMERCIAL

## 2025-01-21 VITALS
TEMPERATURE: 98.3 F | HEART RATE: 85 BPM | DIASTOLIC BLOOD PRESSURE: 69 MMHG | SYSTOLIC BLOOD PRESSURE: 112 MMHG | WEIGHT: 138 LBS | BODY MASS INDEX: 21.61 KG/M2 | RESPIRATION RATE: 20 BRPM | OXYGEN SATURATION: 96 %

## 2025-01-21 DIAGNOSIS — E86.0 MILD DEHYDRATION: ICD-10-CM

## 2025-01-21 DIAGNOSIS — J10.1 INFLUENZA A: ICD-10-CM

## 2025-01-21 LAB
ALBUMIN SERPL BCG-MCNC: 4.3 G/DL (ref 3.5–5.2)
ALP SERPL-CCNC: 98 U/L (ref 40–150)
ALT SERPL W P-5'-P-CCNC: 14 U/L (ref 0–50)
ANION GAP SERPL CALCULATED.3IONS-SCNC: 15 MMOL/L (ref 7–15)
AST SERPL W P-5'-P-CCNC: 24 U/L (ref 0–45)
BASOPHILS # BLD AUTO: 0 10E3/UL (ref 0–0.2)
BASOPHILS NFR BLD AUTO: 0 %
BILIRUB SERPL-MCNC: 0.5 MG/DL
BUN SERPL-MCNC: 33.6 MG/DL (ref 8–23)
CALCIUM SERPL-MCNC: 9.9 MG/DL (ref 8.8–10.4)
CHLORIDE SERPL-SCNC: 99 MMOL/L (ref 98–107)
CREAT SERPL-MCNC: 1 MG/DL (ref 0.51–0.95)
EGFRCR SERPLBLD CKD-EPI 2021: 65 ML/MIN/1.73M2
EOSINOPHIL # BLD AUTO: 0 10E3/UL (ref 0–0.7)
EOSINOPHIL NFR BLD AUTO: 0 %
ERYTHROCYTE [DISTWIDTH] IN BLOOD BY AUTOMATED COUNT: 12.7 % (ref 10–15)
FLUAV RNA SPEC QL NAA+PROBE: POSITIVE
FLUBV RNA RESP QL NAA+PROBE: NEGATIVE
GLUCOSE SERPL-MCNC: 106 MG/DL (ref 70–99)
HCO3 SERPL-SCNC: 22 MMOL/L (ref 22–29)
HCT VFR BLD AUTO: 46.6 % (ref 35–47)
HGB BLD-MCNC: 16 G/DL (ref 11.7–15.7)
IMM GRANULOCYTES # BLD: 0 10E3/UL
IMM GRANULOCYTES NFR BLD: 0 %
LYMPHOCYTES # BLD AUTO: 0.9 10E3/UL (ref 0.8–5.3)
LYMPHOCYTES NFR BLD AUTO: 13 %
MCH RBC QN AUTO: 31.5 PG (ref 26.5–33)
MCHC RBC AUTO-ENTMCNC: 34.3 G/DL (ref 31.5–36.5)
MCV RBC AUTO: 92 FL (ref 78–100)
MONOCYTES # BLD AUTO: 0.8 10E3/UL (ref 0–1.3)
MONOCYTES NFR BLD AUTO: 12 %
NEUTROPHILS # BLD AUTO: 5.1 10E3/UL (ref 1.6–8.3)
NEUTROPHILS NFR BLD AUTO: 75 %
NRBC # BLD AUTO: 0 10E3/UL
NRBC BLD AUTO-RTO: 0 /100
PLATELET # BLD AUTO: 139 10E3/UL (ref 150–450)
POTASSIUM SERPL-SCNC: 4 MMOL/L (ref 3.4–5.3)
PROT SERPL-MCNC: 7.7 G/DL (ref 6.4–8.3)
RBC # BLD AUTO: 5.08 10E6/UL (ref 3.8–5.2)
RSV RNA SPEC NAA+PROBE: NEGATIVE
SARS-COV-2 RNA RESP QL NAA+PROBE: NEGATIVE
SODIUM SERPL-SCNC: 136 MMOL/L (ref 135–145)
WBC # BLD AUTO: 6.8 10E3/UL (ref 4–11)

## 2025-01-21 PROCEDURE — 87637 SARSCOV2&INF A&B&RSV AMP PRB: CPT | Performed by: NURSE PRACTITIONER

## 2025-01-21 PROCEDURE — 258N000003 HC RX IP 258 OP 636: Performed by: NURSE PRACTITIONER

## 2025-01-21 PROCEDURE — 36415 COLL VENOUS BLD VENIPUNCTURE: CPT | Performed by: NURSE PRACTITIONER

## 2025-01-21 PROCEDURE — 99284 EMERGENCY DEPT VISIT MOD MDM: CPT | Mod: 25 | Performed by: NURSE PRACTITIONER

## 2025-01-21 PROCEDURE — 96374 THER/PROPH/DIAG INJ IV PUSH: CPT | Performed by: NURSE PRACTITIONER

## 2025-01-21 PROCEDURE — 250N000011 HC RX IP 250 OP 636: Performed by: NURSE PRACTITIONER

## 2025-01-21 PROCEDURE — 96361 HYDRATE IV INFUSION ADD-ON: CPT | Performed by: NURSE PRACTITIONER

## 2025-01-21 PROCEDURE — 96375 TX/PRO/DX INJ NEW DRUG ADDON: CPT | Performed by: NURSE PRACTITIONER

## 2025-01-21 PROCEDURE — 80053 COMPREHEN METABOLIC PANEL: CPT | Performed by: NURSE PRACTITIONER

## 2025-01-21 PROCEDURE — 85025 COMPLETE CBC W/AUTO DIFF WBC: CPT | Performed by: NURSE PRACTITIONER

## 2025-01-21 PROCEDURE — 99284 EMERGENCY DEPT VISIT MOD MDM: CPT | Performed by: NURSE PRACTITIONER

## 2025-01-21 RX ORDER — ONDANSETRON 2 MG/ML
4 INJECTION INTRAMUSCULAR; INTRAVENOUS ONCE
Status: COMPLETED | OUTPATIENT
Start: 2025-01-21 | End: 2025-01-21

## 2025-01-21 RX ORDER — KETOROLAC TROMETHAMINE 15 MG/ML
15 INJECTION, SOLUTION INTRAMUSCULAR; INTRAVENOUS ONCE
Status: COMPLETED | OUTPATIENT
Start: 2025-01-21 | End: 2025-01-21

## 2025-01-21 RX ORDER — ALBUTEROL SULFATE 90 UG/1
2 INHALANT RESPIRATORY (INHALATION) EVERY 6 HOURS PRN
Qty: 18 G | Refills: 0 | Status: SHIPPED | OUTPATIENT
Start: 2025-01-21

## 2025-01-21 RX ADMIN — SODIUM CHLORIDE 1000 ML: 9 INJECTION, SOLUTION INTRAVENOUS at 20:16

## 2025-01-21 RX ADMIN — ONDANSETRON 4 MG: 2 INJECTION, SOLUTION INTRAMUSCULAR; INTRAVENOUS at 20:17

## 2025-01-21 RX ADMIN — SODIUM CHLORIDE 1000 ML: 9 INJECTION, SOLUTION INTRAVENOUS at 21:08

## 2025-01-21 RX ADMIN — KETOROLAC TROMETHAMINE 15 MG: 15 INJECTION, SOLUTION INTRAMUSCULAR; INTRAVENOUS at 20:18

## 2025-01-21 ASSESSMENT — COLUMBIA-SUICIDE SEVERITY RATING SCALE - C-SSRS
2. HAVE YOU ACTUALLY HAD ANY THOUGHTS OF KILLING YOURSELF IN THE PAST MONTH?: NO
6. HAVE YOU EVER DONE ANYTHING, STARTED TO DO ANYTHING, OR PREPARED TO DO ANYTHING TO END YOUR LIFE?: NO
1. IN THE PAST MONTH, HAVE YOU WISHED YOU WERE DEAD OR WISHED YOU COULD GO TO SLEEP AND NOT WAKE UP?: NO

## 2025-01-21 ASSESSMENT — ACTIVITIES OF DAILY LIVING (ADL)
ADLS_ACUITY_SCORE: 41
ADLS_ACUITY_SCORE: 41

## 2025-01-22 NOTE — ED PROVIDER NOTES
History     Chief Complaint   Patient presents with    Cough    Headache     HPI  Emma Nunn is a 59 year old female who presents for evaluation of fever, body aches, cough, and congestion.  Symptoms started 3 days ago.  She has had some nausea and vomiting.  She has been able to sip some fluids.  She feels exceptionally fatigued.  No history of asthma.  She is a non-smoker.    Allergies:  No Known Allergies    Problem List:    Patient Active Problem List    Diagnosis Date Noted    PTSD (post-traumatic stress disorder) 05/05/2023     Priority: Medium    MEG (generalized anxiety disorder) 04/15/2022     Priority: Medium    Chronic idiopathic urticaria 09/18/2019     Priority: Medium    Cholinergic urticaria 09/18/2019     Priority: Medium    Shoulder joint pain 04/23/2015     Priority: Medium    Malignant neoplasm of female breast (H) 10/02/2014     Priority: Medium     Problem list name updated by automated process. Provider to review      Dehydration 11/19/2013     Priority: Medium    Joint pain 09/24/2012     Priority: Medium    CARDIOVASCULAR SCREENING; LDL GOAL LESS THAN 160 10/31/2010     Priority: Medium    Cervicalgia 07/11/2008     Priority: Medium    External hemorrhoids 02/20/2003     Priority: Medium     Problem list name updated by automated process. Provider to review      Anal fissure      Priority: Medium    Hypothyroidism      Priority: Medium     Problem list name updated by automated process. Provider to review          Past Medical History:    Past Medical History:   Diagnosis Date    Breast cancer (H) 07/2013    Closed head injury 07/11/2008    Concussion with loss of consciousness, unspecified duration, subsequent encounter 05/10/2017    Diffuse cystic mastopathy     Endometriosis 1993    External hemorrhoids without mention of complication     Unspecified hypothyroidism        Past Surgical History:    Past Surgical History:   Procedure Laterality Date    BREAST BIOPSY, CORE RT/LT Left  2013    cancer    COLONOSCOPY N/A 2014    normal, repeat 5 years due to family risk    COLONOSCOPY N/A 2022    Procedure: COLONOSCOPY;  Surgeon: Heath Watson MD;  Location: [facility] GI    LAPAROSCOPIC CHOLECYSTECTOMY N/A 2017    Procedure: LAPAROSCOPIC CHOLECYSTECTOMY;  Laparoscopic Cholecystectomy;  Surgeon: Jason Munoz MD;  Location: [facility] OR    LAPAROSCOPY      for endometriosis    LUMPECTOMY BREAST Left     w/ radiation & chemo    LUMPECTOMY BREAST WITH SENTINEL NODE, COMBINED  2013    Procedure: COMBINED LUMPECTOMY BREAST WITH SENTINEL NODE;  Left Breast Lumpectomy with [place] Node Biopsy;  Surgeon: Jason Munoz MD;  Location: [facility] OR    ZZC NONSPECIFIC PROCEDURE      Outpatient surgery for anal fissure       Family History:    Family History   Problem Relation Age of Onset    Cancer Other 55        Breast, living    Cancer Paternal Aunt 50        Colon,     Cancer Paternal Grandmother         Colon,  86 years old    Osteoporosis Mother     Heart Disease Mother         heart disease    Hypertension Mother     Arthritis Mother     Colon Cancer Mother 77        Colon    Osteoporosis Maternal Grandfather     Heart Disease Maternal Grandfather         heart attack    Heart Disease Maternal Grandmother         Bypass and heart attack    Heart Disease Sister         heart disease/compl. pneumonia    Genetic Disorder Sister         down's Syn       Social History:  Marital Status:   [2]  Social History     Tobacco Use    Smoking status: Never    Smokeless tobacco: Never   Vaping Use    Vaping status: Never Used   Substance Use Topics    Alcohol use: Not Currently    Drug use: No        Medications:    albuterol (PROAIR HFA/PROVENTIL HFA/VENTOLIN HFA) 108 (90 Base) MCG/ACT inhaler  Calcium Carbonate-Vit D-Min (CALCIUM 1200 PO)  hydrOXYzine HCl (ATARAX) 25 MG tablet  levothyroxine (SYNTHROID/LEVOTHROID) 88 MCG tablet  multivitamin w/minerals  (THERA-VIT-M) tablet  predniSONE (DELTASONE) 20 MG tablet  venlafaxine (EFFEXOR XR) 37.5 MG 24 hr capsule  venlafaxine (EFFEXOR XR) 75 MG 24 hr capsule          Review of Systems  As mentioned above in the history present illness. All other systems were reviewed and are negative.    Physical Exam   BP: 111/75  Pulse: (!) 125  Temp: 98.3  F (36.8  C)  Resp: 20  Weight: 62.6 kg (138 lb)  SpO2: 99 %      Physical Exam  Constitutional:       General: She is not in acute distress.     Appearance: She is ill-appearing.   HENT:      Head: Normocephalic and atraumatic.      Right Ear: External ear normal.      Left Ear: External ear normal.      Nose: Nose normal.      Mouth/Throat:      Mouth: Mucous membranes are dry.      Pharynx: Oropharynx is clear.   Eyes:      Conjunctiva/sclera: Conjunctivae normal.   Cardiovascular:      Rate and Rhythm: Regular rhythm. Tachycardia present.   Pulmonary:      Effort: Pulmonary effort is normal.      Breath sounds: Normal breath sounds.      Comments: Speaking in full sentences.  Skin:     General: Skin is warm and dry.   Neurological:      General: No focal deficit present.      Mental Status: She is alert and oriented to person, place, and time.         ED Course        Procedures              Results for orders placed or performed during the hospital encounter of 01/21/25 (from the past 24 hours)   Influenza A/B, RSV and SARS-CoV2 PCR (COVID-19) Nose    Specimen: Nose; Swab   Result Value Ref Range    Influenza A PCR Positive (A) Negative    Influenza B PCR Negative Negative    RSV PCR Negative Negative    SARS CoV2 PCR Negative Negative    Narrative    Testing was performed using the Xpert Xpress CoV2/Flu/RSV Assay on the [employer] GeneXpert Instrument. This test should be ordered for the detection of SARS-CoV2, influenza, and RSV viruses in individuals with signs and symptoms of respiratory tract infection. This test is for in vitro diagnostic use under the [place] FDA for laboratories  certified under CLIA to perform high or moderate complexity testing. This test has been [place] FDA cleared. A negative result does not rule out the presence of PCR inhibitors in the specimen or target RNA in concentration below the limit of detection for the assay. If only one viral target is positive but coinfection with multiple targets is suspected, the sample should be re-tested with another FDA cleared, approved, or authorized test, if coninfection would change clinical management. This test was validated by the [facility]. These laboratories are certified under the Clinical Laboratory Improvement Amendments of 1988 (CLIA-88) as qualified to perfom high complexity laboratory testing.   CBC with platelets differential    Narrative    The following orders were created for panel order CBC with platelets differential.  Procedure                               Abnormality         Status                     ---------                               -----------         ------                     CBC with platelets and d...[[id number]]  Abnormal            Final result               RBC and Platelet Morphology[[id number]]                                                   Please view results for these tests on the individual orders.   Comprehensive metabolic panel   Result Value Ref Range    Sodium 136 135 - 145 mmol/L    Potassium 4.0 3.4 - 5.3 mmol/L    Carbon Dioxide (CO2) 22 22 - 29 mmol/L    Anion Gap 15 7 - 15 mmol/L    Urea Nitrogen 33.6 (H) 8.0 - 23.0 mg/dL    Creatinine 1.00 (H) 0.51 - 0.95 mg/dL    GFR Estimate 65 >60 mL/min/1.73m2    Calcium 9.9 8.8 - 10.4 mg/dL    Chloride 99 98 - 107 mmol/L    Glucose 106 (H) 70 - 99 mg/dL    Alkaline Phosphatase 98 40 - 150 U/L    AST 24 0 - 45 U/L    ALT 14 0 - 50 U/L    Protein Total 7.7 6.4 - 8.3 g/dL    Albumin 4.3 3.5 - 5.2 g/dL    Bilirubin Total 0.5 <=1.2 mg/dL   CBC with platelets and differential   Result Value Ref Range    WBC Count 6.8 4.0 - 11.0  10e3/uL    RBC Count 5.08 3.80 - 5.20 10e6/uL    Hemoglobin 16.0 (H) 11.7 - 15.7 g/dL    Hematocrit 46.6 35.0 - 47.0 %    MCV 92 78 - 100 fL    MCH 31.5 26.5 - 33.0 pg    MCHC 34.3 31.5 - 36.5 g/dL    RDW 12.7 10.0 - 15.0 %    Platelet Count 139 (L) 150 - 450 10e3/uL    % Neutrophils 75 %    % Lymphocytes 13 %    % Monocytes 12 %    % Eosinophils 0 %    % Basophils 0 %    % Immature Granulocytes 0 %    NRBCs per 100 WBC 0 <1 /100    Absolute Neutrophils 5.1 1.6 - 8.3 10e3/uL    Absolute Lymphocytes 0.9 0.8 - 5.3 10e3/uL    Absolute Monocytes 0.8 0.0 - 1.3 10e3/uL    Absolute Eosinophils 0.0 0.0 - 0.7 10e3/uL    Absolute Basophils 0.0 0.0 - 0.2 10e3/uL    Absolute Immature Granulocytes 0.0 <=0.4 10e3/uL    Absolute NRBCs 0.0 10e3/uL       Medications   sodium chloride 0.9% BOLUS 1,000 mL (1,000 mLs Intravenous $New Bag 1/21/25 2108)   sodium chloride 0.9% BOLUS 1,000 mL (0 mLs Intravenous Stopped 1/21/25 2106)   ondansetron (ZOFRAN) injection 4 mg (4 mg Intravenous $Given 1/21/25 2017)   ketorolac (TORADOL) injection 15 mg (15 mg Intravenous $Given 1/21/25 2018)       Assessments & Plan (with Medical Decision Making)     History and exam is consistent with a viral respiratory illness.  Patient is positive for influenza A.  She does appear mildly dehydrated with dry mucous membranes, tachycardia, and mild elevation in creatinine and BUN.  Patient was given IV normal saline 2 L bolus, IV Zofran, and IV Toradol.  She felt better after this and tachycardia resolved.  Lung sounds CTA.  No hypoxia.  No indication for chest imaging at this time.  I offered Zofran prescription but she declined.  She requested an albuterol inhaler and was provided prescription.  Instructed to take Tylenol and/or ibuprofen as needed for fever control and pain.  Albuterol inhaler 2 puffs every 6 hours as needed for cough, shortness of breath, or wheezing.  Recheck for any worsening symptoms.      New Prescriptions    ALBUTEROL (PROAIR  HFA/PROVENTIL HFA/VENTOLIN HFA) 108 (90 BASE) MCG/ACT INHALER    Inhale 2 puffs into the lungs every 6 hours as needed for shortness of breath, wheezing or cough.       Final diagnoses:   Influenza A   Mild dehydration       1/21/2025   [facility] EMERGENCY DEPT       Calvin, BUZZ Awan CNP  01/21/25 8029

## 2025-01-22 NOTE — DISCHARGE INSTRUCTIONS
Drink plenty of fluids to stay hydrated.    Tylenol 650 mg every 4-6 hours as needed for pain.  Ibuprofen 400-600 mg every 6-8 hours as needed for pain  (take with food, stop if causing stomach pains.)    Albuterol inhaler 2 puffs every 6 hours as needed for cough or short of breath.

## 2025-01-22 NOTE — ED TRIAGE NOTES
Pt started getting sick 3 days ago, cough, n&V, headache, body aches    Fatigue       Triage Assessment (Adult)       Row Name 01/21/25 1943          Triage Assessment    Airway WDL WDL        Respiratory WDL    Respiratory WDL WDL

## 2025-03-18 ENCOUNTER — OFFICE VISIT (OUTPATIENT)
Dept: FAMILY MEDICINE | Facility: CLINIC | Age: 60
End: 2025-03-18
Payer: COMMERCIAL

## 2025-03-18 ENCOUNTER — HOSPITAL ENCOUNTER (OUTPATIENT)
Dept: ULTRASOUND IMAGING | Facility: CLINIC | Age: 60
Discharge: HOME | End: 2025-03-18
Payer: COMMERCIAL

## 2025-03-18 VITALS
HEART RATE: 88 BPM | BODY MASS INDEX: 19.85 KG/M2 | DIASTOLIC BLOOD PRESSURE: 66 MMHG | HEIGHT: 68 IN | TEMPERATURE: 98.1 F | OXYGEN SATURATION: 99 % | WEIGHT: 131 LBS | RESPIRATION RATE: 16 BRPM | SYSTOLIC BLOOD PRESSURE: 128 MMHG

## 2025-03-18 DIAGNOSIS — R22.1 NECK NODULE: ICD-10-CM

## 2025-03-18 DIAGNOSIS — R22.1 LUMP IN NECK: Primary | ICD-10-CM

## 2025-03-18 DIAGNOSIS — R22.1 LUMP IN NECK: ICD-10-CM

## 2025-03-18 PROCEDURE — 3074F SYST BP LT 130 MM HG: CPT

## 2025-03-18 PROCEDURE — 1126F AMNT PAIN NOTED NONE PRSNT: CPT

## 2025-03-18 PROCEDURE — 76536 US EXAM OF HEAD AND NECK: CPT

## 2025-03-18 PROCEDURE — 3078F DIAST BP <80 MM HG: CPT

## 2025-03-18 PROCEDURE — 99213 OFFICE O/P EST LOW 20 MIN: CPT

## 2025-03-18 ASSESSMENT — PAIN SCALES - GENERAL: PAINLEVEL_OUTOF10: NO PAIN (0)

## 2025-03-18 NOTE — PROGRESS NOTES
Assessment & Plan     Lump in neck  Neck nodule  2 palpable nodules in the posterior neck, region is distal to the occipital lymph nodes   Asymptomatic   See HPI and assessment. Will order a soft tissue ultrasound.   - US Head Neck Soft Tissue    Patient verbalizes understanding and is in agreement with the plan of care. All questions and concerns addressed.     Would like today's visit routed to her PCP.     Follow up as needed if symptoms worsen or do not improve.                     Jonathon Carter is a 59 year old, presenting for the following health issues:  Derm Problem (Lumps of back of neck/ head in the hair line.  First observed about 1 month ago.)        3/18/2025     9:48 AM   Additional Questions   Roomed by Patricia MONTERROSO   Accompanied by None         3/18/2025     9:48 AM   Patient Reported Additional Medications   Patient reports taking the following new medications None     History of Present Illness       Reason for visit:  Lumps on back of neck  Symptom onset:  More than a month   She is taking medications regularly.          Concern - 3 bumps on the back of neck/ head along the hairline  Onset: 3 months  Description: hard to the touch, non mobile, not hot to the touch, denies drainage  Intensity: mild  Progression of Symptoms:  one feels like it's growing  Accompanying Signs & Symptoms: n/a  Previous history of similar problem: one bump is a few years old  Precipitating factors:        Worsened by: n/a  Alleviating factors:        Improved by: n/a  Therapies tried and outcome: None  One of the bumps went away, current has 2 one on each side of neck.   Denies any pain, tenderness, warmth, edema, skin is intact.   Denies any systemic symptoms.   Taking levothyroxine due to acquired atrophy of thyroid       History of breast cancer -2012 s/p chemo, radiation and lumpectomy with 6 years of hormone therapy completed.     Psoriasis in scalp-derm appointment in June 2025         Review of  "Systems  Constitutional, HEENT, cardiovascular, pulmonary, gi and gu systems are negative, except as otherwise noted.      Objective    /66   Pulse 88   Temp 98.1  F (36.7  C) (Tympanic)   Resp 16   Ht 1.727 m (5' 8\")   Wt 59.4 kg (131 lb)   LMP 08/01/2013   SpO2 99%   BMI 19.92 kg/m    Body mass index is 19.92 kg/m .  Physical Exam   GENERAL: alert and no distress  EYES: Eyes grossly normal to inspection, conjunctivae and sclerae normal  NECK: No anterior lymph node enlargement. Posterior neck has a palpable nodule in the right side-  size 0.5 cm , non mobile, non painful. left posterior neck nodule size 1 cm, mobile, pressure feeling with palpation, not painful. Approximate region of the nodule is distal to the occipital lymph nodes. No other masses or tenderness in the neck. ROM intact, No stiffness.   RESP: lungs clear to auscultation - no rales, rhonchi or wheezes  CV: regular rate and rhythm, normal S1 S2, no S3 or S4, no murmur, click or rub, no peripheral edema  SKIN: Scalp psoriasis, left lower neck/upper shoulder patch of dry erythema skin, appearance of eczema or psoriaiss.   NEURO: Normal strength and tone, mentation intact and speech normal  PSYCH: mentation appears normal, affect normal/bright    Last CBC was 1/21/25 and unremarkable.   Reviewed CMP 1/21/25 -unremarkable.   Last TSH July 2024 was normal.           Signed Electronically by: Lilliana Beaver DNP    "

## 2025-04-22 ENCOUNTER — E-VISIT (OUTPATIENT)
Dept: FAMILY MEDICINE | Facility: CLINIC | Age: 60
End: 2025-04-22
Payer: COMMERCIAL

## 2025-04-22 DIAGNOSIS — J01.90 ACUTE SINUSITIS, RECURRENCE NOT SPECIFIED, UNSPECIFIED LOCATION: Primary | ICD-10-CM

## 2025-04-22 PROCEDURE — 99207 PR NON-BILLABLE SERV PER CHARTING: CPT

## 2025-04-22 RX ORDER — FLUTICASONE PROPIONATE 50 MCG
1 SPRAY, SUSPENSION (ML) NASAL DAILY
Qty: 9.9 ML | Refills: 0 | Status: SHIPPED | OUTPATIENT
Start: 2025-04-22 | End: 2025-05-06

## 2025-04-22 NOTE — PATIENT INSTRUCTIONS
Acute Sinusitis: Care Instructions  Overview     Acute sinusitis is an inflammation of the mucous membranes inside the nose and sinuses. Sinuses are the hollow spaces in your skull around the eyes and nose. Acute sinusitis often follows a cold. Acute sinusitis causes thick, discolored mucus that drains from the nose or down the back of the throat. It also can cause pain and pressure in your head and face along with a stuffy or blocked nose.  In most cases, sinusitis gets better on its own in 1 to 2 weeks. But some mild symptoms may last for several weeks. Sometimes antibiotics are needed if there is a bacterial infection.  Follow-up care is a key part of your treatment and safety. Be sure to make and go to all appointments, and call your doctor if you are having problems. It's also a good idea to know your test results and keep a list of the medicines you take.  How can you care for yourself at home?  Use saline (saltwater) nasal washes. This can help keep your nasal passages open and wash out mucus and allergens.  You can buy saline nose washes at a grocery store or drugstore. Follow the instructions on the package.  You can make your own at home. Add 1 teaspoon of non-iodized salt and 1 teaspoon of baking soda to 2 cups of distilled or boiled and cooled water. Fill a squeeze bottle or a nasal cleansing pot (such as a neti pot) with the nasal wash. Then put the tip into your nostril, and lean over the sink. With your mouth open, gently squirt the liquid. Repeat on the other side.  Try a decongestant nasal spray like oxymetazoline (Afrin). Do not use it for more than 3 days in a row. Using it for more than 3 days can make your congestion worse.  If needed, take an over-the-counter pain medicine, such as acetaminophen (Tylenol), ibuprofen (Advil, Motrin), or naproxen (Aleve). Read and follow all instructions on the label.  If the doctor prescribed antibiotics, take them as directed. Do not stop taking them just  "because you feel better. You need to take the full course of antibiotics.  Be careful when taking over-the-counter cold or flu medicines and Tylenol at the same time. Many of these medicines have acetaminophen, which is Tylenol. Read the labels to make sure that you are not taking more than the recommended dose. Too much acetaminophen (Tylenol) can be harmful.  Try a steroid nasal spray. It may help with your symptoms.  Breathe warm, moist air. You can use a steamy shower, a hot bath, or a sink filled with hot water. Avoid cold, dry air. Using a humidifier in your home may help. Follow the directions for cleaning the machine.  When should you call for help?   Call your doctor now or seek immediate medical care if:    You have new or worse swelling, redness, or pain in your face or around one or both of your eyes.     You have double vision or a change in your vision.     You have a high fever.     You have a severe headache and a stiff neck.     You have mental changes, such as feeling confused or much less alert.   Watch closely for changes in your health, and be sure to contact your doctor if:    You are not getting better as expected.   Where can you learn more?  Go to https://www.Ready.Decohunt/patiented  Enter I933 in the search box to learn more about \"Acute Sinusitis: Care Instructions.\"  Current as of: October 27, 2024  Content Version: 14.4    5978-2464 [employer].   Care instructions adapted under license by your healthcare professional. If you have questions about a medical condition or this instruction, always ask your healthcare professional. [employer] disclaims any warranty or liability for your use of this information.    You may want to try a nasal lavage (also known as nasal irrigation). You can find over-the-counter products, such as Neti-Pot, at retail locations or make your own at home. Instructions for homemade nasal lavage and more information on the process are " available online at http://www.aafp.org/afp/2009/1115/p1121.html.    Dear Emma Nunn    After reviewing your responses, I've been able to diagnose you with sinus pain, headache    Based on your responses and diagnosis, I have prescribed flonase nasal spray, take NSAIDs (non steroid antiinflammatory drugs such as ibuprofen, aleve, motrin, naproxen) with food.   Warm tea with lemon and honey  Humidification  to treat your symptoms. I have sent this to your pharmacy.?     Most sinus infections can begin as viral infections and we typically do not treat with an antibiotic unless symptoms persist beyond 10 days and worsen. Routine antibiotic use not suggested to avoid resistance and also to allow immune system to fight off infection. About 90 % of sinusitis cases are viral.     It is also important to stay well hydrated, get lots of rest and take over-the-counter decongestants,?tylenol?or ibuprofen if you?are able to?take those medications per your primary care provider to help relieve discomfort.?     If your symptoms worsen, you develop severe headache, vomiting, high fever (>102), or are not improving in 7 days, please contact your primary care provider for an appointment or visit any of our convenient Walk-in Care or Urgent Care Centers to be seen which can be found on our website?here.?     Thanks again for choosing?us?as your health care partner,?   ?  Lilliana Beaver DNP?

## 2025-05-22 ENCOUNTER — OFFICE VISIT (OUTPATIENT)
Dept: FAMILY MEDICINE | Facility: CLINIC | Age: 60
End: 2025-05-22
Payer: COMMERCIAL

## 2025-05-22 VITALS
HEIGHT: 68 IN | SYSTOLIC BLOOD PRESSURE: 107 MMHG | OXYGEN SATURATION: 99 % | BODY MASS INDEX: 20.51 KG/M2 | DIASTOLIC BLOOD PRESSURE: 72 MMHG | RESPIRATION RATE: 12 BRPM | HEART RATE: 66 BPM | TEMPERATURE: 97.1 F | WEIGHT: 135.3 LBS

## 2025-05-22 DIAGNOSIS — Z13.220 LIPID SCREENING: ICD-10-CM

## 2025-05-22 DIAGNOSIS — L50.1 CHRONIC IDIOPATHIC URTICARIA: ICD-10-CM

## 2025-05-22 DIAGNOSIS — E03.4 HYPOTHYROIDISM DUE TO ACQUIRED ATROPHY OF THYROID: Primary | ICD-10-CM

## 2025-05-22 DIAGNOSIS — F41.1 GAD (GENERALIZED ANXIETY DISORDER): ICD-10-CM

## 2025-05-22 DIAGNOSIS — Z17.0 MALIGNANT NEOPLASM OF UPPER-OUTER QUADRANT OF LEFT BREAST IN FEMALE, ESTROGEN RECEPTOR POSITIVE (H): ICD-10-CM

## 2025-05-22 DIAGNOSIS — C50.412 MALIGNANT NEOPLASM OF UPPER-OUTER QUADRANT OF LEFT BREAST IN FEMALE, ESTROGEN RECEPTOR POSITIVE (H): ICD-10-CM

## 2025-05-22 DIAGNOSIS — R59.1 LYMPHADENOPATHY: ICD-10-CM

## 2025-05-22 RX ORDER — HYDROXYZINE HYDROCHLORIDE 25 MG/1
25-50 TABLET, FILM COATED ORAL 3 TIMES DAILY PRN
Qty: 90 TABLET | Refills: 1 | Status: SHIPPED | OUTPATIENT
Start: 2025-05-22

## 2025-05-22 ASSESSMENT — PAIN SCALES - GENERAL: PAINLEVEL_OUTOF10: NO PAIN (0)

## 2025-05-22 NOTE — PROGRESS NOTES
Assessment & Plan   Problem List Items Addressed This Visit          Endocrine    Hypothyroidism - Primary    Relevant Orders    Comprehensive metabolic panel (BMP + Alb, Alk Phos, ALT, AST, Total. Bili, TP)    TSH with free T4 reflex       Musculoskeletal and Integumentary    Chronic idiopathic urticaria       Behavioral    MEG (generalized anxiety disorder)    Relevant Medications    sertraline (ZOLOFT) 50 MG tablet    hydrOXYzine HCl (ATARAX) 25 MG tablet       Other    RESOLVED: Malignant neoplasm of female breast (H)    Relevant Orders    Cancer Antigen 15-3     Other Visit Diagnoses         Lymphadenopathy          Lipid screening        Relevant Orders    Lipid panel reflex to direct LDL Fasting           Reactive lymphadenopathy resolved.  No further follow-up needed on this.  Difficulty sleeping with venlafaxine and still having anxiety issues now.  We did discuss treatment options including as needed hydroxyzine.  Will plan for sertraline 50 mg now and follow-up with me in 1 month at her physical.  We did discuss BuSpar as well as an option in the future.  Potential side effects discussed.  Repeat TSH as well as lipids.  She would like her cancer antigen added and has done well after her breast cancer.  She has mammogram scheduled.  Consider follow-up with psychology moving forward.     The longitudinal plan of care for the diagnosis(es)/condition(s) as documented were addressed during this visit. Due to the added complexity in care, I will continue to support Emma in the subsequent management and with ongoing continuity of care.      Subjective   Emma is a 59 year old, presenting for the following health issues:  lymphnode  and Follow Up      5/22/2025     8:38 AM   Additional Questions   Roomed by Sonja         5/22/2025     8:38 AM   Patient Reported Additional Medications   Patient reports taking the following new medications none     History of Present Illness       Reason for visit:  Lumps on  "head/neck    She eats 2-3 servings of fruits and vegetables daily.She consumes 0 sweetened beverage(s) daily.She exercises with enough effort to increase her heart rate 10 to 19 minutes per day.  She exercises with enough effort to increase her heart rate 3 or less days per week.   She is taking medications regularly.        Patient quit taking venlafaxine due to insomnia issues.  Still anxiety in her day-to-day but not every day.  Stress does make this worse.  Otherwise sleeping better now and not having highs anymore.  Thyroid has been stable previously.  She did have influenza earlier this year and lymphadenopathy with ultrasound that was benign appearing.  She may slightly feel 1 nodule but other ones resolved.      Review of Systems  Constitutional, HEENT, cardiovascular, pulmonary, GI, , musculoskeletal, neuro, skin, endocrine and psych systems are negative, except as otherwise noted.      Objective    /72 (BP Location: Left arm, Patient Position: Sitting, Cuff Size: Adult Regular)   Pulse 66   Temp 97.1  F (36.2  C) (Temporal)   Resp 12   Ht 1.727 m (5' 7.99\")   Wt 61.4 kg (135 lb 4.8 oz)   SpO2 99%   BMI 20.58 kg/m    Body mass index is 20.58 kg/m .  Physical Exam   GENERAL: alert and no distress  EYES: Eyes grossly normal to inspection, PERRL and conjunctivae and sclerae normal  HENT: ear canals and TM's normal, nose and mouth without ulcers or lesions  NECK: no significant adenopathy, no asymmetry, masses, or scars  RESP: lungs clear to auscultation - no rales, rhonchi or wheezes  CV: regular rate and rhythm, normal S1 S2, no S3 or S4, no murmur, click or rub, no peripheral edema  MS: no gross musculoskeletal defects noted, no edema  SKIN: no suspicious lesions or rashes  NEURO: Normal strength and tone, mentation intact and speech normal  PSYCH: mentation appears normal, affect normal/bright          Signed Electronically by: Jake Quesada MD    "

## 2025-06-19 NOTE — PATIENT INSTRUCTIONS
Wound Care After a Biopsy    What is a skin biopsy?  A skin biopsy allows the doctor to examine a very small piece of tissue under the microscope to determine the diagnosis and the best treatment for the skin condition. A local anesthetic (numbing medicine)  is injected with a very small needle into the skin area to be tested. A small piece of skin is taken from the area. Sometimes a suture (stitch) is used.     What are the risks of a skin biopsy?  I will experience scar, bleeding, swelling, pain, crusting and redness. I may experience incomplete removal or recurrence. Risks of this procedure are excessive bleeding, bruising, infection, nerve damage, numbness, thick (hypertrophic or keloidal) scar and non-diagnostic biopsy.    How should I care for my wound for the first 24 hours?  Keep the wound dry and covered for 24 hours  If it bleeds, hold direct pressure on the area for 15 minutes. If bleeding does not stop then go to the emergency room  Avoid strenuous exercise the first 1-2 days or as your doctor instructs you    How should I care for the wound after 24 hours?  After 24 hours, remove the bandage  You may bathe or shower as normal  If you had a scalp biopsy, you can shampoo as usual and can use shower water to clean the biopsy site daily  Clean the wound twice a day with gentle soap and water  Do not scrub, be gentle  Apply white petroleum/Vaseline after cleaning the wound with a cotton swab or a clean finger, and keep the site covered with a Bandaid /bandage. Bandages are not necessary with a scalp biopsy  If you are unable to cover the site with a Bandaid /bandage, re-apply ointment 2-3 times a day to keep the site moist. Moisture will help with healing  Avoid strenuous activity for first 1-2 days  Avoid lakes, rivers, pools, and oceans until the stitches are removed or the site is healed    How do I clean my wound?  Wash hands thoroughly with soap or use hand  before all wound care  Clean the  wound with gentle soap and water  Apply white petroleum/Vaseline  to wound after it is clean  Replace the Bandaid /bandage to keep the wound covered for the first few days or as instructed by your doctor  If you had a scalp biopsy, warm shower water to the area on a daily basis should suffice    What should I use to clean my wound?   Cotton-tipped applicators (Qtips )  White petroleum jelly (Vaseline ). Use a clean new container and use Q-tips to apply.  Bandaids   as needed  Gentle soap     How should I care for my wound long term?  Do not get your wound dirty  Keep up with wound care for one week or until the area is healed.  A small scab will form and fall off by itself when the area is completely healed. The area will be red and will become pink in color as it heals. Sun protection is very important for how your scar will turn out. Sunscreen with an SPF 30 or greater is recommended once the area is healed.  If you have stitches, stitches need to be removed in 7 days on the face/neck, or 10-14 days on the body days. You may return to our clinic for this or you may have it done locally at your doctor s office.  You should have some soreness but it should be mild and slowly go away over several days. Talk to your doctor about using tylenol for pain,    When should I call my doctor?  If you have increased:   Pain or swelling  Pus or drainage (clear or slightly yellow drainage is ok)  Temperature over 100F  Spreading redness or warmth around wound    When will I hear about my results?  The biopsy results can take 2 weeks to come back.  Your results will automatically release to [facility] before your provider has even reviewed them.  The clinic will call you with the results, send you a [employer] message, or have you schedule a follow-up clinic or phone time to discuss the results.  Contact our clinics if you do not hear from us in 2 weeks. If further treatment is needed for a skin cancer, this will be done at either our  [place] or [place] office.    Who should I call with questions?  [facility]: [phone]  [facility]: [phone]  For urgent needs outside of business hours call the [facility] at [phone] and ask for the dermatology resident on call       The ABCDEs of Melanoma    Skin cancer can develop anywhere on the skin. Ask someone for help when checking your skin, especially in hard to see places. If you notice a mole different from others, or that changes, enlarges, itches, or bleeds (even if it is small), you should see a dermatologist.

## 2025-06-19 NOTE — PROGRESS NOTES
"[facility] Dermatology Note  Encounter Date: Jul 10, 2025  Office Visit     Reviewed patients past medical history and pertinent chart review prior to patients visit today.     Dermatology Problem List:    0. NUB left upper cutaneous lip, shave biopsy 07/10/25 .    Sebopsoriasis   -ketoconazole shampoo, lidex solution   _________________________    Assessment & Plan:      # Sebopsoriasis  -Diagnosis discussed  -Patient to begin using ketoconazole shampoo and OTC antidandruff shampoo in an alternating fashion.  Patient to let shampoo lather and sit for 5 minutes prior to rinsing  -Patient to begin using the Lidex scalp solution to scalp once or twice daily for 3 to 4 weeks at a time.  Thereafter, use as needed for flares. Potential side effects from prolonged topical steroid use such as atrophy, striae, and telangiectasias were reviewed.    # Neoplasm of uncertain behavior:  left upper cutaneous lip  DDx includes lentigo vs lentigo maligna. Shave biopsy today.    Procedure Note: Biopsy by shave technique  The risks and benefits of the procedure were described to the patient. These include but are not limited to bleeding, infection, scar, incomplete removal, and non-diagnostic biopsy. Verbal informed consent was obtained. The above site(s) was cleansed with an alcohol pad and injected with 1% lidocaine with epinephrine. Once anesthesia was obtained, a biopsy(ies) was performed with Gilette blade. The tissue(s) was placed in a labeled container(s) with formalin and sent to pathology. Hemostasis was achieved with aluminum chloride. Vaseline and a bandage were applied to the wound(s). The patient tolerated the procedure well and was given post biopsy care instructions.    Follow up: one month if scalp not improved with the above     Jannie Siddiqui PA-C  [facility]  Dermatology    _______________________________________    CC: Derm Problem (Pt states, \" Here for a rash on the back of her " "scalp, it comes and goes, been around for about a year, has tried tea tree oil \" )    HPI:  Ms. Emma Nunn is a(n) 59 year old female who presents today as a new patient for evaluation of a rash on the scalp. This rash has been present for about a year and will wax and wane in severity. When it flares it is itchy and can sometimes be painful/burning. She has tried tea tree oil which has helped slightly. She is wondering about treatment options for this.     Patient is otherwise feeling well, without additional skin concerns.      Physical Exam:  SKIN: Focused examination of scalp and face was performed.  - NUB, left upper cutaneous lip, brown asymmetric macule   - occipital scalp, well-demarcated erythematous plaques with overlying greasy scale    - No other lesions of concern on areas examined.     Medications:  Current Outpatient Medications   Medication Sig Dispense Refill    levothyroxine (SYNTHROID/LEVOTHROID) 88 MCG tablet Take 1 tablet (88 mcg) by mouth daily 90 tablet 3    multivitamin w/minerals (THERA-VIT-M) tablet Take 1 tablet by mouth daily      hydrOXYzine HCl (ATARAX) 25 MG tablet Take 1-2 tablets (25-50 mg) by mouth 3 times daily as needed for anxiety. (Patient not taking: Reported on 7/10/2025) 90 tablet 1    sertraline (ZOLOFT) 50 MG tablet Take 1 tablet (50 mg) by mouth daily. (Patient not taking: Reported on 7/10/2025) 90 tablet 1     No current facility-administered medications for this visit.      Past Medical History:   Patient Active Problem List   Diagnosis    Anal fissure    Hypothyroidism    External hemorrhoids    Cervicalgia    CARDIOVASCULAR SCREENING; LDL GOAL LESS THAN 160    Joint pain    Dehydration    Shoulder joint pain    Chronic idiopathic urticaria    Cholinergic urticaria    MEG (generalized anxiety disorder)    PTSD (post-traumatic stress disorder)     Past Medical History:   Diagnosis Date    Breast cancer (H) 07/2013    Lumpectomy, chemo and radiation.      Closed head " injury 07/11/2008    Concussion with loss of consciousness, unspecified duration, subsequent encounter 05/10/2017    Diffuse cystic mastopathy     Fibrocystic breast disease    Endometriosis 1993    External hemorrhoids without mention of complication     Unspecified hypothyroidism        CC Jake Quesada MD  [address] [place]  [place] [address] on close of this encounter.

## 2025-07-01 ENCOUNTER — RESULTS FOLLOW-UP (OUTPATIENT)
Dept: FAMILY MEDICINE | Facility: CLINIC | Age: 60
End: 2025-07-01

## 2025-07-05 SDOH — HEALTH STABILITY: PHYSICAL HEALTH: ON AVERAGE, HOW MANY MINUTES DO YOU ENGAGE IN EXERCISE AT THIS LEVEL?: 30 MIN

## 2025-07-05 SDOH — HEALTH STABILITY: PHYSICAL HEALTH: ON AVERAGE, HOW MANY DAYS PER WEEK DO YOU ENGAGE IN MODERATE TO STRENUOUS EXERCISE (LIKE A BRISK WALK)?: 4 DAYS

## 2025-07-05 ASSESSMENT — SOCIAL DETERMINANTS OF HEALTH (SDOH): HOW OFTEN DO YOU GET TOGETHER WITH FRIENDS OR RELATIVES?: ONCE A WEEK

## 2025-07-09 ENCOUNTER — OFFICE VISIT (OUTPATIENT)
Dept: FAMILY MEDICINE | Facility: CLINIC | Age: 60
End: 2025-07-09
Attending: STUDENT IN AN ORGANIZED HEALTH CARE EDUCATION/TRAINING PROGRAM
Payer: COMMERCIAL

## 2025-07-09 VITALS
SYSTOLIC BLOOD PRESSURE: 110 MMHG | BODY MASS INDEX: 21.06 KG/M2 | WEIGHT: 134.2 LBS | HEART RATE: 76 BPM | RESPIRATION RATE: 12 BRPM | HEIGHT: 67 IN | OXYGEN SATURATION: 100 % | TEMPERATURE: 97.6 F | DIASTOLIC BLOOD PRESSURE: 68 MMHG

## 2025-07-09 DIAGNOSIS — Z17.0 MALIGNANT NEOPLASM OF UPPER-OUTER QUADRANT OF LEFT BREAST IN FEMALE, ESTROGEN RECEPTOR POSITIVE (H): ICD-10-CM

## 2025-07-09 DIAGNOSIS — E03.4 HYPOTHYROIDISM DUE TO ACQUIRED ATROPHY OF THYROID: ICD-10-CM

## 2025-07-09 DIAGNOSIS — F41.1 GAD (GENERALIZED ANXIETY DISORDER): ICD-10-CM

## 2025-07-09 DIAGNOSIS — Z80.0 FAMILY HISTORY OF COLON CANCER: ICD-10-CM

## 2025-07-09 DIAGNOSIS — C50.412 MALIGNANT NEOPLASM OF UPPER-OUTER QUADRANT OF LEFT BREAST IN FEMALE, ESTROGEN RECEPTOR POSITIVE (H): ICD-10-CM

## 2025-07-09 DIAGNOSIS — L50.1 CHRONIC IDIOPATHIC URTICARIA: ICD-10-CM

## 2025-07-09 DIAGNOSIS — Z12.4 CERVICAL CANCER SCREENING: ICD-10-CM

## 2025-07-09 DIAGNOSIS — Z80.8 FAMILY HISTORY OF MALIGNANT MELANOMA: ICD-10-CM

## 2025-07-09 DIAGNOSIS — Z00.00 ROUTINE GENERAL MEDICAL EXAMINATION AT A HEALTH CARE FACILITY: Primary | ICD-10-CM

## 2025-07-09 PROCEDURE — 3078F DIAST BP <80 MM HG: CPT | Performed by: STUDENT IN AN ORGANIZED HEALTH CARE EDUCATION/TRAINING PROGRAM

## 2025-07-09 PROCEDURE — 99396 PREV VISIT EST AGE 40-64: CPT | Performed by: STUDENT IN AN ORGANIZED HEALTH CARE EDUCATION/TRAINING PROGRAM

## 2025-07-09 PROCEDURE — 3074F SYST BP LT 130 MM HG: CPT | Performed by: STUDENT IN AN ORGANIZED HEALTH CARE EDUCATION/TRAINING PROGRAM

## 2025-07-09 PROCEDURE — 87624 HPV HI-RISK TYP POOLED RSLT: CPT | Performed by: STUDENT IN AN ORGANIZED HEALTH CARE EDUCATION/TRAINING PROGRAM

## 2025-07-09 PROCEDURE — 1126F AMNT PAIN NOTED NONE PRSNT: CPT | Performed by: STUDENT IN AN ORGANIZED HEALTH CARE EDUCATION/TRAINING PROGRAM

## 2025-07-09 ASSESSMENT — PAIN SCALES - GENERAL: PAINLEVEL_OUTOF10: NO PAIN (0)

## 2025-07-09 NOTE — NURSING NOTE
Prior to immunization administration, verified patients identity using patient s name and date of birth. Please see Immunization Activity for additional information.     Screening Questionnaire for Adult Immunization    Are you sick today?   No   Do you have allergies to medications, food, a vaccine component or latex?   No   Have you ever had a serious reaction after receiving a vaccination?   No   Do you have a long-term health problem with heart, lung, kidney, or metabolic disease (e.g., diabetes), asthma, a blood disorder, no spleen, complement component deficiency, a cochlear implant, or a spinal fluid leak?  Are you on long-term aspirin therapy?   No   Do you have cancer, leukemia, HIV/AIDS, or any other immune system problem?   No   Do you have a parent, brother, or sister with an immune system problem?   No   In the past 3 months, have you taken medications that affect  your immune system, such as prednisone, other steroids, or anticancer drugs; drugs for the treatment of rheumatoid arthritis, Crohn s disease, or psoriasis; or have you had radiation treatments?   No   Have you had a seizure, or a brain or other nervous system problem?   No   During the past year, have you received a transfusion of blood or blood    products, or been given immune (gamma) globulin or antiviral drug?   No   For women: Are you pregnant or is there a chance you could become       pregnant during the next month?   No   Have you received any vaccinations in the past 4 weeks?   No     Immunization questionnaire answers were all negative.      Patient instructed to remain in clinic for 15 minutes afterwards, and to report any adverse reactions.     Screening performed by Aranza Romo LPN on 7/9/2025 at 11:22 AM.

## 2025-07-09 NOTE — PROGRESS NOTES
Preventive Care Visit  Formerly [facility]  Jake Quesada MD, Family Medicine  Jul 9, 2025      Assessment & Plan   Problem List Items Addressed This Visit          Respiratory/Allergy    Chronic idiopathic urticaria       Endocrine    Hypothyroidism       Behavioral Health    MEG (generalized anxiety disorder)     Other Visit Diagnoses         Routine general medical examination at a health care facility    -  Primary      Cervical cancer screening        Relevant Orders    HPV and Gynecologic Cytology Panel - Recommended Age 30 - 65 Years      Family history of colon cancer          Family history of malignant melanoma          Malignant neoplasm of upper-outer quadrant of left breast in female, estrogen receptor positive (H)               Age-appropriate screening and immunization discussed.  Overall doing well and previous labs reviewed and stable.  Has not yet started sertraline but thinks she plans to given her anxiety.  No further concerns today.  She will let me know if she would like to see counseling in the future. Continues with mammograms.     Patient has been advised of split billing requirements and indicates understanding: Yes    Counseling  Appropriate preventive services were addressed with this patient via screening, questionnaire, or discussion as appropriate for fall prevention, nutrition, physical activity, Tobacco-use cessation, social engagement, weight loss and cognition.  Checklist reviewing preventive services available has been given to the patient.  Reviewed patient's diet, addressing concerns and/or questions.   She is at risk for psychosocial distress and has been provided with information to reduce risk.   Reviewed preventive health counseling, as reflected in patient instructions       Regular exercise       Healthy diet/nutrition       Vision screening       Hearing screening       Alcohol use       Contraception       Osteoporosis prevention/bone  health       Safe sex practices/STD prevention       Colorectal Cancer Screening       Hep C screening for all patients one time for ages 18-79 years       HIV screeninx in teen years, 1x in adult years, and at intervals if high risk       Advance Care Planning      Jonathon Carter is a 59 year old, presenting for the following:  Physical        2025    11:18 AM   Additional Questions   Roomed by Ozzy MAGANA        Advance Care Planning    Discussed advance care planning with patient; however, patient declined at this time.        2025   General Health   How would you rate your overall physical health? Good   Feel stress (tense, anxious, or unable to sleep) Rather much   (!) STRESS CONCERN      2025   Nutrition   Three or more servings of calcium each day? (!) NO   Diet: Regular (no restrictions)   How many servings of fruit and vegetables per day? (!) 2-3   How many sweetened beverages each day? 0-1         2025   Exercise   Days per week of moderate/strenous exercise 4 days   Average minutes spent exercising at this level 30 min         2025   Social Factors   Frequency of gathering with friends or relatives Once a week   Worry food won't last until get money to buy more No   Food not last or not have enough money for food? No   Do you have housing? (Housing is defined as stable permanent housing and does not include staying outside in a car, in a tent, in an abandoned building, in an overnight shelter, or couch-surfing.) Yes   Are you worried about losing your housing? No   Lack of transportation? No   Unable to get utilities (heat,electricity)? No         2025   Fall Risk   Fallen 2 or more times in the past year? No   Trouble with walking or balance? No          2025   Dental   Dentist two times every year? Yes           Today's PHQ-2 Score:       3/18/2025     9:26 AM   PHQ-2 (  [employer])   Q1: Little interest or pleasure in doing things 0   Q2: Feeling down,  depressed or hopeless 0   PHQ-2 Score 0    Q1: Little interest or pleasure in doing things Not at all   Q2: Feeling down, depressed or hopeless Not at all   PHQ-2 Score 0       Patient-reported         7/5/2025   Substance Use   Alcohol more than 3/day or more than 7/wk No   Do you use any other substances recreationally? No     Social History     Tobacco Use    Smoking status: Never     Passive exposure: Never    Smokeless tobacco: Never   Vaping Use    Vaping status: Never Used   Substance Use Topics    Alcohol use: Yes    Drug use: No           7/16/2024   LAST FHS-7 RESULTS   1st degree relative breast or ovarian cancer No   Any relative bilateral breast cancer No   Any male have breast cancer No   Any ONE woman have BOTH breast AND ovarian cancer No   Any woman with breast cancer before 50yrs No   2 or more relatives with breast AND/OR ovarian cancer No   2 or more relatives with breast AND/OR bowel cancer Yes        Mammogram Screening - Mammogram every 1-2 years updated in Health Maintenance based on mutual decision making        7/5/2025   STI Screening   New sexual partner(s) since last STI/HIV test? No     History of abnormal Pap smear: No - age 30- 64 PAP with HPV every 5 years recommended        Latest Ref Rng & Units 12/30/2020     9:00 AM 12/30/2020     8:41 AM 11/25/2016     3:00 PM   PAP / HPV   PAP (Historical)   NIL     HPV 16 DNA NEG^Negative Negative   Negative    HPV 18 DNA NEG^Negative Negative   Negative    Other HR HPV NEG^Negative Negative   Negative      ASCVD Risk   The 10-year ASCVD risk score (Julissa ZAMORA, et al., 2019) is: 1.6%    Values used to calculate the score:      Age: 59 years      Sex: Female      Is Non- : No      Diabetic: No      Tobacco smoker: No      Systolic Blood Pressure: 110 mmHg      Is BP treated: No      HDL Cholesterol: 71 mg/dL      Total Cholesterol: 173 mg/dL    Fracture Risk Assessment Tool  Link to Frax Calculator  Use the  information below to complete the Frax calculator  : 1965  Sex: female  Weight (kg): 60.9 kg (actual weight)  Height (cm): 170.2 cm  Previous Fragility Fracture:  No  History of parent with fractured hip:  No  Current Smoking:  No  Patient has been on glucocorticoids for more than 3 months (5mg/day or more): No  Rheumatoid Arthritis on Problem List:  No  Secondary Osteoporosis on Problem List:  No  Consumes 3 or more units of alcohol per day: No  Femoral Neck BMD (g/cm2)           Reviewed and updated as needed this visit by Provider                    Past Medical History:   Diagnosis Date    Breast cancer (H) 2013    Lumpectomy, chemo and radiation.      Closed head injury 2008    Concussion with loss of consciousness, unspecified duration, subsequent encounter 05/10/2017    Diffuse cystic mastopathy     Fibrocystic breast disease    Endometriosis     External hemorrhoids without mention of complication     Unspecified hypothyroidism      Past Surgical History:   Procedure Laterality Date    BREAST BIOPSY, CORE RT/LT Left 2013    cancer    COLONOSCOPY N/A 2014    normal, repeat 5 years due to family risk    COLONOSCOPY N/A 2022    Procedure: COLONOSCOPY;  Surgeon: Heath Watson MD;  Location:  GI    LAPAROSCOPIC CHOLECYSTECTOMY N/A 2017    Procedure: LAPAROSCOPIC CHOLECYSTECTOMY;  Laparoscopic Cholecystectomy;  Surgeon: Jason Munoz MD;  Location:  OR    LAPAROSCOPY      for endometriosis    LUMPECTOMY BREAST Left     w/ radiation & chemo    LUMPECTOMY BREAST WITH SENTINEL NODE, COMBINED  2013    Procedure: COMBINED LUMPECTOMY BREAST WITH SENTINEL NODE;  Left Breast Lumpectomy with [place] Node Biopsy;  Surgeon: Jason Munoz MD;  Location:  OR    C NONSPECIFIC PROCEDURE      Outpatient surgery for anal fissure     OB History    Para Term  AB Living   3 2 2 0 1 2   SAB IAB Ectopic Multiple Live Births   0 0 1 0 0      #  "Outcome Date GA Lbr Jeff/2nd Weight Sex Type Anes PTL Lv   3 Term            2 Term            1 Ectopic               Obstetric Comments   Vaginal         Review of Systems  Constitutional, HEENT, cardiovascular, pulmonary, GI, , musculoskeletal, neuro, skin, endocrine and psych systems are negative, except as otherwise noted.     Objective    Exam  /68   Pulse 76   Temp 97.6  F (36.4  C) (Temporal)   Resp 12   Ht 1.702 m (5' 7\")   Wt 60.9 kg (134 lb 3.2 oz)   SpO2 100%   BMI 21.02 kg/m     Estimated body mass index is 21.02 kg/m  as calculated from the following:    Height as of this encounter: 1.702 m (5' 7\").    Weight as of this encounter: 60.9 kg (134 lb 3.2 oz).    Physical Exam  GENERAL: alert and no distress  EYES: Eyes grossly normal to inspection, PERRL and conjunctivae and sclerae normal  HENT: ear canals and TM's normal, nose and mouth without ulcers or lesions  NECK: no adenopathy, no asymmetry, masses, or scars  RESP: lungs clear to auscultation - no rales, rhonchi or wheezes  CV: regular rate and rhythm, normal S1 S2, no S3 or S4, no murmur, click or rub, no peripheral edema  ABDOMEN: soft, nontender, no hepatosplenomegaly, no masses and bowel sounds normal  MS: no gross musculoskeletal defects noted, no edema  Urogenital: Normal vaginal mucosa  SKIN: no suspicious lesions or rashes  NEURO: Normal strength and tone, mentation intact and speech normal  PSYCH: mentation appears normal, affect normal/bright    Radha present for exam    Signed Electronically by: Jake Quesada MD    "

## 2025-07-09 NOTE — PATIENT INSTRUCTIONS
Patient Education   Preventive Care Advice   This is general advice given by our system to help you stay healthy. However, your care team may have specific advice just for you. Please talk to your care team about your preventive care needs.  Nutrition  Eat 5 or more servings of fruits and vegetables each day.  Try wheat bread, brown rice and whole grain pasta (instead of white bread, rice, and pasta).  Get enough calcium and vitamin D. Check the label on foods and aim for 100% of the RDA (recommended daily allowance).  Lifestyle  Exercise at least 150 minutes each week  (30 minutes a day, 5 days a week).  Do muscle strengthening activities 2 days a week. These help control your weight and prevent disease.  No smoking.  Wear sunscreen to prevent skin cancer.  Have a dental exam and cleaning every 6 months.  Yearly exams  See your health care team every year to talk about:  Any changes in your health.  Any medicines your care team has prescribed.  Preventive care, family planning, and ways to prevent chronic diseases.  Shots (vaccines)   HPV shots (up to age 26), if you've never had them before.  Hepatitis B shots (up to age 59), if you've never had them before.  COVID-19 shot: Get this shot when it's due.  Flu shot: Get a flu shot every year.  Tetanus shot: Get a tetanus shot every 10 years.  Pneumococcal, hepatitis A, and RSV shots: Ask your care team if you need these based on your risk.  Shingles shot (for age 50 and up)  General health tests  Diabetes screening:  Starting at age 35, Get screened for diabetes at least every 3 years.  If you are younger than age 35, ask your care team if you should be screened for diabetes.  Cholesterol test: At age 39, start having a cholesterol test every 5 years, or more often if advised.  Bone density scan (DEXA): At age 50, ask your care team if you should have this scan for osteoporosis (brittle bones).  Hepatitis C: Get tested at least once in your life.  STIs (sexually  transmitted infections)  Before age 24: Ask your care team if you should be screened for STIs.  After age 24: Get screened for STIs if you're at risk. You are at risk for STIs (including HIV) if:  You are sexually active with more than one person.  You don't use condoms every time.  You or a partner was diagnosed with a sexually transmitted infection.  If you are at risk for HIV, ask about PrEP medicine to prevent HIV.  Get tested for HIV at least once in your life, whether you are at risk for HIV or not.  Cancer screening tests  Cervical cancer screening: If you have a cervix, begin getting regular cervical cancer screening tests starting at age 21.  Breast cancer scan (mammogram): If you've ever had breasts, begin having regular mammograms starting at age 40. This is a scan to check for breast cancer.  Colon cancer screening: It is important to start screening for colon cancer at age 45.  Have a colonoscopy test every 10 years (or more often if you're at risk) Or, ask your provider about stool tests like a FIT test every year or Cologuard test every 3 years.  To learn more about your testing options, visit:   .  For help making a decision, visit:   https://bit.ly/dm23455.  Prostate cancer screening test: If you have a prostate, ask your care team if a prostate cancer screening test (PSA) at age 55 is right for you.  Lung cancer screening: If you are a current or former smoker ages 50 to 80, ask your care team if ongoing lung cancer screenings are right for you.  For informational purposes only. Not to replace the advice of your health care provider. Copyright   2023 [facility]. All rights reserved. Clinically reviewed by the [facility]. [employer] 561342 - REV 01/24.  Learning About Stress  What is stress?     Stress is your body's response to a hard situation. Your body can have a physical, emotional, or mental response. Stress is a fact of life for most people, and it  affects everyone differently. What causes stress for you may not be stressful for someone else.  A lot of things can cause stress. You may feel stress when you go on a job interview, take a test, or run a race. This kind of short-term stress is normal and even useful. It can help you if you need to work hard or react quickly. For example, stress can help you finish an important job on time.  Long-term stress is caused by ongoing stressful situations or events. Examples of long-term stress include long-term health problems, ongoing problems at work, or conflicts in your family. Long-term stress can harm your health.  How does stress affect your health?  When you are stressed, your body responds as though you are in danger. It makes hormones that speed up your heart, make you breathe faster, and give you a burst of energy. This is called the fight-or-flight stress response. If the stress is over quickly, your body goes back to normal and no harm is done.  But if stress happens too often or lasts too long, it can have bad effects. Long-term stress can make you more likely to get sick, and it can make symptoms of some diseases worse. If you tense up when you are stressed, you may develop neck, shoulder, or low back pain. Stress is linked to high blood pressure and heart disease.  Stress also harms your emotional health. It can make you murphy, tense, or depressed. Your relationships may suffer, and you may not do well at work or school.  What can you do to manage stress?  You can try these things to help manage stress:   Do something active. Exercise or activity can help reduce stress. Walking is a great way to get started. Even everyday activities such as housecleaning or yard work can help.  Try yoga or oleg chi. These techniques combine exercise and meditation. You may need some training at first to learn them.  Do something you enjoy. For example, listen to music or go to a movie. Practice your hobby or do volunteer  "work.  Meditate. This can help you relax, because you are not worrying about what happened before or what may happen in the future.  Do guided imagery. Imagine yourself in any setting that helps you feel calm. You can use online videos, books, or a teacher to guide you.  Do breathing exercises. For example:  From a standing position, bend forward from the waist with your knees slightly bent. Let your arms dangle close to the floor.  Breathe in slowly and deeply as you return to a standing position. Roll up slowly and lift your head last.  Hold your breath for just a few seconds in the standing position.  Breathe out slowly and bend forward from the waist.  Let your feelings out. Talk, laugh, cry, and express anger when you need to. Talking with supportive friends or family, a counselor, or a jennyfer leader about your feelings is a healthy way to relieve stress. Avoid discussing your feelings with people who make you feel worse.  Write. It may help to write about things that are bothering you. This helps you find out how much stress you feel and what is causing it. When you know this, you can find better ways to cope.  What can you do to prevent stress?  You might try some of these things to help prevent stress:  Manage your time. This helps you find time to do the things you want and need to do.  Get enough sleep. Your body recovers from the stresses of the day while you are sleeping.  Get support. Your family, friends, and community can make a difference in how you experience stress.  Limit your news feed. Avoid or limit time on social media or news that may make you feel stressed.  Do something active. Exercise or activity can help reduce stress. Walking is a great way to get started.  Where can you learn more?  Go to https://www.[employer]net/patiented  Enter N032 in the search box to learn more about \"Learning About Stress.\"  Current as of: October 24, 2024  Content Version: 14.5 2024-2025 [employer].   Care instructions adapted under license by your healthcare professional. If you have questions about a medical condition or this instruction, always ask your healthcare professional. [employer] disclaims any warranty or liability for your use of this information.

## 2025-07-10 ENCOUNTER — OFFICE VISIT (OUTPATIENT)
Dept: DERMATOLOGY | Facility: CLINIC | Age: 60
End: 2025-07-10
Attending: STUDENT IN AN ORGANIZED HEALTH CARE EDUCATION/TRAINING PROGRAM
Payer: COMMERCIAL

## 2025-07-10 DIAGNOSIS — D48.5 NEOPLASM OF UNCERTAIN BEHAVIOR OF SKIN: ICD-10-CM

## 2025-07-10 DIAGNOSIS — L40.8 SEBOPSORIASIS: Primary | ICD-10-CM

## 2025-07-10 LAB
HPV HR 12 DNA CVX QL NAA+PROBE: NEGATIVE
HPV16 DNA CVX QL NAA+PROBE: NEGATIVE
HPV18 DNA CVX QL NAA+PROBE: NEGATIVE
HUMAN PAPILLOMA VIRUS FINAL DIAGNOSIS: NORMAL

## 2025-07-10 RX ORDER — FLUOCINONIDE TOPICAL SOLUTION USP, 0.05% 0.5 MG/ML
SOLUTION TOPICAL
Qty: 60 ML | Refills: 5 | Status: SHIPPED | OUTPATIENT
Start: 2025-07-10

## 2025-07-10 RX ORDER — KETOCONAZOLE 20 MG/ML
SHAMPOO, SUSPENSION TOPICAL
Qty: 120 ML | Refills: 5 | Status: SHIPPED | OUTPATIENT
Start: 2025-07-10

## 2025-07-10 ASSESSMENT — PAIN SCALES - GENERAL: PAINLEVEL_OUTOF10: MILD PAIN (1)

## 2025-07-10 NOTE — LETTER
7/10/2025      Emma Nunn  [address] [facility] [address]      Dear Colleague,    Thank you for referring your patient, Emma Nunn, to the [facility]. Please see a copy of my visit note below.    [facility] Dermatology Note  Encounter Date: Jul 10, 2025  Office Visit     Reviewed patients past medical history and pertinent chart review prior to patients visit today.     Dermatology Problem List:    0. NUB left upper cutaneous lip, shave biopsy 07/10/25 .    Sebopsoriasis   -ketoconazole shampoo, lidex solution   _________________________    Assessment & Plan:      # Sebopsoriasis  -Diagnosis discussed  -Patient to begin using ketoconazole shampoo and OTC antidandruff shampoo in an alternating fashion.  Patient to let shampoo lather and sit for 5 minutes prior to rinsing  -Patient to begin using the Lidex scalp solution to scalp once or twice daily for 3 to 4 weeks at a time.  Thereafter, use as needed for flares. Potential side effects from prolonged topical steroid use such as atrophy, striae, and telangiectasias were reviewed.    # Neoplasm of uncertain behavior:  left upper cutaneous lip  DDx includes lentigo vs lentigo maligna. Shave biopsy today.    Procedure Note: Biopsy by shave technique  The risks and benefits of the procedure were described to the patient. These include but are not limited to bleeding, infection, scar, incomplete removal, and non-diagnostic biopsy. Verbal informed consent was obtained. The above site(s) was cleansed with an alcohol pad and injected with 1% lidocaine with epinephrine. Once anesthesia was obtained, a biopsy(ies) was performed with Gilette blade. The tissue(s) was placed in a labeled container(s) with formalin and sent to pathology. Hemostasis was achieved with aluminum chloride. Vaseline and a bandage were applied to the wound(s). The patient tolerated the procedure well and was given post biopsy care  "instructions.    Follow up: one month if scalp not improved with the above     Jannie ENGEL [facility]  Dermatology    _______________________________________    CC: Derm Problem (Pt states, \" Here for a rash on the back of her scalp, it comes and goes, been around for about a year, has tried tea tree oil \" )    HPI:  Ms. Emma Nunn is a(n) 59 year old female who presents today as a new patient for evaluation of a rash on the scalp. This rash has been present for about a year and will wax and wane in severity. When it flares it is itchy and can sometimes be painful/burning. She has tried tea tree oil which has helped slightly. She is wondering about treatment options for this.     Patient is otherwise feeling well, without additional skin concerns.      Physical Exam:  SKIN: Focused examination of scalp and face was performed.  - NUB, left upper cutaneous lip, brown asymmetric macule   - occipital scalp, well-demarcated erythematous plaques with overlying greasy scale    - No other lesions of concern on areas examined.     Medications:  Current Outpatient Medications   Medication Sig Dispense Refill     levothyroxine (SYNTHROID/LEVOTHROID) 88 MCG tablet Take 1 tablet (88 mcg) by mouth daily 90 tablet 3     multivitamin w/minerals (THERA-VIT-M) tablet Take 1 tablet by mouth daily       hydrOXYzine HCl (ATARAX) 25 MG tablet Take 1-2 tablets (25-50 mg) by mouth 3 times daily as needed for anxiety. (Patient not taking: Reported on 7/10/2025) 90 tablet 1     sertraline (ZOLOFT) 50 MG tablet Take 1 tablet (50 mg) by mouth daily. (Patient not taking: Reported on 7/10/2025) 90 tablet 1     No current facility-administered medications for this visit.      Past Medical History:   Patient Active Problem List   Diagnosis     Anal fissure     Hypothyroidism     External hemorrhoids     Cervicalgia     CARDIOVASCULAR SCREENING; LDL GOAL LESS THAN 160     Joint pain     Dehydration     Shoulder joint pain     " Chronic idiopathic urticaria     Cholinergic urticaria     MEG (generalized anxiety disorder)     PTSD (post-traumatic stress disorder)     Past Medical History:   Diagnosis Date     Breast cancer (H) 07/2013    Lumpectomy, chemo and radiation.       Closed head injury 07/11/2008     Concussion with loss of consciousness, unspecified duration, subsequent encounter 05/10/2017     Diffuse cystic mastopathy     Fibrocystic breast disease     Endometriosis 1993     External hemorrhoids without mention of complication      Unspecified hypothyroidism        CC Jake Quesada MD  [address] [place]  [place] [address] on close of this encounter.       Again, thank you for allowing me to participate in the care of your patient.        Sincerely,        Jannie Siddiqui PA-C    Electronically signed

## 2025-07-10 NOTE — PROGRESS NOTES
The following medication was given:     MEDICATION:  Lidocaine with epinephrine 1% [id number]  ROUTE: SQ  SITE: see procedure note  DOSE: 1.0 ML  LOT #: [id number]   : [employer]  EXPIRATION DATE: 10-30-26  NDC#: 62674-338-97  Was there drug waste? No   Multi-dose vial: Yes    Lolly Bradford MA  July 10, 2025

## 2025-07-14 LAB
BKR AP ASSOCIATED HPV REPORT: NORMAL
BKR LAB AP GYN ADEQUACY: NORMAL
BKR LAB AP GYN INTERPRETATION: NORMAL
BKR LAB AP PREVIOUS ABNORMAL: NORMAL
PATH REPORT.COMMENTS IMP SPEC: NORMAL
PATH REPORT.COMMENTS IMP SPEC: NORMAL
PATH REPORT.RELEVANT HX SPEC: NORMAL

## 2025-07-16 ENCOUNTER — RESULTS FOLLOW-UP (OUTPATIENT)
Dept: DERMATOLOGY | Facility: CLINIC | Age: 60
End: 2025-07-16
Payer: COMMERCIAL

## 2025-07-16 LAB
PATH REPORT.COMMENTS IMP SPEC: NORMAL
PATH REPORT.COMMENTS IMP SPEC: NORMAL
PATH REPORT.FINAL DX SPEC: NORMAL
PATH REPORT.GROSS SPEC: NORMAL
PATH REPORT.MICROSCOPIC SPEC OTHER STN: NORMAL
PATH REPORT.RELEVANT HX SPEC: NORMAL

## 2025-07-23 DIAGNOSIS — E03.4 HYPOTHYROIDISM DUE TO ACQUIRED ATROPHY OF THYROID: ICD-10-CM

## 2025-07-23 RX ORDER — LEVOTHYROXINE SODIUM 88 UG/1
88 TABLET ORAL DAILY
Qty: 90 TABLET | Refills: 2 | Status: SHIPPED | OUTPATIENT
Start: 2025-07-23

## 2025-07-30 ENCOUNTER — HOSPITAL ENCOUNTER (OUTPATIENT)
Dept: MAMMOGRAPHY | Facility: CLINIC | Age: 60
Discharge: HOME | End: 2025-07-30
Attending: STUDENT IN AN ORGANIZED HEALTH CARE EDUCATION/TRAINING PROGRAM
Payer: COMMERCIAL

## 2025-07-30 DIAGNOSIS — Z12.31 VISIT FOR SCREENING MAMMOGRAM: ICD-10-CM

## 2025-07-30 PROCEDURE — 77067 SCR MAMMO BI INCL CAD: CPT

## (undated) DEVICE — KIT ENDO TURNOVER/PROCEDURE CARRY-ON 101822

## (undated) DEVICE — SYR 30ML LL W/O NDL

## (undated) DEVICE — ENDO CLIP CARTIRDGE K2 MED/LG 10 1112

## (undated) DEVICE — GLOVE PROTEXIS W/NEU-THERA 7.5  2D73TE75

## (undated) DEVICE — SOL NACL 0.9% INJ 1000ML BAG 07983-09

## (undated) DEVICE — DRSG BANDAID 2X3 1/2" FABRIC

## (undated) DEVICE — LUBRICATING JELLY 4.25OZ

## (undated) DEVICE — ENDO TROCAR 05MM VERSAONE BLADED W/STD FIX CANNULA B5STF

## (undated) DEVICE — NDL INSUFFLATION 120MM VERRES 172015

## (undated) DEVICE — STPL SKIN 35W APPOSE 8886803712

## (undated) DEVICE — PACK GENERAL LAPAOSCOPY

## (undated) DEVICE — DRSG BANDAID 1X3" FABRIC

## (undated) DEVICE — ESU HOOK TIP 5MM CONMED

## (undated) DEVICE — ANTIFOG SOLUTION W/FOAM PAD 31142527

## (undated) DEVICE — ESU SUCTION/IRRIGATION SYSTEM PISTOL GRIP

## (undated) DEVICE — TUBING SUCTION 12"X1/4" N612

## (undated) DEVICE — SOL WATER IRRIG 1000ML BOTTLE 07139-09

## (undated) DEVICE — DRSG STERI STRIP 1/2X4" R1547

## (undated) DEVICE — ENDO POUCH GOLD 10MM ECATCH 173050G

## (undated) DEVICE — GLOVE EXAM NITRILE LG

## (undated) DEVICE — STOCKING SLEEVE COMPRESSION CALF MED

## (undated) DEVICE — ENDO CANNULA 05MM VERSAONE UNIVERSAL UNVCA5STF

## (undated) DEVICE — SYR 10ML PREFILLED 0.9% NACL INJ NOT STERILE 306500

## (undated) DEVICE — ESU GROUND PAD UNIVERSAL W/O CORD

## (undated) DEVICE — ENDO TROCAR BLADED 11MM STD VERSAONE B11STF

## (undated) RX ORDER — FENTANYL CITRATE 50 UG/ML
INJECTION, SOLUTION INTRAMUSCULAR; INTRAVENOUS
Status: DISPENSED
Start: 2017-05-24

## (undated) RX ORDER — NEOSTIGMINE METHYLSULFATE 5 MG/5 ML
SYRINGE (ML) INTRAVENOUS
Status: DISPENSED
Start: 2017-05-24

## (undated) RX ORDER — SCOLOPAMINE TRANSDERMAL SYSTEM 1 MG/1
PATCH, EXTENDED RELEASE TRANSDERMAL
Status: DISPENSED
Start: 2017-05-24

## (undated) RX ORDER — DEXAMETHASONE SODIUM PHOSPHATE 10 MG/ML
INJECTION INTRAMUSCULAR; INTRAVENOUS
Status: DISPENSED
Start: 2017-05-24

## (undated) RX ORDER — BUPIVACAINE HYDROCHLORIDE AND EPINEPHRINE 2.5; 5 MG/ML; UG/ML
INJECTION, SOLUTION EPIDURAL; INFILTRATION; INTRACAUDAL; PERINEURAL
Status: DISPENSED
Start: 2017-05-24

## (undated) RX ORDER — LIDOCAINE HYDROCHLORIDE 20 MG/ML
INJECTION, SOLUTION EPIDURAL; INFILTRATION; INTRACAUDAL; PERINEURAL
Status: DISPENSED
Start: 2017-05-24

## (undated) RX ORDER — ONDANSETRON 2 MG/ML
INJECTION INTRAMUSCULAR; INTRAVENOUS
Status: DISPENSED
Start: 2017-05-24

## (undated) RX ORDER — GLYCOPYRROLATE 0.2 MG/ML
INJECTION INTRAMUSCULAR; INTRAVENOUS
Status: DISPENSED
Start: 2017-05-24

## (undated) RX ORDER — ACETAMINOPHEN 10 MG/ML
INJECTION, SOLUTION INTRAVENOUS
Status: DISPENSED
Start: 2017-05-24